# Patient Record
Sex: MALE | Race: WHITE | NOT HISPANIC OR LATINO | Employment: OTHER | URBAN - METROPOLITAN AREA
[De-identification: names, ages, dates, MRNs, and addresses within clinical notes are randomized per-mention and may not be internally consistent; named-entity substitution may affect disease eponyms.]

---

## 2017-03-30 ENCOUNTER — TELEPHONE (OUTPATIENT)
Dept: MEDICAL GROUP | Facility: MEDICAL CENTER | Age: 67
End: 2017-03-30

## 2017-03-30 DIAGNOSIS — R91.1 PULMONARY NODULE: ICD-10-CM

## 2017-03-31 NOTE — TELEPHONE ENCOUNTER
----- Message from Briseyda Sandoval R.N. sent at 3/30/2017 10:38 AM PDT -----  Regarding: Follow-up CT lung due April 2017  Hi Dr. Duncan,    Would you like this patient to have a CT scan repeat of his lungs in April?  I am happy to send him a reminder letter if you place the order.  Just let me know how I can help    Thanks  Briseyda Sandoval  [facility]  [phone]

## 2017-04-06 ENCOUNTER — TELEPHONE (OUTPATIENT)
Dept: MEDICAL GROUP | Facility: MEDICAL CENTER | Age: 67
End: 2017-04-06

## 2017-04-06 ENCOUNTER — HOSPITAL ENCOUNTER (OUTPATIENT)
Dept: RADIOLOGY | Facility: MEDICAL CENTER | Age: 67
End: 2017-04-06
Attending: INTERNAL MEDICINE
Payer: MEDICARE

## 2017-04-06 DIAGNOSIS — R91.1 PULMONARY NODULE: ICD-10-CM

## 2017-04-06 DIAGNOSIS — Z12.11 COLON CANCER SCREENING: ICD-10-CM

## 2017-04-06 PROCEDURE — 71250 CT THORAX DX C-: CPT

## 2017-06-29 ENCOUNTER — PATIENT OUTREACH (OUTPATIENT)
Dept: HEALTH INFORMATION MANAGEMENT | Facility: OTHER | Age: 67
End: 2017-06-29

## 2017-06-29 NOTE — PROGRESS NOTES
Outcome: Left Message    WebIZ Checked & Epic Updated:  yes    [facility]Connect Verified: yes    Attempt # 1

## 2017-07-05 NOTE — PROGRESS NOTES
Attempt #:1    WebIZ Checked & Epic Updated: yes  HealthConnect Verified: no  Verify PCP: yes    Communication Preference Obtained: yes     Review Care Team: yes    Annual Wellness Visit Scheduling  1. Scheduling Status:Scheduled     Care Gap Scheduling (Attempt to Schedule EACH Overdue Care Gap!)     Health Maintenance Due   Topic Date Due   • Annual Wellness Visit  SCHEDULED   • IMM DTaP/Tdap/Td Vaccine (1 - Tdap) SCHEDULED   • IMM ZOSTER VACCINE  SCHEDULED   • PFT SCREENING-FEV1 AND FEV/FVC RATIO / SPIROMETRY SHOULD BE PERFORMED ANNUALLY  NA   • IMM PNEUMOCOCCAL 65+ (ADULT) HIGH/HIGHEST RISK SERIES (2 of 2 - PPSV23) SCHEDULED   • COLONOSCOPY  HAS APPT SCHEDULED         MyChart Activation: declined  MyChart Rozina: no  Virtual Visits: no  Opt In to Text Messages: no

## 2017-08-01 ENCOUNTER — TELEPHONE (OUTPATIENT)
Dept: MEDICAL GROUP | Facility: MEDICAL CENTER | Age: 67
End: 2017-08-01

## 2017-08-07 ENCOUNTER — OFFICE VISIT (OUTPATIENT)
Dept: MEDICAL GROUP | Facility: MEDICAL CENTER | Age: 67
End: 2017-08-07
Payer: MEDICARE

## 2017-08-07 VITALS
BODY MASS INDEX: 22.66 KG/M2 | OXYGEN SATURATION: 95 % | HEART RATE: 77 BPM | DIASTOLIC BLOOD PRESSURE: 60 MMHG | WEIGHT: 141 LBS | TEMPERATURE: 98.8 F | HEIGHT: 66 IN | SYSTOLIC BLOOD PRESSURE: 102 MMHG

## 2017-08-07 DIAGNOSIS — D12.6 ADENOMATOUS POLYP OF COLON, UNSPECIFIED PART OF COLON: Chronic | ICD-10-CM

## 2017-08-07 DIAGNOSIS — Z12.5 PROSTATE CANCER SCREENING: ICD-10-CM

## 2017-08-07 DIAGNOSIS — J44.9 CHRONIC OBSTRUCTIVE PULMONARY DISEASE, UNSPECIFIED COPD TYPE (HCC): Chronic | ICD-10-CM

## 2017-08-07 DIAGNOSIS — Z23 NEED FOR VACCINATION: ICD-10-CM

## 2017-08-07 DIAGNOSIS — R79.89 LOW VITAMIN D LEVEL: ICD-10-CM

## 2017-08-07 DIAGNOSIS — R35.1 NOCTURIA: ICD-10-CM

## 2017-08-07 DIAGNOSIS — Z00.00 MEDICARE ANNUAL WELLNESS VISIT, SUBSEQUENT: ICD-10-CM

## 2017-08-07 DIAGNOSIS — H34.239: ICD-10-CM

## 2017-08-07 DIAGNOSIS — Z72.0 TOBACCO ABUSE: Chronic | ICD-10-CM

## 2017-08-07 DIAGNOSIS — E78.5 DYSLIPIDEMIA: Chronic | ICD-10-CM

## 2017-08-07 DIAGNOSIS — R79.89 LOW VITAMIN B12 LEVEL: Chronic | ICD-10-CM

## 2017-08-07 DIAGNOSIS — I70.0 ATHEROSCLEROSIS OF AORTA (HCC): ICD-10-CM

## 2017-08-07 DIAGNOSIS — D72.829 LEUKOCYTOSIS, UNSPECIFIED TYPE: Chronic | ICD-10-CM

## 2017-08-07 PROCEDURE — G0009 ADMIN PNEUMOCOCCAL VACCINE: HCPCS | Performed by: INTERNAL MEDICINE

## 2017-08-07 PROCEDURE — 90732 PPSV23 VACC 2 YRS+ SUBQ/IM: CPT | Performed by: INTERNAL MEDICINE

## 2017-08-07 PROCEDURE — G0438 PPPS, INITIAL VISIT: HCPCS | Mod: 25 | Performed by: INTERNAL MEDICINE

## 2017-08-07 ASSESSMENT — PATIENT HEALTH QUESTIONNAIRE - PHQ9: CLINICAL INTERPRETATION OF PHQ2 SCORE: 0

## 2017-08-07 NOTE — PROGRESS NOTES
Chief Complaint   Patient presents with   • Annual Wellness Visit         HPI:  Narendra is a 67 y.o. here for Medicare Annual Wellness Visit   medications and allergies reviewed  Cigar 3-4 per day, no cigarettes  Not interested in smoking cessation classes   no cough, no shortness of breath, no chest pain  Using inhalers daily  Taking vitamin D and B-12 multivitamin  Medical history, surgical history, social history, family history reviewed and updated      Patient Active Problem List    Diagnosis Date Noted   • Pulmonary nodule 07/03/2016   • History of hypertension 04/27/2015   • Low vitamin B12 level 02/07/2015   • Leukocytosis 12/31/2013   • Adenomatous colon polyp 07/16/2012   • BPH (benign prostatic hypertrophy) 04/17/2012   • COPD (chronic obstructive pulmonary disease) (HCC) 07/27/2009   • Dyslipidemia 07/27/2009   • History of pneumothorax 07/27/2009   • S/P Splenectomy 07/27/2009   • Preventative health care 07/27/2009   • Tobacco abuse 07/27/2009   • BRAO (branch retinal artery occlusion) 07/27/2009       Current Outpatient Prescriptions   Medication Sig Dispense Refill   • fluticasone-salmeterol (ADVAIR DISKUS) 250-50 MCG/DOSE AEROSOL POWDER, BREATH ACTIVATED Inhale 1 Puff by mouth 2 times a day. 60 Inhaler 4   • tiotropium (SPIRIVA HANDIHALER) 18 MCG Cap Inhale 1 Cap by mouth every day. 30 Cap 11   • tiotropium (SPIRIVA HANDIHALER) 18 MCG Cap Inhale 1 Cap by mouth every day. 30 Cap 11   • zolpidem (AMBIEN) 10 MG Tab Take 1 Tab by mouth at bedtime as needed for Sleep. 15 Each 0   • fluticasone-salmeterol (ADVAIR) 250-50 MCG/DOSE AEROSOL POWDER, BREATH ACTIVATED Inhale 1 Puff by mouth 2 times a day. 3 Inhaler 3   • pravastatin (PRAVACHOL) 20 MG Tab Take 1 Tab by mouth every day. 30 Tab 4   • pneumococcal vaccine (PNEUMOVAX 23) 25 MCG/0.5ML Injection 0.5 mL by Intramuscular route Once PRN for up to 1 dose. 0.5 mL 0   • vitamin D, Ergocalciferol, (DRISDOL) 94547 UNITS Cap capsule Take 1 Cap by mouth every 7  days. 12 Cap 0   • cyanocobalamin (VITAMIN B12) 1000 MCG TABS Take 1 Tab by mouth every day. 90 Tab 3     No current facility-administered medications for this visit.        Patient is taking medications as noted in medication list.  Current supplements as per medication list.     Allergies: Review of patient's allergies indicates no known allergies.    Current social contact/activities: Pt keeps himself busy by working full time.       Is patient current with immunizations? Pneumovax    He  reports that he has been smoking Cigarettes and Cigars.  He has a 70.5 pack-year smoking history. He has never used smokeless tobacco. He reports that he drinks alcohol. He reports that he does not use illicit drugs.  Ready to quit: Not Answered  Counseling given: Not Answered        DPA/Advanced directive: Patient does not have an Advanced Directive.  A packet and workshop information was given on Advanced Directives.    ROS:    Gait: Uses no assistive device   Ostomy: no    Other tubes: no    Amputations: no   Chronic oxygen use no   Last eye exam within the last six months   Wears hearing aids: no   : Denies incontinence.             Depression Screening    Little interest or pleasure in doing things?  0 - not at all  Feeling down, depressed, or hopeless? 0 - not at all  Trouble falling or staying asleep, or sleeping too much?     Feeling tired or having little energy?     Poor appetite or overeating?     Feeling bad about yourself - or that you are a failure or have let yourself or your family down?    Trouble concentrating on things, such as reading the newspaper or watching television?    Moving or speaking so slowly that other people could have noticed.  Or the opposite - being so fidgety or restless that you have been moving around a lot more than usual?     Thoughts that you would be better off dead, or of hurting yourself?     Patient Health Questionnaire Score:        If depressive symptoms identified deferred to  follow up visit unless specifically addressed in assessment and plan.    Interpretation of PHQ-9 Total Score   Score Severity   1-4 No Depression   5-9 Mild Depression   10-14 Moderate Depression   15-19 Moderately Severe Depression   20-27 Severe Depression      Screening for Cognitive Impairment    Three Minute Recall (apple, watch, alcides)  3/3    Draw clock face with all 12 numbers set to the hand to show 10 minutes past 11 o'clock  1 5/5  If cognitive concerns identified, deferred for follow up unless specifically addressed in assessment and plan.    Fall Risk Assessment    Has the patient had two or more falls in the last year or any fall with injury in the last year?  No  If fall risk identified, deferred for follow up unless specifically addressed in assessment and plan.      Safety Assessment    Throw rugs on floor.  No  Handrails on all stairs.  Yes  Good lighting in all hallways.  Yes  Difficulty hearing.  No  Patient counseled about all safety risks that were identified.    Functional Assessment ADLs    Are there any barriers preventing you from cooking for yourself or meeting nutritional needs?  No.    Are there any barriers preventing you from driving safely or obtaining transportation?  No.    Are there any barriers preventing you from using a telephone or calling for help?  No.    Are there any barriers preventing you from shopping?  No.    Are there any barriers preventing you from taking care of your own finances?  No.    Are there any barriers preventing you from managing your medications?  No.    Are you currently engaging any exercise or physical activity?  No.       Health Maintenance Summary                Annual Wellness Visit Overdue 1950     IMM DTaP/Tdap/Td Vaccine Overdue 7/25/1969     IMM ZOSTER VACCINE Overdue 7/25/2010     PFT SCREENING-FEV1 AND FEV/FVC RATIO / SPIROMETRY SHOULD BE PERFORMED ANNUALLY Overdue 2/6/2014      Done 2/6/2013 PFT DICTATED RESULTS     Patient has more  history with this topic...    IMM PNEUMOCOCCAL 65+ (ADULT) HIGH/HIGHEST RISK SERIES Overdue 7/25/2015      Done 1/23/2015 Imm Admin: Pneumococcal Conjugate Vaccine (Prevnar/PCV-13)    IMM INFLUENZA Next Due 9/1/2017     LUNG CANCER SCREENING Next Due 10/6/2017      Done 4/6/2017 CT-CHEST (THORAX) W/O     Patient has more history with this topic...    COLONOSCOPY Next Due 7/28/2020      Done 7/28/2017 REFERRAL TO GI FOR COLONOSCOPY     Patient has more history with this topic...          Adenoma  12/05 colonoscopy [facility] tubular adenoma  6/29/12 colon per [facility], neg, repeat 5 years  7/28/17 colon per [facility] 3 mm polyp descending colon, 10 mm polyp descending colon, 5-8 mm polyp sigmoid colon, 10 mm polyp distal sigmoid colon, 6-8 mm polyp rectum, pathology adenoma and hyperplastic polyps repeat 3 years    BPH  5/11 psa 0.7  4/12 psa 7.5 given course of cipro  5/12/12 psa 2.3 after cipro  12/31/13 psa 1.4  2/7/15 psa 1.1  5/18/16 psa 1.2    COPD  5/08 chest x-ray negative  6/08 PFT FEV1.6 L (54% predicted), FEV/FVC ratio 47%, positive bronchodilator response  6/10 quit smoking tobacco  5/11 still off cigs, smoking 2 cigars per day  5/11 restart spiriva  5/11 cxr negative  12/12 off cigs, still smokes cigars 3-4 per day  12/12 on spiriva, add symbicort 80/4.5  2/7/13 PFT severe stage III COPD, FEV1 1.4 L (46% predicted), FEV/FVC 47% improvement after bronchodilator 13%, normal DLCO; continue spiriva, symbicort 80/1.5, smoking cessation  6/24/16  CT thorax lung cancer screening to spiculated 9 mm nodules RUL underlying emphysematous changes and tiny 3 mm nodule RML, recommend 3 month follow-up CT vs CT/PET  2/7/13 cxr negative  12/26/13 still 3 cigars per day; on spiriva and symbicort  1/23/15 still 3 cigars per day, on spiriva and symbicort  1/26/16 change symbicort to advair 250/50 (insurance) and continue spiriva  6/2/16 CT lung cancer screening visit  7/8/16 C note right upper lobe lung nodules, suspicious in nature,  patient agrees to CT/PET  7/8/16 CT/PET spiculated right upper lobe nodule SUV 1.1, not FDG avid, scarring left upper lobe noted, nonspecific findings, low-grade neoplasm cannot be excluded; per [facility] repeat CT 3 months  10/24/16 CT thorax without; 2 spiculated right upper lobe pulmonary nodules 8 mm and 9 mm in size unchanged, 3 mm right middle lobe unchanged nodule  10/27/16 lung cancer screening program note, recommend referral to pulmonary nodule clinic  4/6/17 CT thorax without; 2 stable 8-9 mm spiculated right upper lobe nodules, stable probably postinflammatory tiny 3 mm RML and RLL nodules, no new suspicious nodules    Dyslipidemia  5/08 chol  239, trig 91,hdl 71,ldl 150  8/09 chol 268,trig 107,hdl 56,ldl 191  8/09 start zocor 20  8/09 stress echo negative  5/11 chol 231,trig 114,hdl 53,ldl 155 off zocor  5/12 chol 215,trig 97,hdl 48,ldl 148 off zocor  12/31/13 chol 215,trig 101,hdl 55,ldl 140,crp 3.0 off statin; 10 year risk 12%, I recommend statin therapy he declines  1/13/14 echo technically difficult study, possible mild LVH  1/13/14 treadmill thallium exercise 6 minutes 30 seconds lashell protocol, limited by fatigue; no ischemia, EF 59%  2/7/15 chol 250,trig 86,hdl 51,ldl 182; urine mac <0.5; 10 year risk calculation 19%, start zocor 20 mg  5/16/16 did not take zocor  5/18/16 chol 239,trig 152,hdl 44,ldl 165    History hypertension  1/13/14 echo technically difficult study, possible mild LVH  1/13/14 treadmill thallium exercise 6 minutes 30 seconds lashell protocol, limited by fatigue; no ischemia, EF 59%  4/27/15 start lisinopril 10 mg   5/16/16 off lisinopril     Leukocytosis  8/09 wbc 8.9  5/11 wbc 11.4  4/12 wbc 12.4 (54%N,34%L)  12/31/13 wbc 12.9 (53%N,22%L),hgb 17,hct 52,mcv 102,platelet 364, CRP 3.0; ? Related to tobacco  2/7/15 wbc 12.4 (49%N,34%L),hgb 17,hct 53,plt [id number]; b12 201,folate 5.3  2/13/15 leukocyte alkaline phosphatase 108 normal  5/18/16 wbc 13.5 (51%N,30%L),hgb 17.8,hct 54,plt 427,b12  249,folate 7  5/18/16 b12 249,folate 7 not on b12    Low B-12  12/7/15 b12 201, folate 5.3, wbc 12.4(49%N,34%L),hgb 17,hct 53,plt [id number]; start b12 1000 mcg daily  5/18/16 b12 249,folate 7,MMA<0.1,Intrinsic factor Ab negative, not on b12    Preventative health  12/26/13 decline flu,pneum,td vac  1/13/14 ultrasound aorta negative  1/23/15 prevnar  1/23/15 menactra  5/16/16 pneumovax written to get [facility]  5/18/16 psa 1.2  5/18/16 vit d 27 on 16320 weekly  7/28/17 colon per [facility] 3 mm polyp descending colon, 10 mm polyp descending colon, 5-8 mm polyp sigmoid colon, 10 mm polyp distal sigmoid colon, 6-8 mm polyp rectum, pathology adenoma and hyperplastic polyps repeat 3 years    Pulmonary nodule  6/24/16  CT thorax lung cancer screening to spiculated 9 mm nodules RUL underlying emphysematous changes and tiny 3 mm nodule RML, recommend 3 month follow-up CT vs CT/PET  7/8/16 IO note right upper lobe lung nodules, suspicious in nature, patient agrees to CT/PET  7/8/16 CT/PET spiculated right upper lobe nodule SUV 1.1, not FDG avid, scarring left upper lobe noted, nonspecific findings, low-grade neoplasm cannot be excluded; per [facility] repeat CT 3 months  10/24/16 CT thorax without; 2 spiculated right upper lobe pulmonary nodules 8 mm and 9 mm in size unchanged, 3 mm right middle lobe unchanged nodule  10/27/16 lung cancer screening program note, recommend referral to pulmonary nodule clinic  11/3/16 lung cancer screening note recent follow-up CT scan unchanged pulmonary nodules, recommend repeat CT scan 6 months with myself  4/6/17 CT thorax without; 2 stable 8-9 mm spiculated right upper lobe nodules, stable probably postinflammatory tiny 3 mm RML and RLL nodules, no new suspicious nodules    Status post splenectomy  During childhood  12/31/13 previously declined vaccines   1/23/15 menactra  1/23/15 prevnar    Tobacco  12/12 off cigs had been smoking 1 to 1.5 packs per day for 40+ years, still smokes cigars 3-4 per  day  2/4/13 cxr negative  4/13 off cigar using electronic cig  12/26/13 still smokes 3 cigar per day  1/13/14 echo technically difficult study, possible mild LVH  1/13/14 treadmill thallium exercise 6 minutes 30 seconds lashell protocol, limited by fatigue; no ischemia, EF 59%  5/16/16 still smoking 3 cigars per day  6/24/16  CT thorax lung cancer screening to spiculated 9 mm nodules RUL underlying emphysematous changes and tiny 3 mm nodule RML, recommend 3 month follow-up CT vs CT/PET  7/8/16 [facility] note right upper lobe lung nodules, suspicious in nature, patient agrees to CT/PET  7/8/16 [facility] note right upper lobe lung nodules, suspicious in nature, patient agrees to CT/PET  7/8/16 CT/PET spiculated right upper lobe nodule SUV 1.1, not FDG avid, scarring left upper lobe noted, nonspecific findings, low-grade neoplasm cannot be excluded; per [facility] repeat CT 3 months  10/24/16 CT thorax without; 2 spiculated right upper lobe pulmonary nodules 8 mm and 9 mm in size unchanged, 3 mm right middle lobe unchanged nodule  10/27/16 lung cancer screening program note, recommend referral to pulmonary nodule clinic  4/6/17 CT thorax without; 2 stable 8-9 mm spiculated right upper lobe nodules, stable probably postinflammatory tiny 3 mm RML and RLL nodules, no new suspicious nodules  8/7/17 no cigarettes, 4 cigars per day        Patient Care Team:  Naveen Duncan M.D. as PCP - General      Social History   Substance Use Topics   • Smoking status: Current Every Day Smoker -- 1.50 packs/day for 47 years     Types: Cigarettes, Cigars     Last Attempt to Quit: 05/17/2012   • Smokeless tobacco: Never Used      Comment: 1.5 ppd since age of 14 yrs.  Quit 4 yrs ago and only smokes cigars   • Alcohol Use: 0.0 - 0.6 oz/week     0-1 Standard drinks or equivalent per week     Family History   Problem Relation Age of Onset   • Cancer Brother      prostate and likely 2 other cancers     He  has a past medical history of Dyslipidemia; Bronchitis  chronic; PNEUMOTHORAX; COPD (chronic obstructive pulmonary disease) (CMS-HCC) (7/27/2009); S/P Splenectomy (7/27/2009); Preventative health care (7/27/2009); Tubular adenoma (7/27/2009); and Tobacco abuse (7/27/2009).   Past Surgical History   Procedure Laterality Date   • Splenectomy     • General lung surgery Left 1980s     Lung biopsy - benign         Exam:      Hearing fair  Dentition fair  Alert, oriented in no acute distress.  Eye contact is good, speech goal directed, affect calm  Lungs clear  Cv s1s2 reg  Rectal exam prostate mild BPH, no nodules, guaiac negative    Assessment and Plan. The following treatment and monitoring plan is recommended:      Assessment  #! Wellness assessment    #2 recent colonoscopy finding polyp pathology adenoma 7/28/17 colon per [facility] 3 mm polyp descending colon, 10 mm polyp descending colon, 5-8 mm polyp sigmoid colon, 10 mm polyp distal sigmoid colon, 6-8 mm polyp rectum, pathology adenoma and hyperplastic polyps repeat 3 years    #3 probable COPD, still smoking 4 cigars per day, has declined primary function tests previously, remains on inhalers, lung cancer screening CT scan thorax done in April 4/6/17 CT thorax without; 2 stable 8-9 mm spiculated right upper lobe nodules, stable probably postinflammatory tiny 3 mm RML and RLL nodules, no new suspicious nodules    #4 Dyslipidemia has declined to take simvastatin 5/18/16 chol 239,trig 152,hdl 44,ldl 165    #5 Leukocytosis most recent labs 5/18/16 wbc 13.5 (51%N,30%L),hgb 17.8,hct 54,plt 427,b12 249,folate 7    #6 Low B-12 taking B-12 vitamin supplementation most recent labs 5/18/16 b12 249,folate 7,MMA<0.1,Intrinsic factor Ab negative, not on b12    #7 Preventative health  12/26/13 decline flu,pneum,td vac  1/13/14 ultrasound aorta negative  1/23/15 prevnar  1/23/15 menactra  5/16/16 pneumovax written to get at pharamacy  5/18/16 psa 1.2  5/18/16 vit d 27 on 789[address] weekly  7/28/17 colon per [facility] 3 mm polyp descending colon, 10 mm  polyp descending colon, 5-8 mm polyp sigmoid colon, 10 mm polyp distal sigmoid colon, 6-8 mm polyp rectum, pathology adenoma and hyperplastic polyps repeat 3 years    #8 Pulmonary nodule most recent CT scan done in April 4/6/17 CT thorax without; 2 stable 8-9 mm spiculated right upper lobe nodules, stable probably postinflammatory tiny 3 mm RML and RLL nodules, no new suspicious nodules    #9 Status post splenectomy previous vaccines administered   1/23/15 menactra  1/23/15 prevnar    #10 Tobacco 8/7/17 no cigarettes, 4 cigars per day    #11 atherosclerosis aorta, no history of aneurysm, risk factors tobacco, dyslipidemia; 10/25/16 atherosclerotic plaque aorta on CT scan thorax    #12 history of low vitamin D         Services suggested:    Health Care Screening recommendations as per orders if indicated.  Referrals offered: PT/OT/Nutrition counseling/Behavioral Health/Smoking cessation as per orders if indicated.    Discussion today about general wellness and lifestyle habits:    · Prevent falls and reduce trip hazards; Cautioned about securing or removing rugs.  · Have a working fire alarm and carbon monoxide detector;   · Engage in regular physical activity and social activities       Follow-up:   Plan  #1 health maintenance is reviewed and updated    #2 colonoscopy in 3 years follow-up adenoma    #3 repeat CT thorax one year follow-up nodules    #4 stop smoking understands long-term risk of cigar smoking lung cancer, worsening lung disease, COPD, head and neck cancer, cardiovascular disease    #5 decline stop smoking classes and medications    #6 labs ordered    #7 pneumococcal 23 vaccine today    #8 repeat Menactra 1/20, has had pneumococcal 13    #9 annual influenza vaccine    #10 labs ordered    #11 no need for hearing test, physical therapy, nutrition consultation    #12 follow-up 6 months    #13 has declined statin therapy understanding may decrease the risk of cardiovascular disease

## 2017-08-07 NOTE — PROGRESS NOTES
Subjective:      Narendra Sun is a 67 y.o. male who presents with Annual Wellness Visit            HPI     Here for wellness         Current Outpatient Prescriptions   Medication Sig Dispense Refill   • fluticasone-salmeterol (ADVAIR DISKUS) 250-50 MCG/DOSE AEROSOL POWDER, BREATH ACTIVATED Inhale 1 Puff by mouth 2 times a day. 60 Inhaler 4   • tiotropium (SPIRIVA HANDIHALER) 18 MCG Cap Inhale 1 Cap by mouth every day. 30 Cap 11   • tiotropium (SPIRIVA HANDIHALER) 18 MCG Cap Inhale 1 Cap by mouth every day. 30 Cap 11   • zolpidem (AMBIEN) 10 MG Tab Take 1 Tab by mouth at bedtime as needed for Sleep. 15 Each 0   • fluticasone-salmeterol (ADVAIR) 250-50 MCG/DOSE AEROSOL POWDER, BREATH ACTIVATED Inhale 1 Puff by mouth 2 times a day. 3 Inhaler 3   • pravastatin (PRAVACHOL) 20 MG Tab Take 1 Tab by mouth every day. 30 Tab 4   • pneumococcal vaccine (PNEUMOVAX 23) 25 MCG/0.5ML Injection 0.5 mL by Intramuscular route Once PRN for up to 1 dose. 0.5 mL 0   • vitamin D, Ergocalciferol, (DRISDOL) 37639 UNITS Cap capsule Take 1 Cap by mouth every 7 days. 12 Cap 0   • cyanocobalamin (VITAMIN B12) 1000 MCG TABS Take 1 Tab by mouth every day. 90 Tab 3     No current facility-administered medications for this visit.     Patient Active Problem List    Diagnosis Date Noted   • Pulmonary nodule 07/03/2016   • History of hypertension 04/27/2015   • Low vitamin B12 level 02/07/2015   • Leukocytosis 12/31/2013   • Adenomatous colon polyp 07/16/2012   • BPH (benign prostatic hypertrophy) 04/17/2012   • COPD (chronic obstructive pulmonary disease) (HCC) 07/27/2009   • Dyslipidemia 07/27/2009   • History of pneumothorax 07/27/2009   • S/P Splenectomy 07/27/2009   • Preventative health care 07/27/2009   • Tobacco abuse 07/27/2009   • BRAO (branch retinal artery occlusion) 07/27/2009       Adenoma  12/05 colonoscopy [facility] tubular adenoma  6/29/12 colon per [facility], neg, repeat 5 years  7/28/17 colon per [facility] 3 mm polyp descending colon, 10 mm  polyp descending colon, 5-8 mm polyp sigmoid colon, 10 mm polyp distal sigmoid colon, 6-8 mm polyp rectum, pathology adenoma and hyperplastic polyps repeat 3 years    BPH  5/11 psa 0.7  4/12 psa 7.5 given course of cipro  5/12/12 psa 2.3 after cipro  12/31/13 psa 1.4  2/7/15 psa 1.1  5/18/16 psa 1.2    brao  Records from ophthalmology july 2009 from [facility] indicate right branch retinal artery occlusion  8/09 MRI/MRA head and neck negative  8/09 protein C, protein S, anticardiolipin antibody, beta 2 glycoprotein antibody, factor II, factor V leyden, homocysteine antithrombin III all negative  8/09 stress echo negative  8/09 holter monitor negative, need to modify risk factors we'll start on statin, he needs to quit smoking    COPD  5/08 chest x-ray negative  6/08 PFT FEV1.6 L (54% predicted), FEV/FVC ratio 47%, positive bronchodilator response  6/10 quit smoking tobacco  5/11 still off cigs, smoking 2 cigars per day  5/11 restart spiriva  5/11 cxr negative  12/12 off cigs, still smokes cigars 3-4 per day  12/12 on spiriva, add symbicort 80/4.5  2/7/13 PFT severe stage III COPD, FEV1 1.4 L (46% predicted), FEV/FVC 47% improvement after bronchodilator 13%, normal DLCO; continue spiriva, symbicort 80/1.5, smoking cessation  6/24/16  CT thorax lung cancer screening to spiculated 9 mm nodules RUL underlying emphysematous changes and tiny 3 mm nodule RML, recommend 3 month follow-up CT vs CT/PET  2/7/13 cxr negative  12/26/13 still 3 cigars per day; on spiriva and symbicort  1/23/15 still 3 cigars per day, on spiriva and symbicort  1/26/16 change symbicort to advair 250/50 (insurance) and continue spiriva  6/2/16 CT lung cancer screening visit  7/8/16 [facility] note right upper lobe lung nodules, suspicious in nature, patient agrees to CT/PET  7/8/16 CT/PET spiculated right upper lobe nodule SUV 1.1, not FDG avid, scarring left upper lobe noted, nonspecific findings, low-grade neoplasm cannot be excluded; per  [facility] repeat CT 3 months  10/24/16 CT thorax without; 2 spiculated right upper lobe pulmonary nodules 8 mm and 9 mm in size unchanged, 3 mm right middle lobe unchanged nodule  10/27/16 lung cancer screening program note, recommend referral to pulmonary nodule clinic  4/6/17 CT thorax without; 2 stable 8-9 mm spiculated right upper lobe nodules, stable probably postinflammatory tiny 3 mm RML and RLL nodules, no new suspicious nodules    Dyslipidemia  5/08 chol  239, trig 91,hdl 71,ldl 150  8/09 chol 268,trig 107,hdl 56,ldl 191  8/09 start zocor 20  8/09 stress echo negative  5/11 chol 231,trig 114,hdl 53,ldl 155 off zocor  5/12 chol 215,trig 97,hdl 48,ldl 148 off zocor  12/31/13 chol 215,trig 101,hdl 55,ldl 140,crp 3.0 off statin; 10 year risk 12%, I recommend statin therapy he declines  1/13/14 echo technically difficult study, possible mild LVH  1/13/14 treadmill thallium exercise 6 minutes 30 seconds lashell protocol, limited by fatigue; no ischemia, EF 59%  2/7/15 chol 250,trig 86,hdl 51,ldl 182; urine mac <0.5; 10 year risk calculation 19%, start zocor 20 mg  5/16/16 did not take zocor  5/18/16 chol 239,trig 152,hdl 44,ldl 165    History hypertension  1/13/14 echo technically difficult study, possible mild LVH  1/13/14 treadmill thallium exercise 6 minutes 30 seconds lashell protocol, limited by fatigue; no ischemia, EF 59%  4/27/15 start lisinopril 10 mg   5/16/16 off lisinopril     History of pneumothorax  1980s status post left upper lobectomy    Leukocytosis  8/09 wbc 8.9  5/11 wbc 11.4  4/12 wbc 12.4 (54%N,34%L)  12/31/13 wbc 12.9 (53%N,22%L),hgb 17,hct 52,mcv 102,platelet 364, CRP 3.0; ? Related to tobacco  2/7/15 wbc 12.4 (49%N,34%L),hgb 17,hct 53,plt [id number]; b12 201,folate 5.3  2/13/15 leukocyte alkaline phosphatase 108 normal  5/18/16 wbc 13.5 (51%N,30%L),hgb 17.8,hct 54,plt 427,b12 249,folate 7  5/18/16 b12 249,folate 7 not on b12    Low B-12  2/7/15 b12 201, folate 5.3, wbc 12.4(49%N,34%L),hgb 17,hct 53,plt  [id number]; start b12 1000 mcg daily  5/18/16 b12 249,folate 7,MMA<0.1,Intrinsic factor Ab negative, not on b12    Preventative health  12/26/13 decline flu,pneum,td vac  1/13/14 ultrasound aorta negative  1/23/15 prevnar  1/23/15 menactra  5/16/16 pneumovax written to get at [facility]  5/18/16 psa 1.2  5/18/16 vit d 27 on [id number] weekly  7/28/17 colon per [facility] 3 mm polyp descending colon, 10 mm polyp descending colon, 5-8 mm polyp sigmoid colon, 10 mm polyp distal sigmoid colon, 6-8 mm polyp rectum, pathology adenoma and hyperplastic polyps repeat 3 years    Pulmonary nodule  6/24/16  CT thorax lung cancer screening to spiculated 9 mm nodules RUL underlying emphysematous changes and tiny 3 mm nodule RML, recommend 3 month follow-up CT vs CT/PET  7/8/16 [facility] note right upper lobe lung nodules, suspicious in nature, patient agrees to CT/PET  7/8/16 CT/PET spiculated right upper lobe nodule SUV 1.1, not FDG avid, scarring left upper lobe noted, nonspecific findings, low-grade neoplasm cannot be excluded; per [facility] repeat CT 3 months  10/24/16 CT thorax without; 2 spiculated right upper lobe pulmonary nodules 8 mm and 9 mm in size unchanged, 3 mm right middle lobe unchanged nodule  10/27/16 lung cancer screening program note, recommend referral to pulmonary nodule clinic  11/3/16 lung cancer screening note recent follow-up CT scan unchanged pulmonary nodules, recommend repeat CT scan 6 months with myself  4/6/17 CT thorax without; 2 stable 8-9 mm spiculated right upper lobe nodules, stable probably postinflammatory tiny 3 mm RML and RLL nodules, no new suspicious nodules    Status post splenectomy  During childhood  12/31/13 previously declined vaccines   1/23/15 menactra  1/23/15 prevnar    Tobacco  12/12 off cigs had been smoking 1 to 1.5 packs per day for 40+ years, still smokes cigars 3-4 per day  2/4/13 cxr negative  4/13 off cigar using electronic cig  12/26/13 still smokes 3 cigar per day  1/13/14 echo technically  difficult study, possible mild LVH  1/13/14 treadmill thallium exercise 6 minutes 30 seconds lashell protocol, limited by fatigue; no ischemia, EF 59%  5/16/16 still smoking 3 cigars per day  6/24/16  CT thorax lung cancer screening to spiculated 9 mm nodules RUL underlying emphysematous changes and tiny 3 mm nodule RML, recommend 3 month follow-up CT vs CT/PET  7/8/16 [facility] note right upper lobe lung nodules, suspicious in nature, patient agrees to CT/PET          Depression Screening    Little interest or pleasure in doing things?  0 - not at all  Feeling down, depressed , or hopeless? 0 - not at all  Trouble falling or staying asleep, or sleeping too much?     Feeling tired or having little energy?     Poor appetite or overeating?     Feeling bad about yourself - or that you are a failure or have let yourself or your family down?    Trouble concentrating on things, such as reading the newspaper or watching television?    Moving or speaking so slowly that other people could have noticed.  Or the opposite - being so fidgety or restless that you have been moving around a lot more than usual?     Thoughts that you would be better off dead, or of hurting yourself?     Patient Health Questionnaire Score:      If depressive symptoms identified deferred to follow up visit unless specifically addressed in assessment and plan.    Interpretation of PHQ-9 Total Score   Score Severity   1-4 No Depression   5-9 Mild Depression   10-14 Moderate Depression   15-19 Moderately Severe Depression   20-27 Severe Depression      Screening for Cognitive Impairment    Three Minute Recall (apple, watch, alcides)  3/3    Draw clock face with all 12 numbers set to the hand to show 10 minutes past 11 o'clock  1 5/5  Cognitive concerns identified deferred for follow up unless specifically addressed in assessment and plan.    Fall Risk Assessment    Has the patient had two or more falls in the last year or any fall with injury in the last year?   "No    Safety Assessment    Throw rugs on floor.  No  Handrails on all stairs.  Yes  Good lighting in all hallways.  Yes  Difficulty hearing.  No  Patient counseled about all safety risks that were identified.    Functional Assessment ADLs    Are there any barriers preventing you from cooking for yourself or meeting nutritional needs?  No.    Are there any barriers preventing you from driving safely or obtaining transportation?  No.    Are there any barriers preventing you from using a telephone or calling for help?  No.    Are there any barriers preventing you from shopping?  No.    Are there any barriers preventing you from taking care of your own finances?  No.    Are there any barriers preventing you from managing your medications?  No.    Are currently engaging any exercise or physical activity?  No.       Health Maintenance Summary                Annual Wellness Visit Overdue 1950     IMM DTaP/Tdap/Td Vaccine Overdue 7/25/1969     IMM ZOSTER VACCINE Overdue 7/25/2010     PFT SCREENING-FEV1 AND FEV/FVC RATIO / SPIROMETRY SHOULD BE PERFORMED ANNUALLY Overdue 2/6/2014      Done 2/6/2013 PFT DICTATED RESULTS     Patient has more history with this topic...    IMM PNEUMOCOCCAL 65+ (ADULT) HIGH/HIGHEST RISK SERIES Overdue 7/25/2015      Done 1/23/2015 Imm Admin: Pneumococcal Conjugate Vaccine (Prevnar/PCV-13)    IMM INFLUENZA Next Due 9/1/2017     LUNG CANCER SCREENING Next Due 10/6/2017      Done 4/6/2017 CT-CHEST (THORAX) W/O     Patient has more history with this topic...    COLONOSCOPY Next Due 7/28/2020      Done 7/28/2017 REFERRAL TO GI FOR COLONOSCOPY     Patient has more history with this topic...          Patient Care Team:  Naveen Duncan M.D. as PCP - CLARISA ColladoPSELENE as Consulting Physician (Oncology)        ROS       Objective:     /60 mmHg  Pulse 77  Temp(Src) 37.1 °C (98.8 °F)  Ht 1.676 m (5' 5.98\")  Wt 63.957 kg (141 lb)  BMI 22.77 kg/m2  SpO2 95%     Physical Exam "   Constitutional: He appears well-developed and well-nourished. No distress.   HENT:   Head: Normocephalic and atraumatic.   Eyes: Conjunctivae are normal. Right eye exhibits no discharge. Left eye exhibits no discharge.   Neck: No JVD present. No thyromegaly present.   Cardiovascular: Normal rate and regular rhythm.    Pulmonary/Chest: Effort normal and breath sounds normal. No respiratory distress. He has no wheezes.   Abdominal: He exhibits no distension.   Neurological: He is alert.   Skin: Skin is warm. He is not diaphoretic.   Psychiatric: He has a normal mood and affect. His behavior is normal.   Nursing note and vitals reviewed.              Assessment/Plan:   Assessment  #! Wellness    #2    Adenoma  7/28/17 colon per [facility] 3 mm polyp descending colon, 10 mm polyp descending colon, 5-8 mm polyp sigmoid colon, 10 mm polyp distal sigmoid colon, 6-8 mm polyp rectum, pathology adenoma and hyperplastic polyps repeat 3 years        COPD  5/08 chest x-ray negative  6/08 PFT FEV1.6 L (54% predicted), FEV/FVC ratio 47%, positive bronchodilator response  6/10 quit smoking tobacco  5/11 still off cigs, smoking 2 cigars per day  5/11 restart spiriva  5/11 cxr negative  12/12 off cigs, still smokes cigars 3-4 per day  12/12 on spiriva, add symbicort 80/4.5  2/7/13 PFT severe stage III COPD, FEV1 1.4 L (46% predicted), FEV/FVC 47% improvement after bronchodilator 13%, normal DLCO; continue spiriva, symbicort 80/1.5, smoking cessation  6/24/16  CT thorax lung cancer screening to spiculated 9 mm nodules RUL underlying emphysematous changes and tiny 3 mm nodule RML, recommend 3 month follow-up CT vs CT/PET  2/7/13 cxr negative  12/26/13 still 3 cigars per day; on spiriva and symbicort  1/23/15 still 3 cigars per day, on spiriva and symbicort  1/26/16 change symbicort to advair 250/50 (insurance) and continue spiriva  6/2/16 CT lung cancer screening visit  7/8/16 [facility] note right upper lobe lung nodules, suspicious in nature,  patient agrees to CT/PET  7/8/16 CT/PET spiculated right upper lobe nodule SUV 1.1, not FDG avid, scarring left upper lobe noted, nonspecific findings, low-grade neoplasm cannot be excluded; per [facility] repeat CT 3 months  10/24/16 CT thorax without; 2 spiculated right upper lobe pulmonary nodules 8 mm and 9 mm in size unchanged, 3 mm right middle lobe unchanged nodule  10/27/16 lung cancer screening program note, recommend referral to pulmonary nodule clinic  4/6/17 CT thorax without; 2 stable 8-9 mm spiculated right upper lobe nodules, stable probably postinflammatory tiny 3 mm RML and RLL nodules, no new suspicious nodules    Dyslipidemia  5/16/16 did not take zocor  5/18/16 chol 239,trig 152,hdl 44,ldl 165      Leukocytosis  5/18/16 b12 249,folate 7 not on b12    Low B-12  5/18/16 b12 249,folate 7,MMA<0.1,Intrinsic factor Ab negative, not on b12    Preventative health  12/26/13 decline flu,pneum,td vac  1/13/14 ultrasound aorta negative  1/23/15 prevnar  1/23/15 menactra  5[facility]16/[facility]  5/18/16 psa 1.2  5/18/16 vit d 27 on [id number] weekly  7/28/17 colon per [facility] 3 mm polyp descending colon, 10 mm polyp descending colon, 5-8 mm polyp sigmoid colon, 10 mm polyp distal sigmoid colon, 6-8 mm polyp rectum, pathology adenoma and hyperplastic polyps repeat 3 years    Pulmonary nodule  6/24/16  CT thorax lung cancer screening to spiculated 9 mm nodules RUL underlying emphysematous changes and tiny 3 mm nodule RML, recommend 3 month follow-up CT vs CT/PET  7/8/16 [facility] note right upper lobe lung nodules, suspicious in nature, patient agrees to CT/PET  7/8/16 CT/PET spiculated right upper lobe nodule SUV 1.1, not FDG avid, scarring left upper lobe noted, nonspecific findings, low-grade neoplasm cannot be excluded; per [facility] repeat CT 3 months  10/24/16 CT thorax without; 2 spiculated right upper lobe pulmonary nodules 8 mm and 9 mm in size unchanged, 3 mm right middle lobe unchanged nodule  10/27/16  lung cancer screening program note, recommend referral to pulmonary nodule clinic  11/3/16 lung cancer screening note recent follow-up CT scan unchanged pulmonary nodules, recommend repeat CT scan 6 months with myself  4/6/17 CT thorax without; 2 stable 8-9 mm spiculated right upper lobe nodules, stable probably postinflammatory tiny 3 mm RML and RLL nodules, no new suspicious nodules    Status post splenectomy  During childhood  12/31/13 previously declined vaccines   1/23/15 menactra  1/23/15 prevnar    Tobacco  12/12 off cigs had been smoking 1 to 1.5 packs per day for 40+ years, still smokes cigars 3-4 per day  2/4/13 cxr negative  4/13 off cigar using electronic cig  12/26/13 still smokes 3 cigar per day  1/13/14 echo technically difficult study, possible mild LVH  1/13/14 treadmill thallium exercise 6 minutes 30 seconds lashell protocol, limited by fatigue; no ischemia, EF 59%  5/16/16 still smoking 3 cigars per day  6/24/16  CT thorax lung cancer screening to spiculated 9 mm nodules RUL underlying emphysematous changes and tiny 3 mm nodule RML, recommend 3 month follow-up CT vs CT/PET  7/8/16 [facility] note right upper lobe lung nodules, suspicious in nature, patient agrees to CT/PET    Aorta calcium           Plan  #1 stop smoking recommended    #2 recommend stop smoking classes    #3 understands long term risk of smoking     #4 repeat labs    #5 pneumovax     #6 PFT

## 2017-08-07 NOTE — MR AVS SNAPSHOT
"        Narendra Sun   2017 10:20 AM   Office Visit   MRN: [id number]    Department:  [facility]   Dept Phone:  [phone]    Description:  Male : 1950   Provider:  Naveen Duncan M.D.; [facility]            Reason for Visit     Annual Wellness Visit           Allergies as of 2017     No Known Allergies      You were diagnosed with     Medicare annual wellness visit, subsequent   [875914]       Need for vaccination   [311869]       Tobacco abuse   [617212]       Dyslipidemia   [237273]       Leukocytosis, unspecified type   [8183093]       Low vitamin B12 level   [176822]       Low vitamin D level   [8146046]       Nocturia   [788.43.ICD-9-CM]       Prostate cancer screening   [553351]       Chronic obstructive pulmonary disease, unspecified COPD type (CMS-HCC)   [4950534]       Adenomatous polyp of colon, unspecified part of colon   [0728351]       BRAO (branch retinal artery occlusion), unspecified laterality   [184708]         Vital Signs     Blood Pressure Pulse Temperature Height Weight Body Mass Index    102/60 mmHg 77 37.1 °C (98.8 °F) 1.676 m (5' 5.98\") 63.957 kg (141 lb) 22.77 kg/m2    Oxygen Saturation Smoking Status                95% Current Every Day Smoker          Basic Information     Date Of Birth Sex Race Ethnicity Preferred Language    1950 Male White Non- English      Problem List              ICD-10-CM Priority Class Noted - Resolved    COPD (chronic obstructive pulmonary disease) (HCC) (Chronic) J44.9   2009 - Present    Dyslipidemia (Chronic) E78.5   2009 - Present    History of pneumothorax Z87.09   2009 - Present    S/P Splenectomy (Chronic)    2009 - Present    Preventative health care (Chronic) Z00.00   2009 - Present    Tobacco abuse (Chronic) Z72.0   2009 - Present    BRAO (branch retinal artery occlusion) H34.239   2009 - Present    BPH (benign prostatic hypertrophy) (Chronic) N40.0   " 4/17/2012 - Present    Adenomatous colon polyp (Chronic) D12.6   7/16/2012 - Present    Leukocytosis (Chronic) D72.829   12/31/2013 - Present    Low vitamin B12 level (Chronic) E53.8   2/7/2015 - Present    History of hypertension Z86.79   4/27/2015 - Present    Pulmonary nodule (Chronic) R91.1   7/3/2016 - Present      Health Maintenance        Date Due Completion Dates    IMM DTaP/Tdap/Td Vaccine (1 - Tdap) 7/25/1969 ---    IMM ZOSTER VACCINE 7/25/2010 ---    IMM PNEUMOCOCCAL 65+ (ADULT) HIGH/HIGHEST RISK SERIES (2 of 2 - PPSV23) 7/25/2015 1/23/2015    IMM INFLUENZA (1) 9/1/2017 ---    COLONOSCOPY 7/28/2020 7/28/2017, 6/29/2012            Current Immunizations     13-VALENT PCV PREVNAR 1/23/2015    Meningococcal Conjugate Vaccine MCV4 (Menactra) 1/23/2015    Pneumococcal polysaccharide vaccine (PPSV-23) 8/7/2017      Below and/or attached are the medications your provider expects you to take. Review all of your home medications and newly ordered medications with your provider and/or pharmacist. Follow medication instructions as directed by your provider and/or pharmacist. Please keep your medication list with you and share with your provider. Update the information when medications are discontinued, doses are changed, or new medications (including over-the-counter products) are added; and carry medication information at all times in the event of emergency situations     Allergies:  No Known Allergies          Medications  Valid as of: August 07, 2017 - 11:34 AM    Generic Name Brand Name Tablet Size Instructions for use    Cyanocobalamin (Tab) VITAMIN B12 1000 MCG Take 1 Tab by mouth every day.        Ergocalciferol (Cap) DRISDOL 05721 UNITS Take 1 Cap by mouth every 7 days.        Fluticasone-Salmeterol (AEROSOL POWDER, BREATH ACTIVATED) ADVAIR 250-50 MCG/DOSE Inhale 1 Puff by mouth 2 times a day.        Tiotropium Bromide Monohydrate (Cap) SPIRIVA 18 MCG Inhale 1 Cap by mouth every day.        Zolpidem Tartrate  (Tab) AMBIEN 10 MG Take 1 Tab by mouth at bedtime as needed for Sleep.        .                 Medicines prescribed today were sent to:     [id number] - [place], [place] - [address]    [address] [facility] [place] [address]    Phone: [phone] Fax: [phone]    Open 24 Hours?: No      Medication refill instructions:       If your prescription bottle indicates you have medication refills left, it is not necessary to call your provider’s office. Please contact your pharmacy and they will refill your medication.    If your prescription bottle indicates you do not have any refills left, you may request refills at any time through one of the following ways: The online [employer] system (except Urgent Care), by calling your provider’s office, or by asking your pharmacy to contact your provider’s office with a refill request. Medication refills are processed only during regular business hours and may not be available until the next business day. Your provider may request additional information or to have a follow-up visit with you prior to refilling your medication.   *Please Note: Medication refills are assigned a new Rx number when refilled electronically. Your pharmacy may indicate that no refills were authorized even though a new prescription for the same medication is available at the pharmacy. Please request the medicine by name with the pharmacy before contacting your provider for a refill.        Your To Do List     Future Labs/Procedures Complete By Expires    CBC WITH DIFFERENTIAL  As directed 8/8/2018    COMP METABOLIC PANEL  As directed 8/8/2018    LIPID PROFILE  As directed 8/8/2018    PROSTATE SPECIFIC AG SCREENING  As directed 8/8/2018    TSH  As directed 8/8/2018    URINALYSIS,CULTURE IF INDICATED  As directed 8/8/2018    VITAMIN B12  As directed 8/8/2018    VITAMIN D,25 HYDROXY  As directed 8/8/2018      Other Notes About Your Plan        5/18/16 wbc 13.5 (51%N,30%L),hgb 17.8,hct 54,plt 427,b12  249,folate 7 ??splenectomy  5/18/16 b12 249,folate 7,homocysteine 12  5/20/16 start pravachol 20 mg repeat labs in 4 weeks, check cmp,cbc,jak2 mutation  4/18 repeat CT lung cancer screening                     [employer] Access Code: [email or web address]  Expires: 9/6/2017 10:28 AM    Faraday Bicycles  A secure, online tool to manage your health information     [employer]’s Faraday Bicycles® is a secure, online tool that connects you to your personalized health information from the privacy of your home -- day or night - making it very easy for you to manage your healthcare. Once the activation process is completed, you can even access your medical information using the Faraday Bicycles rozina, which is available for free in the Apple Rozina store or Google Play store.     [employer] provides the following levels of access (as shown below):   My Chart Features   [facility] Primary Care Doctor [facility]  Specialists [facility]  Urgent  Care Non-[facility]  Primary Care  Doctor   Email your healthcare team securely and privately 24/7 X X X    Manage appointments: schedule your next appointment; view details of past/upcoming appointments X      Request prescription refills. X      View recent personal medical records, including lab and immunizations X X X X   View health record, including health history, allergies, medications X X X X   Read reports about your outpatient visits, procedures, consult and ER notes X X X X   See your discharge summary, which is a recap of your hospital and/or ER visit that includes your diagnosis, lab results, and care plan. X X       How to register for [employer]:  1. Go to  [email or web address].  2. Click on the Sign Up Now box, which takes you to the New Member Sign Up page. You will need to provide the following information:  a. Enter your Far[employer]icy[employer] Access Code exactly as it appears at the top of this page. (You will not need to use this code after you’ve completed the sign-up process. If you do not sign up before the expiration date,  you must request a new code.)   b. Enter your date of birth.   c. Enter your home email address.   d. Click Submit, and follow the next screen’s instructions.  3. Create a BoxFox ID. This will be your BoxFox login ID and cannot be changed, so think of one that is secure and easy to remember.  4. Create a [employer]wot password. You can change your password at any time.  5. Enter your Password Reset Question and Answer. This can be used at a later time if you forget your password.   6. Enter your e-mail address. This allows you to receive e-mail notifications when new information is available in BoxFox.  7. Click Sign Up. You can now view your health information.    For assistance activating your Box[employer] account, call [phone]        Quit Tobacco Information     Do you want to quit using tobacco?    Quitting tobacco decreases risks of cancer, heart and lung disease, increases life expectancy, improves sense of taste and smell, and increases spending money, among other benefits.    If you are thinking about quitting, we can help.  • [facility] Quit Tobacco Program: 327.623.9312  o Program occurs weekly for four weeks and includes pharmacist consultation on products to support quitting smoking or chewing tobacco. A provider referral is needed for pharmacist consultation.  • Tobacco Users Help Hotline: 7-841-QUITNOW (893-3851) or https://[place].quitlogix.org/  o Free, confidential telephone and online coaching for [place] residents. Sessions are designed on a schedule that is convenient for you. Eligible clients receive free nicotine replacement therapy.  • Nationally: www.smokefree.gov  o Information and professional assistance to support both immediate and long-term needs as you become, and remain, a non-smoker. Smokefree.gov allows you to choose the help that best fits your needs.

## 2017-08-14 ENCOUNTER — HOSPITAL ENCOUNTER (OUTPATIENT)
Dept: LAB | Facility: MEDICAL CENTER | Age: 67
End: 2017-08-14
Attending: INTERNAL MEDICINE
Payer: MEDICARE

## 2017-08-14 ENCOUNTER — TELEPHONE (OUTPATIENT)
Dept: MEDICAL GROUP | Facility: MEDICAL CENTER | Age: 67
End: 2017-08-14

## 2017-08-14 DIAGNOSIS — R79.89 LOW VITAMIN D LEVEL: ICD-10-CM

## 2017-08-14 DIAGNOSIS — R35.1 NOCTURIA: ICD-10-CM

## 2017-08-14 DIAGNOSIS — R79.89 LOW VITAMIN B12 LEVEL: Chronic | ICD-10-CM

## 2017-08-14 DIAGNOSIS — E78.5 DYSLIPIDEMIA: Chronic | ICD-10-CM

## 2017-08-14 DIAGNOSIS — Z12.5 PROSTATE CANCER SCREENING: ICD-10-CM

## 2017-08-14 DIAGNOSIS — Z00.00 PREVENTATIVE HEALTH CARE: Chronic | ICD-10-CM

## 2017-08-14 LAB
25(OH)D3 SERPL-MCNC: 23 NG/ML (ref 30–100)
ALBUMIN SERPL BCP-MCNC: 3.7 G/DL (ref 3.2–4.9)
ALBUMIN/GLOB SERPL: 1.4 G/DL
ALP SERPL-CCNC: 68 U/L (ref 30–99)
ALT SERPL-CCNC: 13 U/L (ref 2–50)
ANION GAP SERPL CALC-SCNC: 6 MMOL/L (ref 0–11.9)
APPEARANCE UR: CLEAR
AST SERPL-CCNC: 16 U/L (ref 12–45)
BASOPHILS # BLD AUTO: 0.6 % (ref 0–1.8)
BASOPHILS # BLD: 0.07 K/UL (ref 0–0.12)
BILIRUB SERPL-MCNC: 0.6 MG/DL (ref 0.1–1.5)
BILIRUB UR QL STRIP.AUTO: NEGATIVE
BUN SERPL-MCNC: 19 MG/DL (ref 8–22)
CALCIUM SERPL-MCNC: 8.9 MG/DL (ref 8.5–10.5)
CHLORIDE SERPL-SCNC: 108 MMOL/L (ref 96–112)
CHOLEST SERPL-MCNC: 205 MG/DL (ref 100–199)
CO2 SERPL-SCNC: 24 MMOL/L (ref 20–33)
COLOR UR: YELLOW
CREAT SERPL-MCNC: 0.93 MG/DL (ref 0.5–1.4)
CULTURE IF INDICATED INDCX: NO UA CULTURE
EOSINOPHIL # BLD AUTO: 0.87 K/UL (ref 0–0.51)
EOSINOPHIL NFR BLD: 7.2 % (ref 0–6.9)
ERYTHROCYTE [DISTWIDTH] IN BLOOD BY AUTOMATED COUNT: 51.8 FL (ref 35.9–50)
GFR SERPL CREATININE-BSD FRML MDRD: >60 ML/MIN/1.73 M 2
GLOBULIN SER CALC-MCNC: 2.7 G/DL (ref 1.9–3.5)
GLUCOSE SERPL-MCNC: 76 MG/DL (ref 65–99)
GLUCOSE UR STRIP.AUTO-MCNC: NEGATIVE MG/DL
HCT VFR BLD AUTO: 51.2 % (ref 42–52)
HDLC SERPL-MCNC: 36 MG/DL
HGB BLD-MCNC: 17.2 G/DL (ref 14–18)
IMM GRANULOCYTES # BLD AUTO: 0.05 K/UL (ref 0–0.11)
IMM GRANULOCYTES NFR BLD AUTO: 0.4 % (ref 0–0.9)
KETONES UR STRIP.AUTO-MCNC: NEGATIVE MG/DL
LDLC SERPL CALC-MCNC: 141 MG/DL
LEUKOCYTE ESTERASE UR QL STRIP.AUTO: NEGATIVE
LYMPHOCYTES # BLD AUTO: 3.92 K/UL (ref 1–4.8)
LYMPHOCYTES NFR BLD: 32.4 % (ref 22–41)
MCH RBC QN AUTO: 33.9 PG (ref 27–33)
MCHC RBC AUTO-ENTMCNC: 33.6 G/DL (ref 33.7–35.3)
MCV RBC AUTO: 100.8 FL (ref 81.4–97.8)
MICRO URNS: NORMAL
MONOCYTES # BLD AUTO: 1.12 K/UL (ref 0–0.85)
MONOCYTES NFR BLD AUTO: 9.3 % (ref 0–13.4)
NEUTROPHILS # BLD AUTO: 6.06 K/UL (ref 1.82–7.42)
NEUTROPHILS NFR BLD: 50.1 % (ref 44–72)
NITRITE UR QL STRIP.AUTO: NEGATIVE
NRBC # BLD AUTO: 0 K/UL
NRBC BLD AUTO-RTO: 0 /100 WBC
PH UR STRIP.AUTO: 6 [PH]
PLATELET # BLD AUTO: 401 K/UL (ref 164–446)
PMV BLD AUTO: 9.8 FL (ref 9–12.9)
POTASSIUM SERPL-SCNC: 4.2 MMOL/L (ref 3.6–5.5)
PROT SERPL-MCNC: 6.4 G/DL (ref 6–8.2)
PROT UR QL STRIP: NEGATIVE MG/DL
PSA SERPL-MCNC: 1.29 NG/ML (ref 0–4)
RBC # BLD AUTO: 5.08 M/UL (ref 4.7–6.1)
RBC UR QL AUTO: NEGATIVE
SODIUM SERPL-SCNC: 138 MMOL/L (ref 135–145)
SP GR UR STRIP.AUTO: 1.02
TRIGL SERPL-MCNC: 142 MG/DL (ref 0–149)
TSH SERPL DL<=0.005 MIU/L-ACNC: 1.65 UIU/ML (ref 0.3–3.7)
UROBILINOGEN UR STRIP.AUTO-MCNC: 0.2 MG/DL
VIT B12 SERPL-MCNC: 273 PG/ML (ref 211–911)
WBC # BLD AUTO: 12.1 K/UL (ref 4.8–10.8)

## 2017-08-14 PROCEDURE — 80053 COMPREHEN METABOLIC PANEL: CPT

## 2017-08-14 PROCEDURE — 81270 JAK2 GENE: CPT

## 2017-08-14 PROCEDURE — 84153 ASSAY OF PSA TOTAL: CPT

## 2017-08-14 PROCEDURE — 36415 COLL VENOUS BLD VENIPUNCTURE: CPT

## 2017-08-14 PROCEDURE — 84443 ASSAY THYROID STIM HORMONE: CPT

## 2017-08-14 PROCEDURE — 82306 VITAMIN D 25 HYDROXY: CPT

## 2017-08-14 PROCEDURE — 85025 COMPLETE CBC W/AUTO DIFF WBC: CPT

## 2017-08-14 PROCEDURE — 81003 URINALYSIS AUTO W/O SCOPE: CPT

## 2017-08-14 PROCEDURE — 82607 VITAMIN B-12: CPT

## 2017-08-14 PROCEDURE — 80061 LIPID PANEL: CPT

## 2017-08-15 NOTE — TELEPHONE ENCOUNTER
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

## 2017-08-19 LAB — JAK2 P.V617F MUT/NOR BLD/T: 0 %

## 2017-10-10 DIAGNOSIS — J44.9 CHRONIC OBSTRUCTIVE PULMONARY DISEASE, UNSPECIFIED COPD TYPE (HCC): ICD-10-CM

## 2017-10-10 RX ORDER — TIOTROPIUM BROMIDE 18 UG/1
18 CAPSULE ORAL; RESPIRATORY (INHALATION) DAILY
Qty: 30 CAP | Refills: 11 | Status: SHIPPED | OUTPATIENT
Start: 2017-10-10 | End: 2017-11-09 | Stop reason: SDUPTHER

## 2017-10-10 NOTE — TELEPHONE ENCOUNTER
Was the patient seen in the last year in this department? Yes     Does patient have an active prescription for medications requested? No     Received Request Via: Patient     Pt is calling to request refills on both medications. Would like to pick Rx's up in office tomorrow.

## 2017-11-09 DIAGNOSIS — J44.9 CHRONIC OBSTRUCTIVE PULMONARY DISEASE, UNSPECIFIED COPD TYPE (HCC): ICD-10-CM

## 2017-11-09 RX ORDER — TIOTROPIUM BROMIDE 18 UG/1
18 CAPSULE ORAL; RESPIRATORY (INHALATION) DAILY
Qty: 30 CAP | Refills: 6 | Status: SHIPPED | OUTPATIENT
Start: 2017-11-09 | End: 2017-11-09 | Stop reason: SDUPTHER

## 2017-11-09 RX ORDER — TIOTROPIUM BROMIDE 18 UG/1
18 CAPSULE ORAL; RESPIRATORY (INHALATION) DAILY
Qty: 30 CAP | Refills: 6 | Status: SHIPPED | OUTPATIENT
Start: 2017-11-09 | End: 2018-10-04 | Stop reason: SDUPTHER

## 2017-11-09 NOTE — TELEPHONE ENCOUNTER
Pablo Paz    Pt switching to a new pharmacy, would like hard copies left at  for .    Was the patient seen in the last year in this department? Yes Last 8/7/17    Does patient have an active prescription for medications requested? Yes     Received Request Via: Patient

## 2017-11-29 ENCOUNTER — OFFICE VISIT (OUTPATIENT)
Dept: MEDICAL GROUP | Facility: MEDICAL CENTER | Age: 67
End: 2017-11-29
Payer: MEDICARE

## 2017-11-29 ENCOUNTER — HOSPITAL ENCOUNTER (OUTPATIENT)
Dept: LAB | Facility: MEDICAL CENTER | Age: 67
End: 2017-11-29
Attending: FAMILY MEDICINE
Payer: MEDICARE

## 2017-11-29 VITALS
DIASTOLIC BLOOD PRESSURE: 84 MMHG | BODY MASS INDEX: 22.66 KG/M2 | TEMPERATURE: 98 F | WEIGHT: 141 LBS | HEIGHT: 66 IN | RESPIRATION RATE: 16 BRPM | OXYGEN SATURATION: 94 % | HEART RATE: 88 BPM | SYSTOLIC BLOOD PRESSURE: 140 MMHG

## 2017-11-29 DIAGNOSIS — Z23 NEED FOR VACCINATION: ICD-10-CM

## 2017-11-29 DIAGNOSIS — B35.1 FUNGAL INFECTION OF TOENAIL: ICD-10-CM

## 2017-11-29 DIAGNOSIS — M10.9 ACUTE GOUT INVOLVING TOE OF RIGHT FOOT, UNSPECIFIED CAUSE: ICD-10-CM

## 2017-11-29 DIAGNOSIS — J40 BRONCHITIS: ICD-10-CM

## 2017-11-29 PROBLEM — M79.671 RIGHT FOOT PAIN: Status: ACTIVE | Noted: 2017-11-29

## 2017-11-29 LAB
ANISOCYTOSIS BLD QL SMEAR: ABNORMAL
BASOPHILS # BLD AUTO: 0 % (ref 0–1.8)
BASOPHILS # BLD: 0 K/UL (ref 0–0.12)
EOSINOPHIL # BLD AUTO: 0.8 K/UL (ref 0–0.51)
EOSINOPHIL NFR BLD: 4.1 % (ref 0–6.9)
ERYTHROCYTE [DISTWIDTH] IN BLOOD BY AUTOMATED COUNT: 50.4 FL (ref 35.9–50)
HCT VFR BLD AUTO: 51.7 % (ref 42–52)
HGB BLD-MCNC: 17.5 G/DL (ref 14–18)
LYMPHOCYTES # BLD AUTO: 6.3 K/UL (ref 1–4.8)
LYMPHOCYTES NFR BLD: 32.3 % (ref 22–41)
MACROCYTES BLD QL SMEAR: ABNORMAL
MANUAL DIFF BLD: NORMAL
MCH RBC QN AUTO: 33.1 PG (ref 27–33)
MCHC RBC AUTO-ENTMCNC: 33.8 G/DL (ref 33.7–35.3)
MCV RBC AUTO: 97.9 FL (ref 81.4–97.8)
METAMYELOCYTES NFR BLD MANUAL: 2 %
MONOCYTES # BLD AUTO: 1.97 K/UL (ref 0–0.85)
MONOCYTES NFR BLD AUTO: 10.1 % (ref 0–13.4)
MORPHOLOGY BLD-IMP: NORMAL
NEUTROPHILS # BLD AUTO: 10.04 K/UL (ref 1.82–7.42)
NEUTROPHILS NFR BLD: 51.5 % (ref 44–72)
NRBC # BLD AUTO: 0 K/UL
NRBC BLD AUTO-RTO: 0 /100 WBC
PLATELET # BLD AUTO: 412 K/UL (ref 164–446)
PLATELET BLD QL SMEAR: NORMAL
PMV BLD AUTO: 9.3 FL (ref 9–12.9)
POLYCHROMASIA BLD QL SMEAR: NORMAL
RBC # BLD AUTO: 5.28 M/UL (ref 4.7–6.1)
RBC BLD AUTO: PRESENT
URATE SERPL-MCNC: 5.6 MG/DL (ref 2.5–8.3)
VARIANT LYMPHS BLD QL SMEAR: NORMAL
WBC # BLD AUTO: 19.5 K/UL (ref 4.8–10.8)

## 2017-11-29 PROCEDURE — 84550 ASSAY OF BLOOD/URIC ACID: CPT

## 2017-11-29 PROCEDURE — G0008 ADMIN INFLUENZA VIRUS VAC: HCPCS | Performed by: FAMILY MEDICINE

## 2017-11-29 PROCEDURE — 99214 OFFICE O/P EST MOD 30 MIN: CPT | Mod: 25 | Performed by: FAMILY MEDICINE

## 2017-11-29 PROCEDURE — 85007 BL SMEAR W/DIFF WBC COUNT: CPT

## 2017-11-29 PROCEDURE — 90662 IIV NO PRSV INCREASED AG IM: CPT | Performed by: FAMILY MEDICINE

## 2017-11-29 PROCEDURE — 36415 COLL VENOUS BLD VENIPUNCTURE: CPT

## 2017-11-29 PROCEDURE — 85027 COMPLETE CBC AUTOMATED: CPT

## 2017-11-29 RX ORDER — AZITHROMYCIN 250 MG/1
TABLET, FILM COATED ORAL
Qty: 6 TAB | Refills: 0 | Status: SHIPPED | OUTPATIENT
Start: 2017-11-29 | End: 2017-12-04

## 2017-11-29 RX ORDER — CICLOPIROX 80 MG/ML
SOLUTION TOPICAL
Qty: 6.6 ML | Refills: 3 | Status: SHIPPED | OUTPATIENT
Start: 2017-11-29 | End: 2020-02-13

## 2017-11-29 RX ORDER — INDOMETHACIN 50 MG/1
50 CAPSULE ORAL 3 TIMES DAILY PRN
Qty: 30 CAP | Refills: 0 | Status: SHIPPED | OUTPATIENT
Start: 2017-11-29 | End: 2018-10-04

## 2017-11-29 NOTE — ASSESSMENT & PLAN NOTE
Illness: 8 days of illness including: nasal congestion, cough ,  Symptoms negative for fever, chills,   Treatments tried: none   Since onset, symptoms are worse   Similarly ill exposures: yes  Medical history negative for asthma  He  reports that he has been smoking Cigars.  He has a 70.50 pack-year smoking history. He has never used smokeless tobacco.

## 2017-11-29 NOTE — PATIENT INSTRUCTIONS
Low-Purine Diet  Purines are compounds that affect the level of uric acid in your body. A low-purine diet is a diet that is low in purines. Eating a low-purine diet can prevent the level of uric acid in your body from getting too high and causing gout or kidney stones or both.  WHAT DO I NEED TO KNOW ABOUT THIS DIET?  · Choose low-purine foods. Examples of low-purine foods are listed in the next section.  · Drink plenty of fluids, especially water. Fluids can help remove uric acid from your body. Try to drink 8-16 cups (1.9-3.8 L) a day.  · Limit foods high in fat, especially saturated fat, as fat makes it harder for the body to get rid of uric acid. Foods high in saturated fat include pizza, cheese, ice cream, whole milk, fried foods, and gravies. Choose foods that are lower in fat and lean sources of protein. Use olive oil when cooking as it contains healthy fats that are not high in saturated fat.  · Limit alcohol. Alcohol interferes with the elimination of uric acid from your body. If you are having a gout attack, avoid all alcohol.  · Keep in mind that different people's bodies react differently to different foods. You will probably learn over time which foods do or do not affect you. If you discover that a food tends to cause your gout to flare up, avoid eating that food. You can more freely enjoy foods that do not cause problems. If you have any questions about a food item, talk to your dietitian or health care provider.  WHICH FOODS ARE LOW, MODERATE, AND HIGH IN PURINES?  The following is a list of foods that are low, moderate, and high in purines. You can eat any amount of the foods that are low in purines. You may be able to have small amounts of foods that are moderate in purines. Ask your health care provider how much of a food moderate in purines you can have. Avoid foods high in purines.  Grains  · Foods low in purines: Enriched white bread, pasta, rice, cake, cornbread, popcorn.  · Foods moderate in  purines: Whole-grain breads and cereals, wheat germ, bran, oatmeal. Uncooked oatmeal. Dry wheat bran or wheat germ.  · Foods high in purines: Pancakes, Lao toast, biscuits, muffins.  Vegetables  · Foods low in purines: All vegetables, except those that are moderate in purines.  · Foods moderate in purines: Asparagus, cauliflower, spinach, mushrooms, green peas.  Fruits  · All fruits are low in purines.  Meats and other Protein Foods  · Foods low in purines: Eggs, nuts, peanut butter.  · Foods moderate in purines: 80-90% lean beef, lamb, veal, pork, poultry, fish, eggs, peanut butter, nuts. Crab, lobster, oysters, and shrimp. Cooked dried beans, peas, and lentils.  · Foods high in purines: Anchovies, sardines, herring, mussels, tuna, codfish, scallops, trout, and hien. Cobb. Organ meats (such as liver or kidney). Tripe. Game meat. Goose. Sweetbreads.  Dairy  · All dairy foods are low in purines. Low-fat and fat-free dairy products are best because they are low in saturated fat.  Beverages  · Drinks low in purines: Water, carbonated beverages, tea, coffee, cocoa.  · Drinks moderate in purines: Soft drinks and other drinks sweetened with high-fructose corn syrup. Juices. To find whether a food or drink is sweetened with high-fructose corn syrup, look at the ingredients list.  · Drinks high in purines: Alcoholic beverages (such as beer).  Condiments  · Foods low in purines: Salt, herbs, olives, pickles, relishes, vinegar.  · Foods moderate in purines: Butter, margarine, oils, mayonnaise.  Fats and Oils  · Foods low in purines: All types, except gravies and sauces made with meat.  · Foods high in purines: Gravies and sauces made with meat.  Other Foods  · Foods low in purines: Sugars, sweets, gelatin. Cake. Soups made without meat.  · Foods moderate in purines: Meat-based or fish-based soups, broths, or bouillons. Foods and drinks sweetened with high-fructose corn syrup.  · Foods high in purines: High-fat desserts  (such as ice cream, cookies, cakes, pies, doughnuts, and chocolate).  Contact your dietitian for more information on foods that are not listed here.     This information is not intended to replace advice given to you by your health care provider. Make sure you discuss any questions you have with your health care provider.     Document Released: 04/13/2012 Document Revised: 12/23/2014 Document Reviewed: 11/24/2014  [employer] Interactive Patient Education ©2016 Elsevier Inc.  Gout  Gout is an inflammatory arthritis caused by a buildup of uric acid crystals in the joints. Uric acid is a chemical that is normally present in the blood. When the level of uric acid in the blood is too high it can form crystals that deposit in your joints and tissues. This causes joint redness, soreness, and swelling (inflammation). Repeat attacks are common. Over time, uric acid crystals can form into masses (tophi) near a joint, destroying bone and causing disfigurement. Gout is treatable and often preventable.  CAUSES   The disease begins with elevated levels of uric acid in the blood. Uric acid is produced by your body when it breaks down a naturally found substance called purines. Certain foods you eat, such as meats and fish, contain high amounts of purines. Causes of an elevated uric acid level include:  · Being passed down from parent to child (heredity).  · Diseases that cause increased uric acid production (such as obesity, psoriasis, and certain cancers).  · Excessive alcohol use.  · Diet, especially diets rich in meat and seafood.  · Medicines, including certain cancer-fighting medicines (chemotherapy), water pills (diuretics), and aspirin.  · Chronic kidney disease. The kidneys are no longer able to remove uric acid well.  · Problems with metabolism.  Conditions strongly associated with gout include:  · Obesity.  · High blood pressure.  · High cholesterol.  · Diabetes.  Not everyone with elevated uric acid levels gets gout. It  is not understood why some people get gout and others do not. Surgery, joint injury, and eating too much of certain foods are some of the factors that can lead to gout attacks.  SYMPTOMS   · An attack of gout comes on quickly. It causes intense pain with redness, swelling, and warmth in a joint.  · Fever can occur.  · Often, only one joint is involved. Certain joints are more commonly involved:  ¨ Base of the big toe.  ¨ Knee.  ¨ Ankle.  ¨ Wrist.  ¨ Finger.  Without treatment, an attack usually goes away in a few days to weeks. Between attacks, you usually will not have symptoms, which is different from many other forms of arthritis.  DIAGNOSIS   Your caregiver will suspect gout based on your symptoms and exam. In some cases, tests may be recommended. The tests may include:  · Blood tests.  · Urine tests.  · X-rays.  · Joint fluid exam. This exam requires a needle to remove fluid from the joint (arthrocentesis). Using a microscope, gout is confirmed when uric acid crystals are seen in the joint fluid.  TREATMENT   There are two phases to gout treatment: treating the sudden onset (acute) attack and preventing attacks (prophylaxis).  · Treatment of an Acute Attack.  ¨ Medicines are used. These include anti-inflammatory medicines or steroid medicines.  ¨ An injection of steroid medicine into the affected joint is sometimes necessary.  ¨ The painful joint is rested. Movement can worsen the arthritis.  ¨ You may use warm or cold treatments on painful joints, depending which works best for you.  · Treatment to Prevent Attacks.  ¨ If you suffer from frequent gout attacks, your caregiver may advise preventive medicine. These medicines are started after the acute attack subsides. These medicines either help your kidneys eliminate uric acid from your body or decrease your uric acid production. You may need to stay on these medicines for a very long time.  ¨ The early phase of treatment with preventive medicine can be  associated with an increase in acute gout attacks. For this reason, during the first few months of treatment, your caregiver may also advise you to take medicines usually used for acute gout treatment. Be sure you understand your caregiver's directions. Your caregiver may make several adjustments to your medicine dose before these medicines are effective.  ¨ Discuss dietary treatment with your caregiver or dietitian. Alcohol and drinks high in sugar and fructose and foods such as meat, poultry, and seafood can increase uric acid levels. Your caregiver or dietitian can advise you on drinks and foods that should be limited.  HOME CARE INSTRUCTIONS   · Do not take aspirin to relieve pain. This raises uric acid levels.  · Only take over-the-counter or prescription medicines for pain, discomfort, or fever as directed by your caregiver.  · Rest the joint as much as possible. When in bed, keep sheets and blankets off painful areas.  · Keep the affected joint raised (elevated).  · Apply warm or cold treatments to painful joints. Use of warm or cold treatments depends on which works best for you.  · Use crutches if the painful joint is in your leg.  · Drink enough fluids to keep your urine clear or pale yellow. This helps your body get rid of uric acid. Limit alcohol, sugary drinks, and fructose drinks.  · Follow your dietary instructions. Pay careful attention to the amount of protein you eat. Your daily diet should emphasize fruits, vegetables, whole grains, and fat-free or low-fat milk products. Discuss the use of coffee, vitamin C, and cherries with your caregiver or dietitian. These may be helpful in lowering uric acid levels.  · Maintain a healthy body weight.  SEEK MEDICAL CARE IF:   · You develop diarrhea, vomiting, or any side effects from medicines.  · You do not feel better in 24 hours, or you are getting worse.  SEEK IMMEDIATE MEDICAL CARE IF:   · Your joint becomes suddenly more tender, and you have chills or a  fever.  MAKE SURE YOU:   · Understand these instructions.  · Will watch your condition.  · Will get help right away if you are not doing well or get worse.     This information is not intended to replace advice given to you by your health care provider. Make sure you discuss any questions you have with your health care provider.     Document Released: 12/15/2001 Document Revised: 01/08/2016 Document Reviewed: 07/31/2013  [employer] Interactive Patient Education ©2016 [employer] Inc.

## 2017-11-30 ENCOUNTER — TELEPHONE (OUTPATIENT)
Dept: MEDICAL GROUP | Facility: MEDICAL CENTER | Age: 67
End: 2017-11-30

## 2017-11-30 NOTE — PROGRESS NOTES
Subjective:     Chief Complaint   Patient presents with   • Cough     chest cold   • Pain     right foot pain when walking       Narendra Sun Jr. is a 67 y.o. male here today for evaluation and management of:    Bronchitis  Illness: 8 days of illness including: nasal congestion, cough ,  Symptoms negative for fever, chills,   Treatments tried: none   Since onset, symptoms are worse   Similarly ill exposures: yes  Medical history negative for asthma  He  reports that he has been smoking Cigars.  He has a 70.50 pack-year smoking history. He has never used smokeless tobacco.      Fungal infection of toenail  Patient is requesting something for the fungal infection on his toenails. He reports that his toenails are getting thicker and more yellow.    Right foot pain  Patient complains of right foot pain for the last 3 days. It has been red and swollen. He has not had a history of gout in the past. He denies any injury to the foot. He just came back from a vacation in [place] where he was eating and drinking more than he normally would. He denies any calf pain.       No Known Allergies    Current medicines (including changes today)  Current Outpatient Prescriptions   Medication Sig Dispense Refill   • azithromycin (ZITHROMAX) 250 MG Tab 2 tabs by mouth day 1, 1 tab by mouth days 2-5 6 Tab 0   • ciclopirox (PENLAC) 8 % solution Apply to affected toenails daily at bedtime. Once every 7 days wife off with rubbing alcohol and then reapply 6.6 mL 3   • indomethacin (INDOCIN) 50 MG Cap Take 1 Cap by mouth 3 times a day as needed. 30 Cap 0   • tiotropium (SPIRIVA HANDIHALER) 18 MCG Cap Inhale 1 Cap by mouth every day. 30 Cap 6   • fluticasone-salmeterol (ADVAIR DISKUS) 250-50 MCG/DOSE AEROSOL POWDER, BREATH ACTIVATED Inhale 1 Puff by mouth 2 times a day. 60 Inhaler 6   • zolpidem (AMBIEN) 10 MG Tab Take 1 Tab by mouth at bedtime as needed for Sleep. 15 Each 0   • cyanocobalamin (VITAMIN B12) 1000 MCG TABS Take 1 Tab by  "mouth every day. 90 Tab 3     No current facility-administered medications for this visit.        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    Patient Active Problem List    Diagnosis Date Noted   • Bronchitis 11/29/2017   • Right foot pain 11/29/2017   • Fungal infection of toenail 11/29/2017   • Atherosclerosis of aorta (CMS-HCC) 08/07/2017   • Pulmonary nodule 07/03/2016   • History of hypertension 04/27/2015   • Low vitamin B12 level 02/07/2015   • Leukocytosis 12/31/2013   • Adenomatous colon polyp 07/16/2012   • BPH (benign prostatic hypertrophy) 04/17/2012   • COPD (chronic obstructive pulmonary disease) ([facility]) 07/27/2009   • Dyslipidemia 07/27/2009   • History of pneumothorax 07/27/2009   • S/P Splenectomy 07/27/2009   • Preventative health care 07/27/2009   • Tobacco abuse 07/27/2009   • BRAO (branch retinal artery occlusion) 07/27/2009       ROS   No fever or chills.  No nausea or vomiting.  No chest pain or palpitations.   No pain with urination or hematuria.  No black or bloody stools.       Objective:     Blood pressure 140/84, pulse 88, temperature 36.7 °C (98 °F), resp. rate 16, height 1.676 m (5' 6\"), weight 64 kg (141 lb), SpO2 94 %. Body mass index is 22.76 kg/m².   Physical Exam:  Well developed, well nourished.  Alert, oriented in no acute distress.  Eye contact is good, speech goal directed, affect calm  Eyes: conjunctiva non-injected, sclera non-icteric.  Ears: Pinna normal. TM pearly gray.   Nose: Nares are patent.  Erythematous, swollen mucosa with yellow discharge  Mouth: Oral mucous membranes pink and moist with no lesions. Moderate diffuse erythema of the posterior pharynx with yellow postnasal drainage  Neck Supple.  No adenopathy or masses in the neck or " supraclavicular regions. No thyromegaly  Lungs: clear to auscultation bilaterally with good excursion. No wheezes or rhonchi  CV: regular rate and rhythm. No murmur  Right foot with erythema and induration at the first MP joint with exquisite tenderness to palpation and movement. Toenails are yellow and thickened      Assessment and Plan:   The following treatment plan was discussed    1. Bronchitis  Zithromax as directed. Increase fluids and rest  - azithromycin (ZITHROMAX) 250 MG Tab; 2 tabs by mouth day 1, 1 tab by mouth days 2-5  Dispense: 6 Tab; Refill: 0    2. Acute gout involving toe of right foot, unspecified cause  Check uric acid and CBC. Start Indocin for pain. Follow-up with Dr. Duncan  - URIC ACID; Future  - CBC WITH DIFFERENTIAL; Future  - indomethacin (INDOCIN) 50 MG Cap; Take 1 Cap by mouth 3 times a day as needed.  Dispense: 30 Cap; Refill: 0    3. Fungal infection of toenail  Penlac as directed  - ciclopirox (PENLAC) 8 % solution; Apply to affected toenails daily at bedtime. Once every 7 days wife off with rubbing alcohol and then reapply  Dispense: 6.6 mL; Refill: 3    4. Need for vaccination    - INFLUENZA VACCINE, HIGH DOSE (65+ ONLY)    Any change or worsening of signs or symptoms, patient encouraged to follow-up or report to the emergency room for further evaluation. Patient understands and agrees.    Followup: Return if symptoms worsen or fail to improve.

## 2017-11-30 NOTE — TELEPHONE ENCOUNTER
----- Message from Yana Bowser M.D. sent at 11/30/2017 10:10 AM PST -----  Please have patient make an appointment with Dr. Guerrier to go over these lab results.  Yana Bowser M.D.

## 2017-11-30 NOTE — ASSESSMENT & PLAN NOTE
Patient complains of right foot pain for the last 3 days. It has been red and swollen. He has not had a history of gout in the past. He denies any injury to the foot. He just came back from a vacation in [place] where he was eating and drinking more than he normally would. He denies any calf pain.

## 2017-11-30 NOTE — ASSESSMENT & PLAN NOTE
Patient is requesting something for the fungal infection on his toenails. He reports that his toenails are getting thicker and more yellow.

## 2017-12-01 ENCOUNTER — OFFICE VISIT (OUTPATIENT)
Dept: MEDICAL GROUP | Facility: MEDICAL CENTER | Age: 67
End: 2017-12-01
Payer: MEDICARE

## 2017-12-01 VITALS
BODY MASS INDEX: 22.98 KG/M2 | WEIGHT: 143 LBS | SYSTOLIC BLOOD PRESSURE: 154 MMHG | HEIGHT: 66 IN | OXYGEN SATURATION: 98 % | HEART RATE: 62 BPM | DIASTOLIC BLOOD PRESSURE: 82 MMHG | TEMPERATURE: 97.6 F

## 2017-12-01 DIAGNOSIS — R91.1 PULMONARY NODULE: Chronic | ICD-10-CM

## 2017-12-01 DIAGNOSIS — M10.9 GOUT, UNSPECIFIED CAUSE, UNSPECIFIED CHRONICITY, UNSPECIFIED SITE: ICD-10-CM

## 2017-12-01 DIAGNOSIS — J44.9 CHRONIC OBSTRUCTIVE PULMONARY DISEASE, UNSPECIFIED COPD TYPE (HCC): Chronic | ICD-10-CM

## 2017-12-01 DIAGNOSIS — D72.829 LEUKOCYTOSIS, UNSPECIFIED TYPE: Chronic | ICD-10-CM

## 2017-12-01 DIAGNOSIS — Z72.0 TOBACCO ABUSE: Chronic | ICD-10-CM

## 2017-12-01 PROBLEM — J40 BRONCHITIS: Status: RESOLVED | Noted: 2017-11-29 | Resolved: 2017-12-01

## 2017-12-01 PROBLEM — M79.671 RIGHT FOOT PAIN: Status: RESOLVED | Noted: 2017-11-29 | Resolved: 2017-12-01

## 2017-12-01 PROCEDURE — 99214 OFFICE O/P EST MOD 30 MIN: CPT | Performed by: INTERNAL MEDICINE

## 2017-12-01 NOTE — PROGRESS NOTES
Subjective:      Narendra Sun Jr. is a 67 y.o. male who presents with Follow-Up (labs)            HPI     Seen  11/29/17 for chest and head cold, given zpack, no fever or chills, cough with sputum, no chest pain, no sinus pressure, no new meds. Returned from [place] for a trip, with no fever or chills. No sick exposures. No regular etoh.  Symptoms have improved with antibiotics, no nausea, vomiting, diarrhea, still with some fatigue  No change in appetite  No headache, no neck stiffness  Did also have right first toe pain and given indocin and pain resolved, no etoh at the time, no change in diet, No trauma, no previous history of gout, no family history of gout  Still smoking 4 cigars per day  Copd on advair and spiriva, normally no shortness of breath at rest or with exertion with inhalers  Chronic leukocytosis typically white blood cells 12,000, no recurrent infections, no history of anemia or thrombocytopenia  No bruising or bleeding  No recurrent upper respiratory tract infections        Current Outpatient Prescriptions   Medication Sig Dispense Refill   • azithromycin (ZITHROMAX) 250 MG Tab 2 tabs by mouth day 1, 1 tab by mouth days 2-5 6 Tab 0   • ciclopirox (PENLAC) 8 % solution Apply to affected toenails daily at bedtime. Once every 7 days wife off with rubbing alcohol and then reapply 6.6 mL 3   • indomethacin (INDOCIN) 50 MG Cap Take 1 Cap by mouth 3 times a day as needed. 30 Cap 0   • tiotropium (SPIRIVA HANDIHALER) 18 MCG Cap Inhale 1 Cap by mouth every day. 30 Cap 6   • fluticasone-salmeterol (ADVAIR DISKUS) 250-50 MCG/DOSE AEROSOL POWDER, BREATH ACTIVATED Inhale 1 Puff by mouth 2 times a day. 60 Inhaler 6   • zolpidem (AMBIEN) 10 MG Tab Take 1 Tab by mouth at bedtime as needed for Sleep. 15 Each 0   • cyanocobalamin (VITAMIN B12) 1000 MCG TABS Take 1 Tab by mouth every day. 90 Tab 3     No current facility-administered medications for this visit.      Patient Active Problem List    Diagnosis   • COPD (chronic obstructive pulmonary disease) (HCC)   • Dyslipidemia   • History of pneumothorax   • S/P Splenectomy   • Preventative health care   • Tobacco abuse   • BRAO (branch retinal artery occlusion)   • BPH (benign prostatic hypertrophy)   • Adenomatous colon polyp   • Leukocytosis   • Low vitamin B12 level   • History of hypertension   • Pulmonary nodule   • Atherosclerosis of aorta (CMS-HCC)   • Right foot pain   • Fungal infection of toenail      Adenoma  12/05 colonoscopy [facility] tubular adenoma  6/29/12 colon per [facility], neg, repeat 5 years  7/28/17 colon per [facility] 3 mm polyp descending colon, 10 mm polyp descending colon, 5-8 mm polyp sigmoid colon, 10 mm polyp distal sigmoid colon, 6-8 mm polyp rectum, pathology adenoma and hyperplastic polyps repeat 3 years     BPH  5/11 psa 0.7  4/12 psa 7.5 given course of cipro  5/12/12 psa 2.3 after cipro  12/31/13 psa 1.4  2/7/15 psa 1.1  5/18/16 psa 1.2     COPD  5/08 chest x-ray negative  6/08 PFT FEV1.6 L (54% predicted), FEV/FVC ratio 47%, positive bronchodilator response  6/10 quit smoking tobacco  5/11 still off cigs, smoking 2 cigars per day  5/11 restart spiriva  5/11 cxr negative  12/12 off cigs, still smokes cigars 3-4 per day  12/12 on spiriva, add symbicort 80/4.5  2/7/13 PFT severe stage III COPD, FEV1 1.4 L (46% predicted), FEV/FVC 47% improvement after bronchodilator 13%, normal DLCO; continue spiriva, symbicort 80/1.5, smoking cessation  6/24/16  CT thorax lung cancer screening to spiculated 9 mm nodules RUL underlying emphysematous changes and tiny 3 mm nodule RML, recommend 3 month follow-up CT vs CT/PET  2/7/13 cxr negative  12/26/13 still 3 cigars per day; on spiriva and symbicort  1/23/15 still 3 cigars per day, on spiriva and symbicort  1/26/16 change symbicort to advair 250/50 (insurance) and continue spiriva  6/2/16 CT lung cancer screening visit  7/8/16 [facility] note right upper lobe lung nodules, suspicious in nature, patient agrees to  CT/PET  7/8/16 CT/PET spiculated right upper lobe nodule SUV 1.1, not FDG avid, scarring left upper lobe noted, nonspecific findings, low-grade neoplasm cannot be excluded; per [facility] repeat CT 3 months  10/24/16 CT thorax without; 2 spiculated right upper lobe pulmonary nodules 8 mm and 9 mm in size unchanged, 3 mm right middle lobe unchanged nodule  10/27/16 lung cancer screening program note, recommend referral to pulmonary nodule clinic  4/6/17 CT thorax without; 2 stable 8-9 mm spiculated right upper lobe nodules, stable probably postinflammatory tiny 3 mm RML and RLL nodules, no new suspicious nodules     Dyslipidemia  5/08 chol  239, trig 91,hdl 71,ldl 150  8/09 chol 268,trig 107,hdl 56,ldl 191  8/09 start zocor 20  8/09 stress echo negative  5/11 chol 231,trig 114,hdl 53,ldl 155 off zocor  5/12 chol 215,trig 97,hdl 48,ldl 148 off zocor  12/31/13 chol 215,trig 101,hdl 55,ldl 140,crp 3.0 off statin; 10 year risk 12%, I recommend statin therapy he declines  1/13/14 echo technically difficult study, possible mild LVH  1/13/14 treadmill thallium exercise 6 minutes 30 seconds lashell protocol, limited by fatigue; no ischemia, EF 59%  2/7/15 chol 250,trig 86,hdl 51,ldl 182; urine mac <0.5; 10 year risk calculation 19%, start zocor 20 mg  5/16/16 did not take zocor  5/18/16 chol 239,trig 152,hdl 44,ldl 165  8/14/17 chol 205,trig 142,hdl 36,ldl 141 off zocor, 10 year cardiac risk, declines medication     History hypertension  1/13/14 echo technically difficult study, possible mild LVH  1/13/14 treadmill thallium exercise 6 minutes 30 seconds lashell protocol, limited by fatigue; no ischemia, EF 59%  4/27/15 start lisinopril 10 mg   5/16/16 off lisinopril      Leukocytosis  8/09 wbc 8.9  5/11 wbc 11.4  4/12 wbc 12.4 (54%N,34%L)  12/31/13 wbc 12.9 (53%N,22%L),hgb 17,hct 52,mcv 102,platelet 364, CRP 3.0; ? Related to tobacco  2/7/15 wbc 12.4 (49%N,34%L),hgb 17,hct 53,plt [id number]; b12 201,folate 5.3  2/13/15 leukocyte alkaline  phosphatase 108 normal  5/18/16 wbc 13.5 (51%N,30%L),hgb 17.8,hct 54,plt 427,b12 249,folate 7  5/18/16 b12 249,folate 7 not on b12  8/14/17 wbc 12.1 (50%N,32%L),hgb 17,hct 51,mcv 100.8,b12 273,jak2 negative      Low B-12  12/7/15 b12 201, folate 5.3, wbc 12.4(49%N,34%L),hgb 17,hct 53,plt [id number]; start b12 1000 mcg daily  5/18/16 b12 249,folate 7,MMA<0.1,Intrinsic factor Ab negative, not on b12  8/14/17 b12 273     Preventative health  12/26/13 decline flu,pneum,td vac  1/13/14 ultrasound aorta negative  1/23/15 prevnar  1/23/15 menactra  [facility]  5/18/16 psa 1.2  5/18/16 vit d 27 on [address] weekly  7/28/17 colon per [facility] 3 mm polyp descending colon, 10 mm polyp descending colon, 5-8 mm polyp sigmoid colon, 10 mm polyp distal sigmoid colon, 6-8 mm polyp rectum, pathology adenoma and hyperplastic polyps repeat 3 years     Pulmonary nodule  6/24/16  CT thorax lung cancer screening to spiculated 9 mm nodules RUL underlying emphysematous changes and tiny 3 mm nodule RML, recommend 3 month follow-up CT vs CT/PET  7/8/16 [facility] note right upper lobe lung nodules, suspicious in nature, patient agrees to CT/PET  7/8/16 CT/PET spiculated right upper lobe nodule SUV 1.1, not FDG avid, scarring left upper lobe noted, nonspecific findings, low-grade neoplasm cannot be excluded; per [facility] repeat CT 3 months  10/24/16 CT thorax without; 2 spiculated right upper lobe pulmonary nodules 8 mm and 9 mm in size unchanged, 3 mm right middle lobe unchanged nodule  10/27/16 lung cancer screening program note, recommend referral to pulmonary nodule clinic  11/3/16 lung cancer screening note recent follow-up CT scan unchanged pulmonary nodules, recommend repeat CT scan 6 months with myself  4/6/17 CT thorax without; 2 stable 8-9 mm spiculated right upper lobe nodules, stable probably postinflammatory tiny 3 mm RML and RLL nodules, no new suspicious nodules     Status post splenectomy  During childhood  12/31/13  previously declined vaccines   1/23/15 menactra  1/23/15 prevnar     Tobacco  12/12 off cigs had been smoking 1 to 1.5 packs per day for 40+ years, still smokes cigars 3-4 per day  2/4/13 cxr negative  4/13 off cigar using electronic cig  12/26/13 still smokes 3 cigar per day  1/13/14 echo technically difficult study, possible mild LVH  1/13/14 treadmill thallium exercise 6 minutes 30 seconds lashell protocol, limited by fatigue; no ischemia, EF 59%  5/16/16 still smoking 3 cigars per day  6/24/16  CT thorax lung cancer screening to spiculated 9 mm nodules RUL underlying emphysematous changes and tiny 3 mm nodule RML, recommend 3 month follow-up CT vs CT/PET  7/8/16 [facility] note right upper lobe lung nodules, suspicious in nature, patient agrees to CT/PET  7/8/16 [facility] note right upper lobe lung nodules, suspicious in nature, patient agrees to CT/PET  7/8/16 CT/PET spiculated right upper lobe nodule SUV 1.1, not FDG avid, scarring left upper lobe noted, nonspecific findings, low-grade neoplasm cannot be excluded; per [facility] repeat CT 3 months  10/24/16 CT thorax without; 2 spiculated right upper lobe pulmonary nodules 8 mm and 9 mm in size unchanged, 3 mm right middle lobe unchanged nodule  10/27/16 lung cancer screening program note, recommend referral to pulmonary nodule clinic  4/6/17 CT thorax without; 2 stable 8-9 mm spiculated right upper lobe nodules, stable probably postinflammatory tiny 3 mm RML and RLL nodules, no new suspicious nodules  8/7/17 no cigarettes, 4 cigars per day    Health Maintenance Summary                IMM DTaP/Tdap/Td Vaccine Overdue 7/25/1969     IMM ZOSTER VACCINE Overdue 7/25/2010     PFT SCREENING-FEV1 AND FEV/FVC RATIO / SPIROMETRY SHOULD BE PERFORMED ANNUALLY Overdue 2/6/2014      Done 2/6/2013 PFT DICTATED RESULTS     Patient has more history with this topic...    LUNG CANCER SCREENING Overdue 10/6/2017      Done 4/6/2017 CT-CHEST (THORAX) W/O     Patient has more history with this topic...  "   Annual Wellness Visit Next Due 8/8/2018      Done 8/7/2017 Visit Dx: Medicare annual wellness visit, subsequent    COLONOSCOPY Next Due 7/28/2020      Done 7/28/2017 REFERRAL TO GI FOR COLONOSCOPY     Patient has more history with this topic...          Patient Care Team:  Naveen Duncan M.D. as PCP - General  CAROL Rodríguez as Consulting Physician (Oncology)      ROS       Objective:     /82   Pulse 62   Temp 36.4 °C (97.6 °F)   Ht 1.676 m (5' 6\")   Wt 64.9 kg (143 lb)   SpO2 98%   BMI 23.08 kg/m²      Physical Exam   Constitutional: He appears well-developed and well-nourished. No distress.   HENT:   Head: Normocephalic and atraumatic.   Eyes: Conjunctivae are normal. Right eye exhibits no discharge. Left eye exhibits no discharge.   Neck: Neck supple. No JVD present.   Cardiovascular: Normal rate and regular rhythm.    No murmur heard.  Pulmonary/Chest: Effort normal. No respiratory distress. He has no wheezes.   Abdominal: He exhibits no distension.   Musculoskeletal: He exhibits no edema.   Neurological: He is alert.   Skin: Skin is warm. He is not diaphoretic.   Psychiatric: He has a normal mood and affect. His behavior is normal.   Nursing note and vitals reviewed.              Assessment/Plan:     Assessment  #! Bronchitis and COPD exacerbation started on Zithromax Wednesday, symptoms slowly improving    #2 COPD, smoking 4 cigars per day, not interested in smoking cessation, on Advair and Spiriva    #3 leukocytosis 11/29/17 wbc 19.5 (51%N, 32%L), hemoglobin 17, hematocrit 52, platelet 412,000, has had chronic leukocytosis as baseline WBC 12,000,likely increased white blood cell count secondary to bronchitis, COPD exacerbation    #4 right first toe pain resolved question gout flareup because of alcohol and routine intake for Thanksgiving    #5 pulmonary nodule 4/6/17 CT thorax without; 2 stable 8-9 mm spiculated right upper lobe nodules, stable probably postinflammatory tiny 3 mm RML " and RLL nodules, no new suspicious nodules    #7 elevated blood pressure today question related to bronchitis    Plan  #1 complete Zithromax, monitor for nausea, vomiting, diarrhea    #2 repeat CBC 2 weeks    #3 smoking cessation, stop cigars, long-term risk of worsening COPD, lung cancer discussed, declined stop smoking intervention    #4 repeat CT thorax in April    #5 declines pulmonary function testing    #6 continue Advair and Spiriva    #7 no alcohol    #8 no need for imaging at this time    #9 monitor blood pressure    #10 follow-up 3 months

## 2018-04-04 ENCOUNTER — TELEPHONE (OUTPATIENT)
Dept: MEDICAL GROUP | Facility: MEDICAL CENTER | Age: 68
End: 2018-04-04

## 2018-04-04 DIAGNOSIS — Z72.0 NICOTINE ABUSE: ICD-10-CM

## 2018-04-04 DIAGNOSIS — Z12.2 ENCOUNTER FOR SCREENING FOR LUNG CANCER: ICD-10-CM

## 2018-04-04 NOTE — LETTER
April 5, 2018        Narendra Sun Jr.  [address] [place] [address]        Dear Narendra:    Please be advised that our office has tried to reach you several times by phone without success.    Please note that Dr. Duncan had this message for you:    Please notify the patient that he is due for his annual CT scan of the lungs.  I have placed the order in the computer system.    If you have any questions or concerns, please don't hesitate to call.        Sincerely,        Naveen Duncan M.D.    Electronically Signed

## 2018-04-04 NOTE — TELEPHONE ENCOUNTER
Phone Number Called: [phone] (home)     Message: Pt informed and given number to schedule.     Left Message for patient to call back: no

## 2018-04-04 NOTE — TELEPHONE ENCOUNTER
Please notify the patient that he is due for his annual CT scan of the lungs.  I have placed the order in the computer system.

## 2018-04-04 NOTE — TELEPHONE ENCOUNTER
----- Message from Briseyda Sandoval R.N. sent at 4/4/2018 12:18 PM PDT -----  Regarding: Patient due for annual Chest CT follow-up after 4/6/18  Hi Dr. Duncan,    Our records indicate that your patient is due for his annual Chest CT follow-upafter 4/6/18.   Once ordered I can send a letter out to encourage the patient to schedule.    Let me know if you have any questions or how I might help.    Thanks!   Briseyda Sandoval, BSN, RN, [facility]   Lung Cancer Screening Program   [phone]

## 2018-04-13 ENCOUNTER — HOSPITAL ENCOUNTER (OUTPATIENT)
Dept: RADIOLOGY | Facility: MEDICAL CENTER | Age: 68
End: 2018-04-13
Attending: INTERNAL MEDICINE
Payer: MEDICARE

## 2018-04-13 DIAGNOSIS — Z72.0 NICOTINE ABUSE: ICD-10-CM

## 2018-04-13 DIAGNOSIS — Z12.2 ENCOUNTER FOR SCREENING FOR LUNG CANCER: ICD-10-CM

## 2018-04-13 PROCEDURE — G0297 LDCT FOR LUNG CA SCREEN: HCPCS

## 2018-04-16 ENCOUNTER — TELEPHONE (OUTPATIENT)
Dept: MEDICAL GROUP | Facility: MEDICAL CENTER | Age: 68
End: 2018-04-16

## 2018-04-17 NOTE — TELEPHONE ENCOUNTER
Notified with results  Repeat CT/PET 3 months  Discussed CT calcium score, he would like to consider given atherosclerotic plaque on CT  Recommend smoking cessation understanding smoking increases risk for lung cancer and heart attack  Schedule follow-up appointment in 3 months to discuss, he understands and agrees

## 2018-06-07 ENCOUNTER — PATIENT OUTREACH (OUTPATIENT)
Dept: HEALTH INFORMATION MANAGEMENT | Facility: OTHER | Age: 68
End: 2018-06-07

## 2018-06-07 NOTE — PROGRESS NOTES
1. Attempt #: Final    2. [facility] Verified: yes    3. Verify PCP: yes    4. Care Team Updated:       •   DME Company (gait device, O2, CPAP, etc.): NO       •   Other Specialists (eye doctor, derm, GYN, cardiology, endo, etc): NO    5.  Reviewed/Updated the following with patient:       •   Communication Preference Obtained? YES       •   Preferred Pharmacy? YES       •   Preferred Lab? YES       •   Family History (document living status of immediate family members and if + hx of cancer, diabetes, hypertension, hyperlipidemia, heart attack, stroke) YES. Was Abstract Encounter opened and chart updated? NO    6. [employer] Activation: sent activation code    7. [employer]: yes    8. Annual Wellness Visit Scheduling  Scheduling Status:Scheduled      9. Care Gap Scheduling (Attempt to Schedule EACH Overdue Care Gap!)     Health Maintenance Due   Topic Date Due   • IMM MENINGOCOCCAL B VACCINE (1 of 3 - Trumenba 3-Dose Series ) 07/25/1960   • IMM DTaP/Tdap/Td Vaccine (1 - Tdap) 07/25/1969   • PFT SCREENING-FEV1 AND FEV/FVC RATIO / SPIROMETRY SHOULD BE PERFORMED ANNUALLY  02/06/2014        Scheduled patient for Annual Wellness Visit    10. Patient was advised: “This is a free wellness visit. The provider will screen for medical conditions to help you stay healthy. If you have other concerns to address you may be asked to discuss these at a separate visit or there may be an additional fee.”     11. Patient was informed to arrive 15 min prior to their scheduled appointment and bring in their medication bottles.

## 2018-07-21 ENCOUNTER — TELEPHONE (OUTPATIENT)
Dept: MEDICAL GROUP | Facility: MEDICAL CENTER | Age: 68
End: 2018-07-21

## 2018-07-21 DIAGNOSIS — D72.829 LEUKOCYTOSIS, UNSPECIFIED TYPE: Chronic | ICD-10-CM

## 2018-07-21 DIAGNOSIS — I25.10 CORONARY ARTERY DISEASE WITHOUT ANGINA PECTORIS, UNSPECIFIED VESSEL OR LESION TYPE, UNSPECIFIED WHETHER NATIVE OR TRANSPLANTED HEART: ICD-10-CM

## 2018-07-21 DIAGNOSIS — R35.0 URINARY FREQUENCY: ICD-10-CM

## 2018-07-21 DIAGNOSIS — Z12.5 PROSTATE CANCER SCREENING: ICD-10-CM

## 2018-07-21 DIAGNOSIS — M10.9 GOUT, UNSPECIFIED CAUSE, UNSPECIFIED CHRONICITY, UNSPECIFIED SITE: ICD-10-CM

## 2018-07-21 DIAGNOSIS — R91.1 PULMONARY NODULE: ICD-10-CM

## 2018-07-21 DIAGNOSIS — E78.5 DYSLIPIDEMIA: ICD-10-CM

## 2018-07-21 DIAGNOSIS — R79.89 LOW VITAMIN B12 LEVEL: Chronic | ICD-10-CM

## 2018-07-21 NOTE — TELEPHONE ENCOUNTER
Please notify patient that he is due for:  (1) CT/pet scan of his lungs to follow-up on the lung nodule, that order has been placed, this is a special CT scan with an additional PET test to look for lung inflammation or cancer  (2) fasting blood and urine tests, that order has been placed  (3) we had previously discussed getting a separate CT scan to evaluate calcium deposits in the blood vessels of his heart, I have ordered that test, that may be an out-of-pocket expense however, the imaging department should inform him when that test is scheduled, hopefully he can have that CT scan done at the same time the CT scan of his lungs is performed

## 2018-07-25 ENCOUNTER — TELEPHONE (OUTPATIENT)
Dept: MEDICAL GROUP | Facility: MEDICAL CENTER | Age: 68
End: 2018-07-25

## 2018-07-25 NOTE — TELEPHONE ENCOUNTER
ANNUAL WELLNESS VISIT PRE-VISIT PLANNING WITH OUTREACH    1.  If any orders were placed at last visit, do we have Results/Consult Notes?        •  Labs - Labs ordered, NOT completed  Note: If patient appointment is for lab review and patient did not complete labs, check with provider if OK to reschedule patient until labs completed.       •  Imaging - Imaging was not ordered at last office visit.       •  Referrals - No referrals were ordered at last office visit.    2.  Immunizations were updated in Epic using WebIZ?:Epic matches WebIZ       •  WebIZ Recommendations: FLU, TDAP and SHINGRIX (Shingles)       •  Is patient due for Tdap? YES. Patient was not notified of copay/out of pocket cost.       •  Is patient due for Shingles?YES. Patient was not notified of copay/out of pocket cost.    3.  Patient has the following Care Path diagnoses on Problem List: COPD (chronic obstructive pulmonary disease) (HCC)      4.  MDX printed for Provider? YES

## 2018-08-01 ENCOUNTER — TELEPHONE (OUTPATIENT)
Dept: MEDICAL GROUP | Facility: MEDICAL CENTER | Age: 68
End: 2018-08-01

## 2018-08-01 ENCOUNTER — HOSPITAL ENCOUNTER (OUTPATIENT)
Dept: RADIOLOGY | Facility: MEDICAL CENTER | Age: 68
End: 2018-08-01
Attending: INTERNAL MEDICINE
Payer: COMMERCIAL

## 2018-08-01 ENCOUNTER — HOSPITAL ENCOUNTER (OUTPATIENT)
Dept: RADIOLOGY | Facility: MEDICAL CENTER | Age: 68
End: 2018-08-01
Attending: INTERNAL MEDICINE
Payer: MEDICARE

## 2018-08-01 DIAGNOSIS — I25.10 CORONARY ARTERY DISEASE WITHOUT ANGINA PECTORIS, UNSPECIFIED VESSEL OR LESION TYPE, UNSPECIFIED WHETHER NATIVE OR TRANSPLANTED HEART: ICD-10-CM

## 2018-08-01 DIAGNOSIS — R91.1 PULMONARY NODULE: ICD-10-CM

## 2018-08-01 PROBLEM — B35.1 FUNGAL INFECTION OF TOENAIL: Status: RESOLVED | Noted: 2017-11-29 | Resolved: 2018-08-01

## 2018-08-01 PROCEDURE — A9552 F18 FDG: HCPCS

## 2018-08-01 PROCEDURE — 4410556 CT-CARDIAC SCORING

## 2018-08-02 NOTE — TELEPHONE ENCOUNTER
Notified with coronary calcium score, recommend statin therapy decrease risk of event, he declines for now understands that and may decrease risk of heart disease, heart attack, stroke.  He will continue no tobacco, continue to work on diet and exercise but declines cholesterol medication.

## 2018-08-21 ENCOUNTER — TELEPHONE (OUTPATIENT)
Dept: MEDICAL GROUP | Facility: MEDICAL CENTER | Age: 68
End: 2018-08-21

## 2018-08-21 NOTE — TELEPHONE ENCOUNTER
ANNUAL WELLNESS VISIT PRE-VISIT PLANNING WITH OUTREACH    1.  If any orders were placed at last visit, do we have Results/Consult Notes?        •  Labs - Labs ordered 12/01/17, NOT completed  Note: If patient appointment is for lab review and patient did not complete labs, check with provider if OK to reschedule patient until labs completed.       •  Imaging - Imaging was not ordered at last office visit.       •  Referrals - No referrals were ordered at last office visit.    2.  Immunizations were updated in Epic using WebIZ?:Epic matches WebIZ       •  WebIZ Recommendations: FLU, TDAP and SHINGRIX (Shingles)       •  Is patient due for Tdap? YES. Patient was not notified of copay/out of pocket cost.       •  Is patient due for Shingles?YES. Patient was not notified of copay/out of pocket cost.    3.  Patient has the following Care Path diagnoses on Problem List:  COPD (chronic obstructive pulmonary disease) (HCC)    4.  MDX printed for Provider? YES

## 2018-09-27 ENCOUNTER — TELEPHONE (OUTPATIENT)
Dept: MEDICAL GROUP | Facility: MEDICAL CENTER | Age: 68
End: 2018-09-27

## 2018-09-27 NOTE — TELEPHONE ENCOUNTER
ANNUAL WELLNESS VISIT PRE-VISIT PLANNING WITH OUTREACH    1.  If any orders were placed at last visit, do we have Results/Consult Notes?        •  Labs - Labs ordered 12/1/17, NOT completed  Note: If patient appointment is for lab review and patient did not complete labs, check with provider if OK to reschedule patient until labs completed.       •  Imaging - Imaging was not ordered at last office visit.       •  Referrals - No referrals were ordered at last office visit.    2.  Immunizations were updated in Epic using WebIZ?:Epic matches WebIZ       •  WebIZ Recommendations: FLU, TDAP and SHINGRIX (Shingles)       •  Is patient due for Tdap? YES. Patient was not notified of copay/out of pocket cost.       •  Is patient due for Shingles?YES. Patient was not notified of copay/out of pocket cost.    3.  Patient has the following Care Path diagnoses on Problem List: COPD (chronic obstructive pulmonary disease) (HCC)      4.  MDX printed for Provider? YES

## 2018-10-04 ENCOUNTER — OFFICE VISIT (OUTPATIENT)
Dept: MEDICAL GROUP | Facility: MEDICAL CENTER | Age: 68
End: 2018-10-04
Payer: MEDICARE

## 2018-10-04 VITALS
TEMPERATURE: 98.9 F | HEART RATE: 98 BPM | DIASTOLIC BLOOD PRESSURE: 80 MMHG | SYSTOLIC BLOOD PRESSURE: 122 MMHG | OXYGEN SATURATION: 95 % | BODY MASS INDEX: 24.27 KG/M2 | HEIGHT: 66 IN | WEIGHT: 151 LBS

## 2018-10-04 DIAGNOSIS — Z23 NEED FOR VACCINATION: ICD-10-CM

## 2018-10-04 DIAGNOSIS — J44.9 CHRONIC OBSTRUCTIVE PULMONARY DISEASE, UNSPECIFIED COPD TYPE (HCC): ICD-10-CM

## 2018-10-04 DIAGNOSIS — G47.00 INSOMNIA, UNSPECIFIED TYPE: ICD-10-CM

## 2018-10-04 DIAGNOSIS — Z23 NEEDS FLU SHOT: ICD-10-CM

## 2018-10-04 DIAGNOSIS — I70.0 ATHEROSCLEROSIS OF AORTA (HCC): ICD-10-CM

## 2018-10-04 DIAGNOSIS — Z23 NEED FOR ZOSTER VACCINE: ICD-10-CM

## 2018-10-04 DIAGNOSIS — Z00.00 MEDICARE ANNUAL WELLNESS VISIT, SUBSEQUENT: ICD-10-CM

## 2018-10-04 DIAGNOSIS — Z00.00 PREVENTATIVE HEALTH CARE: Chronic | ICD-10-CM

## 2018-10-04 DIAGNOSIS — Z72.0 TOBACCO ABUSE: Chronic | ICD-10-CM

## 2018-10-04 PROCEDURE — 90662 IIV NO PRSV INCREASED AG IM: CPT | Performed by: INTERNAL MEDICINE

## 2018-10-04 PROCEDURE — G0008 ADMIN INFLUENZA VIRUS VAC: HCPCS | Performed by: INTERNAL MEDICINE

## 2018-10-04 PROCEDURE — G0439 PPPS, SUBSEQ VISIT: HCPCS | Mod: 25 | Performed by: INTERNAL MEDICINE

## 2018-10-04 RX ORDER — TIOTROPIUM BROMIDE 18 UG/1
18 CAPSULE ORAL; RESPIRATORY (INHALATION) DAILY
Qty: 60 CAP | Refills: 6 | Status: SHIPPED | OUTPATIENT
Start: 2018-10-04 | End: 2020-02-13 | Stop reason: SDUPTHER

## 2018-10-04 RX ORDER — ZOLPIDEM TARTRATE 10 MG/1
10 TABLET ORAL NIGHTLY PRN
Qty: 15 EACH | Refills: 0 | Status: SHIPPED | OUTPATIENT
Start: 2018-10-04 | End: 2023-03-02 | Stop reason: SDUPTHER

## 2018-10-04 ASSESSMENT — ACTIVITIES OF DAILY LIVING (ADL): BATHING_REQUIRES_ASSISTANCE: 0

## 2018-10-04 ASSESSMENT — PAIN SCALES - GENERAL: PAINLEVEL: NO PAIN

## 2018-10-04 ASSESSMENT — ENCOUNTER SYMPTOMS: GENERAL WELL-BEING: GOOD

## 2018-10-04 ASSESSMENT — PATIENT HEALTH QUESTIONNAIRE - PHQ9: CLINICAL INTERPRETATION OF PHQ2 SCORE: 0

## 2018-10-04 NOTE — PROGRESS NOTES
Chief Complaint   Patient presents with   • Annual Wellness Visit         HPI:  Narendra is a 68 y.o. here for Medicare Annual Wellness Visit  Meds, allergies, med and surgical history reviewed and updated  Retired in july   Three cigars per day no cigarettes  SH , retired, drinks tea sweetened   No chest pain, shortness of breath, cough, lightheadedness, syncope, still using Advair and Spiriva  Upcoming travel to [facility], needs Ambien for insomnia travel, does not use nightly, no drowsiness, sedation, memory loss, confusion, falls with medication      Patient Active Problem List    Diagnosis Date Noted   • CAD (coronary artery disease) 08/01/2018   • Gout 12/01/2017   • Atherosclerosis of aorta (HCC) 08/07/2017   • Pulmonary nodule 07/03/2016   • History of hypertension 04/27/2015   • Low vitamin B12 level 02/07/2015   • Leukocytosis 12/31/2013   • Adenomatous colon polyp 07/16/2012   • BPH (benign prostatic hypertrophy) 04/17/2012   • COPD (chronic obstructive pulmonary disease) ([facility]) 07/27/2009   • Dyslipidemia 07/27/2009   • History of pneumothorax 07/27/2009   • S/P splenectomy 07/27/2009   • Preventative health care 07/27/2009   • Tobacco abuse 07/27/2009   • BRAO (branch retinal artery occlusion) 07/27/2009       Current Outpatient Prescriptions   Medication Sig Dispense Refill   • tiotropium (SPIRIVA HANDIHALER) 18 MCG Cap Inhale 1 Cap by mouth every day. 30 Cap 6   • fluticasone-salmeterol (ADVAIR DISKUS) 250-50 MCG/DOSE AEROSOL POWDER, BREATH ACTIVATED Inhale 1 Puff by mouth 2 times a day. 60 Inhaler 6   • ciclopirox (PENLAC) 8 % solution Apply to affected toenails daily at bedtime. Once every 7 days wife off with rubbing alcohol and then reapply 6.6 mL 3   • indomethacin (INDOCIN) 50 MG Cap Take 1 Cap by mouth 3 times a day as needed. 30 Cap 0   • zolpidem (AMBIEN) 10 MG Tab Take 1 Tab by mouth at bedtime as needed for Sleep. 15 Each 0   • cyanocobalamin (VITAMIN B12) 1000 MCG TABS Take 1 Tab by  mouth every day. 90 Tab 3     No current facility-administered medications for this visit.         Patient is taking medications as noted in medication list.  Current supplements as per medication list.     Allergies: Patient has no known allergies.    Current social contact/activities: Pt likes to golf, camp, fish, travel, watching football     Is patient current with immunizations? No, due for FLU, TDAP and SHINGRIX (Shingles). Patient is interested in receiving FLU today.    He  reports that he has been smoking Cigars.  He has a 70.50 pack-year smoking history. He has never used smokeless tobacco. He reports that he drinks alcohol. He reports that he does not use drugs.  Ready to quit: Not Answered  Counseling given: Not Answered        DPA/Advanced directive: Patient does not have an Advanced Directive.  A packet and workshop information was given on Advanced Directives.    ROS:    Gait: Uses no assistive device    Ostomy: No   Other tubes: No   Amputations: No   Chronic oxygen use No    Last eye exam Summer 2018    Wears hearing aids: No    : Denies any urinary leakage during the last 6 months       Screening:    COPD    1.  Is patient under the care of a pulmonologist? No.  2.  Has patient ever completed a PFT or spirometry? Yes, on 4-5 years ago.  3.  Is patient on oxygen or CPAP? No.  4.  Has patient ever had instruction on inhaler technique by health care professional? Yes  5.  Is patient interested in a referral to respiratory therapy for more information on COPD, inhaler technique, and/or information on establishing an action plan?  No, patient is NOT interested.      Depression Screening    Little interest or pleasure in doing things?  0 - not at all  Feeling down, depressed, or hopeless? 0 - not at all  Patient Health Questionnaire Score: 0    If depressive symptoms identified deferred to follow up visit unless specifically addressed in assessment and plan.    Interpretation of PHQ-9 Total Score    Score Severity   1-4 No Depression   5-9 Mild Depression   10-14 Moderate Depression   15-19 Moderately Severe Depression   20-27 Severe Depression    Screening for Cognitive Impairment    Three Minute Recall (leader, season, table)  3/3    Jude clock face with all 12 numbers and set the hands to show 10 past 11.  Yes 5/5  If cognitive concerns identified, deferred for follow up unless specifically addressed in assessment and plan.    Fall Risk Assessment    Has the patient had two or more falls in the last year or any fall with injury in the last year?  No  If fall risk identified, deferred for follow up unless specifically addressed in assessment and plan.    Safety Assessment    Throw rugs on floor.  Yes  Handrails on all stairs.  No  Good lighting in all hallways.  Yes  Difficulty hearing.  No  Patient counseled about all safety risks that were identified.    Functional Assessment ADLs    Are there any barriers preventing you from cooking for yourself or meeting nutritional needs?  No.    Are there any barriers preventing you from driving safely or obtaining transportation?  No.    Are there any barriers preventing you from using a telephone or calling for help?  No.    Are there any barriers preventing you from shopping?  No.    Are there any barriers preventing you from taking care of your own finances?  No.    Are there any barriers preventing you from managing your medications?  No.    Are there any barriers preventing you from showering, bathing or dressing yourself?  No.    Are you currently engaging in any exercise or physical activity?  No.     What is your perception of your health?  Good.    Health Maintenance Summary                IMM MENINGOCOCCAL B VACCINE Overdue 7/25/1960     IMM DTaP/Tdap/Td Vaccine Overdue 7/25/1969     IMM ZOSTER VACCINES Overdue 7/25/2000     PFT SCREENING-FEV1 AND FEV/FVC RATIO / SPIROMETRY SHOULD BE PERFORMED ANNUALLY Overdue 2/6/2014      Done 2/6/2013 PFT DICTATED  RESULTS     Patient has more history with this topic...    IMM INFLUENZA Overdue 9/1/2018      Done 11/29/2017 Imm Admin: Influenza Vaccine Adult HD    LUNG CANCER SCREENING Next Due 11/1/2018      Done 8/1/2018 [id number] BASE TO MID-THIGH     Patient has more history with this topic...    COLONOSCOPY Next Due 7/28/2020      Done 7/28/2017 REFERRAL TO GI FOR COLONOSCOPY     Patient has more history with this topic...          Patient Care Team:  Naveen Duncan M.D. as PCP - General  CAROL Rodríguez as Consulting Physician (Oncology)         Adenoma  12/05 colonoscopy [facility] tubular adenoma  6/29/12 colon per [facility], neg, repeat 5 years  7/28/17 colon per [facility] 3 mm polyp descending colon, 10 mm polyp descending colon, 5-8 mm polyp sigmoid colon, 10 mm polyp distal sigmoid colon, 6-8 mm polyp rectum, pathology adenoma and hyperplastic polyps repeat 3 years     BPH  5/11 psa 0.7  4/12 psa 7.5 given course of cipro  5/12/12 psa 2.3 after cipro  12/31/13 psa 1.4  2/7/15 psa 1.1  5/18/16 psa 1.2    cad  8/1/18 CT cardiac scoring LMA 0.0, LCX 1.5, .5, RCA 0.0, PDA 0.0     COPD  5/08 chest x-ray negative  6/08 PFT FEV1.6 L (54% predicted), FEV/FVC ratio 47%, positive bronchodilator response  6/10 quit smoking tobacco  5/11 still off cigs, smoking 2 cigars per day  5/11 restart spiriva  5/11 cxr negative  12/12 off cigs, still smokes cigars 3-4 per day  12/12 on spiriva, add symbicort 80/4.5  2/7/13 PFT severe stage III COPD, FEV1 1.4 L (46% predicted), FEV/FVC 47% improvement after bronchodilator 13%, normal DLCO; continue spiriva, symbicort 80/1.5, smoking cessation  6/24/16  CT thorax lung cancer screening to spiculated 9 mm nodules RUL underlying emphysematous changes and tiny 3 mm nodule RML, recommend 3 month follow-up CT vs CT/PET  2/7/13 cxr negative  12/26/13 still 3 cigars per day; on spiriva and symbicort  1/23/15 still 3 cigars per day, on spiriva and symbicort  1/26/16 change symbicort to advair  250/50 (insurance) and continue spiriva  6/2/16 CT lung cancer screening visit  7/8/16 [facility] note right upper lobe lung nodules, suspicious in nature, patient agrees to CT/PET  7/8/16 CT/PET spiculated right upper lobe nodule SUV 1.1, not FDG avid, scarring left upper lobe noted, nonspecific findings, low-grade neoplasm cannot be excluded; per IO repeat CT 3 months  10/24/16 CT thorax without; 2 spiculated right upper lobe pulmonary nodules 8 mm and 9 mm in size unchanged, 3 mm right middle lobe unchanged nodule  10/27/16 lung cancer screening program note, recommend referral to pulmonary nodule clinic  4/6/17 CT thorax without; 2 stable 8-9 mm spiculated right upper lobe nodules, stable probably postinflammatory tiny 3 mm RML and RLL nodules, no new suspicious nodules  12/1/17 declines PFT   4/15/18 CT lung lung cancer screening stable 9 mm right posterior/apical ill-defined nodule, stable right upper/lateral 9 mm solid pulmonary nodule, postoperative changes left upper lobe, emphysema, aortic and coronary atherosclerotic plaque, previous splenectomy   8/1/18 CT/PET no abnormal uptake within either right upper lobe or right apical nodule, nodules unchanged dating back to previous lung cancer screening CT 6/24/16, downgraded to category 3 RADS, no change 3 mm right middle lobe nodule, no increased uptake neck, abdomen, pelvis     Dyslipidemia  5/08 chol  239, trig 91,hdl 71,ldl 150  8/09 chol 268,trig 107,hdl 56,ldl 191  8/09 start zocor 20  8/09 stress echo negative  5/11 chol 231,trig 114,hdl 53,ldl 155 off zocor  5/12 chol 215,trig 97,hdl 48,ldl 148 off zocor  12/31/13 chol 215,trig 101,hdl 55,ldl 140,crp 3.0 off statin; 10 year risk 12%, I recommend statin therapy he declines  1/13/14 echo technically difficult study, possible mild LVH  1/13/14 treadmill thallium exercise 6 minutes 30 seconds lashell protocol, limited by fatigue; no ischemia, EF 59%  2/7/15 chol 250,trig 86,hdl 51,ldl 182; urine mac <0.5; 10 year  risk calculation 19%, start zocor 20 mg  5/16/16 did not take zocor  5/18/16 chol 239,trig 152,hdl 44,ldl 165  8/14/17 chol 205,trig 142,hdl 36,ldl 141 off zocor, 10 year cardiac risk, declines medication  8/1/18 CT cardiac scoring LMA 0.0, LCX 1.5, .5, RCA 0.0, PDA 0.0     History hypertension  1/13/14 echo technically difficult study, possible mild LVH  1/13/14 treadmill thallium exercise 6 minutes 30 seconds lashell protocol, limited by fatigue; no ischemia, EF 59%  4/27/15 start lisinopril 10 mg   5/16/16 off lisinopril      Leukocytosis  8/09 wbc 8.9  5/11 wbc 11.4  4/12 wbc 12.4 (54%N,34%L)  12/31/13 wbc 12.9 (53%N,22%L),hgb 17,hct 52,mcv 102,platelet 364, CRP 3.0; ? Related to tobacco  2/7/15 wbc 12.4 (49%N,34%L),hgb 17,hct 53,plt [id number]; b12 201,folate 5.3  2/13/15 leukocyte alkaline phosphatase 108 normal  5/18/16 wbc 13.5 (51%N,30%L),hgb 17.8,hct 54,plt 427,b12 249,folate 7  5/18/16 b12 249,folate 7 not on b12  8/14/17 wbc 12.1 (50%N,32%L),hgb 17,hct 51,mcv 100.8,b12 273,jak2 negative   11/29/17 wbc 19.5 (51%N, 32%L)     Low B-12  12/7/15 b12 201, folate 5.3, wbc 12.4(49%N,34%L),hgb 17,hct 53,plt [id number]; start b12 1000 mcg daily  5/18/16 b12 249,folate 7,MMA<0.1,Intrinsic factor Ab negative, not on b12  8/14/17 b12 273     Preventative health  12/26/13 decline flu,pneum,td vac  1/13/14 ultrasound aorta negative  1/23/15 prevnar  1/23/15 menactra  7/28/17 colon per [facility] 3 mm polyp descending colon, 10 mm polyp descending colon, 5-8 mm polyp sigmoid colon, 10 mm polyp distal sigmoid colon, 6-8 mm polyp rectum, pathology adenoma and hyperplastic polyps repeat 3 years  8/7/17 pneumovax  8/14/17 psa 1.2  8/14/17 vit d 23 start 2000 units     Pulmonary nodule  6/24/16  CT thorax lung cancer screening to spiculated 9 mm nodules RUL underlying emphysematous changes and tiny 3 mm nodule RML, recommend 3 month follow-up CT vs CT/PET  7/8/16 [facility] note right upper lobe lung nodules, suspicious in nature, patient  agrees to CT/PET  7/8/16 CT/PET spiculated right upper lobe nodule SUV 1.1, not FDG avid, scarring left upper lobe noted, nonspecific findings, low-grade neoplasm cannot be excluded; per [facility] repeat CT 3 months  10/24/16 CT thorax without; 2 spiculated right upper lobe pulmonary nodules 8 mm and 9 mm in size unchanged, 3 mm right middle lobe unchanged nodule  10/27/16 lung cancer screening program note, recommend referral to pulmonary nodule clinic  11/3/16 lung cancer screening note recent follow-up CT scan unchanged pulmonary nodules, recommend repeat CT scan 6 months with myself  4/6/17 CT thorax without; 2 stable 8-9 mm spiculated right upper lobe nodules, stable probably postinflammatory tiny 3 mm RML and RLL nodules, no new suspicious nodules  12/1/17 4 cigars per day  4/15/18 CT lung lung cancer screening stable 9 mm right posterior/apical ill-defined nodule, stable right upper/lateral 9 mm solid pulmonary nodule, postoperative changes left upper lobe, emphysema, aortic and coronary atherosclerotic plaque, previous splenectomy   8/1/18 CT/PET no abnormal uptake within either right upper lobe or right apical nodule, nodules unchanged dating back to previous lung cancer screening CT 6/24/16, downgraded to category 3 RADS, no change 3 mm right middle lobe nodule, no increased uptake neck, abdomen, pelvis     Status post splenectomy  During childhood  12/31/13 previously declined vaccines   1/23/15 menactra  1/23/15 prevnar  8/7/17 pneumovax     Tobacco  12/12 off cigs had been smoking 1 to 1.5 packs per day for 40+ years, still smokes cigars 3-4 per day  2/4/13 cxr negative  4/13 off cigar using electronic cig  12/26/13 still smokes 3 cigar per day  1/13/14 echo technically difficult study, possible mild LVH  1/13/14 treadmill thallium exercise 6 minutes 30 seconds lashell protocol, limited by fatigue; no ischemia, EF 59%  5/16/16 still smoking 3 cigars per day  6/24/16  CT thorax lung cancer screening to  spiculated 9 mm nodules RUL underlying emphysematous changes and tiny 3 mm nodule RML, recommend 3 month follow-up CT vs CT/PET  7/8/16 [facility] note right upper lobe lung nodules, suspicious in nature, patient agrees to CT/PET  7/8/16 [facility] note right upper lobe lung nodules, suspicious in nature, patient agrees to CT/PET  7/8/16 CT/PET spiculated right upper lobe nodule SUV 1.1, not FDG avid, scarring left upper lobe noted, nonspecific findings, low-grade neoplasm cannot be excluded; per [facility] repeat CT 3 months  10/24/16 CT thorax without; 2 spiculated right upper lobe pulmonary nodules 8 mm and 9 mm in size unchanged, 3 mm right middle lobe unchanged nodule  10/27/16 lung cancer screening program note, recommend referral to pulmonary nodule clinic  4/6/17 CT thorax without; 2 stable 8-9 mm spiculated right upper lobe nodules, stable probably postinflammatory tiny 3 mm RML and RLL nodules, no new suspicious nodules  8/7/17 no cigarettes, 4 cigars per day  12/1/17 4 cigars per day  4/15/18 CT lung lung cancer screening stable 9 mm right posterior/apical ill-defined nodule, stable right upper/lateral 9 mm solid pulmonary nodule, postoperative changes left upper lobe, emphysema, aortic and coronary atherosclerotic plaque, previous splenectomy   8/1/18 CT/PET no abnormal uptake within either right upper lobe or right apical nodule, nodules unchanged dating back to previous lung cancer screening CT 6/24/16, downgraded to category 3 RADS, no change 3 mm right middle lobe nodule, no increased uptake neck, abdomen, pelvis        Social History   Substance Use Topics   • Smoking status: Current Every Day Smoker     Packs/day: 1.50     Years: 47.00     Types: Cigars     Last attempt to quit: 5/17/2012   • Smokeless tobacco: Never Used      Comment: 1.5 ppd since age of 14 yrs.  Quit 4 yrs ago and only smokes cigars   • Alcohol use 0.0 - 0.6 oz/week     Family History   Problem Relation Age of Onset   • Cancer Brother          "prostate and likely 2 other cancers     He  has a past medical history of Bronchitis chronic; COPD (chronic obstructive pulmonary disease) (HCC) (7/27/2009); Dyslipidemia; PNEUMOTHORAX; Preventative health care (7/27/2009); S/P Splenectomy (7/27/2009); Tobacco abuse (7/27/2009); and Tubular adenoma (7/27/2009).   Past Surgical History:   Procedure Laterality Date   • GENERAL LUNG SURGERY Left 1980s    Lung biopsy - benign   • SPLENECTOMY             Exam:     Blood pressure 122/80, pulse 98, temperature 37.2 °C (98.9 °F), temperature source Temporal, height 1.676 m (5' 6\"), weight 68.5 kg (151 lb), SpO2 95 %. Body mass index is 24.37 kg/m².    Hearing and dentition fair  Alert, oriented in no acute distress.  Eye contact is good, speech goal directed, affect calm  Lungs clear  Cv s1s2 reg    Assessment and Plan. The following treatment and monitoring plan is recommended:     1. Need for vaccination  INFLUENZA VACCINE, HIGH DOSE (65+ ONLY)       Assessment  #! Wellness    #2 cad asymptomatic declines statin therapy 8/1/18 CT cardiac scoring LMA 0.0, LCX 1.5, .5, RCA 0.0, PDA 0.0     #3 COPD stable on inhalers 4/15/18 CT lung lung cancer screening stable 9 mm right posterior/apical ill-defined nodule, stable right upper/lateral 9 mm solid pulmonary nodule, postoperative changes left upper lobe, emphysema, aortic and coronary atherosclerotic plaque, previous splenectomy  8/1/18 CT/PET no abnormal uptake within either right upper lobe or right apical nodule, nodules unchanged dating back to previous lung cancer screening CT 6/24/16, downgraded to category 3 RADS, no change 3 mm right middle lobe nodule, no increased uptake neck, abdomen, pelvis     #4 dyslipidemia 8/14/17 chol 205,trig 142,hdl 36,ldl 141 off zocor, 10 year cardiac risk, declines medication  8/1/18 CT cardiac scoring LMA 0.0, LCX 1.5, .5, RCA 0.0, PDA 0.0     #5 leukocytosis no current fevers, chills, night sweats 8/14/17 wbc 12.1 " (50%N,32%L),hgb 17,hct 51,mcv 100.8,b12 273,jak2 negative 11/29/17 wbc 19.5 (51%N, 32%L)     #6 low B-12 8/14/17 b12 273     #7 preventative health  12/26/13 decline flu,pneum,td vac  1/13/14 ultrasound aorta negative  1/23/15 prevnar  1/23/15 menactra  7/28/17 colon per [facility] 3 mm polyp descending colon, 10 mm polyp descending colon, 5-8 mm polyp sigmoid colon, 10 mm polyp distal sigmoid colon, 6-8 mm polyp rectum, pathology adenoma and hyperplastic polyps repeat 3 years  8/7/17 pneumovax  8/14/17 psa 1.2  8/14/17 vit d 23 start 2000 units     #8 pulmonary nodule most recent imaging study 8/1/18 CT/PET no abnormal uptake within either right upper lobe or right apical nodule, nodules unchanged dating back to previous lung cancer screening CT 6/24/16, downgraded to category 3 RADS, no change 3 mm right middle lobe nodule, no increased uptake neck, abdomen, pelvis     #9 status post splenectomy  During childhood  12/31/13 previously declined vaccines   1/23/15 menactra  1/23/15 prevnar  8/7/17 pneumovax     #10 tobacco 3 cigars/day has quit cigarettes  8/1/18 CT/PET no abnormal uptake within either right upper lobe or right apical nodule, nodules unchanged dating back to previous lung cancer screening CT 6/24/16, downgraded to category 3 RADS, no change 3 mm right middle lobe nodule, no increased uptake neck, abdomen, pelvis  10/4/18 3 cigars per day    #11 insomnia with travel    #12 atherosclerosis aorta no aneurysm stable, has cut back on cigarettes, only 3 cigars              Services suggested:    Health Care Screening recommendations as per orders if indicated.  Referrals offered: PT/OT/Nutrition counseling/Behavioral Health/Smoking cessation as per orders if indicated.    Discussion today about general wellness and lifestyle habits:    · Prevent falls and reduce trip hazards; Cautioned about securing or removing rugs.  · Have a working fire alarm and carbon monoxide detector;   · Engage in regular physical  activity and social activities       Follow-up  Plan  #! Flu shot javon dose    #2 shignrix to get at pharmacy    #3 tdap declines    #4 declines PT    #5 declines hearing test    #6 declines pft    #7 refill ambien precautions sleep hygiene discussed, can cause drowsiness, sedation, memory loss, confusion, falls    #8 had labs done this summer at [facility] we did not get the results labs reordered again    #9 smoking cessation emphasized, tobacco increases long-term risk for COPD, lung cancer, cardiovascular disease    #10 declines statin therapy    #11 Repeat vaccines 2020 splenectomy    #12 repeat CT thorax August 19    #13 declined statin therapy    #14 follow-up 6 months

## 2018-10-16 ENCOUNTER — PATIENT OUTREACH (OUTPATIENT)
Dept: HEALTH INFORMATION MANAGEMENT | Facility: OTHER | Age: 68
End: 2018-10-16

## 2018-10-16 ENCOUNTER — HOSPITAL ENCOUNTER (OUTPATIENT)
Dept: LAB | Facility: MEDICAL CENTER | Age: 68
End: 2018-10-16
Attending: INTERNAL MEDICINE
Payer: MEDICARE

## 2018-10-16 DIAGNOSIS — M10.9 GOUT, UNSPECIFIED CAUSE, UNSPECIFIED CHRONICITY, UNSPECIFIED SITE: ICD-10-CM

## 2018-10-16 DIAGNOSIS — R79.89 LOW VITAMIN B12 LEVEL: Chronic | ICD-10-CM

## 2018-10-16 DIAGNOSIS — D72.829 LEUKOCYTOSIS, UNSPECIFIED TYPE: Chronic | ICD-10-CM

## 2018-10-16 DIAGNOSIS — R35.0 URINARY FREQUENCY: ICD-10-CM

## 2018-10-16 DIAGNOSIS — Z12.5 PROSTATE CANCER SCREENING: ICD-10-CM

## 2018-10-16 DIAGNOSIS — E78.5 DYSLIPIDEMIA: ICD-10-CM

## 2018-10-16 LAB
ALBUMIN SERPL BCP-MCNC: 4.2 G/DL (ref 3.2–4.9)
ALBUMIN/GLOB SERPL: 1.2 G/DL
ALP SERPL-CCNC: 69 U/L (ref 30–99)
ALT SERPL-CCNC: 15 U/L (ref 2–50)
ANION GAP SERPL CALC-SCNC: 7 MMOL/L (ref 0–11.9)
APPEARANCE UR: CLEAR
AST SERPL-CCNC: 17 U/L (ref 12–45)
BASOPHILS # BLD AUTO: 0.6 % (ref 0–1.8)
BASOPHILS # BLD: 0.09 K/UL (ref 0–0.12)
BILIRUB SERPL-MCNC: 0.7 MG/DL (ref 0.1–1.5)
BILIRUB UR QL STRIP.AUTO: NEGATIVE
BUN SERPL-MCNC: 19 MG/DL (ref 8–22)
CALCIUM SERPL-MCNC: 9.8 MG/DL (ref 8.5–10.5)
CHLORIDE SERPL-SCNC: 107 MMOL/L (ref 96–112)
CHOLEST SERPL-MCNC: 261 MG/DL (ref 100–199)
CO2 SERPL-SCNC: 25 MMOL/L (ref 20–33)
COLOR UR: YELLOW
CREAT SERPL-MCNC: 1.08 MG/DL (ref 0.5–1.4)
EOSINOPHIL # BLD AUTO: 0.8 K/UL (ref 0–0.51)
EOSINOPHIL NFR BLD: 5.7 % (ref 0–6.9)
ERYTHROCYTE [DISTWIDTH] IN BLOOD BY AUTOMATED COUNT: 54.1 FL (ref 35.9–50)
FASTING STATUS PATIENT QL REPORTED: NORMAL
GLOBULIN SER CALC-MCNC: 3.4 G/DL (ref 1.9–3.5)
GLUCOSE SERPL-MCNC: 82 MG/DL (ref 65–99)
GLUCOSE UR STRIP.AUTO-MCNC: NEGATIVE MG/DL
HCT VFR BLD AUTO: 55.3 % (ref 42–52)
HDLC SERPL-MCNC: 42 MG/DL
HGB BLD-MCNC: 18.3 G/DL (ref 14–18)
IMM GRANULOCYTES # BLD AUTO: 0.11 K/UL (ref 0–0.11)
IMM GRANULOCYTES NFR BLD AUTO: 0.8 % (ref 0–0.9)
KETONES UR STRIP.AUTO-MCNC: NEGATIVE MG/DL
LDLC SERPL CALC-MCNC: 188 MG/DL
LEUKOCYTE ESTERASE UR QL STRIP.AUTO: NEGATIVE
LYMPHOCYTES # BLD AUTO: 5.11 K/UL (ref 1–4.8)
LYMPHOCYTES NFR BLD: 36.5 % (ref 22–41)
MCH RBC QN AUTO: 33.6 PG (ref 27–33)
MCHC RBC AUTO-ENTMCNC: 33.1 G/DL (ref 33.7–35.3)
MCV RBC AUTO: 101.5 FL (ref 81.4–97.8)
MICRO URNS: NORMAL
MONOCYTES # BLD AUTO: 1.21 K/UL (ref 0–0.85)
MONOCYTES NFR BLD AUTO: 8.6 % (ref 0–13.4)
NEUTROPHILS # BLD AUTO: 6.69 K/UL (ref 1.82–7.42)
NEUTROPHILS NFR BLD: 47.8 % (ref 44–72)
NITRITE UR QL STRIP.AUTO: NEGATIVE
NRBC # BLD AUTO: 0 K/UL
NRBC BLD-RTO: 0 /100 WBC
PH UR STRIP.AUTO: 5.5 [PH]
PLATELET # BLD AUTO: 436 K/UL (ref 164–446)
PMV BLD AUTO: 9.8 FL (ref 9–12.9)
POTASSIUM SERPL-SCNC: 4.5 MMOL/L (ref 3.6–5.5)
PROT SERPL-MCNC: 7.6 G/DL (ref 6–8.2)
PROT UR QL STRIP: NEGATIVE MG/DL
RBC # BLD AUTO: 5.45 M/UL (ref 4.7–6.1)
RBC UR QL AUTO: NEGATIVE
SODIUM SERPL-SCNC: 139 MMOL/L (ref 135–145)
SP GR UR STRIP.AUTO: 1.02
TRIGL SERPL-MCNC: 155 MG/DL (ref 0–149)
URATE SERPL-MCNC: 6.1 MG/DL (ref 2.5–8.3)
UROBILINOGEN UR STRIP.AUTO-MCNC: 0.2 MG/DL
WBC # BLD AUTO: 14 K/UL (ref 4.8–10.8)

## 2018-10-16 PROCEDURE — 88184 FLOWCYTOMETRY/ TC 1 MARKER: CPT

## 2018-10-16 PROCEDURE — 81003 URINALYSIS AUTO W/O SCOPE: CPT

## 2018-10-16 PROCEDURE — 84443 ASSAY THYROID STIM HORMONE: CPT

## 2018-10-16 PROCEDURE — 36415 COLL VENOUS BLD VENIPUNCTURE: CPT

## 2018-10-16 PROCEDURE — 82607 VITAMIN B-12: CPT

## 2018-10-16 PROCEDURE — 80053 COMPREHEN METABOLIC PANEL: CPT

## 2018-10-16 PROCEDURE — 80061 LIPID PANEL: CPT

## 2018-10-16 PROCEDURE — 84550 ASSAY OF BLOOD/URIC ACID: CPT

## 2018-10-16 PROCEDURE — 88185 FLOWCYTOMETRY/TC ADD-ON: CPT | Mod: 91

## 2018-10-16 PROCEDURE — 84153 ASSAY OF PSA TOTAL: CPT

## 2018-10-16 PROCEDURE — 85025 COMPLETE CBC W/AUTO DIFF WBC: CPT

## 2018-10-16 NOTE — PROGRESS NOTES
Outcome: Patient completed AWV and received a RX to complete zoster vaccine. Pt declined to Tdap vaccine, stated was not interested.     Please transfer to Patient Outreach Team at [phone] when patient returns call.    WebIZ Checked & Epic Updated:  yes  Tdap   Zoster Papito (Shingrix)     [facility] Verified: yes  Effective Date: 1/1/2018     Attempt # 1

## 2018-10-17 LAB
PSA SERPL-MCNC: 1.05 NG/ML (ref 0–4)
TSH SERPL DL<=0.005 MIU/L-ACNC: 2.15 UIU/ML (ref 0.38–5.33)
VIT B12 SERPL-MCNC: 324 PG/ML (ref 211–911)

## 2018-10-18 ENCOUNTER — TELEPHONE (OUTPATIENT)
Dept: MEDICAL GROUP | Facility: MEDICAL CENTER | Age: 68
End: 2018-10-18

## 2018-10-18 DIAGNOSIS — E78.5 DYSLIPIDEMIA: Chronic | ICD-10-CM

## 2018-10-18 LAB
ACUTE LEUKEMIA MARKERS SPEC-IMP: NORMAL
CD1 CELLS NFR SPEC: <1 %
CD10 CELLS NFR SPEC: 1 %
CD103 CELLS NFR SPEC: <1 %
CD11B CELLS NFR SPEC: 2 %
CD13 CELLS NFR SPEC: 4 %
CD16 CELLS NFR SPEC: 19 %
CD19 CELLS NFR SPEC: 8 %
CD2 CELLS NFR SPEC: 87 %
CD20 CELLS NFR SPEC: 9 %
CD22 CELLS NFR SPEC: 7 %
CD3 CELLS NFR SPEC: 76 %
CD33 CELLS NFR SPEC: 3 %
CD34 CELLS NFR SPEC: <1 %
CD4 LEUKLYMP PHENO Q5783: 41 %
CD45 CELLS NFR SPEC: NORMAL %
CD5 CELLS NFR SPEC: 77 %
CD56 CELLS NFR SPEC: 17 %
CD57 CELLS NFR SPEC: 22 %
CD64 CELLS NFR SPEC: 1 %
CD7 CELLS NFR SPEC: 79 %
CD8 LEUKLYMP PHENO Q5786: 42 %
EVENTS COUNTED SPEC: 24 MARKERS
LYMPHS KAPPA/LYMPH NFR SPEC: 5 %
LYMPHS LAMBDA/LYMPH NFR SPEC: 3 %
SOURCE 9121: NORMAL
TCR G-D CELLS NFR SPEC: 1 %

## 2018-10-18 RX ORDER — ATORVASTATIN CALCIUM 20 MG/1
20 TABLET, FILM COATED ORAL DAILY
Qty: 90 TAB | Refills: 1 | Status: SHIPPED | OUTPATIENT
Start: 2018-10-18 | End: 2019-08-01 | Stop reason: SDUPTHER

## 2018-10-19 NOTE — TELEPHONE ENCOUNTER
Notified with labs, WBC decreased, flow cytometry negative, cholesterol elevated off statin, recommend start statin again, previous CT calcium score elevated, has had statin before, can cause muscle pain, muscle aches  Need to recheck labs in 8 weeks, prescription to pharmacy notify us if muscle pain, muscle aches otherwise recheck labs 8 weeks orders placed in computer

## 2019-03-13 ENCOUNTER — TELEPHONE (OUTPATIENT)
Dept: MEDICAL GROUP | Facility: MEDICAL CENTER | Age: 69
End: 2019-03-13

## 2019-03-13 DIAGNOSIS — M25.522 LEFT ELBOW PAIN: ICD-10-CM

## 2019-03-13 DIAGNOSIS — M79.642 HAND PAIN, LEFT: ICD-10-CM

## 2019-03-13 DIAGNOSIS — M79.645 THUMB PAIN, LEFT: ICD-10-CM

## 2019-03-13 NOTE — TELEPHONE ENCOUNTER
Please notify patient I will place the x-ray orders in the computer system but he needs to be seen if he has persistent pain or symptoms.

## 2019-03-13 NOTE — TELEPHONE ENCOUNTER
1. Caller Name: Pt                      Call Back Number: [phone] (home)       2. Message: Pt left message requesting a x-ray order for his Left elbow pain and Left Thumb pain[facility] and has been experiencing  pain ever since.     3. Patient approves office to leave a detailed voicemail/MyChart message: yes

## 2019-03-14 ENCOUNTER — TELEPHONE (OUTPATIENT)
Dept: MEDICAL GROUP | Facility: MEDICAL CENTER | Age: 69
End: 2019-03-14

## 2019-03-14 ENCOUNTER — HOSPITAL ENCOUNTER (OUTPATIENT)
Dept: RADIOLOGY | Facility: MEDICAL CENTER | Age: 69
End: 2019-03-14
Attending: INTERNAL MEDICINE
Payer: MEDICARE

## 2019-03-14 DIAGNOSIS — M25.522 LEFT ELBOW PAIN: ICD-10-CM

## 2019-03-14 DIAGNOSIS — M79.645 THUMB PAIN, LEFT: ICD-10-CM

## 2019-03-14 PROCEDURE — 73080 X-RAY EXAM OF ELBOW: CPT | Mod: LT

## 2019-03-14 PROCEDURE — 73140 X-RAY EXAM OF FINGER(S): CPT | Mod: LT

## 2019-03-14 NOTE — TELEPHONE ENCOUNTER
----- Message from Naveen Duncan M.D. sent at 3/14/2019 12:36 PM PDT -----  Please notify patient x-ray of the elbow was negative

## 2019-03-14 NOTE — TELEPHONE ENCOUNTER
Phone Number Called: [phone] (home)       Message: Patient notified of results.   Pt asking if there is any explanation for his pain and if there is anything he can do or take that might relieve his pain    Left Message for patient to call back: N\A

## 2019-03-14 NOTE — TELEPHONE ENCOUNTER
Phone Number Called: [phone] (home)       Message: Patient notified of results.       Left Message for patient to call back: N\A

## 2019-03-14 NOTE — TELEPHONE ENCOUNTER
----- Message from Naveen Duncan M.D. sent at 3/14/2019 12:36 PM PDT -----  Please notify patient the x-ray shows no acute fracture, there is some evidence of old injury to the area of the thumb but nothing acute.

## 2019-03-14 NOTE — TELEPHONE ENCOUNTER
He really would need to be seen and evaluated for this.  He can try Tylenol 3 times a day in the meantime.

## 2019-03-21 ENCOUNTER — PATIENT OUTREACH (OUTPATIENT)
Dept: OTHER | Facility: MEDICAL CENTER | Age: 69
End: 2019-03-21

## 2019-04-13 ENCOUNTER — TELEPHONE (OUTPATIENT)
Dept: MEDICAL GROUP | Facility: MEDICAL CENTER | Age: 69
End: 2019-04-13

## 2019-04-13 DIAGNOSIS — Z12.2 ENCOUNTER FOR SCREENING FOR LUNG CANCER: ICD-10-CM

## 2019-04-13 DIAGNOSIS — Z12.2 ENCOUNTER FOR SCREENING FOR MALIGNANT NEOPLASM OF RESPIRATORY ORGANS: ICD-10-CM

## 2019-04-13 DIAGNOSIS — Z87.891 PERSONAL HISTORY OF NICOTINE DEPENDENCE: ICD-10-CM

## 2019-04-13 DIAGNOSIS — F17.200 TOBACCO DEPENDENCE: ICD-10-CM

## 2019-04-13 NOTE — TELEPHONE ENCOUNTER
Please notify patient that he is due for his CT lung cancer screening test.  The CT lung cancer screening test has been ordered.

## 2019-04-18 ENCOUNTER — HOSPITAL ENCOUNTER (OUTPATIENT)
Dept: RADIOLOGY | Facility: MEDICAL CENTER | Age: 69
End: 2019-04-18
Attending: INTERNAL MEDICINE
Payer: MEDICARE

## 2019-04-18 DIAGNOSIS — Z87.891 PERSONAL HISTORY OF NICOTINE DEPENDENCE: ICD-10-CM

## 2019-04-18 DIAGNOSIS — Z12.2 ENCOUNTER FOR SCREENING FOR MALIGNANT NEOPLASM OF RESPIRATORY ORGANS: ICD-10-CM

## 2019-04-18 PROCEDURE — G0297 LDCT FOR LUNG CA SCREEN: HCPCS

## 2019-04-19 ENCOUNTER — TELEPHONE (OUTPATIENT)
Dept: MEDICAL GROUP | Facility: MEDICAL CENTER | Age: 69
End: 2019-04-19

## 2019-04-19 NOTE — TELEPHONE ENCOUNTER
----- Message from Naveen Duncan M.D. sent at 4/19/2019  4:18 PM PDT -----  Please notify patient that the CT shows no overall change, he still has the right lung nodules, the radiologist recommends a repeat CT scan in 6 months

## 2019-04-19 NOTE — TELEPHONE ENCOUNTER
Please notify patient that the CT shows no overall change, he still has the right lung nodules, the radiologist recommends a repeat CT scan in 6 months

## 2019-07-29 ENCOUNTER — TELEPHONE (OUTPATIENT)
Dept: MEDICAL GROUP | Facility: MEDICAL CENTER | Age: 69
End: 2019-07-29

## 2019-07-30 ENCOUNTER — TELEPHONE (OUTPATIENT)
Dept: MEDICAL GROUP | Facility: MEDICAL CENTER | Age: 69
End: 2019-07-30

## 2019-07-30 NOTE — TELEPHONE ENCOUNTER
ESTABLISHED PATIENT PRE-VISIT PLANNING     Patient was NOT contacted to complete PVP.     Note: Patient will not be contacted if there is no indication to call.     1.  Reviewed notes from the last few office visits within the medical group: Yes    2.  If any orders were placed at last visit or intended to be done for this visit (i.e. 6 mos follow-up), do we have Results/Consult Notes?        •  Labs - Labs not completed   Note: If patient appointment is for lab review and patient did not complete labs, check with provider if OK to reschedule patient until labs completed.       •  Imaging - Imaging ordered, completed and results are in chart.       •  Referrals - No referrals were ordered at last office visit.    3. Is this appointment scheduled as a Hospital Follow-Up? No    4.  Immunizations were updated in [employer] using WebIZ?: Yes       •  Web Iz Recommendations: MENACTRA (MCV4), PREVNAR (PCV13) , TDAP, TRUMENBA (Men B) and SHINGRIX (Shingles)    5.  Patient is due for the following Health Maintenance Topics:   Health Maintenance Due   Topic Date Due   • HEPATITIS C SCREENING  1950   • IMM MENINGOCOCCAL B VACCINE AT RISK PATIENTS AGED 10+ (1 of 3 - Risk Trumenba 3-dose series) 07/25/1960   • IMM DTaP/Tdap/Td Vaccine (1 - Tdap) 07/25/1969   • IMM ZOSTER VACCINES (1 of 2) 07/25/2000   • PFT SCREENING-FEV1 AND FEV/FVC RATIO / SPIROMETRY SHOULD BE PERFORMED ANNUALLY  02/06/2014   • IMM MENINGOCOCCAL VACCINE (MCV4) (2 - Risk 2-dose series) 03/20/2015       6. Orders for overdue Health Maintenance topics pended in Pre-Charting? NO    7.  AHA (MDX) form printed for Provider? YES    8.  Patient was NOT informed to arrive 15 min prior to their scheduled appointment and bring in their medication bottles.

## 2019-07-30 NOTE — TELEPHONE ENCOUNTER
ESTABLISHED PATIENT PRE-VISIT PLANNING     Patient was NOT contacted to complete PVP.     Note: Patient will not be contacted if there is no indication to call.     1.  Reviewed notes from the last few office visits within the medical group: Yes    2.  If any orders were placed at last visit or intended to be done for this visit (i.e. 6 mos follow-up), do we have Results/Consult Notes?        •  Labs - Labs ordered but not completed   Note: If patient appointment is for lab review and patient did not complete labs, check with provider if OK to reschedule patient until labs completed.       •  Imaging - Imaging ordered, completed and results are in chart.       •  Referrals - No referrals were ordered at last office visit.    3. Is this appointment scheduled as a Hospital Follow-Up? No    4.  Immunizations were updated in [employer] using WebIZ?: Yes       •  Web Iz Recommendations: PREVNAR (PCV13) , TDAP, TRUMENBA (Men B) and SHINGRIX (Shingles)    5.  Patient is due for the following Health Maintenance Topics:   Health Maintenance Due   Topic Date Due   • HEPATITIS C SCREENING  1950   • IMM MENINGOCOCCAL B VACCINE AT RISK PATIENTS AGED 10+ (1 of 3 - Risk Trumenba 3-dose series) 07/25/1960   • IMM DTaP/Tdap/Td Vaccine (1 - Tdap) 07/25/1969   • IMM ZOSTER VACCINES (1 of 2) 07/25/2000   • PFT SCREENING-FEV1 AND FEV/FVC RATIO / SPIROMETRY SHOULD BE PERFORMED ANNUALLY  02/06/2014   • IMM MENINGOCOCCAL VACCINE (MCV4) (2 - Risk 2-dose series) 03/20/2015       6. Orders for overdue Health Maintenance topics pended in Pre-Charting? NO    7.  AHA (MDX) form printed for Provider? YES    8.  Patient was NOT informed to arrive 15 min prior to their scheduled appointment and bring in their medication bottles.

## 2019-08-01 ENCOUNTER — OFFICE VISIT (OUTPATIENT)
Dept: MEDICAL GROUP | Facility: MEDICAL CENTER | Age: 69
End: 2019-08-01
Payer: MEDICARE

## 2019-08-01 VITALS
BODY MASS INDEX: 24.43 KG/M2 | HEIGHT: 66 IN | DIASTOLIC BLOOD PRESSURE: 80 MMHG | WEIGHT: 152 LBS | SYSTOLIC BLOOD PRESSURE: 134 MMHG | TEMPERATURE: 98.5 F | OXYGEN SATURATION: 94 % | HEART RATE: 73 BPM

## 2019-08-01 DIAGNOSIS — D72.829 LEUKOCYTOSIS, UNSPECIFIED TYPE: ICD-10-CM

## 2019-08-01 DIAGNOSIS — J44.9 CHRONIC OBSTRUCTIVE PULMONARY DISEASE, UNSPECIFIED COPD TYPE (HCC): ICD-10-CM

## 2019-08-01 DIAGNOSIS — I70.0 ATHEROSCLEROSIS OF AORTA (HCC): ICD-10-CM

## 2019-08-01 DIAGNOSIS — D64.9 ANEMIA, UNSPECIFIED TYPE: ICD-10-CM

## 2019-08-01 DIAGNOSIS — R35.1 NOCTURIA: ICD-10-CM

## 2019-08-01 DIAGNOSIS — Z12.5 PROSTATE CANCER SCREENING: ICD-10-CM

## 2019-08-01 DIAGNOSIS — E78.5 DYSLIPIDEMIA: Chronic | ICD-10-CM

## 2019-08-01 DIAGNOSIS — E55.9 HYPOVITAMINOSIS D: ICD-10-CM

## 2019-08-01 DIAGNOSIS — Z72.0 TOBACCO ABUSE: Chronic | ICD-10-CM

## 2019-08-01 DIAGNOSIS — Z00.00 MEDICARE ANNUAL WELLNESS VISIT, SUBSEQUENT: ICD-10-CM

## 2019-08-01 DIAGNOSIS — Z11.59 NEED FOR HEPATITIS C SCREENING TEST: ICD-10-CM

## 2019-08-01 DIAGNOSIS — R91.1 PULMONARY NODULE: ICD-10-CM

## 2019-08-01 DIAGNOSIS — I10 ESSENTIAL HYPERTENSION: ICD-10-CM

## 2019-08-01 PROCEDURE — G0439 PPPS, SUBSEQ VISIT: HCPCS | Performed by: INTERNAL MEDICINE

## 2019-08-01 RX ORDER — ATORVASTATIN CALCIUM 20 MG/1
20 TABLET, FILM COATED ORAL DAILY
Qty: 30 TAB | Refills: 5 | Status: SHIPPED | OUTPATIENT
Start: 2019-08-01 | End: 2019-10-14 | Stop reason: SDUPTHER

## 2019-08-01 ASSESSMENT — ENCOUNTER SYMPTOMS: GENERAL WELL-BEING: GOOD

## 2019-08-01 ASSESSMENT — PATIENT HEALTH QUESTIONNAIRE - PHQ9: CLINICAL INTERPRETATION OF PHQ2 SCORE: 0

## 2019-08-01 ASSESSMENT — ACTIVITIES OF DAILY LIVING (ADL): BATHING_REQUIRES_ASSISTANCE: 0

## 2019-08-01 NOTE — PROGRESS NOTES
Subjective:      Narendra Sun Jr. is a 69 y.o. male who presents with wellness        HPI     CC:  Health Risk Assessment Exam.    HPI: Annual wellness visit  Narendra is a 69 y.o. here for Health Risk Assessment.     PCP: Naveen Duncan M.D.  Ophthal: eye exam once a year at [facility] glasses distance and reading, no issues night driving   Teeth cleaning once per year  meds and allergies reviewed and updated, medical and surgical history reviewed and updated, social and family history and updated  Did not start lipitor last year  Still smoking 3 cigars per day no cough, shortness of breath at rest, no recurrent upper respiratory tract infections, COPD maintains on Advair and Spiriva  Some hip pain with ambulation worse with ambulation 1/4 mile   Does not use sunscreen         Patient Active Problem List    Diagnosis Date Noted   • CAD (coronary artery disease) 08/01/2018   • Gout 12/01/2017   • Atherosclerosis of aorta (HCC) 08/07/2017   • Pulmonary nodule 07/03/2016   • History of hypertension 04/27/2015   • Low vitamin B12 level 02/07/2015   • Leukocytosis 12/31/2013   • Adenomatous colon polyp 07/16/2012   • BPH (benign prostatic hypertrophy) 04/17/2012   • COPD (chronic obstructive pulmonary disease) (HCC) 07/27/2009   • Dyslipidemia 07/27/2009   • History of pneumothorax 07/27/2009   • S/P splenectomy 07/27/2009   • Preventative health care 07/27/2009   • Tobacco abuse 07/27/2009   • BRAO (branch retinal artery occlusion) 07/27/2009     Current Outpatient Medications   Medication Sig Dispense Refill   • tiotropium (SPIRIVA HANDIHALER) 18 MCG Cap Inhale 1 Cap by mouth every day. 60 Cap 6   • fluticasone-salmeterol (ADVAIR DISKUS) 250-50 MCG/DOSE AEROSOL POWDER, BREATH ACTIVATED Inhale 1 Puff by mouth 2 times a day. 120 Inhaler 6   • cyanocobalamin (VITAMIN B12) 1000 MCG TABS Take 1 Tab by mouth every day. 90 Tab 3   • atorvastatin (LIPITOR) 20 MG Tab Take 1 Tab by mouth every day. (Patient not taking:  "Reported on 2019) 90 Tab 1   • ciclopirox (PENLAC) 8 % solution Apply to affected toenails daily at bedtime. Once every 7 days wife off with rubbing alcohol and then reapply (Patient not taking: Reported on 2019) 6.6 mL 3     No current facility-administered medications for this visit.       Current supplements: Reviewed  Chronic narcotic pain medicines: no  Allergies: Patient has no known allergies.    Screening: Reviewed  Depressive Symptoms: Denies feeling down, depressed or hopeless. Denies loss of interest or pleasure in doing things   ADLs: Denies needing help with using telephone, transportation, shopping, preparing meals, housework, laundry, or managing medication or money.    Independent with bathing, hygiene, feeding, toileting, dressing    Memory concerns: Denies difficulty remembering details of conversations, events and upcoming appointments.  Hearing problems: Denies.   Recent falls no    Current social contact/activities: Reviewed  Social History     Tobacco Use   • Smoking status: Current Every Day Smoker     Packs/day: 1.50     Years: 47.00     Pack years: 70.50     Types: Cigars     Last attempt to quit: 2012     Years since quittin.2   • Smokeless tobacco: Never Used   • Tobacco comment: 1.5 ppd since age of 14 yrs.  Quit 4 yrs ago and only smokes cigars   Substance Use Topics   • Alcohol use: Yes     Alcohol/week: 0.0 - 0.6 oz   • Drug use: No       Family History   Problem Relation Age of Onset   • Cancer Brother         prostate and likely 2 other cancers     Past Surgical History:   Procedure Laterality Date   • GENERAL LUNG SURGERY Left 1980s    Lung biopsy - benign   • SPLENECTOMY         ROS:    Ostomy or other tubes or amputations: No  Chronic oxygen use no  Uses inhalers daily       /80 (BP Location: Right arm, Patient Position: Sitting, BP Cuff Size: Adult)   Pulse 73   Temp 36.9 °C (98.5 °F)   Ht 1.676 m (5' 6\")   Wt 68.9 kg (152 lb)   SpO2 94%   BMI 24.53 " kg/m²  Body mass index is 24.53 kg/m².    Physical Exam:  Gait: normal. Uses assistive device :no  Lungs clear  Cardiovascular S1-S2 regular  Extremities no edema  Skin no petechia  Normal affect, insight, judgment      Health Care Screening recommendations reviewed with patient today and updated or ordered.  DPA/Advanced directive: Completed/Information provided.    Referrals for PT/OT/Nutrition counseling/Behavioral Health/Smoking cessation as above if indicated  Discussion today about general wellness and lifestyle habits:    · Prevent falls and reduce trip hazards;   · Have a working fire alarm and carbon monoxide detector;   · Engage in regular physical activity and social activities;   Use sun protection when outdoors.           Current Outpatient Medications   Medication Sig Dispense Refill   • atorvastatin (LIPITOR) 20 MG Tab Take 1 Tab by mouth every day. 90 Tab 1   • tiotropium (SPIRIVA HANDIHALER) 18 MCG Cap Inhale 1 Cap by mouth every day. 60 Cap 6   • fluticasone-salmeterol (ADVAIR DISKUS) 250-50 MCG/DOSE AEROSOL POWDER, BREATH ACTIVATED Inhale 1 Puff by mouth 2 times a day. 120 Inhaler 6   • ciclopirox (PENLAC) 8 % solution Apply to affected toenails daily at bedtime. Once every 7 days wife off with rubbing alcohol and then reapply 6.6 mL 3   • cyanocobalamin (VITAMIN B12) 1000 MCG TABS Take 1 Tab by mouth every day. 90 Tab 3     No current facility-administered medications for this visit.       Adenoma  12/05 colonoscopy [facility] tubular adenoma  6/29/12 colon per [facility], neg, repeat 5 years  7/28/17 colon per [facility] 3 mm polyp descending colon, 10 mm polyp descending colon, 5-8 mm polyp sigmoid colon, 10 mm polyp distal sigmoid colon, 6-8 mm polyp rectum, pathology adenoma and hyperplastic polyps repeat 3 years     BPH  5/11 psa 0.7  4/12 psa 7.5 given course of cipro  5/12/12 psa 2.3 after cipro  12/31/13 psa 1.4  2/7/15 psa 1.1  5/18/16 psa 1.2  10/16/18 psa 1.0      cad  8/1/18 CT cardiac scoring LMA 0.0,  LCX 1.5, .5, RCA 0.0, PDA 0.0     COPD  5/08 chest x-ray negative  6/08 PFT FEV1.6 L (54% predicted), FEV/FVC ratio 47%, positive bronchodilator response  6/10 quit smoking tobacco  5/11 still off cigs, smoking 2 cigars per day  5/11 restart spiriva  5/11 cxr negative  12/12 off cigs, still smokes cigars 3-4 per day  12/12 on spiriva, add symbicort 80/4.5  2/7/13 PFT severe stage III COPD, FEV1 1.4 L (46% predicted), FEV/FVC 47% improvement after bronchodilator 13%, normal DLCO; continue spiriva, symbicort 80/1.5, smoking cessation  6/24/16  CT thorax lung cancer screening to spiculated 9 mm nodules RUL underlying emphysematous changes and tiny 3 mm nodule RML, recommend 3 month follow-up CT vs CT/PET  2/7/13 cxr negative  12/26/13 still 3 cigars per day; on spiriva and symbicort  1/23/15 still 3 cigars per day, on spiriva and symbicort  1/26/16 change symbicort to advair 250/50 (insurance) and continue spiriva  6/2/16 CT lung cancer screening visit  7/8/16 [facility] note right upper lobe lung nodules, suspicious in nature, patient agrees to CT/PET  7/8/16 CT/PET spiculated right upper lobe nodule SUV 1.1, not FDG avid, scarring left upper lobe noted, nonspecific findings, low-grade neoplasm cannot be excluded; per [facility] repeat CT 3 months  10/24/16 CT thorax without; 2 spiculated right upper lobe pulmonary nodules 8 mm and 9 mm in size unchanged, 3 mm right middle lobe unchanged nodule  10/27/16 lung cancer screening program note, recommend referral to pulmonary nodule clinic  4/6/17 CT thorax without; 2 stable 8-9 mm spiculated right upper lobe nodules, stable probably postinflammatory tiny 3 mm RML and RLL nodules, no new suspicious nodules  12/1/17 declines PFT   4/15/18 CT lung lung cancer screening stable 9 mm right posterior/apical ill-defined nodule, stable right upper/lateral 9 mm solid pulmonary nodule, postoperative changes left upper lobe, emphysema, aortic and coronary atherosclerotic plaque, previous  splenectomy   8/1/18 CT/PET no abnormal uptake within either right upper lobe or right apical nodule, nodules unchanged dating back to previous lung cancer screening CT 6/24/16, downgraded to category 3 RADS, no change 3 mm right middle lobe nodule, no increased uptake neck, abdomen, pelvis  4/19/19 CT lung cancer screening spiculated nodular density right lung apex 9 x 9 mm with cavitary change and spiculated nodule right lung apex posteriorly 7 x 8 mm overall unchanged from previous exam, postoperative change left upper lobe, recommend repeat 6 months     Dyslipidemia  5/08 chol  239, trig 91,hdl 71,ldl 150  8/09 chol 268,trig 107,hdl 56,ldl 191  8/09 start zocor 20  8/09 stress echo negative  5/11 chol 231,trig 114,hdl 53,ldl 155 off zocor  5/12 chol 215,trig 97,hdl 48,ldl 148 off zocor  12/31/13 chol 215,trig 101,hdl 55,ldl 140,crp 3.0 off statin; 10 year risk 12%, I recommend statin therapy he declines  1/13/14 echo technically difficult study, possible mild LVH  1/13/14 treadmill thallium exercise 6 minutes 30 seconds lashell protocol, limited by fatigue; no ischemia, EF 59%  2/7/15 chol 250,trig 86,hdl 51,ldl 182; urine mac <0.5; 10 year risk calculation 19%, start zocor 20 mg  5/16/16 did not take zocor  5/18/16 chol 239,trig 152,hdl 44,ldl 165  8/14/17 chol 205,trig 142,hdl 36,ldl 141 off zocor, 10 year cardiac risk, declines medication  8/1/18 CT cardiac scoring LMA 0.0, LCX 1.5, .5, RCA 0.0, PDA 0.0  10/16/8 chol 261,trig 155,hdl 42,ldl 188 declines statin therapy  10/18/18 start lipitor 20 mg     History hypertension  1/13/14 echo technically difficult study, possible mild LVH  1/13/14 treadmill thallium exercise 6 minutes 30 seconds lashell protocol, limited by fatigue; no ischemia, EF 59%  4/27/15 start lisinopril 10 mg   5/16/16 off lisinopril     gout  11/29/17 right first toe pain given indocin uric 5.6  10/16/18 uric 6.1     Leukocytosis  8/09 wbc 8.9  5/11 wbc 11.4  4/12 wbc 12.4  (54%N,34%L)  12/31/13 wbc 12.9 (53%N,22%L),hgb 17,hct 52,mcv 102,platelet 364, CRP 3.0; ? Related to tobacco  2/7/15 wbc 12.4 (49%N,34%L),hgb 17,hct 53,plt [id number]; b12 201,folate 5.3  2/13/15 leukocyte alkaline phosphatase 108 normal  5/18/16 wbc 13.5 (51%N,30%L),hgb 17.8,hct 54,plt 427,b12 249,folate 7  5/18/16 b12 249,folate 7 not on b12  8/14/17 wbc 12.1 (50%N,32%L),hgb 17,hct 51,mcv 100.8,b12 273,jak2 negative   11/29/17 wbc 19.5 (51%N, 32%L)  10/16/18 wbc 14 (48%N,36%L), hgb 18,hct 55, plt 436, b12 324     Low B-12  12/7/15 b12 201, folate 5.3, wbc 12.4(49%N,34%L),hgb 17,hct 53,plt [id number]; start b12 1000 mcg daily  5/18/16 b12 249,folate 7,MMA<0.1,Intrinsic factor Ab negative, not on b12  8/14/17 b12 273  10/16/18 b12 324, hgb 18,hct 55,mcv 101     Preventative health  12/26/13 decline flu,pneum,td vac  1/13/14 ultrasound aorta negative  1/23/15 prevnar  1/23/15 menactra  7/28/17 colon per [facility] 3 mm polyp descending colon, 10 mm polyp descending colon, 5-8 mm polyp sigmoid colon, 10 mm polyp distal sigmoid colon, 6-8 mm polyp rectum, pathology adenoma and hyperplastic polyps repeat 3 years  8/7/17 pneumovax  8/14/17 vit d 23 start 2000 units  10/4/18 shingrix provided to get at pharmacy   10/4/18 tdap declined  10/16/18 psa 1.0    Pulmonary nodule  6/24/16  CT thorax lung cancer screening to spiculated 9 mm nodules RUL underlying emphysematous changes and tiny 3 mm nodule RML, recommend 3 month follow-up CT vs CT/PET  7/8/16 [facility] note right upper lobe lung nodules, suspicious in nature, patient agrees to CT/PET  7/8/16 CT/PET spiculated right upper lobe nodule SUV 1.1, not FDG avid, scarring left upper lobe noted, nonspecific findings, low-grade neoplasm cannot be excluded; per [facility] repeat CT 3 months  10/24/16 CT thorax without; 2 spiculated right upper lobe pulmonary nodules 8 mm and 9 mm in size unchanged, 3 mm right middle lobe unchanged nodule  10/27/16 lung cancer screening program note, recommend referral  to pulmonary nodule clinic  11/3/16 lung cancer screening note recent follow-up CT scan unchanged pulmonary nodules, recommend repeat CT scan 6 months with myself  4/6/17 CT thorax without; 2 stable 8-9 mm spiculated right upper lobe nodules, stable probably postinflammatory tiny 3 mm RML and RLL nodules, no new suspicious nodules  4/15/18 CT lung lung cancer screening stable 9 mm right posterior/apical ill-defined nodule, stable right upper/lateral 9 mm solid pulmonary nodule, postoperative changes left upper lobe, emphysema, aortic and coronary atherosclerotic plaque, previous splenectomy   8/1/18 CT/PET no abnormal uptake within either right upper lobe or right apical nodule, nodules unchanged dating back to previous lung cancer screening CT 6/24/16, downgraded to category 3 RADS, no change 3 mm right middle lobe nodule, no increased uptake neck, abdomen, pelvis  4/19/19 CT lung cancer screening spiculated nodular density right lung apex 9 x 9 mm with cavitary change and spiculated nodule right lung apex posteriorly 7 x 8 mm overall unchanged from previous exam, postoperative change left upper lobe, recommend repeat 6 months     Status post splenectomy  During childhood  12/31/13 previously declined vaccines   1/23/15 menactra  1/23/15 prevnar  8/7/17 pneumovax     Tobacco  12/12 off cigs had been smoking 1 to 1.5 packs per day for 40+ years, still smokes cigars 3-4 per day  2/4/13 cxr negative  4/13 off cigar using electronic cig  12/26/13 still smokes 3 cigar per day  1/13/14 echo technically difficult study, possible mild LVH  1/13/14 treadmill thallium exercise 6 minutes 30 seconds lashell protocol, limited by fatigue; no ischemia, EF 59%  5/16/16 still smoking 3 cigars per day  6/24/16  CT thorax lung cancer screening to spiculated 9 mm nodules RUL underlying emphysematous changes and tiny 3 mm nodule RML, recommend 3 month follow-up CT vs CT/PET  7/8/16 [facility] note right upper lobe lung nodules, suspicious in  nature, patient agrees to CT/PET  7/8/16 [facility] note right upper lobe lung nodules, suspicious in nature, patient agrees to CT/PET  7/8/16 CT/PET spiculated right upper lobe nodule SUV 1.1, not FDG avid, scarring left upper lobe noted, nonspecific findings, low-grade neoplasm cannot be excluded; per [facility] repeat CT 3 months  10/24/16 CT thorax without; 2 spiculated right upper lobe pulmonary nodules 8 mm and 9 mm in size unchanged, 3 mm right middle lobe unchanged nodule  10/27/16 lung cancer screening program note, recommend referral to pulmonary nodule clinic  4/6/17 CT thorax without; 2 stable 8-9 mm spiculated right upper lobe nodules, stable probably postinflammatory tiny 3 mm RML and RLL nodules, no new suspicious nodules  8/7/17 no cigarettes, 4 cigars per day  12/1/17 4 cigars per day  4/15/18 CT lung lung cancer screening stable 9 mm right posterior/apical ill-defined nodule, stable right upper/lateral 9 mm solid pulmonary nodule, postoperative changes left upper lobe, emphysema, aortic and coronary atherosclerotic plaque, previous splenectomy   8/1/18 CT/PET no abnormal uptake within either right upper lobe or right apical nodule, nodules unchanged dating back to previous lung cancer screening CT 6/24/16, downgraded to category 3 RADS, no change 3 mm right middle lobe nodule, no increased uptake neck, abdomen, pelvis  10/4/18 declines PFT, smoking 3 cigars per day  4/19/19 CT lung cancer screening spiculated nodular density right lung apex 9 x 9 mm with cavitary change and spiculated nodule right lung apex posteriorly 7 x 8 mm overall unchanged from previous exam, postoperative change left upper lobe, recommend repeat 6 months            Patient Active Problem List   Diagnosis   • COPD (chronic obstructive pulmonary disease) (HCC)   • Dyslipidemia   • History of pneumothorax   • S/P splenectomy   • Preventative health care   • Tobacco abuse   • BRAO (branch retinal artery occlusion)   • BPH (benign prostatic  hypertrophy)   • Adenomatous colon polyp   • Leukocytosis   • Low vitamin B12 level   • History of hypertension   • Pulmonary nodule   • Atherosclerosis of aorta (HCC)   • Gout   • CAD (coronary artery disease)     Depression Screening    Little interest or pleasure in doing things?  0 - not at all  Feeling down, depressed , or hopeless? 0 - not at all  Patient Health Questionnaire Score: 0     If depressive symptoms identified deferred to follow up visit unless specifically addressed in assessment and plan.    Interpretation of PHQ-9 Total Score   Score Severity   1-4 No Depression   5-9 Mild Depression   10-14 Moderate Depression   15-19 Moderately Severe Depression   20-27 Severe Depression    Screening for Cognitive Impairment    Three Minute Recall (village, kitchen, baby) 3/3    Jude clock face with all 12 numbers and set the hands to show 10 past 10.  Yes    Cognitive concerns identified deferred for follow up unless specifically addressed in assessment and plan.    Fall Risk Assessment    Has the patient had two or more falls in the last year or any fall with injury in the last year?  No    Safety Assessment    Throw rugs on floor.  No  Handrails on all stairs.  No  Good lighting in all hallways.  Yes  Difficulty hearing.  No  Patient counseled about all safety risks that were identified.    Functional Assessment ADLs    Are there any barriers preventing you from cooking for yourself or meeting nutritional needs?  No.    Are there any barriers preventing you from driving safely or obtaining transportation?  No.    Are there any barriers preventing you from using a telephone or calling for help?  No.    Are there any barriers preventing you from shopping?  No.    Are there any barriers preventing you from taking care of your own finances?  No.    Are there any barriers preventing you from managing your medications?  No.    Are there any barriers preventing you from showering, bathing or dressing  yourself?  No.    Are you currently engaging in any exercise or physical activity?  No.     What is your perception of your health?  Good.      Health Maintenance Summary                Annual Wellness Visit Overdue 1950     HEPATITIS C SCREENING Overdue 1950     IMM MENINGOCOCCAL B VACCINE AT RISK PATIENTS AGED 10+ Overdue 7/25/1960     PFT SCREENING-FEV1 AND FEV/FVC RATIO / SPIROMETRY SHOULD BE PERFORMED ANNUALLY Overdue 7/25/1968     IMM DTaP/Tdap/Td Vaccine Overdue 7/25/1969     IMM ZOSTER VACCINES Overdue 7/25/2000     IMM MENINGOCOCCAL VACCINE (MCV4) Overdue 3/20/2015      Done 1/23/2015 Imm Admin: Meningococcal Conjugate Vaccine MCV4 (Menactra)    IMM INFLUENZA Next Due 9/1/2019      Done 10/4/2018 Imm Admin: Influenza Vaccine Adult HD     Patient has more history with this topic...    LUNG CANCER SCREENING Next Due 10/18/2019      Done 4/18/2019 CT-LUNG CANCER-SCREENING     Patient has more history with this topic...    COLONOSCOPY Next Due 7/28/2020      Done 7/28/2017 REFERRAL TO GI FOR COLONOSCOPY     Patient has more history with this topic...          Patient Care Team:  Naveen Duncan M.D. as PCP - General  CAROL Rodríguez as Consulting Physician (Oncology)      ROS       Objective:          Physical Exam   Constitutional: He appears well-developed and well-nourished. No distress.   HENT:   Head: Normocephalic and atraumatic.   Eyes: Conjunctivae are normal. Right eye exhibits no discharge. Left eye exhibits no discharge.   Neck: Neck supple. No JVD present. No thyromegaly present.   Cardiovascular: Normal rate and regular rhythm.   Pulmonary/Chest: Effort normal and breath sounds normal. No stridor. No respiratory distress.   Abdominal: He exhibits no distension.   Musculoskeletal: He exhibits no edema.   Neurological: He is alert.   Skin: Skin is warm. He is not diaphoretic.   Nursing note and vitals reviewed.              Assessment/Plan:     Assessment and Plan. The following  treatment and monitoring plan is recommended:   #! Wellness visit       #2 cad coronary calcium score asymptomatic 8/1/18 CT cardiac scoring LMA 0.0, LCX 1.5, .5, RCA 0.0, PDA 0.0     #3 COPD on advair and spiriva no recent exacerbation 6/08 PFT FEV1.6 L (54% predicted), FEV/FVC ratio 47%, positive bronchodilator response/19/19 CT lung cancer screening spiculated nodular density right lung apex 9 x 9 mm with cavitary change and spiculated nodule right lung apex posteriorly 7 x 8 mm overall unchanged from previous exam, postoperative change left upper lobe, recommend repeat 6 months  Has declined follow up PFT     #4 dyslipidemia 10/16/8 chol 261,trig 155,hdl 42,ldl 188 declines statin therapy recommended to start Lipitor last year did not do so      #5 History hypertension 5/16/16 off lisinopril, not checking blood pressures regularly     #6 leukocytosis 10/16/18 wbc 14 (48%N,36%L), hgb 18,hct 55, plt 436, b12 324 question related to splenectomy     #7 low B-12 10/16/18 b12 324, hgb 18,hct 55,mcv 101 on supplementation     #8 pulmonary nodule continues to smoke 3 cigars/day 4/19/19 CT lung cancer screening spiculated nodular density right lung apex 9 x 9 mm with cavitary change and spiculated nodule right lung apex posteriorly 7 x 8 mm overall unchanged from previous exam, postoperative change left upper lobe, recommend repeat 6 months     #9 status post splenectomy  During childhood  12/31/13 previously declined vaccines   1/23/15 menactra  1/23/15 prevnar  8/7/17 pneumovax     #10 Tobacco 3 cigars per day declines stop smoking classes    #11 Adenoma 7/28/17 colon per [facility] 3 mm polyp descending colon, 10 mm polyp descending colon, 5-8 mm polyp sigmoid colon, 10 mm polyp distal sigmoid colon, 6-8 mm polyp rectum, pathology adenoma and hyperplastic polyps repeat 3 years     #12 BPH 10/16/18 psa 1.0    #13 Preventative health  12/26/13 decline flu,pneum,td vac  1/13/14 ultrasound aorta negative  1/23/15  prevnar  1/23/15 menactra  7/28/17 colon per [facility] 3 mm polyp descending colon, 10 mm polyp descending colon, 5-8 mm polyp sigmoid colon, 10 mm polyp distal sigmoid colon, 6-8 mm polyp rectum, pathology adenoma and hyperplastic polyps repeat 3 years  8/7/17 pneumovax  8/14/17 vit d 23 start 2000 units  10/4/18 shingrix provided to get at pharmacy   10/4/18 tdap declined  10/16/18 psa 1.0    #14 hip arthritis    #15  Aortic atherosclerosis no aneurysm       Plan  #! Start lipitor as ordered previously can cause muscle pain or aches check labs 4 weeks    #2 stop smoking cigar offered stop smoking classes, declines understanding tobacco increases risk for worsening COPD, coronary disease    #3 recommend PFT patient declines    #4 [facility] parking placard application for hip pain     #5 declines hip xray and PT notify me if symptoms worsen    #6 declines hearing test    #7 declines tdap    #8 still waiting on shingrix check with pharmacy    #11 due CT thorax in November    #12 check blood pressure periodically record    #13 labs ordered repeat 4 weeks after on Lipitor    #14 follow up one year

## 2019-10-08 ENCOUNTER — HOSPITAL ENCOUNTER (OUTPATIENT)
Dept: RADIOLOGY | Facility: MEDICAL CENTER | Age: 69
End: 2019-10-08
Attending: INTERNAL MEDICINE
Payer: MEDICARE

## 2019-10-08 ENCOUNTER — TELEPHONE (OUTPATIENT)
Dept: MEDICAL GROUP | Facility: MEDICAL CENTER | Age: 69
End: 2019-10-08

## 2019-10-08 DIAGNOSIS — R91.1 PULMONARY NODULE: ICD-10-CM

## 2019-10-08 PROCEDURE — 71250 CT THORAX DX C-: CPT

## 2019-10-09 ENCOUNTER — HOSPITAL ENCOUNTER (OUTPATIENT)
Dept: LAB | Facility: MEDICAL CENTER | Age: 69
End: 2019-10-09
Attending: INTERNAL MEDICINE
Payer: MEDICARE

## 2019-10-09 DIAGNOSIS — E55.9 HYPOVITAMINOSIS D: ICD-10-CM

## 2019-10-09 DIAGNOSIS — I10 ESSENTIAL HYPERTENSION: ICD-10-CM

## 2019-10-09 DIAGNOSIS — Z11.59 NEED FOR HEPATITIS C SCREENING TEST: ICD-10-CM

## 2019-10-09 DIAGNOSIS — E78.5 DYSLIPIDEMIA: Chronic | ICD-10-CM

## 2019-10-09 DIAGNOSIS — Z12.5 PROSTATE CANCER SCREENING: ICD-10-CM

## 2019-10-09 DIAGNOSIS — D64.9 ANEMIA, UNSPECIFIED TYPE: ICD-10-CM

## 2019-10-09 DIAGNOSIS — R35.1 NOCTURIA: ICD-10-CM

## 2019-10-09 LAB
ALBUMIN SERPL BCP-MCNC: 4 G/DL (ref 3.2–4.9)
ALBUMIN/GLOB SERPL: 1.4 G/DL
ALP SERPL-CCNC: 73 U/L (ref 30–99)
ALT SERPL-CCNC: 19 U/L (ref 2–50)
ANION GAP SERPL CALC-SCNC: 7 MMOL/L (ref 0–11.9)
APPEARANCE UR: CLEAR
AST SERPL-CCNC: 18 U/L (ref 12–45)
BASOPHILS # BLD AUTO: 0.6 % (ref 0–1.8)
BASOPHILS # BLD: 0.08 K/UL (ref 0–0.12)
BILIRUB SERPL-MCNC: 0.6 MG/DL (ref 0.1–1.5)
BILIRUB UR QL STRIP.AUTO: NEGATIVE
BUN SERPL-MCNC: 11 MG/DL (ref 8–22)
CALCIUM SERPL-MCNC: 8.8 MG/DL (ref 8.5–10.5)
CHLORIDE SERPL-SCNC: 106 MMOL/L (ref 96–112)
CHOLEST SERPL-MCNC: 159 MG/DL (ref 100–199)
CO2 SERPL-SCNC: 26 MMOL/L (ref 20–33)
COLOR UR: YELLOW
CREAT SERPL-MCNC: 0.92 MG/DL (ref 0.5–1.4)
CREAT UR-MCNC: 108.5 MG/DL
EOSINOPHIL # BLD AUTO: 0.67 K/UL (ref 0–0.51)
EOSINOPHIL NFR BLD: 4.8 % (ref 0–6.9)
ERYTHROCYTE [DISTWIDTH] IN BLOOD BY AUTOMATED COUNT: 54.8 FL (ref 35.9–50)
GLOBULIN SER CALC-MCNC: 2.9 G/DL (ref 1.9–3.5)
GLUCOSE SERPL-MCNC: 73 MG/DL (ref 65–99)
GLUCOSE UR STRIP.AUTO-MCNC: NEGATIVE MG/DL
HCT VFR BLD AUTO: 55.4 % (ref 42–52)
HDLC SERPL-MCNC: 44 MG/DL
HGB BLD-MCNC: 18.3 G/DL (ref 14–18)
IMM GRANULOCYTES # BLD AUTO: 0.08 K/UL (ref 0–0.11)
IMM GRANULOCYTES NFR BLD AUTO: 0.6 % (ref 0–0.9)
IRON SATN MFR SERPL: 39 % (ref 15–55)
IRON SERPL-MCNC: 120 UG/DL (ref 50–180)
KETONES UR STRIP.AUTO-MCNC: NEGATIVE MG/DL
LDLC SERPL CALC-MCNC: 96 MG/DL
LEUKOCYTE ESTERASE UR QL STRIP.AUTO: NEGATIVE
LYMPHOCYTES # BLD AUTO: 4.34 K/UL (ref 1–4.8)
LYMPHOCYTES NFR BLD: 31 % (ref 22–41)
MCH RBC QN AUTO: 34.1 PG (ref 27–33)
MCHC RBC AUTO-ENTMCNC: 33 G/DL (ref 33.7–35.3)
MCV RBC AUTO: 103.4 FL (ref 81.4–97.8)
MICRO URNS: NORMAL
MICROALBUMIN UR-MCNC: 0.7 MG/DL
MICROALBUMIN/CREAT UR: 6 MG/G (ref 0–30)
MONOCYTES # BLD AUTO: 1.28 K/UL (ref 0–0.85)
MONOCYTES NFR BLD AUTO: 9.1 % (ref 0–13.4)
NEUTROPHILS # BLD AUTO: 7.56 K/UL (ref 1.82–7.42)
NEUTROPHILS NFR BLD: 53.9 % (ref 44–72)
NITRITE UR QL STRIP.AUTO: NEGATIVE
NRBC # BLD AUTO: 0 K/UL
NRBC BLD-RTO: 0 /100 WBC
PH UR STRIP.AUTO: 6 [PH] (ref 5–8)
PLATELET # BLD AUTO: 377 K/UL (ref 164–446)
PMV BLD AUTO: 9.7 FL (ref 9–12.9)
POTASSIUM SERPL-SCNC: 4.7 MMOL/L (ref 3.6–5.5)
PROT SERPL-MCNC: 6.9 G/DL (ref 6–8.2)
PROT UR QL STRIP: NEGATIVE MG/DL
RBC # BLD AUTO: 5.36 M/UL (ref 4.7–6.1)
RBC UR QL AUTO: NEGATIVE
SODIUM SERPL-SCNC: 139 MMOL/L (ref 135–145)
SP GR UR STRIP.AUTO: 1.02
TIBC SERPL-MCNC: 307 UG/DL (ref 250–450)
TRIGL SERPL-MCNC: 94 MG/DL (ref 0–149)
UROBILINOGEN UR STRIP.AUTO-MCNC: 0.2 MG/DL
WBC # BLD AUTO: 14 K/UL (ref 4.8–10.8)

## 2019-10-09 PROCEDURE — 80061 LIPID PANEL: CPT

## 2019-10-09 PROCEDURE — 81003 URINALYSIS AUTO W/O SCOPE: CPT

## 2019-10-09 PROCEDURE — 82607 VITAMIN B-12: CPT

## 2019-10-09 PROCEDURE — 80053 COMPREHEN METABOLIC PANEL: CPT

## 2019-10-09 PROCEDURE — 82306 VITAMIN D 25 HYDROXY: CPT

## 2019-10-09 PROCEDURE — 84165 PROTEIN E-PHORESIS SERUM: CPT

## 2019-10-09 PROCEDURE — 83540 ASSAY OF IRON: CPT

## 2019-10-09 PROCEDURE — 83550 IRON BINDING TEST: CPT

## 2019-10-09 PROCEDURE — 84153 ASSAY OF PSA TOTAL: CPT

## 2019-10-09 PROCEDURE — 82043 UR ALBUMIN QUANTITATIVE: CPT

## 2019-10-09 PROCEDURE — 85025 COMPLETE CBC W/AUTO DIFF WBC: CPT

## 2019-10-09 PROCEDURE — 86803 HEPATITIS C AB TEST: CPT

## 2019-10-09 PROCEDURE — 87522 HEPATITIS C REVRS TRNSCRPJ: CPT

## 2019-10-09 PROCEDURE — 82570 ASSAY OF URINE CREATININE: CPT

## 2019-10-09 PROCEDURE — 82728 ASSAY OF FERRITIN: CPT

## 2019-10-09 PROCEDURE — 82746 ASSAY OF FOLIC ACID SERUM: CPT

## 2019-10-09 PROCEDURE — 84155 ASSAY OF PROTEIN SERUM: CPT

## 2019-10-09 PROCEDURE — 84443 ASSAY THYROID STIM HORMONE: CPT

## 2019-10-09 PROCEDURE — 36415 COLL VENOUS BLD VENIPUNCTURE: CPT

## 2019-10-10 LAB
25(OH)D3 SERPL-MCNC: 22 NG/ML (ref 30–100)
FERRITIN SERPL-MCNC: 116.4 NG/ML (ref 22–322)
FOLATE SERPL-MCNC: 20.2 NG/ML
HCV AB SER QL: REACTIVE
PSA SERPL-MCNC: 2.16 NG/ML (ref 0–4)
TSH SERPL DL<=0.005 MIU/L-ACNC: 1.87 UIU/ML (ref 0.38–5.33)
VIT B12 SERPL-MCNC: 300 PG/ML (ref 211–911)

## 2019-10-12 LAB
ALBUMIN SERPL ELPH-MCNC: 3.75 G/DL (ref 3.75–5.01)
ALPHA1 GLOB SERPL ELPH-MCNC: 0.3 G/DL (ref 0.19–0.46)
ALPHA2 GLOB SERPL ELPH-MCNC: 0.74 G/DL (ref 0.48–1.05)
B-GLOBULIN SERPL ELPH-MCNC: 0.75 G/DL (ref 0.48–1.1)
GAMMA GLOB SERPL ELPH-MCNC: 0.86 G/DL (ref 0.62–1.51)
HCV RNA SERPL NAA+PROBE-ACNC: NOT DETECTED IU/ML
HCV RNA SERPL NAA+PROBE-LOG IU: NOT DETECTED LOG IU/ML
HCV RNA SERPL QL NAA+PROBE: NOT DETECTED
INTERPRETATION SERPL IFE-IMP: NORMAL
MONOCLON BAND OBS SERPL: NORMAL
PATHOLOGY STUDY: NORMAL
PROT SERPL-MCNC: 6.4 G/DL (ref 6.3–8.2)

## 2019-10-14 DIAGNOSIS — Z00.00 PREVENTATIVE HEALTH CARE: Chronic | ICD-10-CM

## 2019-10-14 DIAGNOSIS — E78.5 DYSLIPIDEMIA: Chronic | ICD-10-CM

## 2019-10-14 RX ORDER — ATORVASTATIN CALCIUM 20 MG/1
20 TABLET, FILM COATED ORAL DAILY
Qty: 90 TAB | Refills: 3 | Status: SHIPPED | OUTPATIENT
Start: 2019-10-14 | End: 2020-12-13

## 2019-11-26 ENCOUNTER — PATIENT OUTREACH (OUTPATIENT)
Dept: HEALTH INFORMATION MANAGEMENT | Facility: OTHER | Age: 69
End: 2019-11-26

## 2019-11-26 SDOH — ECONOMIC STABILITY: FOOD INSECURITY: WITHIN THE PAST 12 MONTHS, THE FOOD YOU BOUGHT JUST DIDN'T LAST AND YOU DIDN'T HAVE MONEY TO GET MORE.: NEVER TRUE

## 2019-11-26 SDOH — ECONOMIC STABILITY: TRANSPORTATION INSECURITY
IN THE PAST 12 MONTHS, HAS THE LACK OF TRANSPORTATION KEPT YOU FROM MEDICAL APPOINTMENTS OR FROM GETTING MEDICATIONS?: NO

## 2019-11-26 SDOH — ECONOMIC STABILITY: FOOD INSECURITY: WITHIN THE PAST 12 MONTHS, YOU WORRIED THAT YOUR FOOD WOULD RUN OUT BEFORE YOU GOT MONEY TO BUY MORE.: NEVER TRUE

## 2019-11-26 SDOH — ECONOMIC STABILITY: TRANSPORTATION INSECURITY
IN THE PAST 12 MONTHS, HAS LACK OF TRANSPORTATION KEPT YOU FROM MEETINGS, WORK, OR FROM GETTING THINGS NEEDED FOR DAILY LIVING?: NO

## 2019-11-26 NOTE — PROGRESS NOTES
1. [facility] Verified: yes    2. Verify PCP: yes    3. Review and add  to Care Team: yes    5. Reviewed/Updated the following with patient:       •   Communication Preference Obtained? YES  • MyChart Activation: already active       •   E-Mail Address Obtained? YES       •   Appointment Day and Time Preferences? YES       •   Preferred Pharmacy? YES       •   Preferred Lab? YES    6. Care Gap Scheduling (Attempt to Schedule EACH Overdue Care Gap!)    Patient declined Immunizations: FLU.

## 2020-01-17 ENCOUNTER — OFFICE VISIT (OUTPATIENT)
Dept: URGENT CARE | Facility: PHYSICIAN GROUP | Age: 70
End: 2020-01-17
Payer: MEDICARE

## 2020-01-17 ENCOUNTER — HOSPITAL ENCOUNTER (OUTPATIENT)
Dept: RADIOLOGY | Facility: MEDICAL CENTER | Age: 70
End: 2020-01-17
Attending: NURSE PRACTITIONER
Payer: MEDICARE

## 2020-01-17 VITALS
SYSTOLIC BLOOD PRESSURE: 118 MMHG | HEIGHT: 66 IN | RESPIRATION RATE: 24 BRPM | WEIGHT: 150 LBS | BODY MASS INDEX: 24.11 KG/M2 | HEART RATE: 102 BPM | TEMPERATURE: 97.5 F | OXYGEN SATURATION: 91 % | DIASTOLIC BLOOD PRESSURE: 70 MMHG

## 2020-01-17 DIAGNOSIS — R05.9 COUGH: ICD-10-CM

## 2020-01-17 DIAGNOSIS — J44.0 COPD WITH ACUTE LOWER RESPIRATORY INFECTION (HCC): ICD-10-CM

## 2020-01-17 PROCEDURE — 94760 N-INVAS EAR/PLS OXIMETRY 1: CPT | Performed by: NURSE PRACTITIONER

## 2020-01-17 PROCEDURE — 94640 AIRWAY INHALATION TREATMENT: CPT | Performed by: NURSE PRACTITIONER

## 2020-01-17 PROCEDURE — 99214 OFFICE O/P EST MOD 30 MIN: CPT | Mod: 25 | Performed by: NURSE PRACTITIONER

## 2020-01-17 PROCEDURE — 71046 X-RAY EXAM CHEST 2 VIEWS: CPT

## 2020-01-17 RX ORDER — IPRATROPIUM BROMIDE AND ALBUTEROL SULFATE 2.5; .5 MG/3ML; MG/3ML
3 SOLUTION RESPIRATORY (INHALATION) ONCE
Status: COMPLETED | OUTPATIENT
Start: 2020-01-17 | End: 2020-01-17

## 2020-01-17 RX ORDER — BENZONATATE 100 MG/1
100 CAPSULE ORAL 3 TIMES DAILY PRN
Qty: 60 CAP | Refills: 0 | Status: SHIPPED | OUTPATIENT
Start: 2020-01-17 | End: 2020-02-13

## 2020-01-17 RX ORDER — DOXYCYCLINE HYCLATE 100 MG
100 TABLET ORAL 2 TIMES DAILY
Qty: 14 TAB | Refills: 0 | Status: SHIPPED | OUTPATIENT
Start: 2020-01-17 | End: 2020-01-24

## 2020-01-17 RX ORDER — ALBUTEROL SULFATE 90 UG/1
2 AEROSOL, METERED RESPIRATORY (INHALATION) EVERY 6 HOURS PRN
Qty: 8.5 G | Refills: 0 | Status: SHIPPED | OUTPATIENT
Start: 2020-01-17 | End: 2020-02-13 | Stop reason: SDUPTHER

## 2020-01-17 RX ORDER — PREDNISONE 10 MG/1
20 TABLET ORAL 2 TIMES DAILY
Qty: 20 TAB | Refills: 0 | Status: SHIPPED | OUTPATIENT
Start: 2020-01-17 | End: 2020-01-22

## 2020-01-17 RX ADMIN — IPRATROPIUM BROMIDE AND ALBUTEROL SULFATE 3 ML: 2.5; .5 SOLUTION RESPIRATORY (INHALATION) at 11:28

## 2020-01-17 NOTE — PROGRESS NOTES
Chief Complaint   Patient presents with   • Cough     x 4 days, dry, body aches        HISTORY OF PRESENT ILLNESS: Patient is a 69 y.o. male who presents today due to symptoms that started four days ago. Pt reports a productive cough. Reports associated nasal congestion, sinus pressure, mild sore throat, bilateral ear pressure, fever, chills, fatigue, malaise, wheezing, and body aches. Denies chest pain, shortness of breath, or wheezing. Admits to h/o COPD.  He takes his COPD maintenance medications as directed, he does not have a rescue inhaler.  He is a daily smoker.  Has tried OTC cold medications without significant relief of symptoms. No recent ABX use. No other aggravating or alleviating factors.     Patient Active Problem List    Diagnosis Date Noted   • CAD (coronary artery disease) 08/01/2018   • Gout 12/01/2017   • Atherosclerosis of aorta (HCC) 08/07/2017   • Pulmonary nodule 07/03/2016   • History of hypertension 04/27/2015   • Low vitamin B12 level 02/07/2015   • Leukocytosis 12/31/2013   • Adenomatous colon polyp 07/16/2012   • BPH (benign prostatic hypertrophy) 04/17/2012   • COPD (chronic obstructive pulmonary disease) ([facility]) 07/27/2009   • Dyslipidemia 07/27/2009   • History of pneumothorax 07/27/2009   • S/P splenectomy 07/27/2009   • Preventative health care 07/27/2009   • Tobacco abuse 07/27/2009   • BRAO (branch retinal artery occlusion) 07/27/2009       Allergies:Patient has no known allergies.    Current Outpatient Medications Ordered in Epic   Medication Sig Dispense Refill   • atorvastatin (LIPITOR) 20 MG Tab Take 1 Tab by mouth every day. 90 Tab 3   • tiotropium (SPIRIVA HANDIHALER) 18 MCG Cap Inhale 1 Cap by mouth every day. 60 Cap 6   • fluticasone-salmeterol (ADVAIR DISKUS) 250-50 MCG/DOSE AEROSOL POWDER, BREATH ACTIVATED Inhale 1 Puff by mouth 2 times a day. 120 Inhaler 6   • cyanocobalamin (VITAMIN B12) 1000 MCG TABS Take 1 Tab by mouth every day. 90 Tab 3   • ciclopirox (PENLAC) 8 %  solution Apply to affected toenails daily at bedtime. Once every 7 days wife off with rubbing alcohol and then reapply (Patient not taking: Reported on 2019) 6.6 mL 3     Current Facility-Administered Medications Ordered in Epic   Medication Dose Route Frequency Provider Last Rate Last Dose   • ipratropium-albuterol (DUONEB) nebulizer solution  3 mL Nebulization Once HOMERO Odell           Past Medical History:   Diagnosis Date   • Bronchitis chronic    • COPD (chronic obstructive pulmonary disease) ([facility]) 2009   • Dyslipidemia    • PNEUMOTHORAX    • Preventative health care 2009   • S/P Splenectomy 2009   • Tobacco abuse 2009   • Tubular adenoma 2009       Social History     Tobacco Use   • Smoking status: Current Every Day Smoker     Packs/day: 1.50     Years: 47.00     Pack years: 70.50     Types: Cigars     Last attempt to quit: 2012     Years since quittin.6   • Smokeless tobacco: Never Used   • Tobacco comment: 1.5 ppd since age of 14 yrs.  Quit 4 yrs ago and only smokes cigars   Substance Use Topics   • Alcohol use: Yes     Alcohol/week: 0.0 - 0.6 oz   • Drug use: No       Family Status   Relation Name Status   • Mo     • Fa     • Bro  Alive   • Bro  Alive     Family History   Problem Relation Age of Onset   • Cancer Brother         prostate and likely 2 other cancers       ROS:  Review of Systems   Constitutional: Positive for subjective fever, chills, fatigue, malaise. Negative for weight loss.  HENT: Positive for congestion, ear pressure, and sore throat. Negative for ear pain, nosebleeds, and neck pain.    Eyes: Negative for vision changes.   Cardiovascular: Negative for chest pain, palpitations, orthopnea and leg swelling.   Respiratory: Positive for cough and sputum production, wheezing. Negative for shortness of breath.  Gastrointestinal: Negative for abdominal pain, nausea, vomiting or diarrhea.   Skin: Negative for rash, diaphoresis.  "    Exam:  /70   Pulse (!) 102   Temp 36.4 °C (97.5 °F) (Temporal)   Resp (!) 24   Ht 1.676 m (5' 6\")   Wt 68 kg (150 lb)   SpO2 91%   General: well-nourished, well-developed male in NAD  Head: normocephalic, atraumatic  Eyes: PERRLA, EOM within normal limits, no conjunctival injection, no scleral icterus, visual fields and acuity grossly intact.  Ears: normal shape and symmetry, no tenderness, no discharge. External canals are without any significant edema or erythema. Tympanic membranes are without any inflammation, no effusion. Gross auditory acuity is intact.  Nose: symmetrical without tenderness, mild discharge, erythema present bilateral nares.  Mouth/Throat: reasonable hygiene, no exudates or tonsillar enlargement. Mild erythema present.   Neck: no masses, range of motion within normal limits, no tracheal deviation.  Lymph: mild cervical adenopathy. No supraclavicular adenopathy.   Neuro: alert and oriented. Cranial nerves 1-12 grossly intact.   Cardiovascular: regular rate (auscultated at 95), regular rhythm without murmurs, rubs, or gallops. No edema.   Pulmonary: no distress. Chest is symmetrical with respiration.  Scattered wheezes and rhonchi throughout.  Musculoskeletal: appropriate muscle tone, gait is stable.  Skin: warm, dry, intact, no clubbing, no cyanosis.   Psych: appropriate mood, affect, judgement.         DX chest radiology reading \"No evidence of acute cardiopulmonary disease.\"        Assessment/Plan:  1. COPD with acute lower respiratory infection (HCC)  ipratropium-albuterol (DUONEB) nebulizer solution    predniSONE (DELTASONE) 10 MG Tab    albuterol 108 (90 Base) MCG/ACT Aero Soln inhalation aerosol    doxycycline (VIBRAMYCIN) 100 MG Tab   2. Cough  DX-CHEST-2 VIEWS    benzonatate (TESSALON) 100 MG Cap             Patient is given a DuoNeb in clinic, less wheezes auscultated posttreatment, oxygen improved to 95%.  Doxycycline and prednisone as directed.  Albuterol and Tessalon " as needed.  Smoking cessation advised.  Rest, increase fluids, hand and respiratory hygiene.   Supportive care, differential diagnoses, and indications for immediate follow-up discussed with patient.   Pathogenesis of diagnosis discussed including typical length and natural progression.  Instructed to return to clinic or nearest emergency department for any change in condition, further concerns, or worsening of symptoms.  Patient states understanding of the plan of care and discharge instructions.  Instructed to make an appointment with his primary care provider in the next 3-5 days if not significantly improving and for further care.         Please note that this dictation was created using voice recognition software. I have made every reasonable attempt to correct obvious errors, but I expect that there are errors of grammar and possibly content that I did not discover before finalizing the note.  A mask was worn for the entire visit with the patient.     MIGUEL Odell.

## 2020-02-07 ENCOUNTER — TELEPHONE (OUTPATIENT)
Dept: MEDICAL GROUP | Facility: MEDICAL CENTER | Age: 70
End: 2020-02-07

## 2020-02-07 NOTE — TELEPHONE ENCOUNTER
Narendra Dumont called and stated that you better call pt today. I called and it went to voice mail. LM for pt to call office.

## 2020-02-10 ENCOUNTER — TELEPHONE (OUTPATIENT)
Dept: MEDICAL GROUP | Facility: MEDICAL CENTER | Age: 70
End: 2020-02-10

## 2020-02-10 DIAGNOSIS — R05.9 COUGH: ICD-10-CM

## 2020-02-10 NOTE — TELEPHONE ENCOUNTER
Narendra Sun Jr.  [phone]      I was directed to sched pt for his cough. ESCALATION visit scheduled for sarika. Pt states his cough started to get better after UC visit but now is worse that ever. Pt is wondering if maybe a CXR is in order before his appt. Please advise.

## 2020-02-12 ENCOUNTER — HOSPITAL ENCOUNTER (OUTPATIENT)
Dept: RADIOLOGY | Facility: MEDICAL CENTER | Age: 70
End: 2020-02-12
Attending: INTERNAL MEDICINE
Payer: MEDICARE

## 2020-02-12 ENCOUNTER — TELEPHONE (OUTPATIENT)
Dept: MEDICAL GROUP | Facility: MEDICAL CENTER | Age: 70
End: 2020-02-12

## 2020-02-12 DIAGNOSIS — R05.9 COUGH: ICD-10-CM

## 2020-02-12 PROCEDURE — 71046 X-RAY EXAM CHEST 2 VIEWS: CPT

## 2020-02-13 ENCOUNTER — TELEPHONE (OUTPATIENT)
Dept: MEDICAL GROUP | Facility: MEDICAL CENTER | Age: 70
End: 2020-02-13

## 2020-02-13 ENCOUNTER — OFFICE VISIT (OUTPATIENT)
Dept: MEDICAL GROUP | Facility: MEDICAL CENTER | Age: 70
End: 2020-02-13
Payer: MEDICARE

## 2020-02-13 VITALS
SYSTOLIC BLOOD PRESSURE: 128 MMHG | DIASTOLIC BLOOD PRESSURE: 78 MMHG | OXYGEN SATURATION: 95 % | HEART RATE: 97 BPM | TEMPERATURE: 99 F | BODY MASS INDEX: 24.91 KG/M2 | HEIGHT: 66 IN | WEIGHT: 155 LBS

## 2020-02-13 DIAGNOSIS — I70.0 ATHEROSCLEROSIS OF AORTA (HCC): ICD-10-CM

## 2020-02-13 DIAGNOSIS — J40 BRONCHITIS: ICD-10-CM

## 2020-02-13 DIAGNOSIS — J44.9 CHRONIC OBSTRUCTIVE PULMONARY DISEASE, UNSPECIFIED COPD TYPE (HCC): ICD-10-CM

## 2020-02-13 DIAGNOSIS — J44.0 COPD WITH ACUTE LOWER RESPIRATORY INFECTION (HCC): ICD-10-CM

## 2020-02-13 DIAGNOSIS — D72.829 LEUKOCYTOSIS, UNSPECIFIED TYPE: ICD-10-CM

## 2020-02-13 DIAGNOSIS — Z72.0 TOBACCO ABUSE: Chronic | ICD-10-CM

## 2020-02-13 PROCEDURE — 99213 OFFICE O/P EST LOW 20 MIN: CPT | Performed by: INTERNAL MEDICINE

## 2020-02-13 RX ORDER — AZITHROMYCIN 250 MG/1
TABLET, FILM COATED ORAL
Qty: 6 TAB | Refills: 0 | Status: SHIPPED | OUTPATIENT
Start: 2020-02-13 | End: 2020-08-11

## 2020-02-13 RX ORDER — METHYLPREDNISOLONE 4 MG/1
TABLET ORAL
Qty: 21 TAB | Refills: 0 | Status: SHIPPED | OUTPATIENT
Start: 2020-02-13 | End: 2020-08-11

## 2020-02-13 RX ORDER — ALBUTEROL SULFATE 90 UG/1
2 AEROSOL, METERED RESPIRATORY (INHALATION) EVERY 6 HOURS PRN
Qty: 8.5 G | Refills: 6 | Status: SHIPPED | OUTPATIENT
Start: 2020-02-13 | End: 2021-02-25

## 2020-02-13 RX ORDER — TIOTROPIUM BROMIDE 18 UG/1
18 CAPSULE ORAL; RESPIRATORY (INHALATION) DAILY
Qty: 60 CAP | Refills: 6 | Status: SHIPPED
Start: 2020-02-13 | End: 2022-05-16

## 2020-02-13 ASSESSMENT — PATIENT HEALTH QUESTIONNAIRE - PHQ9: CLINICAL INTERPRETATION OF PHQ2 SCORE: 0

## 2020-02-13 NOTE — TELEPHONE ENCOUNTER
----- Message from Naveen Duncan M.D. sent at 2/12/2020  3:19 PM PST -----  Please notify the patient that his chest x-ray is negative

## 2020-02-14 NOTE — PROGRESS NOTES
Subjective:      Narendra Sun Jr. is a 69 y.o. male who presents with Cough            HPI     Seen by urgent care 1/17/20 and given doxycycline 100 mg bid and prednisone taper, completed course of the antibiotics, still with dry cough, no sputum, no blood in sputum, no fever but some chills, no body aches, no nausea or emesis, no sob with activity, no chest pain, still using spiriva and advair, does have post nasal drip and cough worse at night, no sinus pressure, no mouth or tooth pain.  COPD declines PFT, on Advair and Spiriva although only taking Advair once a day has difficulty remembering to take second dose.  Last CT thorax pulmonary nodules unchanged 9 mm and 8 mm right apex in October  2 cigars per day  Dyslipidemia remains on Lipitor          Current Outpatient Medications   Medication Sig Dispense Refill   • albuterol 108 (90 Base) MCG/ACT Aero Soln inhalation aerosol Inhale 2 Puffs by mouth every 6 hours as needed for Shortness of Breath. 8.5 g 0   • benzonatate (TESSALON) 100 MG Cap Take 1 Cap by mouth 3 times a day as needed for Cough. 60 Cap 0   • atorvastatin (LIPITOR) 20 MG Tab Take 1 Tab by mouth every day. 90 Tab 3   • tiotropium (SPIRIVA HANDIHALER) 18 MCG Cap Inhale 1 Cap by mouth every day. 60 Cap 6   • fluticasone-salmeterol (ADVAIR DISKUS) 250-50 MCG/DOSE AEROSOL POWDER, BREATH ACTIVATED Inhale 1 Puff by mouth 2 times a day. 120 Inhaler 6   • cyanocobalamin (VITAMIN B12) 1000 MCG TABS Take 1 Tab by mouth every day. 90 Tab 3   • ciclopirox (PENLAC) 8 % solution Apply to affected toenails daily at bedtime. Once every 7 days wife off with rubbing alcohol and then reapply (Patient not taking: Reported on 8/1/2019) 6.6 mL 3     No current facility-administered medications for this visit.      Patient Active Problem List   Diagnosis   • COPD (chronic obstructive pulmonary disease) (HCC)   • Dyslipidemia   • History of pneumothorax   • S/P splenectomy   • Preventative health care   • Tobacco  "abuse   • BRAO (branch retinal artery occlusion)   • BPH (benign prostatic hypertrophy)   • Adenomatous colon polyp   • Leukocytosis   • Low vitamin B12 level   • History of hypertension   • Pulmonary nodule   • Atherosclerosis of aorta (HCC)   • Gout   • CAD (coronary artery disease)       Depression Screening    Little interest or pleasure in doing things?  0 - not at all  Feeling down, depressed , or hopeless? 0 - not at all  Patient Health Questionnaire Score: 0          Health Maintenance Summary                IMM INFLUENZA Overdue 9/1/2019      Done 10/4/2018 Imm Admin: Influenza Vaccine Adult HD     Patient has more history with this topic...    IMM ZOSTER VACCINES Postponed 3/11/2020 Originally 7/25/2000. System: vaccine not available, other system reasons    LUNG CANCER SCREENING Next Due 4/8/2020      Done 10/8/2019 CT-CHEST (THORAX) W/O     Patient has more history with this topic...    COLONOSCOPY Next Due 7/28/2020      Done 7/28/2017 REFERRAL TO GI FOR COLONOSCOPY     Patient has more history with this topic...    Annual Wellness Visit Next Due 8/1/2020      Done 8/1/2019 Visit Dx: Medicare annual wellness visit, subsequent     Patient has more history with this topic...          Patient Care Team:  Naveen Duncan M.D. as PCP - General  CAROL Rodríguez as Consulting Physician (Oncology)  Valencia Lima as      ROS       Objective:     /78 (BP Location: Right arm, Patient Position: Sitting, BP Cuff Size: Adult)   Pulse 97   Temp 37.2 °C (99 °F)   Ht 1.676 m (5' 6\")   Wt 70.3 kg (155 lb)   SpO2 95%   BMI 25.02 kg/m²      Physical Exam  Vitals signs and nursing note reviewed.   Constitutional:       General: He is not in acute distress.     Appearance: Normal appearance.   HENT:      Head: Normocephalic and atraumatic.      Right Ear: Tympanic membrane normal.      Left Ear: Tympanic membrane normal.      Mouth/Throat:      Pharynx: No oropharyngeal " exudate.   Neck:      Musculoskeletal: Neck supple.   Cardiovascular:      Rate and Rhythm: Normal rate and regular rhythm.      Heart sounds: Normal heart sounds. No murmur.   Pulmonary:      Effort: Pulmonary effort is normal. No respiratory distress.      Breath sounds: Normal breath sounds.   Abdominal:      General: There is no distension.   Skin:     General: Skin is warm.   Neurological:      General: No focal deficit present.      Mental Status: He is alert.   Psychiatric:         Mood and Affect: Mood normal.                 Assessment/Plan:     Assessment  #1 persistent cough completion of doxycycline last month with persistent cough, some improvement after initial course and prednisone therapy but symptoms recurred, chest x-ray yesterday negative, still smokes cigars, has been stable on Advair taking only once a day and Spiriva for COPD    #2 COPD has declined follow-up pulmonary function testing, last PFT 2013 stage III on Advair once a day and Spiriva, has difficulty remembering to take second Advair dose    #3 pulmonary nodules stable by CT thorax last in October 8 mm and 9 mm noncalcified right apex nodules, stable from 2016 when had negative CT/PET    #4 tobacco abuse cigars 3/day    #5 aortic atherosclerosis no aneurysm    #6 leukocytosis chronic, last WBC 14,000, iron studies, SPEP negative at that time      Plan  #1 Zithromax x5 days can cause nausea, vomiting, diarrhea, rash has had previously 2017 without side effects    #2 Medrol dose pack has had previously without side effects    #3 refill albuterol as needed    #4 complete Advair change to Breo once a day since he has difficulty remembering to take second inhalation dose, continue Spiriva    #5 work on cigar smoking cessation since that increases long-term risk of worsening COPD    #6 repeat CBC, check flow cytometry in 6 weeks after completion of antibiotics and steroids    #7 repeat CT calcium score in October

## 2020-05-06 ENCOUNTER — PATIENT OUTREACH (OUTPATIENT)
Dept: HEALTH INFORMATION MANAGEMENT | Facility: OTHER | Age: 70
End: 2020-05-06

## 2020-05-06 NOTE — PROGRESS NOTES
Called member to reschedule cancelled visit in March. I was able to reschedule the member for an in person visit with his PCP. The member had no questions or concerns at this time. If the member calls back, please transfer to [phone].    Attempt # 1

## 2020-05-07 ENCOUNTER — OFFICE VISIT (OUTPATIENT)
Dept: MEDICAL GROUP | Facility: MEDICAL CENTER | Age: 70
End: 2020-05-07
Payer: MEDICARE

## 2020-05-07 VITALS
DIASTOLIC BLOOD PRESSURE: 76 MMHG | OXYGEN SATURATION: 92 % | TEMPERATURE: 98.7 F | SYSTOLIC BLOOD PRESSURE: 130 MMHG | WEIGHT: 158 LBS | HEIGHT: 66 IN | HEART RATE: 98 BPM | BODY MASS INDEX: 25.39 KG/M2

## 2020-05-07 DIAGNOSIS — Z00.00 PREVENTATIVE HEALTH CARE: Chronic | ICD-10-CM

## 2020-05-07 DIAGNOSIS — Z72.0 TOBACCO ABUSE: Chronic | ICD-10-CM

## 2020-05-07 DIAGNOSIS — Z90.81 S/P SPLENECTOMY: Chronic | ICD-10-CM

## 2020-05-07 DIAGNOSIS — J44.9 CHRONIC OBSTRUCTIVE PULMONARY DISEASE, UNSPECIFIED COPD TYPE (HCC): Chronic | ICD-10-CM

## 2020-05-07 DIAGNOSIS — Z23 NEED FOR MENINGITIS VACCINATION: ICD-10-CM

## 2020-05-07 DIAGNOSIS — R91.1 PULMONARY NODULE: ICD-10-CM

## 2020-05-07 DIAGNOSIS — Z23 NEED FOR PNEUMOCOCCAL VACCINATION: ICD-10-CM

## 2020-05-07 PROCEDURE — 90472 IMMUNIZATION ADMIN EACH ADD: CPT | Performed by: INTERNAL MEDICINE

## 2020-05-07 PROCEDURE — 99214 OFFICE O/P EST MOD 30 MIN: CPT | Mod: 25 | Performed by: INTERNAL MEDICINE

## 2020-05-07 PROCEDURE — 90734 MENACWYD/MENACWYCRM VACC IM: CPT | Performed by: INTERNAL MEDICINE

## 2020-05-07 PROCEDURE — 90670 PCV13 VACCINE IM: CPT | Performed by: INTERNAL MEDICINE

## 2020-05-07 PROCEDURE — G0009 ADMIN PNEUMOCOCCAL VACCINE: HCPCS | Performed by: INTERNAL MEDICINE

## 2020-05-07 ASSESSMENT — FIBROSIS 4 INDEX: FIB4 SCORE: 0.76

## 2020-05-07 NOTE — PROGRESS NOTES
Subjective:      Narendra Sun Jr. is a 69 y.o. male who presents           HPI     Here for follow up completed zithromax in feb, has copd on breo once daily and spiriva, no cough or sob, no fever,, no hemoptysis or chest pain, practicing social distancing measures with COVID in our community.  Self existing measures, using mask when outdoors in crowds.  Still trying to keep active although he has gained some weight since last summer.  Tries to work in the yard, occasional go for walks.  Due for blood test for follow-up leukocytosis  Dyslipidemia on Lipitor  2 cigars per day no cigarettes  S/p splenectomy         Current Outpatient Medications   Medication Sig Dispense Refill   • tiotropium (SPIRIVA HANDIHALER) 18 MCG Cap Inhale 1 Cap by mouth every day. 60 Cap 6   • Fluticasone Furoate-Vilanterol (BREO ELLIPTA) 200-25 MCG/INH AEROSOL POWDER, BREATH ACTIVATED Inhale 1 Puff by mouth every day. 2 Each 6   • albuterol 108 (90 Base) MCG/ACT Aero Soln inhalation aerosol Inhale 2 Puffs by mouth every 6 hours as needed for Shortness of Breath. 8.5 g 6   • atorvastatin (LIPITOR) 20 MG Tab Take 1 Tab by mouth every day. 90 Tab 3   • cyanocobalamin (VITAMIN B12) 1000 MCG TABS Take 1 Tab by mouth every day. 90 Tab 3   • azithromycin (ZITHROMAX Z-LULU) 250 MG Tab 2 tabs by mouth day 1, 1 tab by mouth days 2-5 (Patient not taking: Reported on 5/7/2020) 6 Tab 0   • methylPREDNISolone (MEDROL DOSEPAK) 4 MG Tablet Therapy Pack As directed on the packaging label. (Patient not taking: Reported on 5/7/2020) 21 Tab 0   • fluticasone-salmeterol (ADVAIR DISKUS) 250-50 MCG/DOSE AEROSOL POWDER, BREATH ACTIVATED Inhale 1 Puff by mouth 2 times a day. (Patient not taking: Reported on 5/7/2020) 120 Inhaler 6     No current facility-administered medications for this visit.      Patient Active Problem List   Diagnosis   • COPD (chronic obstructive pulmonary disease) (HCC)   • Dyslipidemia   • History of pneumothorax   • S/P splenectomy   •  "Preventative health care   • Tobacco abuse   • BRAO (branch retinal artery occlusion)   • BPH (benign prostatic hypertrophy)   • Adenomatous colon polyp   • Leukocytosis   • Low vitamin B12 level   • History of hypertension   • Pulmonary nodule   • Atherosclerosis of aorta (HCC)   • Gout   • CAD (coronary artery disease)            Health Maintenance Summary                IMM ZOSTER VACCINES Overdue 7/25/2000     LUNG CANCER SCREENING Overdue 4/8/2020      Done 10/8/2019 CT-CHEST (THORAX) W/O     Patient has more history with this topic...    COLONOSCOPY Next Due 7/28/2020      Done 7/28/2017 REFERRAL TO GI FOR COLONOSCOPY     Patient has more history with this topic...    Annual Wellness Visit Next Due 8/1/2020      Done 8/1/2019 Visit Dx: Medicare annual wellness visit, subsequent     Patient has more history with this topic...    IMM INFLUENZA Next Due 9/1/2020      Done 10/4/2018 Imm Admin: Influenza Vaccine Adult HD     Patient has more history with this topic...          Patient Care Team:  Naveen Duncan M.D. as PCP - General  CAROL Rodríguez as Consulting Physician (Oncology)  Valencia Lima as [facility]       ROS       Objective:     /76 (BP Location: Right arm, Patient Position: Sitting, BP Cuff Size: Adult)   Pulse 98   Temp 37.1 °C (98.7 °F)   Ht 1.676 m (5' 6\")   Wt 71.7 kg (158 lb)   SpO2 92%   BMI 25.50 kg/m²      Physical Exam  Vitals signs and nursing note reviewed.   Constitutional:       General: He is not in acute distress.     Appearance: Normal appearance.   HENT:      Head: Normocephalic and atraumatic.      Right Ear: External ear normal.      Left Ear: External ear normal.      Nose: Nose normal.   Eyes:      Conjunctiva/sclera: Conjunctivae normal.   Neck:      Musculoskeletal: Neck supple.   Cardiovascular:      Rate and Rhythm: Normal rate and regular rhythm.      Heart sounds: Normal heart sounds. No murmur.   Pulmonary:      Effort: " Pulmonary effort is normal. No respiratory distress.      Breath sounds: Normal breath sounds.   Abdominal:      General: There is no distension.   Skin:     General: Skin is warm.   Neurological:      General: No focal deficit present.      Mental Status: He is alert.   Psychiatric:         Mood and Affect: Mood normal.         Behavior: Behavior normal.                 Assessment/Plan:       Assessment  #! Copd stable on breo and spirviva    #2 leukocytosis 10/10/19 wbc 14 (53%N,31%L),hgb 18,hct 55,mcv 104,iron 120,ferritin 116,%sat 39,b12 300,folate 20,SPEP negative     #3 s/p splenectomy  1/23/15 menactra  1/23/15 prevnar  8/7/17 pneumovax  5/7/20 prevnar   5/7/20 menactra    #4 tobacco use 2 cigars/day    #5 pulmonary nodule 10/8/19 CT thorax without lung; unchanged 19 mm and 8 mm noncalcified pulmonary nodules right lung apex, postoperative scarring left upper lobe again noted    Plan  #! Declines PFT     #2 prevnar    #3 menactra     #4 has labs due already in [facility] he will get those done    #5 CT lung follow-up pulmonary nodule this summer order placed in computer system    #6 colon cancer screening due this summer     #7 continue Breo and Spiriva    #8 recommend cigar smoking cessation as tobacco long-term increases risk of worsening COPD and lung cancer    #9 follow-up 6 months    #10 continue practicing social distancing measures    #11 nutrition, diet, exercise discussed

## 2020-07-16 ENCOUNTER — HOSPITAL ENCOUNTER (OUTPATIENT)
Dept: LAB | Facility: MEDICAL CENTER | Age: 70
End: 2020-07-16
Attending: INTERNAL MEDICINE
Payer: MEDICARE

## 2020-07-16 DIAGNOSIS — D72.829 LEUKOCYTOSIS, UNSPECIFIED TYPE: ICD-10-CM

## 2020-07-16 LAB
BASOPHILS # BLD AUTO: 0.6 % (ref 0–1.8)
BASOPHILS # BLD: 0.09 K/UL (ref 0–0.12)
EOSINOPHIL # BLD AUTO: 0.62 K/UL (ref 0–0.51)
EOSINOPHIL NFR BLD: 4.3 % (ref 0–6.9)
ERYTHROCYTE [DISTWIDTH] IN BLOOD BY AUTOMATED COUNT: 57.8 FL (ref 35.9–50)
HCT VFR BLD AUTO: 56.8 % (ref 42–52)
HGB BLD-MCNC: 18.6 G/DL (ref 14–18)
IMM GRANULOCYTES # BLD AUTO: 0.13 K/UL (ref 0–0.11)
IMM GRANULOCYTES NFR BLD AUTO: 0.9 % (ref 0–0.9)
LYMPHOCYTES # BLD AUTO: 4.05 K/UL (ref 1–4.8)
LYMPHOCYTES NFR BLD: 28.1 % (ref 22–41)
MCH RBC QN AUTO: 33.1 PG (ref 27–33)
MCHC RBC AUTO-ENTMCNC: 32.7 G/DL (ref 33.7–35.3)
MCV RBC AUTO: 101.1 FL (ref 81.4–97.8)
MONOCYTES # BLD AUTO: 1.29 K/UL (ref 0–0.85)
MONOCYTES NFR BLD AUTO: 8.9 % (ref 0–13.4)
NEUTROPHILS # BLD AUTO: 8.25 K/UL (ref 1.82–7.42)
NEUTROPHILS NFR BLD: 57.2 % (ref 44–72)
NRBC # BLD AUTO: 0 K/UL
NRBC BLD-RTO: 0 /100 WBC
PLATELET # BLD AUTO: 368 K/UL (ref 164–446)
PMV BLD AUTO: 9.9 FL (ref 9–12.9)
RBC # BLD AUTO: 5.62 M/UL (ref 4.7–6.1)
WBC # BLD AUTO: 14.4 K/UL (ref 4.8–10.8)

## 2020-07-16 PROCEDURE — 88184 FLOWCYTOMETRY/ TC 1 MARKER: CPT

## 2020-07-16 PROCEDURE — 88185 FLOWCYTOMETRY/TC ADD-ON: CPT | Mod: 91

## 2020-07-16 PROCEDURE — 85025 COMPLETE CBC W/AUTO DIFF WBC: CPT

## 2020-07-16 PROCEDURE — 36415 COLL VENOUS BLD VENIPUNCTURE: CPT

## 2020-07-17 LAB
ACUTE LEUKEMIA MARKERS SPEC-IMP: NORMAL
EVENTS COUNTED SPEC: 21 MARKERS
SOURCE 9121: NORMAL

## 2020-07-20 ENCOUNTER — PATIENT OUTREACH (OUTPATIENT)
Dept: HEALTH INFORMATION MANAGEMENT | Facility: OTHER | Age: 70
End: 2020-07-20

## 2020-07-20 NOTE — PROGRESS NOTES
Outcome: Left Message to call back so I can schedule upcoming AWV due 8/1/2020    Please transfer to Patient Outreach Team at [phone] when patient returns call.    [facility] Verified: yes    Attempt # 1

## 2020-07-24 ENCOUNTER — PATIENT OUTREACH (OUTPATIENT)
Dept: HEALTH INFORMATION MANAGEMENT | Facility: OTHER | Age: 70
End: 2020-07-24

## 2020-07-24 NOTE — PROGRESS NOTES
Called and wish the member a happy birthday. There was nothing the member needed at this time. If the member calls back, please transfer to [phone].

## 2020-07-28 NOTE — PROGRESS NOTES
Called member to schedule his overdue AWV with his PCP. I was able to schedule the appointment and provide all needed information. If the member calls in, please transfer to [phone]    Attempt # 1

## 2020-08-05 ENCOUNTER — TELEPHONE (OUTPATIENT)
Dept: MEDICAL GROUP | Facility: MEDICAL CENTER | Age: 70
End: 2020-08-05

## 2020-08-05 NOTE — TELEPHONE ENCOUNTER
Future Appointments       Provider Department Center    8/11/2020 10:20 AM Naveen MIGUEL Duncan M.D.; [facility]          ANNUAL WELLNESS VISIT PRE-VISIT PLANNING WITHOUT OUTREACH    1.  Reviewed note from last office visit with PCP: YES    2.  If any orders were placed at last visit, do we have Results/Consult Notes?        •  Labs - Labs ordered, completed on 7/17/2020 and results are in chart.  Note: If patient appointment is for lab review and patient did not complete labs, check with provider if OK to reschedule patient until labs completed.       •  Imaging - Imaging was not ordered at last office visit.       •  Referrals - No referrals were ordered at last office visit.    3.  Immunizations were updated in [facility] using [employer]?: Yes       •  [employer] Recommendations: SHINGRIX (Shingles)        •  Is patient due for Tdap? NO       •  Is patient due for Shingrix? YES. Patient was not notified of copay/out of pocket cost.     4.  Patient is due for the following Health Maintenance Topics:   Health Maintenance Due   Topic Date Due   • IMM ZOSTER VACCINES (1 of 2) 07/25/2000   • LUNG CANCER SCREENING  04/08/2020   • COLONOSCOPY  07/28/2020   • Annual Wellness Visit  08/01/2020       - Patient already has appointment scheduled for Annual Wellness Visit (AWV).    5.  Reviewed/Updated the following with patient:       •   Preferred Pharmacy? NO       •   Preferred Lab? NO       •   Preferred Communication? NO       •   Allergies? NO       •   Medications? NO       •   Social History? NO       •   Family History (document living status of immediate family members and if + hx of  cancer, diabetes, hypertension, hyperlipidemia, heart attack, stroke) NO    6.  Care Team Updated:       •   DME Company (gait device, O2, CPAP, etc.): NO       •   Other Specialists (eye doctor, derm, GYN, cardiology, endo, etc): NO    7. Orders for overdue Health Maintenance topics pended  in Pre-Charting? NO    8.  Patient has the following Care Path diagnoses on Problem List:      9.  Patient was not advised: “This is a free wellness visit. The provider will screen for medical conditions to help you stay healthy. If you have other concerns to address you may be asked to discuss these at a separate visit or there may be an additional fee.”     10.  Patient was NOT informed to arrive 15 min prior to their scheduled appointment and bring in their medication bottles.

## 2020-08-11 ENCOUNTER — OFFICE VISIT (OUTPATIENT)
Dept: MEDICAL GROUP | Facility: MEDICAL CENTER | Age: 70
End: 2020-08-11
Payer: MEDICARE

## 2020-08-11 ENCOUNTER — TELEPHONE (OUTPATIENT)
Dept: MEDICAL GROUP | Facility: MEDICAL CENTER | Age: 70
End: 2020-08-11

## 2020-08-11 VITALS
DIASTOLIC BLOOD PRESSURE: 84 MMHG | OXYGEN SATURATION: 95 % | SYSTOLIC BLOOD PRESSURE: 128 MMHG | HEART RATE: 94 BPM | BODY MASS INDEX: 24.43 KG/M2 | WEIGHT: 152 LBS | TEMPERATURE: 98.1 F | HEIGHT: 66 IN

## 2020-08-11 DIAGNOSIS — R91.1 SOLITARY PULMONARY NODULE: ICD-10-CM

## 2020-08-11 DIAGNOSIS — D12.6 COLON ADENOMA: ICD-10-CM

## 2020-08-11 DIAGNOSIS — I25.10 CORONARY ARTERY DISEASE INVOLVING NATIVE CORONARY ARTERY OF NATIVE HEART WITHOUT ANGINA PECTORIS: ICD-10-CM

## 2020-08-11 DIAGNOSIS — D72.829 LEUKOCYTOSIS, UNSPECIFIED TYPE: ICD-10-CM

## 2020-08-11 DIAGNOSIS — Z23 NEED FOR MENINGITIS VACCINATION: ICD-10-CM

## 2020-08-11 DIAGNOSIS — J44.9 CHRONIC OBSTRUCTIVE PULMONARY DISEASE, UNSPECIFIED COPD TYPE (HCC): ICD-10-CM

## 2020-08-11 DIAGNOSIS — I70.0 ATHEROSCLEROSIS OF AORTA (HCC): ICD-10-CM

## 2020-08-11 DIAGNOSIS — Z00.00 MEDICARE ANNUAL WELLNESS VISIT, SUBSEQUENT: ICD-10-CM

## 2020-08-11 DIAGNOSIS — Z12.11 COLON CANCER SCREENING: ICD-10-CM

## 2020-08-11 DIAGNOSIS — E78.5 DYSLIPIDEMIA: Chronic | ICD-10-CM

## 2020-08-11 PROCEDURE — 90746 HEPB VACCINE 3 DOSE ADULT IM: CPT | Performed by: INTERNAL MEDICINE

## 2020-08-11 PROCEDURE — G0439 PPPS, SUBSEQ VISIT: HCPCS | Performed by: INTERNAL MEDICINE

## 2020-08-11 PROCEDURE — G0010 ADMIN HEPATITIS B VACCINE: HCPCS | Performed by: INTERNAL MEDICINE

## 2020-08-11 ASSESSMENT — FIBROSIS 4 INDEX: FIB4 SCORE: 0.79

## 2020-08-11 ASSESSMENT — ENCOUNTER SYMPTOMS: GENERAL WELL-BEING: GOOD

## 2020-08-11 ASSESSMENT — PATIENT HEALTH QUESTIONNAIRE - PHQ9: CLINICAL INTERPRETATION OF PHQ2 SCORE: 0

## 2020-08-11 ASSESSMENT — ACTIVITIES OF DAILY LIVING (ADL): BATHING_REQUIRES_ASSISTANCE: 0

## 2020-08-11 NOTE — TELEPHONE ENCOUNTER
Narendra Sun Jr.  [phone] (home)     LM for pt to call office.     Pt was given Hep B Vaccine.  Should have been given Men. AUSTIN (MANOJ).     Dottie CARRILLO report.

## 2020-08-11 NOTE — PROGRESS NOTES
Chief Complaint   Patient presents with   • Annual Wellness Visit         HPI:  Narendra is a 70 y.o. here for Medicare Annual Wellness Visit  Medication, allergies, medical history, surgical history, social history, family history reviewed and updated  SH , retired, no etoh, cigars 2 per day  Patient continues to smoke 2 cigarettes/day working on cessation interested in quitting but does not want to try prescription medications for cessation, still keeps active, does notice occasional shortness of breath with activity, COPD diagnosis previously, currently on Spiriva and Breo however he does find that the Breo is costly  Practicing social distancing measures    Patient Active Problem List    Diagnosis Date Noted   • CAD (coronary artery disease) 08/01/2018   • Gout 12/01/2017   • Atherosclerosis of aorta (HCC) 08/07/2017   • Pulmonary nodule 07/03/2016   • History of hypertension 04/27/2015   • Low vitamin B12 level 02/07/2015   • Leukocytosis 12/31/2013   • Adenomatous colon polyp 07/16/2012   • BPH (benign prostatic hypertrophy) 04/17/2012   • COPD (chronic obstructive pulmonary disease) (HCC) 07/27/2009   • Dyslipidemia 07/27/2009   • History of pneumothorax 07/27/2009   • S/P splenectomy 07/27/2009   • Preventative health care 07/27/2009   • Tobacco abuse 07/27/2009   • History BRAO (branch retinal artery occlusion) 07/27/2009              Adenoma  12/05 colonoscopy [facility] tubular adenoma  6/29/12 colon per [facility], neg, repeat 5 years  7/28/17 colon per [facility] 3 mm polyp descending colon, 10 mm polyp descending colon, 5-8 mm polyp sigmoid colon, 10 mm polyp distal sigmoid colon, 6-8 mm polyp rectum, pathology adenoma and hyperplastic polyps repeat 3 years    atherosclerosis aorta  10/25/16 atherosclerotic plaque aorta on CT scan thorax     BPH  5/11 psa 0.7  4/12 psa 7.5 given course of cipro  5/12/12 psa 2.3 after cipro  12/31/13 psa 1.4  2/7/15 psa 1.1  5/18/16 psa 1.2  10/16/18 psa 1.0  10/10/19 psa 2.1       cad  8/1/18 CT cardiac scoring LMA 0.0, LCX 1.5, .5, RCA 0.0, PDA 0.0     COPD  5/08 chest x-ray negative  6/08 PFT FEV1.6 L (54% predicted), FEV/FVC ratio 47%, positive bronchodilator response  6/10 quit smoking tobacco  5/11 still off cigs, smoking 2 cigars per day  5/11 restart spiriva  5/11 cxr negative  12/12 off cigs, still smokes cigars 3-4 per day  12/12 on spiriva, add symbicort 80/4.5  2/7/13 PFT severe stage III COPD, FEV1 1.4 L (46% predicted), FEV/FVC 47% improvement after bronchodilator 13%, normal DLCO; continue spiriva, symbicort 80/1.5, smoking cessation  6/24/16  CT thorax lung cancer screening to spiculated 9 mm nodules RUL underlying emphysematous changes and tiny 3 mm nodule RML, recommend 3 month follow-up CT vs CT/PET  2/7/13 cxr negative  12/26/13 still 3 cigars per day; on spiriva and symbicort  1/23/15 still 3 cigars per day, on spiriva and symbicort  1/26/16 change symbicort to advair 250/50 (insurance) and continue spiriva  6/2/16 CT lung cancer screening visit  7/8/16 [facility] note right upper lobe lung nodules, suspicious in nature, patient agrees to CT/PET  7/8/16 CT/PET spiculated right upper lobe nodule SUV 1.1, not FDG avid, scarring left upper lobe noted, nonspecific findings, low-grade neoplasm cannot be excluded; per [facility] repeat CT 3 months  10/24/16 CT thorax without; 2 spiculated right upper lobe pulmonary nodules 8 mm and 9 mm in size unchanged, 3 mm right middle lobe unchanged nodule  10/27/16 lung cancer screening program note, recommend referral to pulmonary nodule clinic  4/6/17 CT thorax without; 2 stable 8-9 mm spiculated right upper lobe nodules, stable probably postinflammatory tiny 3 mm RML and RLL nodules, no new suspicious nodules  12/1/17 declines PFT   4/15/18 CT lung lung cancer screening stable 9 mm right posterior/apical ill-defined nodule, stable right upper/lateral 9 mm solid pulmonary nodule, postoperative changes left upper lobe, emphysema, aortic and  coronary atherosclerotic plaque, previous splenectomy   8/1/18 CT/PET no abnormal uptake within either right upper lobe or right apical nodule, nodules unchanged dating back to previous lung cancer screening CT 6/24/16, downgraded to category 3 RADS, no change 3 mm right middle lobe nodule, no increased uptake neck, abdomen, pelvis  4/19/19 CT lung cancer screening spiculated nodular density right lung apex 9 x 9 mm with cavitary change and spiculated nodule right lung apex posteriorly 7 x 8 mm overall unchanged from previous exam, postoperative change left upper lobe, recommend repeat 6 months  8/1/19 on advair and spiriva declines PFT  10/8/19 CT thorax without; unchanged 9 mm and 8 mm noncalcified pulmonary nodules right lung apex, postoperative scarring left upper lobe again noted  2/13/20 change advair to breo once daily (had difficulty remembering to take Advair twice a day) continue spiriva  5/7/20 declines PFT      Dyslipidemia  5/08 chol  239, trig 91,hdl 71,ldl 150  8/09 chol 268,trig 107,hdl 56,ldl 191  8/09 start zocor 20  8/09 stress echo negative  5/11 chol 231,trig 114,hdl 53,ldl 155 off zocor  5/12 chol 215,trig 97,hdl 48,ldl 148 off zocor  12/31/13 chol 215,trig 101,hdl 55,ldl 140,crp 3.0 off statin; 10 year risk 12%, I recommend statin therapy he declines  1/13/14 echo technically difficult study, possible mild LVH  1/13/14 treadmill thallium exercise 6 minutes 30 seconds lashell protocol, limited by fatigue; no ischemia, EF 59%  2/7/15 chol 250,trig 86,hdl 51,ldl 182; urine mac <0.5; 10 year risk calculation 19%, start zocor 20 mg  5/16/16 did not take zocor  5/18/16 chol 239,trig 152,hdl 44,ldl 165  8/14/17 chol 205,trig 142,hdl 36,ldl 141 off zocor, 10 year cardiac risk, declines medication  8/1/18 CT cardiac scoring LMA 0.0, LCX 1.5, .5, RCA 0.0, PDA 0.0  10/16/8 chol 261,trig 155,hdl 42,ldl 188 declines statin therapy  10/18/18 start lipitor 20 mg  10/10/19 chol 159,trig 94,hdl 44,ldl 96  on lipitor 20 mg     History hypertension  1/13/14 echo technically difficult study, possible mild LVH  1/13/14 treadmill thallium exercise 6 minutes 30 seconds lashell protocol, limited by fatigue; no ischemia, EF 59%  4/27/15 start lisinopril 10 mg   5/16/16 off lisinopril   10/10/19 urine mac 6     gout  11/29/17 right first toe pain given indocin uric 5.6  10/16/18 uric 6.1     Leukocytosis  8/09 wbc 8.9  5/11 wbc 11.4  4/12 wbc 12.4 (54%N,34%L)  12/31/13 wbc 12.9 (53%N,22%L),hgb 17,hct 52,mcv 102,platelet 364, CRP 3.0; ? Related to tobacco  2/7/15 wbc 12.4 (49%N,34%L),hgb 17,hct 53,plt [id number]; b12 201,folate 5.3  2/13/15 leukocyte alkaline phosphatase 108 normal  5/18/16 wbc 13.5 (51%N,30%L),hgb 17.8,hct 54,plt 427,b12 249,folate 7  5/18/16 b12 249,folate 7 not on b12  8/14/17 wbc 12.1 (50%N,32%L),hgb 17,hct 51,mcv 100.8,b12 273,jak2 negative   11/29/17 wbc 19.5 (51%N, 32%L)  10/16/18 wbc 14 (48%N,36%L), hgb 18,hct 55, plt 436, b12 324  10/8/19 CT thorax without lung; unchanged 19 mm and 8 mm noncalcified pulmonary nodules right lung apex, postoperative scarring left upper lobe again noted  10/10/19 wbc 14 (53%N,31%L),hgb 18,hct 55,mcv 103,iron 120,ferritin 116,%sat 39,b12 300,folate 20  7/16/20 wbc 14.4 (57%N,28%L),hgb 18.6,hct 56.8,plt 368, flow cytometry normal myeloid cells     Low B-12  12/7/15 b12 201, folate 5.3, wbc 12.4(49%N,34%L),hgb 17,hct 53,plt [id number]; start b12 1000 mcg daily  5/18/16 b12 249,folate 7,MMA<0.1,Intrinsic factor Ab negative, not on b12  8/14/17 b12 273  10/16/18 b12 324, hgb 18,hct 55,mcv 101  10/10/19 b2 300, hgb 18,hct 55,mcv 103     Preventative health  1/13/14 ultrasound aorta negative  7/28/17 colon per [facility] 3 mm polyp descending colon, 10 mm polyp descending colon, 5-8 mm polyp sigmoid colon, 10 mm polyp distal sigmoid colon, 6-8 mm polyp rectum, pathology adenoma and hyperplastic polyps repeat 3 years  8/7/17 pneumovax  10/4/18 tdap declined  10/10/19 psa 2.1  10/10/19 vit d 22  on 2000 units increase 4000 units  10/12/19 hep c Ab reactive, follow up HCV RNA negative  5/7/20 prevnar   5/7/20 menactra     Pulmonary nodule  6/24/16  CT thorax lung cancer screening to spiculated 9 mm nodules RUL underlying emphysematous changes and tiny 3 mm nodule RML, recommend 3 month follow-up CT vs CT/PET  7/8/16 [facility] note right upper lobe lung nodules, suspicious in nature, patient agrees to CT/PET  7/8/16 CT/PET spiculated right upper lobe nodule SUV 1.1, not FDG avid, scarring left upper lobe noted, nonspecific findings, low-grade neoplasm cannot be excluded; per [facility] repeat CT 3 months  10/24/16 CT thorax without; 2 spiculated right upper lobe pulmonary nodules 8 mm and 9 mm in size unchanged, 3 mm right middle lobe unchanged nodule  10/27/16 lung cancer screening program note, recommend referral to pulmonary nodule clinic  11/3/16 lung cancer screening note recent follow-up CT scan unchanged pulmonary nodules, recommend repeat CT scan 6 months with myself  4/6/17 CT thorax without; 2 stable 8-9 mm spiculated right upper lobe nodules, stable probably postinflammatory tiny 3 mm RML and RLL nodules, no new suspicious nodules  4/15/18 CT lung lung cancer screening stable 9 mm right posterior/apical ill-defined nodule, stable right upper/lateral 9 mm solid pulmonary nodule, postoperative changes left upper lobe, emphysema, aortic and coronary atherosclerotic plaque, previous splenectomy   8/1/18 CT/PET no abnormal uptake within either right upper lobe or right apical nodule, nodules unchanged dating back to previous lung cancer screening CT 6/24/16, downgraded to category 3 RADS, no change 3 mm right middle lobe nodule, no increased uptake neck, abdomen, pelvis  4/19/19 CT lung cancer screening spiculated nodular density right lung apex 9 x 9 mm with cavitary change and spiculated nodule right lung apex posteriorly 7 x 8 mm overall unchanged from previous exam, postoperative change left upper lobe,  recommend repeat 6 months  10/8/19 CT thorax without lung; unchanged 19 mm and 8 mm noncalcified pulmonary nodules right lung apex, postoperative scarring left upper lobe again noted  2/12/20 cxr negative      Status post splenectomy  During childhood  1/23/15 menactra  1/23/15 prevnar  8/7/17 pneumovax  5/7/20 prevnar   5/7/20 menactra     Tobacco  12/12 off cigs had been smoking 1 to 1.5 packs per day for 40+ years, still smokes cigars 3-4 per day  2/4/13 cxr negative  4/13 off cigar using electronic cig  12/26/13 still smokes 3 cigar per day  1/13/14 echo technically difficult study, possible mild LVH  1/13/14 treadmill thallium exercise 6 minutes 30 seconds lashell protocol, limited by fatigue; no ischemia, EF 59%  5/16/16 still smoking 3 cigars per day  6/24/16  CT thorax lung cancer screening to spiculated 9 mm nodules RUL underlying emphysematous changes and tiny 3 mm nodule RML, recommend 3 month follow-up CT vs CT/PET  7/8/16 [facility] note right upper lobe lung nodules, suspicious in nature, patient agrees to CT/PET  7/8/16 [facility] note right upper lobe lung nodules, suspicious in nature, patient agrees to CT/PET  7/8/16 CT/PET spiculated right upper lobe nodule SUV 1.1, not FDG avid, scarring left upper lobe noted, nonspecific findings, low-grade neoplasm cannot be excluded; per [facility] repeat CT 3 months  10/24/16 CT thorax without; 2 spiculated right upper lobe pulmonary nodules 8 mm and 9 mm in size unchanged, 3 mm right middle lobe unchanged nodule  10/27/16 lung cancer screening program note, recommend referral to pulmonary nodule clinic  4/6/17 CT thorax without; 2 stable 8-9 mm spiculated right upper lobe nodules, stable probably postinflammatory tiny 3 mm RML and RLL nodules, no new suspicious nodules  8/7/17 no cigarettes, 4 cigars per day  12/1/17 4 cigars per day  4/15/18 CT lung lung cancer screening stable 9 mm right posterior/apical ill-defined nodule, stable right upper/lateral 9 mm solid pulmonary nodule,  postoperative changes left upper lobe, emphysema, aortic and coronary atherosclerotic plaque, previous splenectomy   8/1/18 CT/PET no abnormal uptake within either right upper lobe or right apical nodule, nodules unchanged dating back to previous lung cancer screening CT 6/24/16, downgraded to category 3 RADS, no change 3 mm right middle lobe nodule, no increased uptake neck, abdomen, pelvis  10/4/18 declines PFT, smoking 3 cigars per day  4/19/19 CT lung cancer screening spiculated nodular density right lung apex 9 x 9 mm with cavitary change and spiculated nodule right lung apex posteriorly 7 x 8 mm overall unchanged from previous exam, postoperative change left upper lobe, recommend repeat 6 months  8/1/19 3 cigars/day declines stop smoking classes  10/8/19 CT thorax without lung; unchanged 19 mm and 8 mm noncalcified pulmonary nodules right lung apex, postoperative scarring left upper lobe again noted  2/12/20 cxr negative   2/13/20 2 cigars per day  5/7/20 2 cigars per day       Current Outpatient Medications   Medication Sig Dispense Refill   • tiotropium (SPIRIVA HANDIHALER) 18 MCG Cap Inhale 1 Cap by mouth every day. 60 Cap 6   • Fluticasone Furoate-Vilanterol (BREO ELLIPTA) 200-25 MCG/INH AEROSOL POWDER, BREATH ACTIVATED Inhale 1 Puff by mouth every day. 2 Each 6   • albuterol 108 (90 Base) MCG/ACT Aero Soln inhalation aerosol Inhale 2 Puffs by mouth every 6 hours as needed for Shortness of Breath. 8.5 g 6   • atorvastatin (LIPITOR) 20 MG Tab Take 1 Tab by mouth every day. 90 Tab 3   • cyanocobalamin (VITAMIN B12) 1000 MCG TABS Take 1 Tab by mouth every day. 90 Tab 3   • azithromycin (ZITHROMAX Z-LULU) 250 MG Tab 2 tabs by mouth day 1, 1 tab by mouth days 2-5 (Patient not taking: Reported on 5/7/2020) 6 Tab 0   • methylPREDNISolone (MEDROL DOSEPAK) 4 MG Tablet Therapy Pack As directed on the packaging label. (Patient not taking: Reported on 5/7/2020) 21 Tab 0   • fluticasone-salmeterol (ADVAIR DISKUS) 250-50  MCG/DOSE AEROSOL POWDER, BREATH ACTIVATED Inhale 1 Puff by mouth 2 times a day. (Patient not taking: Reported on 5/7/2020) 120 Inhaler 6     No current facility-administered medications for this visit.         Patient is taking medications as noted in medication list.  Current supplements as per medication list.     Allergies: Patient has no known allergies.    Current social contact/activities: Patient reports camping, fishing, golfing      Is patient current with immunizations? No, due for SHINGRIX (Shingles). Patient is interested in receiving NONE today.    He  reports that he has been smoking cigars. He has a 70.50 pack-year smoking history. He has never used smokeless tobacco. He reports current alcohol use. He reports that he does not use drugs.  Ready to quit: Not Answered  Counseling given: Not Answered  Comment: 1.5 ppd since age of 14 yrs.  Quit 4 yrs ago and only smokes cigars        DPA/Advanced directive: Patient does not have an Advanced Directive.  A packet and workshop information was given on Advanced Directives.    ROS:    Gait: Uses no assistive device   Ostomy: No   Other tubes: No    Amputations: No   Chronic oxygen use No   Last eye exam Patient reports 1 year ago and he has one scheduled for next week    Wears hearing aids: No    : Denies any urinary leakage during the last 6 months       Screening:    COPD    1.  Is patient under the care of a pulmonologist? No.  2.  Has patient ever completed a PFT or spirometry? Yes, on 1/20/2014.  3.  Is patient on oxygen or CPAP? No.  4.  Has patient ever had instruction on inhaler technique by health care professional? Yes  5.  Is patient interested in a referral to respiratory therapy for more information on COPD, inhaler technique, and/or information on establishing an action plan?  No, patient is NOT interested.      Depression Screening    Little interest or pleasure in doing things?  0 - not at all  Feeling down, depressed, or hopeless? 0 - not  at all  Patient Health Questionnaire Score: 0    If depressive symptoms identified deferred to follow up visit unless specifically addressed in assessment and plan.    Interpretation of PHQ-9 Total Score   Score Severity   1-4 No Depression   5-9 Mild Depression   10-14 Moderate Depression   15-19 Moderately Severe Depression   20-27 Severe Depression    Screening for Cognitive Impairment    Three Minute Recall (river, nation, finger)  2/3    Jude clock face with all 12 numbers and set the hands to show 10 past 11.  Yes 5/5  If cognitive concerns identified, deferred for follow up unless specifically addressed in assessment and plan.    Fall Risk Assessment    Has the patient had two or more falls in the last year or any fall with injury in the last year?  No  If fall risk identified, deferred for follow up unless specifically addressed in assessment and plan.    Safety Assessment    Throw rugs on floor.  No  Handrails on all stairs.  Yes  Good lighting in all hallways.  Yes  Difficulty hearing.  No  Patient counseled about all safety risks that were identified.    Functional Assessment ADLs    Are there any barriers preventing you from cooking for yourself or meeting nutritional needs?  No.    Are there any barriers preventing you from driving safely or obtaining transportation?  No.    Are there any barriers preventing you from using a telephone or calling for help?  No.    Are there any barriers preventing you from shopping?  No.    Are there any barriers preventing you from taking care of your own finances?  No.    Are there any barriers preventing you from managing your medications?  No.    Are there any barriers preventing you from showering, bathing or dressing yourself?  No.    Are you currently engaging in any exercise or physical activity?  No.     What is your perception of your health?  Good.    Health Maintenance Summary                IMM ZOSTER VACCINES Overdue 7/25/2000     LUNG CANCER SCREENING  "Overdue 2020      Done 10/8/2019 CT-CHEST (THORAX) W/O     Patient has more history with this topic...    COLONOSCOPY Overdue 2020      Done 2017 REFERRAL TO GI FOR COLONOSCOPY     Patient has more history with this topic...    Annual Wellness Visit Overdue 2020      Done 2019 Visit Dx: Medicare annual wellness visit, subsequent     Patient has more history with this topic...    IMM INFLUENZA Next Due 2020      Done 10/4/2018 Imm Admin: Influenza Vaccine Adult HD     Patient has more history with this topic...          Patient Care Team:  Naveen Duncan M.D. as PCP - General  CAROL Rodríguez as Consulting Physician (Oncology)  Valencia Lima as [facility]     Social History     Tobacco Use   • Smoking status: Current Every Day Smoker     Packs/day: 1.50     Years: 47.00     Pack years: 70.50     Types: Cigars     Last attempt to quit: 2012     Years since quittin.2   • Smokeless tobacco: Never Used   • Tobacco comment: 1.5 ppd since age of 14 yrs.  Quit 4 yrs ago and only smokes cigars   Substance Use Topics   • Alcohol use: Yes     Alcohol/week: 0.0 - 0.6 oz   • Drug use: No     Family History   Problem Relation Age of Onset   • Cancer Brother         prostate and likely 2 other cancers     He  has a past medical history of Bronchitis chronic, COPD (chronic obstructive pulmonary disease) (HCC) (2009), Dyslipidemia, PNEUMOTHORAX, Preventative health care (2009), S/P Splenectomy (2009), Tobacco abuse (2009), and Tubular adenoma (2009).   Past Surgical History:   Procedure Laterality Date   • GENERAL LUNG SURGERY Left 1980s    Lung biopsy - benign   • SPLENECTOMY             Exam:     /84   Pulse 94   Temp 36.7 °C (98.1 °F) (Temporal)   Ht 1.676 m (5' 6\")   Wt 68.9 kg (152 lb)   SpO2 95%  Body mass index is 24.53 kg/m².    Hearing and dentition fair  Alert, oriented in no acute distress.  Eye contact is good, " speech goal directed, affect calm  Lungs clear   Cv s1s2 reg       Assessment and Plan. The following treatment and monitoring plan is recommended:    Assessment  #! Medicare wellness assessment    #2 COPD last pulmonary function testing 2013 has declined to follow-up PFTs, continues to smoke cigars, remains on Spiriva and Breo    #3 leukocytosis status post splenectomy no history of recurrent infections 7/16/20 wbc 14.4 (57%N,28%L),hgb 18.6,hct 56.8,plt 368, flow cytometry normal myeloid cells    #4 dyslipidemia 10/10/19 chol 159,trig 94,hdl 44,ldl 96 on lipitor 20 mg no side effects of medication    #5 pulmonary nodule pulmonary nodule 10/8/19 CT thorax without lung; unchanged 19 mm and 8 mm noncalcified pulmonary nodules right lung apex, postoperative scarring left upper lobe again noted    #6 tobacco abuse 2 cigars/day continues to work on smoking cessation on his own    #7 cad asymptomatic 8/1/18 CT cardiac scoring LMA 0.0, LCX 1.5, .5, RCA 0.0, PDA 0.0  = 282.0     #8 colon polyp adenoma 7/28/17 colon per [facility] 3 mm polyp descending colon, 10 mm polyp descending colon, 5-8 mm polyp sigmoid colon, 10 mm polyp distal sigmoid colon, 6-8 mm polyp rectum, pathology adenoma and hyperplastic polyps repeat 3 years    #9 aortic atherosclerosis 10/25/16 atherosclerotic plaque aorta on CT scan thorax, no aneurysm    #10 preventative health  1/13/14 ultrasound aorta negative  7/28/17 colon per [facility] 3 mm polyp descending colon, 10 mm polyp descending colon, 5-8 mm polyp sigmoid colon, 10 mm polyp distal sigmoid colon, 6-8 mm polyp rectum, pathology adenoma and hyperplastic polyps repeat 3 years  8/7/17 pneumovax  10/4/18 tdap declined  10/10/19 psa 2.1  10/10/19 vit d 22 on 2000 units increase 4000 units  10/12/19 hep c Ab reactive, follow up HCV RNA negative  5/7/20 prevnar   5/7/20 menactra    #11 splenectomy  During childhood  1/23/15 menactra  1/23/15 prevnar  8/7/17 pneumovax  5/7/20 prevnar   5/7/20  menactra    Services suggested:   Health Care Screening recommendations as per orders if indicated.  Referrals offered: PT/OT/Nutrition counseling/Behavioral Health/Smoking cessation as per orders if indicated.    Discussion today about general wellness and lifestyle habits:    · Prevent falls and reduce trip hazards; Cautioned about securing or removing rugs.  · Have a working fire alarm and carbon monoxide detector;   · Engage in regular physical activity and social activities       Plan  #! Health maintenance reviewed and updated    #2 referral [facility] follow up colon cancer screening    #3 shingrix vaccine to check at pharmacy    #4 check availability for flu at pharmacy     #5 CT thorax ordered follow up lung nodule    #6 offered stop smoking clinic and potential pharmaceutical intervention for smoking cessation assistance he declines he would like to work on this on his own, he can try the over-the-counter nicotine patch, work on smoking cessation as tobacco increases risk for lung disease, heart disease, malignancy    #7 PFT     #8 pulmonary rehab diagnosis COPD    #9 repeat cbc follow-up leukocytosis likely due to splenectomy    #!0 has had Menactra, recommend Trumenba vaccination    #11 check on alternatives for Breo, potentially since he is on Breo and Spiriva, he may benefit from Trelegy Ellipta to defer cost    #12 follow-up 6 months    #13 continue social distancing, mask wearing in public

## 2020-08-12 NOTE — TELEPHONE ENCOUNTER
Patient notified. Patient will be coming in tomorrow to get the vaccines. On the phone it seemed as if patient did not grasp that he was given a vaccine he did not need. Tomorrow when he comes in for the Men B vaccine, we will explain better that he was given a vaccine he did not need, and that the Hep B vaccine will be refunded and not charged for.

## 2020-08-13 ENCOUNTER — NON-PROVIDER VISIT (OUTPATIENT)
Dept: MEDICAL GROUP | Facility: MEDICAL CENTER | Age: 70
End: 2020-08-13
Payer: MEDICARE

## 2020-08-13 DIAGNOSIS — Z23 NEED FOR HEPATITIS B VACCINATION: ICD-10-CM

## 2020-08-13 PROCEDURE — 99999 PR NO CHARGE: CPT | Performed by: INTERNAL MEDICINE

## 2020-08-13 PROCEDURE — 90621 MENB-FHBP VACC 2/3 DOSE IM: CPT | Performed by: INTERNAL MEDICINE

## 2020-08-13 PROCEDURE — 90471 IMMUNIZATION ADMIN: CPT | Performed by: INTERNAL MEDICINE

## 2020-08-13 NOTE — NON-PROVIDER
"Narendra Sun Jr. is a 70 y.o. male here for a non-provider visit for:   TRUMENBA (Men B) 1 of 1    Reason for immunization: Overdue/Provider Recommended  Immunization records indicate need for vaccine: Yes, confirmed with Epic  Minimum interval has been met for this vaccine: Yes  ABN completed: No    Order and dose verified by: [facility]  VIS Dated  8/9/16 was given to patient: Yes  All IAC Questionnaire questions were answered \"No.\"    Patient tolerated injection and no adverse effects were observed or reported: Yes    Pt scheduled for next dose in series: Not Indicated    "

## 2020-08-31 ENCOUNTER — PATIENT OUTREACH (OUTPATIENT)
Dept: HEALTH INFORMATION MANAGEMENT | Facility: OTHER | Age: 70
End: 2020-08-31

## 2020-10-01 ENCOUNTER — PATIENT OUTREACH (OUTPATIENT)
Dept: HEALTH INFORMATION MANAGEMENT | Facility: OTHER | Age: 70
End: 2020-10-01

## 2020-10-16 ENCOUNTER — HOSPITAL ENCOUNTER (OUTPATIENT)
Dept: RADIOLOGY | Facility: MEDICAL CENTER | Age: 70
End: 2020-10-16
Attending: INTERNAL MEDICINE
Payer: MEDICARE

## 2020-10-16 DIAGNOSIS — R91.1 SOLITARY PULMONARY NODULE: ICD-10-CM

## 2020-10-16 PROCEDURE — 71250 CT THORAX DX C-: CPT

## 2020-10-17 ENCOUNTER — OFFICE VISIT (OUTPATIENT)
Dept: URGENT CARE | Facility: PHYSICIAN GROUP | Age: 70
End: 2020-10-17
Payer: MEDICARE

## 2020-10-17 ENCOUNTER — TELEPHONE (OUTPATIENT)
Dept: MEDICAL GROUP | Facility: MEDICAL CENTER | Age: 70
End: 2020-10-17

## 2020-10-17 VITALS
OXYGEN SATURATION: 99 % | WEIGHT: 150 LBS | HEIGHT: 66 IN | SYSTOLIC BLOOD PRESSURE: 132 MMHG | TEMPERATURE: 97.7 F | DIASTOLIC BLOOD PRESSURE: 70 MMHG | BODY MASS INDEX: 24.11 KG/M2 | HEART RATE: 96 BPM | RESPIRATION RATE: 20 BRPM

## 2020-10-17 DIAGNOSIS — M65.4 DE QUERVAIN'S TENOSYNOVITIS, RIGHT: ICD-10-CM

## 2020-10-17 DIAGNOSIS — J44.9 CHRONIC OBSTRUCTIVE PULMONARY DISEASE, UNSPECIFIED COPD TYPE (HCC): Chronic | ICD-10-CM

## 2020-10-17 DIAGNOSIS — R91.8 PULMONARY NODULES: ICD-10-CM

## 2020-10-17 PROCEDURE — 99214 OFFICE O/P EST MOD 30 MIN: CPT | Performed by: PHYSICIAN ASSISTANT

## 2020-10-17 RX ORDER — SODIUM FLUORIDE 1.1 G/100G
CREAM ORAL
COMMUNITY
Start: 2020-09-12 | End: 2023-03-08

## 2020-10-17 ASSESSMENT — PAIN SCALES - GENERAL: PAINLEVEL: 4=SLIGHT-MODERATE PAIN

## 2020-10-17 ASSESSMENT — ENCOUNTER SYMPTOMS
SENSORY CHANGE: 1
TINGLING: 1
FALLS: 0

## 2020-10-17 ASSESSMENT — FIBROSIS 4 INDEX: FIB4 SCORE: 0.79

## 2020-10-17 NOTE — TELEPHONE ENCOUNTER
Notified with results, stable pulmonary nodules, one new nodule, recommend follow-up either with smoking history, repeat CT thorax ordered February, patient understands and agrees.  Also spiriva and breo cost prohibitive, we will try to see if Trelegy inhaler would be more cost effective for him, this would be in place of the breo and spiriva, prescription to pharmacy, patient to call his pharmacy and check on the cost of the trelegy

## 2020-10-17 NOTE — PATIENT INSTRUCTIONS
De Quervain Tenosynovitis  Introduction  Tendons attach muscles to bones. They also help with joint movements. When tendons become irritated or swollen, it is called tendinitis.  The extensor pollicis brevis (EPB) tendon connects the EPB muscle to a bone that is near the base of the thumb. The EPB muscle helps to straighten and extend the thumb. De Quervain tenosynovitis is a condition in which the EPB tendon lining (sheath) becomes irritated, thickened, and swollen. This condition is sometimes called stenosing tenosynovitis. This condition causes pain on the thumb side of the back of the wrist.  What are the causes?  Causes of this condition include:  · Activities that repeatedly cause your thumb and wrist to extend.  · A sudden increase in activity or change in activity that affects your wrist.  What increases the risk?  This condition is more likely to develop in:  · Females.  · People who have diabetes.  · Women who have recently given birth.  · People who are over 40 years of age.  · People who do activities that involve repeated hand and wrist motions, such as tennis, racquetball, volleyball, gardening, and taking care of children.  · People who do heavy labor.  · People who have poor wrist strength and flexibility.  · People who do not warm up properly before activities.  What are the signs or symptoms?  Symptoms of this condition include:  · Pain or tenderness over the thumb side of the back of the wrist when your thumb and wrist are not moving.  · Pain that gets worse when you straighten your thumb or extend your thumb or wrist.  · Pain when the injured area is touched.  · Locking or catching of the thumb joint while you bend and straighten your thumb.  · Decreased thumb motion due to pain.  · Swelling over the affected area.  How is this diagnosed?  This condition is diagnosed with a medical history and physical exam. Your health care provider will ask for details about your injury and ask about your  symptoms.  How is this treated?  Treatment may include the use of icing and medicines to reduce pain and swelling. You may also be advised to wear a splint or brace to limit your thumb and wrist motion. In less severe cases, treatment may also include working with a physical therapist to strengthen your wrist and calm the irritation around your EPB tendon sheath. In severe cases, surgery may be needed.  Follow these instructions at home:  If you have a splint or brace:  · Wear it as told by your health care provider. Remove it only as told by your health care provider.  · Loosen the splint or brace if your fingers become numb and tingle, or if they turn cold and blue.  · Keep the splint or brace clean and dry.  Managing pain, stiffness, and swelling  · If directed, apply ice to the injured area.  ¨ Put ice in a plastic bag.  ¨ Place a towel between your skin and the bag.  ¨ Leave the ice on for 20 minutes, 2-3 times per day.  · Move your fingers often to avoid stiffness and to lessen swelling.  · Raise (elevate) the injured area above the level of your heart while you are sitting or lying down.  General instructions  · Return to your normal activities as told by your health care provider. Ask your health care provider what activities are safe for you.  · Take over-the-counter and prescription medicines only as told by your health care provider.  · Keep all follow-up visits as told by your health care provider. This is important.  · Do not drive or operate heavy machinery while taking prescription pain medicine.  Contact a health care provider if:  · Your pain, tenderness, or swelling gets worse, even if you have had treatment.  · You have numbness or tingling in your wrist, hand, or fingers on the injured side.  This information is not intended to replace advice given to you by your health care provider. Make sure you discuss any questions you have with your health care provider.  Document Released: 12/18/2006  Document Revised: 05/25/2017 Document Reviewed: 02/23/2016  © 2017 [employer]

## 2020-10-17 NOTE — PROGRESS NOTES
"Subjective:   Narendra Sun Jr.  is a 70 y.o. male who presents for Wrist Pain (R wrist pain, x2 months)    This is a new problem.  Mr. Sun is a right-hand-dominant male who presents to urgent care with 2-month history of pain in the right wrist.  Patient reports that he was camping and doing a lot of repetitive motion with his wrist when he began to experience pain.  This has persisted over the course the past 2 months.  Pain is worse with certain movements in particular movements of the thumb.  Patient denies any specific falls or injury.  He reports occasionally the hand will feel as if it is numb and tingling.      Patient denies fever, chills, shaking chills cough, shortness of breath, runny nose, congestion, sore throat, headache, loss of taste or smell, myalgias, nausea, vomiting, diarrhea.  Patient denies any known exposure to patient positive for COVID-19 or suspected positive for COVID-19.  Patient has not traveled outside the area in the past 14 days.          Wrist Injury   Associated symptoms include tingling.     Review of Systems   Musculoskeletal: Positive for joint pain. Negative for falls.   Neurological: Positive for tingling and sensory change.   All other systems reviewed and are negative.    No Known Allergies  Reviewed past medical, surgical , social and family history.  Reviewed prescription and over-the-counter medications with patient and electronic health record today.     Objective:   /70   Pulse 96   Temp 36.5 °C (97.7 °F) (Temporal)   Resp 20   Ht 1.676 m (5' 6\")   Wt 68 kg (150 lb)   SpO2 99%   BMI 24.21 kg/m²   Physical Exam  Vitals signs reviewed.   Constitutional:       General: He is not in acute distress.     Appearance: He is well-developed. He is not ill-appearing or toxic-appearing.   HENT:      Head: Normocephalic and atraumatic.      Jaw: There is normal jaw occlusion.      Right Ear: External ear normal.      Left Ear: External ear normal.      Nose: Nose " normal.      Mouth/Throat:      Mouth: Mucous membranes are moist.   Eyes:      General: Lids are normal.      Extraocular Movements: Extraocular movements intact.      Conjunctiva/sclera: Conjunctivae normal.      Pupils: Pupils are equal, round, and reactive to light.   Neck:      Musculoskeletal: Normal range of motion and neck supple.   Cardiovascular:      Rate and Rhythm: Normal rate and regular rhythm.      Pulses:           Radial pulses are 2+ on the right side.      Heart sounds: Normal heart sounds.   Pulmonary:      Effort: Pulmonary effort is normal. No respiratory distress.      Breath sounds: Normal breath sounds.   Musculoskeletal:      Right wrist: He exhibits decreased range of motion and tenderness. He exhibits no bony tenderness, no swelling, no effusion and no deformity.        Arms:       Comments: Right wrist with tenderness along the extensor tendon of the thumb with + Finkelstein.  Distal neurovascular intact.  No bony tenderness.   Skin:     General: Skin is warm and dry.      Findings: No rash.   Neurological:      Mental Status: He is alert and oriented to person, place, and time.      Cranial Nerves: No cranial nerve deficit.      Sensory: No sensory deficit.      Motor: Motor function is intact.      Coordination: Coordination is intact.   Psychiatric:         Attention and Perception: Attention normal.         Mood and Affect: Mood and affect normal.         Speech: Speech normal.         Behavior: Behavior normal.         Thought Content: Thought content normal.         Cognition and Memory: Cognition normal.         Judgment: Judgment normal.           Assessment/Plan:   1. De Quervain's tenosynovitis, right  - REFERRAL TO SPORTS MEDICINE      Patient is placed in a thumb spica splint.  Referral placed to sports medicine for further evaluation and management.    Printed education provided to patient regarding de Quervain's tenosynovitis.  Differential diagnosis, natural history,  supportive care, and indications for immediate follow-up discussed.     Upon entering exam room I ensured patient was wearing a mask.  This provider wore appropriate PPE throughout entire visit.  Patient wore mask entire visit except for a brief period while examining oropharynx.    Red flag warning symptoms and strict ER/follow-up precautions given.  The patient demonstrated a good understanding and agreed with the treatment plan.  Please note that this note was created using voice recognition speech to text software. Every effort has been made to correct obvious errors.  However, I expect there are errors of grammar and possibly context that were not discovered prior to finalizing the note  PATRICIA Esteves PA-C

## 2020-12-08 ENCOUNTER — TELEPHONE (OUTPATIENT)
Dept: MEDICAL GROUP | Facility: MEDICAL CENTER | Age: 70
End: 2020-12-08

## 2020-12-08 NOTE — TELEPHONE ENCOUNTER
Narendra Sun Jr.  [phone]    Pt called and LM, he would like to discuss his colonoscopy results with you.

## 2020-12-09 NOTE — TELEPHONE ENCOUNTER
Called patient, answered his question, recommend continue follow-up 3 years repeat colonoscopy as recommended by GI.

## 2020-12-15 DIAGNOSIS — J44.9 CHRONIC OBSTRUCTIVE PULMONARY DISEASE, UNSPECIFIED COPD TYPE (HCC): Chronic | ICD-10-CM

## 2021-01-15 DIAGNOSIS — Z23 NEED FOR VACCINATION: ICD-10-CM

## 2021-02-06 PROCEDURE — 91301 MODERNA SARS-COV-2 VACCINE: CPT

## 2021-02-06 PROCEDURE — 0011A MODERNA SARS-COV-2 VACCINE: CPT

## 2021-02-08 ENCOUNTER — IMMUNIZATION (OUTPATIENT)
Dept: FAMILY PLANNING/WOMEN'S HEALTH CLINIC | Facility: IMMUNIZATION CENTER | Age: 71
End: 2021-02-08
Payer: MEDICARE

## 2021-02-08 DIAGNOSIS — Z23 ENCOUNTER FOR VACCINATION: Primary | ICD-10-CM

## 2021-03-05 ENCOUNTER — IMMUNIZATION (OUTPATIENT)
Dept: FAMILY PLANNING/WOMEN'S HEALTH CLINIC | Facility: IMMUNIZATION CENTER | Age: 71
End: 2021-03-05
Attending: INTERNAL MEDICINE
Payer: MEDICARE

## 2021-03-05 DIAGNOSIS — Z23 ENCOUNTER FOR VACCINATION: Primary | ICD-10-CM

## 2021-03-05 PROCEDURE — 0012A MODERNA SARS-COV-2 VACCINE: CPT

## 2021-03-05 PROCEDURE — 91301 MODERNA SARS-COV-2 VACCINE: CPT

## 2021-03-09 ENCOUNTER — PATIENT OUTREACH (OUTPATIENT)
Dept: HEALTH INFORMATION MANAGEMENT | Facility: OTHER | Age: 71
End: 2021-03-09

## 2021-03-18 ENCOUNTER — TELEPHONE (OUTPATIENT)
Dept: MEDICAL GROUP | Facility: MEDICAL CENTER | Age: 71
End: 2021-03-18

## 2021-03-18 ENCOUNTER — APPOINTMENT (OUTPATIENT)
Dept: RADIOLOGY | Facility: MEDICAL CENTER | Age: 71
End: 2021-03-18
Attending: INTERNAL MEDICINE
Payer: MEDICARE

## 2021-03-18 DIAGNOSIS — E53.8 B12 DEFICIENCY: ICD-10-CM

## 2021-03-18 DIAGNOSIS — Z12.5 PROSTATE CANCER SCREENING: ICD-10-CM

## 2021-03-18 DIAGNOSIS — R35.1 NOCTURIA: ICD-10-CM

## 2021-03-18 DIAGNOSIS — E78.5 DYSLIPIDEMIA: Chronic | ICD-10-CM

## 2021-03-18 DIAGNOSIS — E55.9 VITAMIN D DEFICIENCY: ICD-10-CM

## 2021-03-18 RX ORDER — ATORVASTATIN CALCIUM 20 MG/1
TABLET, FILM COATED ORAL
Qty: 100 TABLET | Refills: 0 | Status: SHIPPED | OUTPATIENT
Start: 2021-03-18 | End: 2021-07-07

## 2021-03-18 NOTE — TELEPHONE ENCOUNTER
Please notify the patient that he is due for his repeat blood test including his cholesterol.  I will place the order for the fasting blood test in the computer system.

## 2021-03-29 ENCOUNTER — TELEPHONE (OUTPATIENT)
Dept: MEDICAL GROUP | Facility: MEDICAL CENTER | Age: 71
End: 2021-03-29

## 2021-03-29 ENCOUNTER — HOSPITAL ENCOUNTER (OUTPATIENT)
Dept: RADIOLOGY | Facility: MEDICAL CENTER | Age: 71
End: 2021-03-29
Attending: INTERNAL MEDICINE
Payer: MEDICARE

## 2021-03-29 ENCOUNTER — HOSPITAL ENCOUNTER (OUTPATIENT)
Dept: LAB | Facility: MEDICAL CENTER | Age: 71
End: 2021-03-29
Attending: INTERNAL MEDICINE
Payer: MEDICARE

## 2021-03-29 DIAGNOSIS — E53.8 B12 DEFICIENCY: ICD-10-CM

## 2021-03-29 DIAGNOSIS — E78.5 DYSLIPIDEMIA: Chronic | ICD-10-CM

## 2021-03-29 DIAGNOSIS — J44.9 CHRONIC OBSTRUCTIVE PULMONARY DISEASE, UNSPECIFIED COPD TYPE (HCC): ICD-10-CM

## 2021-03-29 DIAGNOSIS — E55.9 VITAMIN D DEFICIENCY: ICD-10-CM

## 2021-03-29 DIAGNOSIS — R91.8 PULMONARY NODULES: ICD-10-CM

## 2021-03-29 DIAGNOSIS — Z12.5 PROSTATE CANCER SCREENING: ICD-10-CM

## 2021-03-29 DIAGNOSIS — R91.1 LUNG NODULE: ICD-10-CM

## 2021-03-29 DIAGNOSIS — R35.1 NOCTURIA: ICD-10-CM

## 2021-03-29 LAB
BASOPHILS # BLD AUTO: 0.5 % (ref 0–1.8)
BASOPHILS # BLD: 0.06 K/UL (ref 0–0.12)
EOSINOPHIL # BLD AUTO: 0.58 K/UL (ref 0–0.51)
EOSINOPHIL NFR BLD: 4.5 % (ref 0–6.9)
ERYTHROCYTE [DISTWIDTH] IN BLOOD BY AUTOMATED COUNT: 56.1 FL (ref 35.9–50)
HCT VFR BLD AUTO: 60.1 % (ref 42–52)
HGB BLD-MCNC: 19.2 G/DL (ref 14–18)
IMM GRANULOCYTES # BLD AUTO: 0.09 K/UL (ref 0–0.11)
IMM GRANULOCYTES NFR BLD AUTO: 0.7 % (ref 0–0.9)
LYMPHOCYTES # BLD AUTO: 4.11 K/UL (ref 1–4.8)
LYMPHOCYTES NFR BLD: 31.9 % (ref 22–41)
MCH RBC QN AUTO: 32.9 PG (ref 27–33)
MCHC RBC AUTO-ENTMCNC: 31.9 G/DL (ref 33.7–35.3)
MCV RBC AUTO: 103.1 FL (ref 81.4–97.8)
MONOCYTES # BLD AUTO: 1.21 K/UL (ref 0–0.85)
MONOCYTES NFR BLD AUTO: 9.4 % (ref 0–13.4)
NEUTROPHILS # BLD AUTO: 6.85 K/UL (ref 1.82–7.42)
NEUTROPHILS NFR BLD: 53 % (ref 44–72)
NRBC # BLD AUTO: 0 K/UL
NRBC BLD-RTO: 0 /100 WBC
PLATELET # BLD AUTO: 397 K/UL (ref 164–446)
PMV BLD AUTO: 10.5 FL (ref 9–12.9)
RBC # BLD AUTO: 5.83 M/UL (ref 4.7–6.1)
WBC # BLD AUTO: 12.9 K/UL (ref 4.8–10.8)

## 2021-03-29 PROCEDURE — 82306 VITAMIN D 25 HYDROXY: CPT

## 2021-03-29 PROCEDURE — 82607 VITAMIN B-12: CPT

## 2021-03-29 PROCEDURE — 36415 COLL VENOUS BLD VENIPUNCTURE: CPT

## 2021-03-29 PROCEDURE — 84153 ASSAY OF PSA TOTAL: CPT

## 2021-03-29 PROCEDURE — 85025 COMPLETE CBC W/AUTO DIFF WBC: CPT

## 2021-03-29 PROCEDURE — 80053 COMPREHEN METABOLIC PANEL: CPT

## 2021-03-29 PROCEDURE — 71250 CT THORAX DX C-: CPT | Mod: ME

## 2021-03-29 PROCEDURE — 80061 LIPID PANEL: CPT

## 2021-03-29 PROCEDURE — 84443 ASSAY THYROID STIM HORMONE: CPT

## 2021-03-30 LAB
25(OH)D3 SERPL-MCNC: 41 NG/ML (ref 30–100)
ALBUMIN SERPL BCP-MCNC: 4.4 G/DL (ref 3.2–4.9)
ALBUMIN/GLOB SERPL: 1.4 G/DL
ALP SERPL-CCNC: 87 U/L (ref 30–99)
ALT SERPL-CCNC: 22 U/L (ref 2–50)
ANION GAP SERPL CALC-SCNC: 10 MMOL/L (ref 7–16)
AST SERPL-CCNC: 20 U/L (ref 12–45)
BILIRUB SERPL-MCNC: 0.7 MG/DL (ref 0.1–1.5)
BUN SERPL-MCNC: 15 MG/DL (ref 8–22)
CALCIUM SERPL-MCNC: 9.7 MG/DL (ref 8.5–10.5)
CHLORIDE SERPL-SCNC: 105 MMOL/L (ref 96–112)
CHOLEST SERPL-MCNC: 186 MG/DL (ref 100–199)
CO2 SERPL-SCNC: 23 MMOL/L (ref 20–33)
CREAT SERPL-MCNC: 1 MG/DL (ref 0.5–1.4)
FASTING STATUS PATIENT QL REPORTED: NORMAL
GLOBULIN SER CALC-MCNC: 3.1 G/DL (ref 1.9–3.5)
GLUCOSE SERPL-MCNC: 75 MG/DL (ref 65–99)
HDLC SERPL-MCNC: 42 MG/DL
LDLC SERPL CALC-MCNC: 116 MG/DL
POTASSIUM SERPL-SCNC: 4.4 MMOL/L (ref 3.6–5.5)
PROT SERPL-MCNC: 7.5 G/DL (ref 6–8.2)
PSA SERPL-MCNC: 1.34 NG/ML (ref 0–4)
SODIUM SERPL-SCNC: 138 MMOL/L (ref 135–145)
TRIGL SERPL-MCNC: 138 MG/DL (ref 0–149)
TSH SERPL DL<=0.005 MIU/L-ACNC: 1.59 UIU/ML (ref 0.38–5.33)
VIT B12 SERPL-MCNC: 398 PG/ML (ref 211–911)

## 2021-03-30 NOTE — TELEPHONE ENCOUNTER
Notified with results, will repeat CT scan in 3 to 4 months, order placed in computer, patient understands and agrees

## 2021-04-01 PROBLEM — D75.1 POLYCYTHEMIA: Status: ACTIVE | Noted: 2021-04-01

## 2021-04-02 ENCOUNTER — TELEPHONE (OUTPATIENT)
Dept: MEDICAL GROUP | Facility: MEDICAL CENTER | Age: 71
End: 2021-04-02

## 2021-04-02 DIAGNOSIS — D75.1 POLYCYTHEMIA: ICD-10-CM

## 2021-04-03 NOTE — TELEPHONE ENCOUNTER
Notified with results, cholesterol stable, WBC decreased, still with polycythemia. Previous studies including flow cytometry negative, patient has had prior splenectomy.  Will order secondary work-up for polycythemia, patient can have that done in a month.  He understands and agrees.  Orders are placed in the computer system.

## 2021-05-17 ENCOUNTER — PATIENT MESSAGE (OUTPATIENT)
Dept: HEALTH INFORMATION MANAGEMENT | Facility: OTHER | Age: 71
End: 2021-05-17

## 2021-05-27 ENCOUNTER — PATIENT MESSAGE (OUTPATIENT)
Dept: HEALTH INFORMATION MANAGEMENT | Facility: OTHER | Age: 71
End: 2021-05-27

## 2021-05-27 ENCOUNTER — PATIENT OUTREACH (OUTPATIENT)
Dept: HEALTH INFORMATION MANAGEMENT | Facility: OTHER | Age: 71
End: 2021-05-27

## 2021-05-27 NOTE — PROGRESS NOTES
Called pt to schedule RACHELE. Verified HIPAA. I was able to schedule the appointment for the pt and provided all needed information.     06/22/2021  1:00 PM  BLU Boyle   [address]    Attempt # 2

## 2021-06-22 PROBLEM — M10.9 GOUT: Status: RESOLVED | Noted: 2017-12-01 | Resolved: 2021-06-22

## 2021-07-06 ENCOUNTER — TELEPHONE (OUTPATIENT)
Dept: MEDICAL GROUP | Facility: MEDICAL CENTER | Age: 71
End: 2021-07-06

## 2021-07-06 NOTE — TELEPHONE ENCOUNTER
ANNUAL WELLNESS VISIT PRE-VISIT PLANNING    Patient was NOT contacted to complete the Annual Wellness Visit Pre-Visit Planning. Information was reviewed in the chart.     1.  Reviewed notes from the last office visit: Yes    2.  If any orders were ordered or intended to be done prior to visit (i.e. 6 mos follow-up), do we have results/consult notes or has patient scheduled?   · Labs - Labs ordered, NOT completed. Patient advised to complete prior to next appointment. A [employer] message was sent to patient reminding of upcoming visit and outstanding lab orders.   Note: If patient appointment is for lab review and patient did not complete labs, check with provider if OK to reschedule patient until labs completed.  · Imaging - Imaging ordered, NOT completed. Patient advised to complete prior to next appointment. Patient has an appointment scheduled with [facility] to complete the imaging order.   · Referrals - No referrals were ordered at last office visit.    3.  Immunizations were updated in Epic using Reconcile Outside Information activity? Yes. Immunizations queried through [employer] and reconciled from outside sources. Immunization records are up to date.       •  Is patient due for Tdap? NO       •  Is patient due for Shingrix? NO    4.  Patient is due for the following Health Maintenance Topics:   There are no preventive care reminders to display for this patient.    5.  Reviewed/Updated the following:  · Preferred Pharmacy? Yes  · Preferred Lab? Yes  · Preferred Communication? Yes  · Allergies? Yes. No outside allergies to reconcile  · Medications? YES. Was Abstract Encounter opened and chart updated? NO. No new medications to reconcile from outside sources  · Social History? Yes  · Family History (document living status of immediate family members and if + hx of cancer, diabetes, hypertension, hyperlipidemia, heart attack, stroke) No    6.  Care Team Updated:  · DME Company (gait device, O2, CPAP, etc.): N\A  · Other  Specialists (eye doctor, derm, GYN, cardiology, endo, etc): YES    7.  Patient was not advised: “This is a free wellness visit. The provider will screen for medical conditions to help you stay healthy. If you have other concerns to address you may be asked to discuss these at a separate visit or there may be an additional fee.”         ---------------------------------------------------------------------------------------------  This Pre-Visit Planning note has been created for the Provider and Medical Assistant to review prior to the patient's office appointment.   Patient is NOT REQUIRED to follow-up on this note.

## 2021-07-19 ENCOUNTER — OFFICE VISIT (OUTPATIENT)
Dept: MEDICAL GROUP | Facility: MEDICAL CENTER | Age: 71
End: 2021-07-19
Payer: MEDICARE

## 2021-07-19 VITALS
TEMPERATURE: 99.2 F | SYSTOLIC BLOOD PRESSURE: 104 MMHG | OXYGEN SATURATION: 95 % | WEIGHT: 149 LBS | HEIGHT: 66 IN | DIASTOLIC BLOOD PRESSURE: 74 MMHG | HEART RATE: 98 BPM | BODY MASS INDEX: 23.95 KG/M2

## 2021-07-19 DIAGNOSIS — Z00.00 PREVENTATIVE HEALTH CARE: Chronic | ICD-10-CM

## 2021-07-19 DIAGNOSIS — Z23 NEED FOR MENINGOCOCCAL VACCINATION: ICD-10-CM

## 2021-07-19 DIAGNOSIS — L98.9 SKIN LESION: ICD-10-CM

## 2021-07-19 DIAGNOSIS — R91.1 PULMONARY NODULE: ICD-10-CM

## 2021-07-19 DIAGNOSIS — Z23 NEED FOR HEPATITIS B VACCINATION: ICD-10-CM

## 2021-07-19 PROCEDURE — G0010 ADMIN HEPATITIS B VACCINE: HCPCS | Performed by: INTERNAL MEDICINE

## 2021-07-19 PROCEDURE — 99213 OFFICE O/P EST LOW 20 MIN: CPT | Mod: 25 | Performed by: INTERNAL MEDICINE

## 2021-07-19 PROCEDURE — 17000 DESTRUCT PREMALG LESION: CPT | Performed by: INTERNAL MEDICINE

## 2021-07-19 PROCEDURE — 90746 HEPB VACCINE 3 DOSE ADULT IM: CPT | Performed by: INTERNAL MEDICINE

## 2021-07-19 PROCEDURE — 90621 MENB-FHBP VACC 2/3 DOSE IM: CPT | Performed by: INTERNAL MEDICINE

## 2021-07-19 PROCEDURE — 90472 IMMUNIZATION ADMIN EACH ADD: CPT | Performed by: INTERNAL MEDICINE

## 2021-07-19 ASSESSMENT — FIBROSIS 4 INDEX: FIB4 SCORE: 0.75
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
## 2021-08-02 ENCOUNTER — TELEPHONE (OUTPATIENT)
Dept: MEDICAL GROUP | Facility: MEDICAL CENTER | Age: 71
End: 2021-08-02

## 2021-08-02 ENCOUNTER — HOSPITAL ENCOUNTER (OUTPATIENT)
Dept: RADIOLOGY | Facility: MEDICAL CENTER | Age: 71
End: 2021-08-02
Attending: INTERNAL MEDICINE
Payer: MEDICARE

## 2021-08-02 DIAGNOSIS — R91.1 PULMONARY NODULE: ICD-10-CM

## 2021-08-02 DIAGNOSIS — R91.1 LUNG NODULE: ICD-10-CM

## 2021-08-02 PROCEDURE — 71250 CT THORAX DX C-: CPT | Mod: MH

## 2021-08-03 ENCOUNTER — TELEPHONE (OUTPATIENT)
Dept: MEDICAL GROUP | Facility: MEDICAL CENTER | Age: 71
End: 2021-08-03

## 2021-08-03 NOTE — TELEPHONE ENCOUNTER
----- Message from Naveen Duncan M.D. sent at 8/2/2021  7:15 PM PDT -----  Called the patient and left a message.  Please notify him that the CT of his lung shows no change in the size of his lung nodules, there is no evidence of pneumonia or inflammation.    The radiologist recommends repeating the CT scan again in 6 months, I will place that order in the computer system.

## 2021-08-03 NOTE — TELEPHONE ENCOUNTER
Called the patient and left a message.  Please notify him that the CT of his lung shows no change in the size of his lung nodules, there is no evidence of pneumonia or inflammation.    The radiologist recommends repeating the CT scan again in 6 months, I will place that order in the computer system.

## 2022-02-04 ENCOUNTER — PATIENT OUTREACH (OUTPATIENT)
Dept: HEALTH INFORMATION MANAGEMENT | Facility: OTHER | Age: 72
End: 2022-02-04

## 2022-03-16 ENCOUNTER — PATIENT OUTREACH (OUTPATIENT)
Dept: HEALTH INFORMATION MANAGEMENT | Facility: OTHER | Age: 72
End: 2022-03-16
Payer: MEDICARE

## 2022-05-16 ENCOUNTER — OFFICE VISIT (OUTPATIENT)
Dept: MEDICAL GROUP | Facility: MEDICAL CENTER | Age: 72
End: 2022-05-16
Payer: MEDICARE

## 2022-05-16 ENCOUNTER — HOSPITAL ENCOUNTER (OUTPATIENT)
Dept: RADIOLOGY | Facility: MEDICAL CENTER | Age: 72
End: 2022-05-16
Attending: INTERNAL MEDICINE
Payer: MEDICARE

## 2022-05-16 VITALS
BODY MASS INDEX: 25.07 KG/M2 | DIASTOLIC BLOOD PRESSURE: 84 MMHG | HEART RATE: 102 BPM | HEIGHT: 66 IN | OXYGEN SATURATION: 93 % | WEIGHT: 156 LBS | SYSTOLIC BLOOD PRESSURE: 142 MMHG | TEMPERATURE: 98.1 F

## 2022-05-16 DIAGNOSIS — Z23 NEED FOR MENINGITIS VACCINATION: ICD-10-CM

## 2022-05-16 DIAGNOSIS — R91.1 PULMONARY NODULE: ICD-10-CM

## 2022-05-16 DIAGNOSIS — D72.829 LEUKOCYTOSIS, UNSPECIFIED TYPE: ICD-10-CM

## 2022-05-16 DIAGNOSIS — E78.5 DYSLIPIDEMIA: ICD-10-CM

## 2022-05-16 DIAGNOSIS — Z00.00 PREVENTATIVE HEALTH CARE: Chronic | ICD-10-CM

## 2022-05-16 DIAGNOSIS — D75.1 POLYCYTHEMIA: ICD-10-CM

## 2022-05-16 DIAGNOSIS — E53.8 B12 DEFICIENCY: ICD-10-CM

## 2022-05-16 DIAGNOSIS — Z23 NEED FOR TDAP VACCINATION: ICD-10-CM

## 2022-05-16 DIAGNOSIS — Z90.81 S/P SPLENECTOMY: Chronic | ICD-10-CM

## 2022-05-16 DIAGNOSIS — E55.9 VITAMIN D DEFICIENCY: ICD-10-CM

## 2022-05-16 DIAGNOSIS — Z12.5 PROSTATE CANCER SCREENING: ICD-10-CM

## 2022-05-16 PROCEDURE — 90471 IMMUNIZATION ADMIN: CPT | Performed by: INTERNAL MEDICINE

## 2022-05-16 PROCEDURE — 71250 CT THORAX DX C-: CPT | Mod: ME

## 2022-05-16 PROCEDURE — 99214 OFFICE O/P EST MOD 30 MIN: CPT | Mod: 25 | Performed by: INTERNAL MEDICINE

## 2022-05-16 PROCEDURE — 90715 TDAP VACCINE 7 YRS/> IM: CPT | Performed by: INTERNAL MEDICINE

## 2022-05-16 PROCEDURE — 90621 MENB-FHBP VACC 2/3 DOSE IM: CPT | Performed by: INTERNAL MEDICINE

## 2022-05-16 PROCEDURE — 90472 IMMUNIZATION ADMIN EACH ADD: CPT | Performed by: INTERNAL MEDICINE

## 2022-05-16 ASSESSMENT — FIBROSIS 4 INDEX: FIB4 SCORE: 0.76

## 2022-05-16 NOTE — PROGRESS NOTES
Subjective     tricia Sun Jr is a 71 y.o. male who presents follow up copd               HPI      Patient here follow-up COPD still smoking 2 cigars/day, has declined PFTs, last CT scan showed nodules stable in August.  Still uses Trelegy Ellipta daily, albuterol inhaler as needed.  Dyslipidemia remains on Lipitor 20 mg.  No regular exercise, tries to eat a healthy diet, although he does eat some sweets.  Breathing has been stable, maintains on Trelegy, no recurrent upper respiratory tract infections.  Up-to-date on his COVID vaccines.  He has had 1 shingles vaccine, he is due for the second shingles vaccine.  Status post colectomy, due for Trumenba third in series.  Also has not had a recent tetanus vaccine.  Polycythemia and leukocytosis chronic has been stable previously.  Due for follow-up labs.  No recurrent infections.  Medications, allergies, medical history, surgical history, social history, family history  reviewed and updated      Current Outpatient Medications   Medication Sig Dispense Refill   • albuterol 108 (90 Base) MCG/ACT Aero Soln inhalation aerosol INHALE 2 PUFFS BY MOUTH EVERY 6 HOURS AS NEEDED FOR SHORTNESS OF BREATH 17 Each 3   • atorvastatin (LIPITOR) 20 MG Tab TAKE 1 TABLET BY MOUTH EVERY  Tablet 0   • TRELEGY ELLIPTA 100-62.5-25 MCG/INH AEROSOL POWDER, BREATH ACTIVATED inhalation INHALE 1 INHALATION EVERY DAY BY MOUTH 180 Each 1   • vitamin D (CHOLECALCIFEROL) 1000 Unit (25 mcg) Tab Take 3,000 Units by mouth every day.     • BREO ELLIPTA 200-25 MCG/INH AEROSOL POWDER, BREATH ACTIVATED INHALE 1 PUFF BY MOUTH EVERY  Each 6   • DENTA 5000 PLUS 1.1 % Cream BRUSH ONCE AT BEDTIME DAILY     • tiotropium (SPIRIVA HANDIHALER) 18 MCG Cap Inhale 1 Cap by mouth every day. 60 Cap 6   • cyanocobalamin (VITAMIN B12) 1000 MCG TABS Take 1 Tab by mouth every day. 90 Tab 3     No current facility-administered medications for this visit.     Adenoma  12/05 colonoscopy [facility] tubular  adenoma  6/29/12 colon per [facility], neg, repeat 5 years  7/28/17 colon per [facility] 3 mm polyp descending colon, 10 mm polyp descending colon, 5-8 mm polyp sigmoid colon, 10 mm polyp distal sigmoid colon, 6-8 mm polyp rectum, pathology adenoma and hyperplastic polyps repeat 3 years  11/16/20 colon per [facility] 4 polyps hyperplastic and benign polypoid, repeat in 3 years     atherosclerosis aorta  10/25/16 atherosclerotic plaque aorta on CT scan thorax     BPH  5/11 psa 0.7  4/12 psa 7.5 given course of cipro  5/12/12 psa 2.3 after cipro  12/31/13 psa 1.4  2/7/15 psa 1.1  5/18/16 psa 1.2  10/16/18 psa 1.0  10/10/19 psa 2.1  3/30/21 psa 1.34      cad  8/1/18 CT cardiac scoring LMA 0.0, LCX 1.5, .5, RCA 0.0, PDA 0.0     COPD  5/08 chest x-ray negative  6/08 PFT FEV1.6 L (54% predicted), FEV/FVC ratio 47%, positive bronchodilator response  6/10 quit smoking tobacco  5/11 still off cigs, smoking 2 cigars per day  5/11 restart spiriva  5/11 cxr negative  12/12 off cigs, still smokes cigars 3-4 per day  12/12 on spiriva, add symbicort 80/4.5  2/7/13 PFT severe stage III COPD, FEV1 1.4 L (46% predicted), FEV/FVC 47% improvement after bronchodilator 13%, normal DLCO; continue spiriva, symbicort 80/1.5, smoking cessation  6/24/16  CT thorax lung cancer screening to spiculated 9 mm nodules RUL underlying emphysematous changes and tiny 3 mm nodule RML, recommend 3 month follow-up CT vs CT/PET  2/7/13 cxr negative  12/26/13 still 3 cigars per day; on spiriva and symbicort  1/23/15 still 3 cigars per day, on spiriva and symbicort  1/26/16 change symbicort to advair 250/50 (insurance) and continue spiriva  6/2/16 CT lung cancer screening visit  7/8/16 [facility] note right upper lobe lung nodules, suspicious in nature, patient agrees to CT/PET  7/8/16 CT/PET spiculated right upper lobe nodule SUV 1.1, not FDG avid, scarring left upper lobe noted, nonspecific findings, low-grade neoplasm cannot be excluded; per [facility] repeat CT 3 months  10/24/16  CT thorax without; 2 spiculated right upper lobe pulmonary nodules 8 mm and 9 mm in size unchanged, 3 mm right middle lobe unchanged nodule  10/27/16 lung cancer screening program note, recommend referral to pulmonary nodule clinic  4/6/17 CT thorax without; 2 stable 8-9 mm spiculated right upper lobe nodules, stable probably postinflammatory tiny 3 mm RML and RLL nodules, no new suspicious nodules  12/1/17 declines PFT   4/15/18 CT lung lung cancer screening stable 9 mm right posterior/apical ill-defined nodule, stable right upper/lateral 9 mm solid pulmonary nodule, postoperative changes left upper lobe, emphysema, aortic and coronary atherosclerotic plaque, previous splenectomy   8/1/18 CT/PET no abnormal uptake within either right upper lobe or right apical nodule, nodules unchanged dating back to previous lung cancer screening CT 6/24/16, downgraded to category 3 RADS, no change 3 mm right middle lobe nodule, no increased uptake neck, abdomen, pelvis  4/19/19 CT lung cancer screening spiculated nodular density right lung apex 9 x 9 mm with cavitary change and spiculated nodule right lung apex posteriorly 7 x 8 mm overall unchanged from previous exam, postoperative change left upper lobe, recommend repeat 6 months  8/1/19 on advair and spiriva declines PFT  10/8/19 CT thorax without; unchanged 9 mm and 8 mm noncalcified pulmonary nodules right lung apex, postoperative scarring left upper lobe again noted  2/13/20 change advair to breo once daily (had difficulty remembering to take Advair twice a day) continue spiriva  5/7/20 declines PFT   10/16/20 CT thorax without, pulmonary nodules 7.9 mm right apex, 9.1 mm RUL, 2.8 mm RUL unchanged compared to previous, new 5.0 mm nodule RUL, ill-defined opacities lingula unchanged consistent with atelectasis/fibrosis, no effusions, hyperexpanded and emphysematous lungs borderline enlarged right hilar lymph node unchanged 9.1 mm, atherosclerotic changes  10/17/20 change breo  plus spiriva to trelegy inhaler due to cost  3/29/21 CT thorax; new left lower lobe 4 mm noncalcified nodule, otherwise stable 10 mm and 8 mm spiculated right upper lobe and small nodules, emphysematous changes, left upper lobe resection, recommend repeat CT 3 to 6 months  8/2/21 CT thorax without, spiculated nodule right upper lobe apex 8 mm unchanged, nodule right upper lobe central cavitation 10 mm in size unchanged, 4 mm nodule right lower lobe not significantly changed, stable 4 mm left upper lobe nodule, emphysematous and bullous change multiple areas of pulmonary scarring, surgical changes left upper lobe, repeat CT in 6 months     Dyslipidemia  5/08 chol  239, trig 91,hdl 71,ldl 150  8/09 chol 268,trig 107,hdl 56,ldl 191  8/09 start zocor 20  8/09 stress echo negative  5/11 chol 231,trig 114,hdl 53,ldl 155 off zocor  5/12 chol 215,trig 97,hdl 48,ldl 148 off zocor  12/31/13 chol 215,trig 101,hdl 55,ldl 140,crp 3.0 off statin; 10 year risk 12%, I recommend statin therapy he declines  1/13/14 echo technically difficult study, possible mild LVH  1/13/14 treadmill thallium exercise 6 minutes 30 seconds lashell protocol, limited by fatigue; no ischemia, EF 59%  2/7/15 chol 250,trig 86,hdl 51,ldl 182; urine mac <0.5; 10 year risk calculation 19%, start zocor 20 mg  5/16/16 did not take zocor  5/18/16 chol 239,trig 152,hdl 44,ldl 165  8/14/17 chol 205,trig 142,hdl 36,ldl 141 off zocor, 10 year cardiac risk, declines medication  8/1/18 CT cardiac scoring LMA 0.0, LCX 1.5, .5, RCA 0.0, PDA 0.0  10/16/8 chol 261,trig 155,hdl 42,ldl 188 declines statin therapy  10/18/18 start lipitor 20 mg  10/10/19 chol 159,trig 94,hdl 44,ldl 96 on lipitor 20 mg  3/30/21 chol 186,trig 138,hdl 42,ldl 116 on lipitor 20 mg     History hypertension  1/13/14 echo technically difficult study, possible mild LVH  1/13/14 treadmill thallium exercise 6 minutes 30 seconds lashell protocol, limited by fatigue; no ischemia, EF 59%  4/27/15 start  lisinopril 10 mg   5/16/16 off lisinopril   10/10/19 urine mac 6     history brao  Records from ophthalmology july 2009 from [facility] indicate right branch retinal artery occlusion  8/09 MRI/MRA head and neck negative  8/09 protein C, protein S, anticardiolipin antibody, beta 2 glycoprotein antibody, factor II, factor V leyden, homocysteine antithrombin III all negative  8/09 stress echo negative  8/09 holter monitor negative, need to modify risk factors we'll start on statin, he needs to quit smoking     gout  11/29/17 right first toe pain given indocin uric 5.6  10/16/18 uric 6.1     Leukocytosis  8/09 wbc 8.9  5/11 wbc 11.4  4/12 wbc 12.4 (54%N,34%L)  12/31/13 wbc 12.9 (53%N,22%L),hgb 17,hct 52,mcv 102,platelet 364, CRP 3.0; ? Related to tobacco  2/7/15 wbc 12.4 (49%N,34%L),hgb 17,hct 53,plt [id number]; b12 201,folate 5.3  2/13/15 leukocyte alkaline phosphatase 108 normal  5/18/16 wbc 13.5 (51%N,30%L),hgb 17.8,hct 54,plt 427,b12 249,folate 7  5/18/16 b12 249,folate 7 not on b12  8/14/17 wbc 12.1 (50%N,32%L),hgb 17,hct 51,mcv 100.8,b12 273,jak2 negative   11/29/17 wbc 19.5 (51%N, 32%L)  10/16/18 wbc 14 (48%N,36%L), hgb 18,hct 55, plt 436, b12 324  10/8/19 CT thorax without lung; unchanged 19 mm and 8 mm noncalcified pulmonary nodules right lung apex, postoperative scarring left upper lobe again noted  10/10/19 wbc 14 (53%N,31%L),hgb 18,hct 55,mcv 103,iron 120,ferritin 116,%sat 39,b12 300,folate 20  7/16/20 wbc 14.4 (57%N,28%L),hgb 18.6,hct 56.8,plt 368, flow cytometry normal myeloid cells  3/30/21 wbc 12.9,hgb 19,hct 60,mcv 103   ??related to splenectomy     Low B-12  12/7/15 b12 201, folate 5.3, wbc 12.4(49%N,34%L),hgb 17,hct 53,plt [id number]; start b12 1000 mcg daily  5/18/16 b12 249,folate 7,MMA<0.1,Intrinsic factor Ab negative, not on b12  8/14/17 b12 273  10/16/18 b12 324, hgb 18,hct 55,mcv 101  10/10/19 b2 300, hgb 18,hct 55,mcv 103  3/30/21 b12 398, hgb 19,hct 60,mcv 103     polycythemia  12/31/13  wbc 12.9 (53%N,22%L),hgb 17,hct 52,mcv 102,platelet 364, CRP 3.0; ? Related to tobacco  2/7/15 wbc 12.4 (49%N,34%L),hgb 17,hct 53,plt [id number]; b12 201,folate 5.3  2/13/15 leukocyte alkaline phosphatase 108 normal  5/18/16 wbc 13.5 (51%N,30%L),hgb 17.8,hct 54,plt 427,b12 249,folate 7  5/18/16 b12 249,folate 7 not on b12  8/14/17 wbc 12.1 (50%N,32%L),hgb 17,hct 51,mcv 100.8,b12 273,jak2 negative   11/29/17 wbc 19.5 (51%N, 32%L)  8/1/18 CT/PET stable pulmonary nodules, no uptake in the right upper lobe or right apical nodule, no abnormal FDG uptake neck, abdomen, pelvis  10/16/18 wbc 14 (48%N,36%L), hgb 18,hct 55, plt 436, b12 324, flow cytometry phenotypically normal slightly left shifted myeloid cells without evidence of monoclonality, leukemia, or lymphoproliferative disorder  10/8/19 CT thorax without lung; unchanged 19 mm and 8 mm noncalcified pulmonary nodules right lung apex, postoperative scarring left upper lobe again noted  10/10/19 wbc 14 (53%N,31%L),hgb 18,hct 55,mcv 103,iron 120,ferritin 116,%sat 39,b12 300,folate 20  7/16/20 wbc 14.4 (57%N,28%L),hgb 18.6,hct 56.8,plt 368, flow cytometry normal myeloid cells  3/30/21 wbc 12.9,hgb 19,hct 60,mcv 103      Preventative health  8/7/17 pneumovax  10/4/18 tdap declined  10/12/19 hep c Ab reactive, follow up HCV RNA negative  5/7/20 prevnar   11/16/20 colon per [facility] 4 polyps hyperplastic and benign polypoid, repeat in 3 years  3/5/21 menactra second  3/29/21 psa 1.3  3/29/21 vit d 22  6/27/21 shingrix first   7/19/21 hep b second  7/19/21 trumenba second  5/13/22 covid moderna 4th     Pulmonary nodule  6/24/16  CT thorax lung cancer screening to spiculated 9 mm nodules RUL underlying emphysematous changes and tiny 3 mm nodule RML, recommend 3 month follow-up CT vs CT/PET  7/8/16 [facility] note right upper lobe lung nodules, suspicious in nature, patient agrees to CT/PET  7/8/16 CT/PET spiculated right upper lobe nodule SUV 1.1, not FDG avid, scarring left upper lobe  noted, nonspecific findings, low-grade neoplasm cannot be excluded; per [facility] repeat CT 3 months  10/24/16 CT thorax without; 2 spiculated right upper lobe pulmonary nodules 8 mm and 9 mm in size unchanged, 3 mm right middle lobe unchanged nodule  10/27/16 lung cancer screening program note, recommend referral to pulmonary nodule clinic  11/3/16 lung cancer screening note recent follow-up CT scan unchanged pulmonary nodules, recommend repeat CT scan 6 months with myself  4/6/17 CT thorax without; 2 stable 8-9 mm spiculated right upper lobe nodules, stable probably postinflammatory tiny 3 mm RML and RLL nodules, no new suspicious nodules  4/15/18 CT lung lung cancer screening stable 9 mm right posterior/apical ill-defined nodule, stable right upper/lateral 9 mm solid pulmonary nodule, postoperative changes left upper lobe, emphysema, aortic and coronary atherosclerotic plaque, previous splenectomy   8/1/18 CT/PET no abnormal uptake within either right upper lobe or right apical nodule, nodules unchanged dating back to previous lung cancer screening CT 6/24/16, downgraded to category 3 RADS, no change 3 mm right middle lobe nodule, no increased uptake neck, abdomen, pelvis  4/19/19 CT lung cancer screening spiculated nodular density right lung apex 9 x 9 mm with cavitary change and spiculated nodule right lung apex posteriorly 7 x 8 mm overall unchanged from previous exam, postoperative change left upper lobe, recommend repeat 6 months  10/8/19 CT thorax without lung; unchanged 19 mm and 8 mm noncalcified pulmonary nodules right lung apex, postoperative scarring left upper lobe again noted  2/12/20 cxr negative   10/16/20 CT thorax without, pulmonary nodules 7.9 mm right apex, 9.1 mm RUL, 2.8 mm RUL unchanged compared to previous, new 5.0 mm nodule RUL, ill-defined opacities lingula unchanged consistent with atelectasis/fibrosis, no effusions, hyperexpanded and emphysematous lungs borderline enlarged right hilar lymph  node unchanged 9.1 mm, atherosclerotic changes  3/29/21 CT thorax; new left lower lobe 4 mm noncalcified nodule, otherwise stable 10 mm and 8 mm spiculated right upper lobe and small nodules, emphysematous changes, left upper lobe resection, recommend repeat CT 3 to 6 months     Status post splenectomy  During childhood  8/7/17 pneumovax  5/7/20 prevnar   3/5/21 menactra second  7/19/21 trumenba second     Tobacco  12/12 off cigs had been smoking 1 to 1.5 packs per day for 40+ years, still smokes cigars 3-4 per day  2/4/13 cxr negative  4/13 off cigar using electronic cig  12/26/13 still smokes 3 cigar per day  1/13/14 echo technically difficult study, possible mild LVH  1/13/14 treadmill thallium exercise 6 minutes 30 seconds lashell protocol, limited by fatigue; no ischemia, EF 59%  5/16/16 still smoking 3 cigars per day  6/24/16  CT thorax lung cancer screening to spiculated 9 mm nodules RUL underlying emphysematous changes and tiny 3 mm nodule RML, recommend 3 month follow-up CT vs CT/PET  7/8/16 [facility] note right upper lobe lung nodules, suspicious in nature, patient agrees to CT/PET  7/8/16 [facility] note right upper lobe lung nodules, suspicious in nature, patient agrees to CT/PET  7/8/16 CT/PET spiculated right upper lobe nodule SUV 1.1, not FDG avid, scarring left upper lobe noted, nonspecific findings, low-grade neoplasm cannot be excluded; per [facility] repeat CT 3 months  10/24/16 CT thorax without; 2 spiculated right upper lobe pulmonary nodules 8 mm and 9 mm in size unchanged, 3 mm right middle lobe unchanged nodule  10/27/16 lung cancer screening program note, recommend referral to pulmonary nodule clinic  4/6/17 CT thorax without; 2 stable 8-9 mm spiculated right upper lobe nodules, stable probably postinflammatory tiny 3 mm RML and RLL nodules, no new suspicious nodules  8/7/17 no cigarettes, 4 cigars per day  12/1/17 4 cigars per day  4/15/18 CT lung lung cancer screening stable 9 mm right posterior/apical  ill-defined nodule, stable right upper/lateral 9 mm solid pulmonary nodule, postoperative changes left upper lobe, emphysema, aortic and coronary atherosclerotic plaque, previous splenectomy   8/1/18 CT/PET no abnormal uptake within either right upper lobe or right apical nodule, nodules unchanged dating back to previous lung cancer screening CT 6/24/16, downgraded to category 3 RADS, no change 3 mm right middle lobe nodule, no increased uptake neck, abdomen, pelvis  10/4/18 declines PFT, smoking 3 cigars per day  4/19/19 CT lung cancer screening spiculated nodular density right lung apex 9 x 9 mm with cavitary change and spiculated nodule right lung apex posteriorly 7 x 8 mm overall unchanged from previous exam, postoperative change left upper lobe, recommend repeat 6 months  8/1/19 3 cigars/day declines stop smoking classes  10/8/19 CT thorax without lung; unchanged 19 mm and 8 mm noncalcified pulmonary nodules right lung apex, postoperative scarring left upper lobe again noted  2/12/20 cxr negative   2/13/20 2 cigars per day  5/7/20 2 cigars per day  10/16/20 CT thorax without, pulmonary nodules 7.9 mm right apex, 9.1 mm RUL, 2.8 mm RUL unchanged compared to previous, new 5.0 mm nodule RUL, ill-defined opacities lingula unchanged consistent with atelectasis/fibrosis, no effusions, hyperexpanded and emphysematous lungs borderline enlarged right hilar lymph node unchanged 9.1 mm, atherosclerotic changes  3/29/21 CT thorax; new left lower lobe 4 mm noncalcified nodule, otherwise stable 10 mm and 8 mm spiculated right upper lobe and small nodules, emphysematous changes, left upper lobe resection, recommend repeat CT 3 to 6 months                      Patient Active Problem List   Diagnosis   • COPD (chronic obstructive pulmonary disease) (HCC)   • Dyslipidemia   • History of pneumothorax   • S/P splenectomy   • Preventative health care   • Tobacco abuse   • History of BRAO (branch retinal artery occlusion)   • BPH  (benign prostatic hypertrophy)   • Adenomatous colon polyp   • Leukocytosis   • Low vitamin B12 level   • History of hypertension   • Pulmonary nodule   • Atherosclerosis of aorta (HCC)   • CAD (coronary artery disease)   • Polycythemia        Patient Care Team:  Naveen Duncan M.D. as PCP - General  Naveen Duncan M.D. as PCP - [facility] Paneled  CAROL Rodríguez as Consulting Physician (Medical Oncology)  Valencia Lima as [facility]     ROS           Objective          Physical Exam  Vitals and nursing note reviewed.   Constitutional:       Appearance: Normal appearance.   HENT:      Head: Normocephalic and atraumatic.      Right Ear: External ear normal.      Left Ear: External ear normal.   Eyes:      Conjunctiva/sclera: Conjunctivae normal.   Cardiovascular:      Rate and Rhythm: Normal rate and regular rhythm.   Pulmonary:      Effort: Pulmonary effort is normal.      Breath sounds: Normal breath sounds.   Abdominal:      General: There is no distension.   Skin:     General: Skin is warm.   Neurological:      Mental Status: He is alert.   Psychiatric:         Mood and Affect: Mood normal.         Behavior: Behavior normal.                             Assessment & Plan        Assessment  #!  COPD with emphysematous changes on CT scan, has declined pulmonary function test recently, last PFTs 2013 showed FEV1 1.4 L, 46% predicted, smokes 2 cigars a day    #2  Cigar use 2/day    #3 dyslipidemia 3/30/21 chol 186,trig 138,hdl 42,ldl 116 on lipitor 20 mg    #4 leukocytosis, WBC 12.9, chronic elevation going back to 2011    #5 pulmonary nodule 3/29/21 CT thorax; new left lower lobe 4 mm noncalcified nodule, otherwise stable 10 mm and 8 mm spiculated right upper lobe and small nodules, emphysematous changes, left upper lobe resection, recommend repeat CT 3 to 6 months    #6  Polycythemia 3/30/21 wbc 12.9,hgb 19,hct 60,mcv 103      #7  B12 deficiency, vitamin D deficiency    #8 status post  splenectomy        Plan  #1 labs including work-up for leukocytosis, polycythemia    #2 due trumenba third     #3 tdap     #4 need pneumovax later this fall     #5 CT lung cancer screening     #6  Declines pulmonary function testing    #7 continue inhalers    #8 continue Lipitor    #9 due second sihingrix vaccine at the pharmacy    #10 work on tobacco cessation as that increases risk for COPD worsening and lung cancer    #11 work on regular exercise and activity    #12 try to limit sweets and processed foods in diet    #13 up-to-date on COVID fourth vaccine in series    #14 follow-up 6 months

## 2022-05-17 ENCOUNTER — TELEPHONE (OUTPATIENT)
Dept: MEDICAL GROUP | Facility: MEDICAL CENTER | Age: 72
End: 2022-05-17
Payer: MEDICARE

## 2022-05-17 DIAGNOSIS — R91.1 PULMONARY NODULE: ICD-10-CM

## 2022-05-17 DIAGNOSIS — Z72.0 TOBACCO ABUSE: Chronic | ICD-10-CM

## 2022-05-18 NOTE — TELEPHONE ENCOUNTER
Notified with CT result, recommend repeat 6 months.  Order placed in the computer system.  Patient understands and agrees.

## 2022-06-06 ENCOUNTER — HOSPITAL ENCOUNTER (OUTPATIENT)
Dept: LAB | Facility: MEDICAL CENTER | Age: 72
End: 2022-06-06
Attending: INTERNAL MEDICINE
Payer: MEDICARE

## 2022-06-06 DIAGNOSIS — E53.8 B12 DEFICIENCY: ICD-10-CM

## 2022-06-06 DIAGNOSIS — E55.9 VITAMIN D DEFICIENCY: ICD-10-CM

## 2022-06-06 DIAGNOSIS — E78.5 DYSLIPIDEMIA: ICD-10-CM

## 2022-06-06 DIAGNOSIS — D75.1 POLYCYTHEMIA: ICD-10-CM

## 2022-06-06 DIAGNOSIS — D72.829 LEUKOCYTOSIS, UNSPECIFIED TYPE: ICD-10-CM

## 2022-06-06 DIAGNOSIS — Z12.5 PROSTATE CANCER SCREENING: ICD-10-CM

## 2022-06-06 LAB
25(OH)D3 SERPL-MCNC: 36 NG/ML (ref 30–100)
ALBUMIN SERPL BCP-MCNC: 4.1 G/DL (ref 3.2–4.9)
ALBUMIN/GLOB SERPL: 1.4 G/DL
ALP SERPL-CCNC: 95 U/L (ref 30–99)
ALT SERPL-CCNC: 17 U/L (ref 2–50)
ANION GAP SERPL CALC-SCNC: 12 MMOL/L (ref 7–16)
AST SERPL-CCNC: 20 U/L (ref 12–45)
BASOPHILS # BLD AUTO: 0.5 % (ref 0–1.8)
BASOPHILS # BLD: 0.06 K/UL (ref 0–0.12)
BILIRUB SERPL-MCNC: 0.6 MG/DL (ref 0.1–1.5)
BUN SERPL-MCNC: 14 MG/DL (ref 8–22)
CALCIUM SERPL-MCNC: 8.9 MG/DL (ref 8.5–10.5)
CHLORIDE SERPL-SCNC: 105 MMOL/L (ref 96–112)
CHOLEST SERPL-MCNC: 165 MG/DL (ref 100–199)
CO2 SERPL-SCNC: 22 MMOL/L (ref 20–33)
CREAT SERPL-MCNC: 0.98 MG/DL (ref 0.5–1.4)
EOSINOPHIL # BLD AUTO: 0.59 K/UL (ref 0–0.51)
EOSINOPHIL NFR BLD: 4.8 % (ref 0–6.9)
ERYTHROCYTE [DISTWIDTH] IN BLOOD BY AUTOMATED COUNT: 55.1 FL (ref 35.9–50)
FASTING STATUS PATIENT QL REPORTED: NORMAL
GFR SERPLBLD CREATININE-BSD FMLA CKD-EPI: 82 ML/MIN/1.73 M 2
GLOBULIN SER CALC-MCNC: 2.9 G/DL (ref 1.9–3.5)
GLUCOSE SERPL-MCNC: 80 MG/DL (ref 65–99)
HCT VFR BLD AUTO: 54.8 % (ref 42–52)
HDLC SERPL-MCNC: 45 MG/DL
HGB BLD-MCNC: 18.3 G/DL (ref 14–18)
IMM GRANULOCYTES # BLD AUTO: 0.08 K/UL (ref 0–0.11)
IMM GRANULOCYTES NFR BLD AUTO: 0.6 % (ref 0–0.9)
LDLC SERPL CALC-MCNC: 91 MG/DL
LYMPHOCYTES # BLD AUTO: 3.4 K/UL (ref 1–4.8)
LYMPHOCYTES NFR BLD: 27.4 % (ref 22–41)
MCH RBC QN AUTO: 34 PG (ref 27–33)
MCHC RBC AUTO-ENTMCNC: 33.4 G/DL (ref 33.7–35.3)
MCV RBC AUTO: 101.7 FL (ref 81.4–97.8)
MONOCYTES # BLD AUTO: 1.13 K/UL (ref 0–0.85)
MONOCYTES NFR BLD AUTO: 9.1 % (ref 0–13.4)
NEUTROPHILS # BLD AUTO: 7.13 K/UL (ref 1.82–7.42)
NEUTROPHILS NFR BLD: 57.6 % (ref 44–72)
NRBC # BLD AUTO: 0 K/UL
NRBC BLD-RTO: 0 /100 WBC
PLATELET # BLD AUTO: 419 K/UL (ref 164–446)
PMV BLD AUTO: 9.8 FL (ref 9–12.9)
POTASSIUM SERPL-SCNC: 4.6 MMOL/L (ref 3.6–5.5)
PROT SERPL-MCNC: 7 G/DL (ref 6–8.2)
PSA SERPL-MCNC: 1.49 NG/ML (ref 0–4)
RBC # BLD AUTO: 5.39 M/UL (ref 4.7–6.1)
SODIUM SERPL-SCNC: 139 MMOL/L (ref 135–145)
TRIGL SERPL-MCNC: 143 MG/DL (ref 0–149)
TSH SERPL DL<=0.005 MIU/L-ACNC: 2.99 UIU/ML (ref 0.38–5.33)
VIT B12 SERPL-MCNC: 468 PG/ML (ref 211–911)
WBC # BLD AUTO: 12.4 K/UL (ref 4.8–10.8)

## 2022-06-06 PROCEDURE — 84443 ASSAY THYROID STIM HORMONE: CPT

## 2022-06-06 PROCEDURE — 36415 COLL VENOUS BLD VENIPUNCTURE: CPT

## 2022-06-06 PROCEDURE — 80061 LIPID PANEL: CPT

## 2022-06-06 PROCEDURE — 81219 CALR GENE COM VARIANTS: CPT

## 2022-06-06 PROCEDURE — 84153 ASSAY OF PSA TOTAL: CPT

## 2022-06-06 PROCEDURE — 81270 JAK2 GENE: CPT

## 2022-06-06 PROCEDURE — 88185 FLOWCYTOMETRY/TC ADD-ON: CPT | Mod: 91

## 2022-06-06 PROCEDURE — 82607 VITAMIN B-12: CPT

## 2022-06-06 PROCEDURE — 85025 COMPLETE CBC W/AUTO DIFF WBC: CPT

## 2022-06-06 PROCEDURE — 82306 VITAMIN D 25 HYDROXY: CPT

## 2022-06-06 PROCEDURE — 80053 COMPREHEN METABOLIC PANEL: CPT

## 2022-06-06 PROCEDURE — 81338 MPL GENE COMMON VARIANTS: CPT

## 2022-06-06 PROCEDURE — 82668 ASSAY OF ERYTHROPOIETIN: CPT

## 2022-06-06 PROCEDURE — 88184 FLOWCYTOMETRY/ TC 1 MARKER: CPT

## 2022-06-08 LAB
ACUTE LEUKEMIA MARKERS SPEC-IMP: NORMAL
EPO SERPL-ACNC: 7 MU/ML (ref 4–27)
EVENTS COUNTED SPEC: 26 MARKERS
SOURCE 9121: NORMAL

## 2022-06-09 ENCOUNTER — TELEPHONE (OUTPATIENT)
Dept: MEDICAL GROUP | Facility: MEDICAL CENTER | Age: 72
End: 2022-06-09
Payer: MEDICARE

## 2022-06-10 NOTE — TELEPHONE ENCOUNTER
Notified with labs, chronic leukocytosis, flow cytometry negative, chronic polycythemia improved, April negative, LIUDMILA mutation pending and will notify if abnormal, vitamin D, B12 normal, cholesterol excellent on statin, PSA normal.  Continue medications, continue work on good nutrition and exercise program.

## 2022-06-15 LAB
JAK2 P.V617F BLD/T QL: NOT DETECTED
SPECIMEN SOURCE: NORMAL

## 2022-06-16 LAB — GENE XXX MUT ANL BLD/T: NOT DETECTED

## 2022-06-20 LAB — MPL P.W515 BLD/T QL: NOT DETECTED

## 2022-08-15 ENCOUNTER — APPOINTMENT (OUTPATIENT)
Dept: MEDICAL GROUP | Facility: MEDICAL CENTER | Age: 72
End: 2022-08-15
Payer: MEDICARE

## 2022-11-21 ENCOUNTER — OFFICE VISIT (OUTPATIENT)
Dept: MEDICAL GROUP | Facility: MEDICAL CENTER | Age: 72
End: 2022-11-21
Payer: MEDICARE

## 2022-11-21 VITALS
BODY MASS INDEX: 24.27 KG/M2 | HEIGHT: 66 IN | SYSTOLIC BLOOD PRESSURE: 152 MMHG | WEIGHT: 151 LBS | HEART RATE: 78 BPM | TEMPERATURE: 98.5 F | OXYGEN SATURATION: 99 % | DIASTOLIC BLOOD PRESSURE: 84 MMHG

## 2022-11-21 DIAGNOSIS — I70.0 ATHEROSCLEROSIS OF AORTA (HCC): ICD-10-CM

## 2022-11-21 DIAGNOSIS — Z23 NEEDS FLU SHOT: ICD-10-CM

## 2022-11-21 DIAGNOSIS — Z72.0 TOBACCO ABUSE: Chronic | ICD-10-CM

## 2022-11-21 DIAGNOSIS — R91.1 PULMONARY NODULE: ICD-10-CM

## 2022-11-21 DIAGNOSIS — Z11.59 NEED FOR HEPATITIS B SCREENING TEST: ICD-10-CM

## 2022-11-21 DIAGNOSIS — E78.5 DYSLIPIDEMIA: Chronic | ICD-10-CM

## 2022-11-21 DIAGNOSIS — J44.9 CHRONIC OBSTRUCTIVE PULMONARY DISEASE, UNSPECIFIED COPD TYPE (HCC): Chronic | ICD-10-CM

## 2022-11-21 DIAGNOSIS — Z00.00 PREVENTATIVE HEALTH CARE: Chronic | ICD-10-CM

## 2022-11-21 DIAGNOSIS — Z23 NEED FOR HEPATITIS B VACCINATION: ICD-10-CM

## 2022-11-21 PROCEDURE — 99214 OFFICE O/P EST MOD 30 MIN: CPT | Mod: 25 | Performed by: INTERNAL MEDICINE

## 2022-11-21 ASSESSMENT — FIBROSIS 4 INDEX: FIB4 SCORE: 0.83

## 2022-11-21 ASSESSMENT — PATIENT HEALTH QUESTIONNAIRE - PHQ9: CLINICAL INTERPRETATION OF PHQ2 SCORE: 0

## 2022-11-21 NOTE — PROGRESS NOTES
Subjective     tricia Sun Jr is a 72 y.o. male who presents with blood pressure Follow-Up            HPI    Patient here for follow-up appointment, COPD on Trelegy inhaler taking daily, still smoking 3 cigars/day with no worsening shortness of breath with activity, keeps active during the spring summer and fall with golfing but does not cough as much with the cold weather.  Dyslipidemia on Lipitor 20 mg daily no previous muscle aches or pains with statin therapy.  Trying to eat a healthy diet.  Has not been checking blood pressure regularly, history of hypertension.  Does use salt in his diet.  Pulmonary nodules due for follow-up CT scan.  Medications, allergies, medical history, surgical history, social history, family history  reviewed and updated      Current Outpatient Medications   Medication Sig Dispense Refill    atorvastatin (LIPITOR) 20 MG Tab TAKE 1 TABLET BY MOUTH EVERY  Tablet 2    TRELEGY ELLIPTA 100-62.5-25 MCG/INH AEROSOL POWDER, BREATH ACTIVATED inhalation INHALE 1 INHALATION EVERY DAY BY MOUTH 180 Each 3    albuterol 108 (90 Base) MCG/ACT Aero Soln inhalation aerosol INHALE 2 PUFFS BY MOUTH EVERY 6 HOURS AS NEEDED FOR SHORTNESS OF BREATH 17 Each 3    vitamin D (CHOLECALCIFEROL) 1000 Unit (25 mcg) Tab Take 3,000 Units by mouth every day.      DENTA 5000 PLUS 1.1 % Cream BRUSH ONCE AT BEDTIME DAILY      cyanocobalamin (VITAMIN B12) 1000 MCG TABS Take 1 Tab by mouth every day. 90 Tab 3     No current facility-administered medications for this visit.       Adenoma  12/05 colonoscopy [facility] tubular adenoma  6/29/12 colon per [facility], neg, repeat 5 years  7/28/17 colon per [facility] 3 mm polyp descending colon, 10 mm polyp descending colon, 5-8 mm polyp sigmoid colon, 10 mm polyp distal sigmoid colon, 6-8 mm polyp rectum, pathology adenoma and hyperplastic polyps repeat 3 years  11/16/20 colon per [facility] 4 polyps hyperplastic and benign polypoid, repeat in 3 years     atherosclerosis aorta  10/25/16  atherosclerotic plaque aorta on CT scan thorax     BPH  5/11 psa 0.7  4/12 psa 7.5 given course of cipro  5/12/12 psa 2.3 after cipro  12/31/13 psa 1.4  2/7/15 psa 1.1  5/18/16 psa 1.2  10/16/18 psa 1.0  10/10/19 psa 2.1  3/30/21 psa 1.34  6/8/22 psa 1.5      cad  8/1/18 CT cardiac scoring LMA 0.0, LCX 1.5, .5, RCA 0.0, PDA 0.0     COPD  5/08 chest x-ray negative  6/08 PFT FEV1.6 L (54% predicted), FEV/FVC ratio 47%, positive bronchodilator response  6/10 quit smoking tobacco  5/11 still off cigs, smoking 2 cigars per day  5/11 restart spiriva  5/11 cxr negative  12/12 off cigs, still smokes cigars 3-4 per day  12/12 on spiriva, add symbicort 80/4.5  2/7/13 PFT severe stage III COPD, FEV1 1.4 L (46% predicted), FEV/FVC 47% improvement after bronchodilator 13%, normal DLCO; continue spiriva, symbicort 80/1.5, smoking cessation  6/24/16  CT thorax lung cancer screening to spiculated 9 mm nodules RUL underlying emphysematous changes and tiny 3 mm nodule RML, recommend 3 month follow-up CT vs CT/PET  2/7/13 cxr negative  12/26/13 still 3 cigars per day; on spiriva and symbicort  1/23/15 still 3 cigars per day, on spiriva and symbicort  1/26/16 change symbicort to advair 250/50 (insurance) and continue spiriva  6/2/16 CT lung cancer screening visit  7/8/16 [facility] note right upper lobe lung nodules, suspicious in nature, patient agrees to CT/PET  7/8/16 CT/PET spiculated right upper lobe nodule SUV 1.1, not FDG avid, scarring left upper lobe noted, nonspecific findings, low-grade neoplasm cannot be excluded; per [facility] repeat CT 3 months  10/24/16 CT thorax without; 2 spiculated right upper lobe pulmonary nodules 8 mm and 9 mm in size unchanged, 3 mm right middle lobe unchanged nodule  10/27/16 lung cancer screening program note, recommend referral to pulmonary nodule clinic  4/6/17 CT thorax without; 2 stable 8-9 mm spiculated right upper lobe nodules, stable probably postinflammatory tiny 3 mm RML and RLL nodules, no  new suspicious nodules  12/1/17 declines PFT   4/15/18 CT lung lung cancer screening stable 9 mm right posterior/apical ill-defined nodule, stable right upper/lateral 9 mm solid pulmonary nodule, postoperative changes left upper lobe, emphysema, aortic and coronary atherosclerotic plaque, previous splenectomy   8/1/18 CT/PET no abnormal uptake within either right upper lobe or right apical nodule, nodules unchanged dating back to previous lung cancer screening CT 6/24/16, downgraded to category 3 RADS, no change 3 mm right middle lobe nodule, no increased uptake neck, abdomen, pelvis  4/19/19 CT lung cancer screening spiculated nodular density right lung apex 9 x 9 mm with cavitary change and spiculated nodule right lung apex posteriorly 7 x 8 mm overall unchanged from previous exam, postoperative change left upper lobe, recommend repeat 6 months  8/1/19 on advair and spiriva declines PFT  10/8/19 CT thorax without; unchanged 9 mm and 8 mm noncalcified pulmonary nodules right lung apex, postoperative scarring left upper lobe again noted  2/13/20 change advair to breo once daily (had difficulty remembering to take Advair twice a day) continue spiriva  5/7/20 declines PFT   10/16/20 CT thorax without, pulmonary nodules 7.9 mm right apex, 9.1 mm RUL, 2.8 mm RUL unchanged compared to previous, new 5.0 mm nodule RUL, ill-defined opacities lingula unchanged consistent with atelectasis/fibrosis, no effusions, hyperexpanded and emphysematous lungs borderline enlarged right hilar lymph node unchanged 9.1 mm, atherosclerotic changes  10/17/20 change breo plus spiriva to trelegy inhaler due to cost  3/29/21 CT thorax; new left lower lobe 4 mm noncalcified nodule, otherwise stable 10 mm and 8 mm spiculated right upper lobe and small nodules, emphysematous changes, left upper lobe resection, recommend repeat CT 3 to 6 months  8/2/21 CT thorax without, spiculated nodule right upper lobe apex 8 mm unchanged, nodule right upper  lobe central cavitation 10 mm in size unchanged, 4 mm nodule right lower lobe not significantly changed, stable 4 mm left upper lobe nodule, emphysematous and bullous change multiple areas of pulmonary scarring, surgical changes left upper lobe, repeat CT in 6 months  5/16/22 declines PFT  5/15/22 CT thorax without, stable 9 mm nodule right apex, stable 9 mm nodule RUL, 5 mm nodule RLL increased from 3 mm compared to 2018, 3 mm nodule LLL, no significant adenopathy, coronary calcifications, repeat 6 months     Dyslipidemia  5/08 chol  239, trig 91,hdl 71,ldl 150  8/09 chol 268,trig 107,hdl 56,ldl 191  8/09 start zocor 20  8/09 stress echo negative  5/11 chol 231,trig 114,hdl 53,ldl 155 off zocor  5/12 chol 215,trig 97,hdl 48,ldl 148 off zocor  12/31/13 chol 215,trig 101,hdl 55,ldl 140,crp 3.0 off statin; 10 year risk 12%, I recommend statin therapy he declines  1/13/14 echo technically difficult study, possible mild LVH  1/13/14 treadmill thallium exercise 6 minutes 30 seconds lashell protocol, limited by fatigue; no ischemia, EF 59%  2/7/15 chol 250,trig 86,hdl 51,ldl 182; urine mac <0.5; 10 year risk calculation 19%, start zocor 20 mg  5/16/16 did not take zocor  5/18/16 chol 239,trig 152,hdl 44,ldl 165  8/14/17 chol 205,trig 142,hdl 36,ldl 141 off zocor, 10 year cardiac risk, declines medication  8/1/18 CT cardiac scoring LMA 0.0, LCX 1.5, .5, RCA 0.0, PDA 0.0  10/16/8 chol 261,trig 155,hdl 42,ldl 188 declines statin therapy  10/18/18 start lipitor 20 mg  10/10/19 chol 159,trig 94,hdl 44,ldl 96 on lipitor 20 mg  3/30/21 chol 186,trig 138,hdl 42,ldl 116 on lipitor 20 mg  6/8/22 chol 165,trig 143,hdl 45,ldl 91 on lipitor 20 mg     History hypertension  1/13/14 echo technically difficult study, possible mild LVH  1/13/14 treadmill thallium exercise 6 minutes 30 seconds lashell protocol, limited by fatigue; no ischemia, EF 59%  4/27/15 start lisinopril 10 mg   5/16/16 off lisinopril   10/10/19 urine mac 6      history brao  Records from ophthalmology july 2009 from [facility] indicate right branch retinal artery occlusion  8/09 MRI/MRA head and neck negative  8/09 protein C, protein S, anticardiolipin antibody, beta 2 glycoprotein antibody, factor II, factor V leyden, homocysteine antithrombin III all negative  8/09 stress echo negative  8/09 holter monitor negative, need to modify risk factors we'll start on statin, he needs to quit smoking     gout  11/29/17 right first toe pain given indocin uric 5.6  10/16/18 uric 6.1     Leukocytosis  8/09 wbc 8.9  5/11 wbc 11.4  4/12 wbc 12.4 (54%N,34%L)  12/31/13 wbc 12.9 (53%N,22%L),hgb 17,hct 52,mcv 102,platelet 364, CRP 3.0; ? Related to tobacco  2/7/15 wbc 12.4 (49%N,34%L),hgb 17,hct 53,plt [id number]; b12 201,folate 5.3  2/13/15 leukocyte alkaline phosphatase 108 normal  5/18/16 wbc 13.5 (51%N,30%L),hgb 17.8,hct 54,plt 427,b12 249,folate 7  5/18/16 b12 249,folate 7 not on b12  8/14/17 wbc 12.1 (50%N,32%L),hgb 17,hct 51,mcv 100.8,b12 273,jak2 negative   11/29/17 wbc 19.5 (51%N, 32%L)  10/16/18 wbc 14 (48%N,36%L), hgb 18,hct 55, plt 436, b12 324  10/8/19 CT thorax without lung; unchanged 19 mm and 8 mm noncalcified pulmonary nodules right lung apex, postoperative scarring left upper lobe again noted  10/10/19 wbc 14 (53%N,31%L),hgb 18,hct 55,mcv 103,iron 120,ferritin 116,%sat 39,b12 300,folate 20  7/16/20 wbc 14.4 (57%N,28%L),hgb 18.6,hct 56.8,plt 368, flow cytometry normal myeloid cells  3/30/21 wbc 12.9,hgb 19,hct 60,mcv 103   6/8/22 wbc 12.4,b12 468,flow cytometry negative  ??related to splenectomy     polycythemia  12/31/13 wbc 12.9 (53%N,22%L),hgb 17,hct 52,mcv 102,platelet 364, CRP 3.0; ? Related to tobacco  2/7/15 wbc 12.4 (49%N,34%L),hgb 17,hct 53,plt [id number]; b12 201,folate 5.3  2/13/15 leukocyte alkaline phosphatase 108 normal  5/18/16 wbc 13.5 (51%N,30%L),hgb 17.8,hct 54,plt 427,b12 249,folate 7  5/18/16 b12 249,folate 7 not on b12  8/14/17 wbc 12.1  (50%N,32%L),hgb 17,hct 51,mcv 100.8,b12 273,jak2 negative   11/29/17 wbc 19.5 (51%N, 32%L)  8/1/18 CT/PET stable pulmonary nodules, no uptake in the right upper lobe or right apical nodule, no abnormal FDG uptake neck, abdomen, pelvis  10/16/18 wbc 14 (48%N,36%L), hgb 18,hct 55, plt 436, b12 324, flow cytometry phenotypically normal slightly left shifted myeloid cells without evidence of monoclonality, leukemia, or lymphoproliferative disorder  10/8/19 CT thorax without lung; unchanged 19 mm and 8 mm noncalcified pulmonary nodules right lung apex, postoperative scarring left upper lobe again noted  10/10/19 wbc 14 (53%N,31%L),hgb 18,hct 55,mcv 103,iron 120,ferritin 116,%sat 39,b12 300,folate 20  7/16/20 wbc 14.4 (57%N,28%L),hgb 18.6,hct 56.8,plt 368, flow cytometry normal myeloid cells  3/30/21 wbc 12.9,hgb 19,hct 60,mcv 103   6/8/22 hgb 18,hct 54.8,EPO 7,LIUDMILA,MPL,CALR mutation negative,flow cytometry negative     Preventative health  8/7/17 pneumovax  10/12/19 hep c Ab reactive, follow up HCV RNA negative  5/7/20 prevnar   11/16/20 colon per [facility] 4 polyps hyperplastic and benign polypoid, repeat in 3 years  3/5/21 menactra second  6/27/21 shingrix first   7/19/21 hep b second  5/13/22 covid moderna 4th  5/16/22 trumenba third   5/16/22 tdap  6/8/22 psa 1.5  6/8/22 vit d 36     Pulmonary nodule  6/24/16  CT thorax lung cancer screening to spiculated 9 mm nodules RUL underlying emphysematous changes and tiny 3 mm nodule RML, recommend 3 month follow-up CT vs CT/PET  7/8/16 [facility] note right upper lobe lung nodules, suspicious in nature, patient agrees to CT/PET  7/8/16 CT/PET spiculated right upper lobe nodule SUV 1.1, not FDG avid, scarring left upper lobe noted, nonspecific findings, low-grade neoplasm cannot be excluded; per [facility] repeat CT 3 months  10/24/16 CT thorax without; 2 spiculated right upper lobe pulmonary nodules 8 mm and 9 mm in size unchanged, 3 mm right middle lobe unchanged nodule  10/27/16 lung cancer  screening program note, recommend referral to pulmonary nodule clinic  11/3/16 lung cancer screening note recent follow-up CT scan unchanged pulmonary nodules, recommend repeat CT scan 6 months with myself  4/6/17 CT thorax without; 2 stable 8-9 mm spiculated right upper lobe nodules, stable probably postinflammatory tiny 3 mm RML and RLL nodules, no new suspicious nodules  4/15/18 CT lung lung cancer screening stable 9 mm right posterior/apical ill-defined nodule, stable right upper/lateral 9 mm solid pulmonary nodule, postoperative changes left upper lobe, emphysema, aortic and coronary atherosclerotic plaque, previous splenectomy   8/1/18 CT/PET no abnormal uptake within either right upper lobe or right apical nodule, nodules unchanged dating back to previous lung cancer screening CT 6/24/16, downgraded to category 3 RADS, no change 3 mm right middle lobe nodule, no increased uptake neck, abdomen, pelvis  4/19/19 CT lung cancer screening spiculated nodular density right lung apex 9 x 9 mm with cavitary change and spiculated nodule right lung apex posteriorly 7 x 8 mm overall unchanged from previous exam, postoperative change left upper lobe, recommend repeat 6 months  10/8/19 CT thorax without lung; unchanged 19 mm and 8 mm noncalcified pulmonary nodules right lung apex, postoperative scarring left upper lobe again noted  2/12/20 cxr negative   10/16/20 CT thorax without, pulmonary nodules 7.9 mm right apex, 9.1 mm RUL, 2.8 mm RUL unchanged compared to previous, new 5.0 mm nodule RUL, ill-defined opacities lingula unchanged consistent with atelectasis/fibrosis, no effusions, hyperexpanded and emphysematous lungs borderline enlarged right hilar lymph node unchanged 9.1 mm, atherosclerotic changes  3/29/21 CT thorax; new left lower lobe 4 mm noncalcified nodule, otherwise stable 10 mm and 8 mm spiculated right upper lobe and small nodules, emphysematous changes, left upper lobe resection, recommend repeat CT 3 to 6  months  5/15/22 CT thorax without, stable 9 mm nodule right apex, stable 9 mm nodule RUL, 5 mm nodule RLL increased from 3 mm compared to 2018, 3 mm nodule LLL, no significant adenopathy, coronary calcifications, repeat 6 months     Status post splenectomy  During childhood  8/7/17 pneumovax  5/7/20 prevnar   3/5/21 menactra second  5/16/22 trumenba third      Tobacco  12/12 off cigs had been smoking 1 to 1.5 packs per day for 40+ years, still smokes cigars 3-4 per day  2/4/13 cxr negative  4/13 off cigar using electronic cig  12/26/13 still smokes 3 cigar per day  1/13/14 echo technically difficult study, possible mild LVH  1/13/14 treadmill thallium exercise 6 minutes 30 seconds lashell protocol, limited by fatigue; no ischemia, EF 59%  5/16/16 still smoking 3 cigars per day  6/24/16  CT thorax lung cancer screening to spiculated 9 mm nodules RUL underlying emphysematous changes and tiny 3 mm nodule RML, recommend 3 month follow-up CT vs CT/PET  7/8/16 [facility] note right upper lobe lung nodules, suspicious in nature, patient agrees to CT/PET  7/8/16 [facility] note right upper lobe lung nodules, suspicious in nature, patient agrees to CT/PET  7/8/16 CT/PET spiculated right upper lobe nodule SUV 1.1, not FDG avid, scarring left upper lobe noted, nonspecific findings, low-grade neoplasm cannot be excluded; per [facility] repeat CT 3 months  10/24/16 CT thorax without; 2 spiculated right upper lobe pulmonary nodules 8 mm and 9 mm in size unchanged, 3 mm right middle lobe unchanged nodule  10/27/16 lung cancer screening program note, recommend referral to pulmonary nodule clinic  4/6/17 CT thorax without; 2 stable 8-9 mm spiculated right upper lobe nodules, stable probably postinflammatory tiny 3 mm RML and RLL nodules, no new suspicious nodules  8/7/17 no cigarettes, 4 cigars per day  12/1/17 4 cigars per day  4/15/18 CT lung lung cancer screening stable 9 mm right posterior/apical ill-defined nodule, stable right upper/lateral 9 mm  solid pulmonary nodule, postoperative changes left upper lobe, emphysema, aortic and coronary atherosclerotic plaque, previous splenectomy   8/1/18 CT/PET no abnormal uptake within either right upper lobe or right apical nodule, nodules unchanged dating back to previous lung cancer screening CT 6/24/16, downgraded to category 3 RADS, no change 3 mm right middle lobe nodule, no increased uptake neck, abdomen, pelvis  10/4/18 declines PFT, smoking 3 cigars per day  4/19/19 CT lung cancer screening spiculated nodular density right lung apex 9 x 9 mm with cavitary change and spiculated nodule right lung apex posteriorly 7 x 8 mm overall unchanged from previous exam, postoperative change left upper lobe, recommend repeat 6 months  8/1/19 3 cigars/day declines stop smoking classes  10/8/19 CT thorax without lung; unchanged 19 mm and 8 mm noncalcified pulmonary nodules right lung apex, postoperative scarring left upper lobe again noted  2/12/20 cxr negative   2/13/20 2 cigars per day  5/7/20 2 cigars per day  10/16/20 CT thorax without, pulmonary nodules 7.9 mm right apex, 9.1 mm RUL, 2.8 mm RUL unchanged compared to previous, new 5.0 mm nodule RUL, ill-defined opacities lingula unchanged consistent with atelectasis/fibrosis, no effusions, hyperexpanded and emphysematous lungs borderline enlarged right hilar lymph node unchanged 9.1 mm, atherosclerotic changes  3/29/21 CT thorax; new left lower lobe 4 mm noncalcified nodule, otherwise stable 10 mm and 8 mm spiculated right upper lobe and small nodules, emphysematous changes, left upper lobe resection, recommend repeat CT 3 to 6 months  5/16/22 declines PFT smoking 2 cigars per day  5/15/22 CT thorax without, stable 9 mm nodule right apex, stable 9 mm nodule RUL, 5 mm nodule RLL increased from 3 mm compared to 2018, 3 mm nodule LLL, no significant adenopathy, coronary calcifications, repeat 6 months       Patient Active Problem List   Diagnosis    COPD (chronic obstructive  pulmonary disease) (HCC)    Dyslipidemia    History of pneumothorax    S/P splenectomy    Preventative health care    Tobacco abuse    History of BRAO (branch retinal artery occlusion)    BPH (benign prostatic hypertrophy)    Adenomatous colon polyp    Leukocytosis    Low vitamin B12 level    History of hypertension    Pulmonary nodule    Atherosclerosis of aorta (HCC)    CAD (coronary artery disease)    Polycythemia     Depression Screening  Little interest or pleasure in doing things?  0 - not at all  Feeling down, depressed , or hopeless? 0 - not at all  Patient Health Questionnaire Score: 0                             Patient Care Team:  Naveen Duncan M.D. as PCP - General  Naveen Duncan M.D. as PCP - [facility] Paneled  CAROL Rodríguez as Consulting Physician (Medical Oncology)  Valencia Lima as [facility]       ROS           Objective          Physical Exam  Vitals and nursing note reviewed.   Constitutional:       Appearance: Normal appearance.   HENT:      Head: Normocephalic and atraumatic.      Right Ear: External ear normal.      Left Ear: External ear normal.   Eyes:      Conjunctiva/sclera: Conjunctivae normal.   Cardiovascular:      Rate and Rhythm: Normal rate and regular rhythm.      Heart sounds: Normal heart sounds. No murmur heard.  Pulmonary:      Effort: No respiratory distress.   Abdominal:      General: There is no distension.   Musculoskeletal:         General: No swelling.   Skin:     Coloration: Skin is not jaundiced.   Neurological:      General: No focal deficit present.      Mental Status: He is alert.   Psychiatric:         Mood and Affect: Mood normal.         Behavior: Behavior normal.         Assessment & Plan        Assessment   #1 COPD, on Trelegy inhaler, breathing stable has declined PFTs.  Continues to smoke 3 cigars/day    #2 elevated blood pressure today with history of hypertension off lisinopril, has not been checking his blood pressures at  home    #3 dyslipidemia on Lipitor 6/8/22 chol 165,trig 143,hdl 45,ldl 91 on lipitor 20 mg    #4 leukocytosis 6/8/22 wbc 12.4,b12 468,flow cytometry negative, stable going back to 2011 question related to splenectomy, previous work-up positive for cytometry, B12, iron studies, folate negative    #5 polycythemia 6/8/22 hgb 18,hct 54.8,EPO 7,LIUDMILA,MPL,CALR mutation negative,flow cytometry negative question related to smoking    #6 pulmonary nodule 5/15/22 CT thorax without, stable 9 mm nodule right apex, stable 9 mm nodule RUL, 5 mm nodule RLL increased from 3 mm compared to 2018, 3 mm nodule LLL, no significant adenopathy, coronary calcifications, repeat 6 months    #7 aortic atherosclerosis no aneurysm    Plan   #! Check blood pressure regular and record, if remains elevated consider resuming lisinopril    #2 try to get more exercise with walking since it is the winter months as he does not golf during the winter     #3 decrease sodium intake     #4 CT follow-up pulmonary nodules    #5 continue work on smoking cessation    #6 declines PFT    #7 continue Trelegy inhaler    #8 continue atorvastatin    #9 repeat labs ordered for February    #10 influenza vaccine high-dose and hepatitis B third in series today, he declines the Prevnar 20 today we can have him do that next visit, he can get the updated COVID-vaccine at the pharmacy, he can get the second shingles vaccine at the pharmacy    #11 follow-up 6 months

## 2022-11-22 PROCEDURE — G0010 ADMIN HEPATITIS B VACCINE: HCPCS | Performed by: INTERNAL MEDICINE

## 2022-11-22 PROCEDURE — 90662 IIV NO PRSV INCREASED AG IM: CPT | Performed by: INTERNAL MEDICINE

## 2022-11-22 PROCEDURE — 90746 HEPB VACCINE 3 DOSE ADULT IM: CPT | Performed by: INTERNAL MEDICINE

## 2022-11-22 PROCEDURE — G0008 ADMIN INFLUENZA VIRUS VAC: HCPCS | Performed by: INTERNAL MEDICINE

## 2023-01-16 ENCOUNTER — HOSPITAL ENCOUNTER (OUTPATIENT)
Dept: LAB | Facility: MEDICAL CENTER | Age: 73
End: 2023-01-16
Attending: INTERNAL MEDICINE
Payer: MEDICARE

## 2023-01-16 DIAGNOSIS — E78.5 DYSLIPIDEMIA: Chronic | ICD-10-CM

## 2023-01-16 LAB
ALBUMIN SERPL BCP-MCNC: 4 G/DL (ref 3.2–4.9)
ALBUMIN/GLOB SERPL: 1.5 G/DL
ALP SERPL-CCNC: 89 U/L (ref 30–99)
ALT SERPL-CCNC: 15 U/L (ref 2–50)
ANION GAP SERPL CALC-SCNC: 12 MMOL/L (ref 7–16)
AST SERPL-CCNC: 22 U/L (ref 12–45)
BASOPHILS # BLD AUTO: 0.7 % (ref 0–1.8)
BASOPHILS # BLD: 0.09 K/UL (ref 0–0.12)
BILIRUB SERPL-MCNC: 0.8 MG/DL (ref 0.1–1.5)
BUN SERPL-MCNC: 14 MG/DL (ref 8–22)
CALCIUM ALBUM COR SERPL-MCNC: 9.3 MG/DL (ref 8.5–10.5)
CALCIUM SERPL-MCNC: 9.3 MG/DL (ref 8.5–10.5)
CHLORIDE SERPL-SCNC: 111 MMOL/L (ref 96–112)
CHOLEST SERPL-MCNC: 169 MG/DL (ref 100–199)
CO2 SERPL-SCNC: 23 MMOL/L (ref 20–33)
CREAT SERPL-MCNC: 1.08 MG/DL (ref 0.5–1.4)
EOSINOPHIL # BLD AUTO: 0.6 K/UL (ref 0–0.51)
EOSINOPHIL NFR BLD: 4.8 % (ref 0–6.9)
ERYTHROCYTE [DISTWIDTH] IN BLOOD BY AUTOMATED COUNT: 56.4 FL (ref 35.9–50)
FASTING STATUS PATIENT QL REPORTED: NORMAL
GFR SERPLBLD CREATININE-BSD FMLA CKD-EPI: 73 ML/MIN/1.73 M 2
GLOBULIN SER CALC-MCNC: 2.7 G/DL (ref 1.9–3.5)
GLUCOSE SERPL-MCNC: 88 MG/DL (ref 65–99)
HCT VFR BLD AUTO: 55.5 % (ref 42–52)
HDLC SERPL-MCNC: 48 MG/DL
HGB BLD-MCNC: 18.6 G/DL (ref 14–18)
IMM GRANULOCYTES # BLD AUTO: 0.1 K/UL (ref 0–0.11)
IMM GRANULOCYTES NFR BLD AUTO: 0.8 % (ref 0–0.9)
LDLC SERPL CALC-MCNC: 97 MG/DL
LYMPHOCYTES # BLD AUTO: 4.2 K/UL (ref 1–4.8)
LYMPHOCYTES NFR BLD: 33.4 % (ref 22–41)
MCH RBC QN AUTO: 34.4 PG (ref 27–33)
MCHC RBC AUTO-ENTMCNC: 33.5 G/DL (ref 33.7–35.3)
MCV RBC AUTO: 102.6 FL (ref 81.4–97.8)
MONOCYTES # BLD AUTO: 1.16 K/UL (ref 0–0.85)
MONOCYTES NFR BLD AUTO: 9.2 % (ref 0–13.4)
NEUTROPHILS # BLD AUTO: 6.44 K/UL (ref 1.82–7.42)
NEUTROPHILS NFR BLD: 51.1 % (ref 44–72)
NRBC # BLD AUTO: 0 K/UL
NRBC BLD-RTO: 0 /100 WBC
PLATELET # BLD AUTO: 387 K/UL (ref 164–446)
PMV BLD AUTO: 9.9 FL (ref 9–12.9)
POTASSIUM SERPL-SCNC: 4.9 MMOL/L (ref 3.6–5.5)
PROT SERPL-MCNC: 6.7 G/DL (ref 6–8.2)
RBC # BLD AUTO: 5.41 M/UL (ref 4.7–6.1)
SODIUM SERPL-SCNC: 146 MMOL/L (ref 135–145)
TRIGL SERPL-MCNC: 122 MG/DL (ref 0–149)
WBC # BLD AUTO: 12.6 K/UL (ref 4.8–10.8)

## 2023-01-16 PROCEDURE — 36415 COLL VENOUS BLD VENIPUNCTURE: CPT

## 2023-01-16 PROCEDURE — 80053 COMPREHEN METABOLIC PANEL: CPT

## 2023-01-16 PROCEDURE — 80061 LIPID PANEL: CPT

## 2023-01-16 PROCEDURE — 85025 COMPLETE CBC W/AUTO DIFF WBC: CPT

## 2023-01-17 ENCOUNTER — TELEPHONE (OUTPATIENT)
Dept: MEDICAL GROUP | Facility: MEDICAL CENTER | Age: 73
End: 2023-01-17
Payer: MEDICARE

## 2023-01-17 NOTE — TELEPHONE ENCOUNTER
Called with labs, stable lipid on lipitor, chronic stable leukocytosis no significant change, chronic mild polycythemia stable, previous work-up negative working on smoking cessation.

## 2023-03-02 ENCOUNTER — TELEPHONE (OUTPATIENT)
Dept: MEDICAL GROUP | Facility: MEDICAL CENTER | Age: 73
End: 2023-03-02
Payer: MEDICARE

## 2023-03-02 DIAGNOSIS — G47.00 INSOMNIA, UNSPECIFIED TYPE: ICD-10-CM

## 2023-03-02 RX ORDER — ZOLPIDEM TARTRATE 10 MG/1
10 TABLET ORAL NIGHTLY PRN
Qty: 15 EACH | Refills: 0 | Status: SHIPPED | OUTPATIENT
Start: 2023-03-02 | End: 2023-04-01

## 2023-03-02 NOTE — TELEPHONE ENCOUNTER
Please notify the patient his last Ambien refill was 2018, I will send 1 refill to his pharmacy for the quantity 15 tablets.

## 2023-03-02 NOTE — TELEPHONE ENCOUNTER
Narendra Sun   [phone] (home)     [facility] needs sleeping pills for Narendra when he travels, going to [facility].

## 2023-03-08 ENCOUNTER — APPOINTMENT (OUTPATIENT)
Dept: RADIOLOGY | Facility: MEDICAL CENTER | Age: 73
End: 2023-03-08
Attending: EMERGENCY MEDICINE
Payer: MEDICARE

## 2023-03-08 ENCOUNTER — APPOINTMENT (OUTPATIENT)
Dept: RADIOLOGY | Facility: MEDICAL CENTER | Age: 73
End: 2023-03-08
Attending: INTERNAL MEDICINE
Payer: MEDICARE

## 2023-03-08 ENCOUNTER — HOSPITAL ENCOUNTER (OUTPATIENT)
Facility: MEDICAL CENTER | Age: 73
End: 2023-03-08
Attending: EMERGENCY MEDICINE | Admitting: INTERNAL MEDICINE
Payer: MEDICARE

## 2023-03-08 VITALS
DIASTOLIC BLOOD PRESSURE: 91 MMHG | SYSTOLIC BLOOD PRESSURE: 144 MMHG | RESPIRATION RATE: 19 BRPM | OXYGEN SATURATION: 94 % | WEIGHT: 153.44 LBS | HEART RATE: 79 BPM | BODY MASS INDEX: 24.66 KG/M2 | TEMPERATURE: 98.2 F | HEIGHT: 66 IN

## 2023-03-08 DIAGNOSIS — R42 VERTIGO: ICD-10-CM

## 2023-03-08 DIAGNOSIS — R53.1 WEAKNESS: ICD-10-CM

## 2023-03-08 PROBLEM — M25.512 LEFT SHOULDER PAIN: Status: ACTIVE | Noted: 2023-03-08

## 2023-03-08 LAB
ABO + RH BLD: NORMAL
ABO GROUP BLD: NORMAL
ALBUMIN SERPL BCP-MCNC: 4.2 G/DL (ref 3.2–4.9)
ALBUMIN/GLOB SERPL: 1.3 G/DL
ALP SERPL-CCNC: 101 U/L (ref 30–99)
ALT SERPL-CCNC: 20 U/L (ref 2–50)
ANION GAP SERPL CALC-SCNC: 12 MMOL/L (ref 7–16)
APTT PPP: 32.7 SEC (ref 24.7–36)
AST SERPL-CCNC: 26 U/L (ref 12–45)
BASOPHILS # BLD AUTO: 0.6 % (ref 0–1.8)
BASOPHILS # BLD: 0.07 K/UL (ref 0–0.12)
BILIRUB SERPL-MCNC: 0.5 MG/DL (ref 0.1–1.5)
BLD GP AB SCN SERPL QL: NORMAL
BUN SERPL-MCNC: 11 MG/DL (ref 8–22)
CALCIUM ALBUM COR SERPL-MCNC: 9.4 MG/DL (ref 8.5–10.5)
CALCIUM SERPL-MCNC: 9.6 MG/DL (ref 8.4–10.2)
CHLORIDE SERPL-SCNC: 103 MMOL/L (ref 96–112)
CO2 SERPL-SCNC: 24 MMOL/L (ref 20–33)
CREAT SERPL-MCNC: 1.01 MG/DL (ref 0.5–1.4)
EKG IMPRESSION: NORMAL
EOSINOPHIL # BLD AUTO: 0.47 K/UL (ref 0–0.51)
EOSINOPHIL NFR BLD: 3.9 % (ref 0–6.9)
ERYTHROCYTE [DISTWIDTH] IN BLOOD BY AUTOMATED COUNT: 50.4 FL (ref 35.9–50)
GFR SERPLBLD CREATININE-BSD FMLA CKD-EPI: 79 ML/MIN/1.73 M 2
GLOBULIN SER CALC-MCNC: 3.3 G/DL (ref 1.9–3.5)
GLUCOSE BLD STRIP.AUTO-MCNC: 88 MG/DL (ref 65–99)
GLUCOSE SERPL-MCNC: 104 MG/DL (ref 65–99)
HCT VFR BLD AUTO: 57.3 % (ref 42–52)
HGB BLD-MCNC: 19.5 G/DL (ref 14–18)
IMM GRANULOCYTES # BLD AUTO: 0.09 K/UL (ref 0–0.11)
IMM GRANULOCYTES NFR BLD AUTO: 0.8 % (ref 0–0.9)
INR PPP: 0.97 (ref 0.87–1.13)
LYMPHOCYTES # BLD AUTO: 3.15 K/UL (ref 1–4.8)
LYMPHOCYTES NFR BLD: 26.3 % (ref 22–41)
MCH RBC QN AUTO: 34.2 PG (ref 27–33)
MCHC RBC AUTO-ENTMCNC: 34 G/DL (ref 33.7–35.3)
MCV RBC AUTO: 100.5 FL (ref 81.4–97.8)
MONOCYTES # BLD AUTO: 1.09 K/UL (ref 0–0.85)
MONOCYTES NFR BLD AUTO: 9.1 % (ref 0–13.4)
NEUTROPHILS # BLD AUTO: 7.1 K/UL (ref 1.82–7.42)
NEUTROPHILS NFR BLD: 59.3 % (ref 44–72)
NRBC # BLD AUTO: 0 K/UL
NRBC BLD-RTO: 0 /100 WBC
PLATELET # BLD AUTO: 394 K/UL (ref 164–446)
PMV BLD AUTO: 9.4 FL (ref 9–12.9)
POTASSIUM SERPL-SCNC: 4.3 MMOL/L (ref 3.6–5.5)
PROT SERPL-MCNC: 7.5 G/DL (ref 6–8.2)
PROTHROMBIN TIME: 12.8 SEC (ref 12–14.6)
RBC # BLD AUTO: 5.7 M/UL (ref 4.7–6.1)
RH BLD: NORMAL
SODIUM SERPL-SCNC: 139 MMOL/L (ref 135–145)
TROPONIN T SERPL-MCNC: 9 NG/L (ref 6–19)
WBC # BLD AUTO: 12 K/UL (ref 4.8–10.8)

## 2023-03-08 PROCEDURE — A9270 NON-COVERED ITEM OR SERVICE: HCPCS | Performed by: INTERNAL MEDICINE

## 2023-03-08 PROCEDURE — 700117 HCHG RX CONTRAST REV CODE 255: Performed by: EMERGENCY MEDICINE

## 2023-03-08 PROCEDURE — 70498 CT ANGIOGRAPHY NECK: CPT

## 2023-03-08 PROCEDURE — 85025 COMPLETE CBC W/AUTO DIFF WBC: CPT

## 2023-03-08 PROCEDURE — 86850 RBC ANTIBODY SCREEN: CPT

## 2023-03-08 PROCEDURE — 36415 COLL VENOUS BLD VENIPUNCTURE: CPT

## 2023-03-08 PROCEDURE — 80053 COMPREHEN METABOLIC PANEL: CPT

## 2023-03-08 PROCEDURE — 700102 HCHG RX REV CODE 250 W/ 637 OVERRIDE(OP): Performed by: INTERNAL MEDICINE

## 2023-03-08 PROCEDURE — 93005 ELECTROCARDIOGRAM TRACING: CPT | Performed by: EMERGENCY MEDICINE

## 2023-03-08 PROCEDURE — 70551 MRI BRAIN STEM W/O DYE: CPT

## 2023-03-08 PROCEDURE — 70450 CT HEAD/BRAIN W/O DYE: CPT

## 2023-03-08 PROCEDURE — 84484 ASSAY OF TROPONIN QUANT: CPT

## 2023-03-08 PROCEDURE — 93005 ELECTROCARDIOGRAM TRACING: CPT

## 2023-03-08 PROCEDURE — 85610 PROTHROMBIN TIME: CPT

## 2023-03-08 PROCEDURE — 700111 HCHG RX REV CODE 636 W/ 250 OVERRIDE (IP): Performed by: EMERGENCY MEDICINE

## 2023-03-08 PROCEDURE — 99285 EMERGENCY DEPT VISIT HI MDM: CPT

## 2023-03-08 PROCEDURE — 0042T CT-CEREBRAL PERFUSION ANALYSIS: CPT

## 2023-03-08 PROCEDURE — 94760 N-INVAS EAR/PLS OXIMETRY 1: CPT

## 2023-03-08 PROCEDURE — 86901 BLOOD TYPING SEROLOGIC RH(D): CPT

## 2023-03-08 PROCEDURE — 96374 THER/PROPH/DIAG INJ IV PUSH: CPT

## 2023-03-08 PROCEDURE — 99235 HOSP IP/OBS SAME DATE MOD 70: CPT | Mod: GC | Performed by: INTERNAL MEDICINE

## 2023-03-08 PROCEDURE — 82962 GLUCOSE BLOOD TEST: CPT

## 2023-03-08 PROCEDURE — G0378 HOSPITAL OBSERVATION PER HR: HCPCS

## 2023-03-08 PROCEDURE — 70496 CT ANGIOGRAPHY HEAD: CPT

## 2023-03-08 PROCEDURE — 71045 X-RAY EXAM CHEST 1 VIEW: CPT

## 2023-03-08 PROCEDURE — 85730 THROMBOPLASTIN TIME PARTIAL: CPT

## 2023-03-08 PROCEDURE — 86900 BLOOD TYPING SEROLOGIC ABO: CPT

## 2023-03-08 RX ORDER — ONDANSETRON 4 MG/1
4 TABLET, ORALLY DISINTEGRATING ORAL EVERY 4 HOURS PRN
Status: DISCONTINUED | OUTPATIENT
Start: 2023-03-08 | End: 2023-03-08 | Stop reason: HOSPADM

## 2023-03-08 RX ORDER — AMOXICILLIN 250 MG
2 CAPSULE ORAL 2 TIMES DAILY
Status: DISCONTINUED | OUTPATIENT
Start: 2023-03-08 | End: 2023-03-08 | Stop reason: HOSPADM

## 2023-03-08 RX ORDER — ALBUTEROL SULFATE 90 UG/1
2 AEROSOL, METERED RESPIRATORY (INHALATION) DAILY
Status: SHIPPED | COMMUNITY
End: 2023-07-23

## 2023-03-08 RX ORDER — ONDANSETRON 2 MG/ML
4 INJECTION INTRAMUSCULAR; INTRAVENOUS EVERY 4 HOURS PRN
Status: DISCONTINUED | OUTPATIENT
Start: 2023-03-08 | End: 2023-03-08 | Stop reason: HOSPADM

## 2023-03-08 RX ORDER — MECLIZINE HYDROCHLORIDE 25 MG/1
12.5 TABLET ORAL ONCE
Status: COMPLETED | OUTPATIENT
Start: 2023-03-08 | End: 2023-03-08

## 2023-03-08 RX ORDER — ENOXAPARIN SODIUM 100 MG/ML
40 INJECTION SUBCUTANEOUS DAILY
Status: DISCONTINUED | OUTPATIENT
Start: 2023-03-08 | End: 2023-03-08 | Stop reason: HOSPADM

## 2023-03-08 RX ORDER — LORAZEPAM 2 MG/ML
0.5 INJECTION INTRAMUSCULAR ONCE
Status: COMPLETED | OUTPATIENT
Start: 2023-03-08 | End: 2023-03-08

## 2023-03-08 RX ORDER — BISACODYL 10 MG
10 SUPPOSITORY, RECTAL RECTAL
Status: DISCONTINUED | OUTPATIENT
Start: 2023-03-08 | End: 2023-03-08 | Stop reason: HOSPADM

## 2023-03-08 RX ORDER — ATORVASTATIN CALCIUM 10 MG/1
20 TABLET, FILM COATED ORAL DAILY
Status: DISCONTINUED | OUTPATIENT
Start: 2023-03-09 | End: 2023-03-08 | Stop reason: HOSPADM

## 2023-03-08 RX ORDER — ZOLPIDEM TARTRATE 5 MG/1
5 TABLET ORAL NIGHTLY PRN
Status: DISCONTINUED | OUTPATIENT
Start: 2023-03-08 | End: 2023-03-08 | Stop reason: HOSPADM

## 2023-03-08 RX ORDER — MECLIZINE HCL 12.5 MG/1
25 TABLET ORAL 3 TIMES DAILY PRN
Qty: 30 TABLET | Refills: 0 | Status: SHIPPED | OUTPATIENT
Start: 2023-03-08 | End: 2023-07-19

## 2023-03-08 RX ORDER — FLUTICASONE PROPIONATE 44 UG/1
2 AEROSOL, METERED RESPIRATORY (INHALATION)
Status: DISCONTINUED | OUTPATIENT
Start: 2023-03-09 | End: 2023-03-08 | Stop reason: HOSPADM

## 2023-03-08 RX ORDER — POLYETHYLENE GLYCOL 3350 17 G/17G
1 POWDER, FOR SOLUTION ORAL
Status: DISCONTINUED | OUTPATIENT
Start: 2023-03-08 | End: 2023-03-08 | Stop reason: HOSPADM

## 2023-03-08 RX ORDER — ALBUTEROL SULFATE 90 UG/1
2 AEROSOL, METERED RESPIRATORY (INHALATION) DAILY
Status: DISCONTINUED | OUTPATIENT
Start: 2023-03-09 | End: 2023-03-08 | Stop reason: HOSPADM

## 2023-03-08 RX ADMIN — IOHEXOL 80 ML: 350 INJECTION, SOLUTION INTRAVENOUS at 13:02

## 2023-03-08 RX ADMIN — IOHEXOL 40 ML: 350 INJECTION, SOLUTION INTRAVENOUS at 12:31

## 2023-03-08 RX ADMIN — LORAZEPAM 0.5 MG: 2 INJECTION INTRAMUSCULAR; INTRAVENOUS at 12:47

## 2023-03-08 RX ADMIN — MECLIZINE HYDROCHLORIDE 12.5 MG: 25 TABLET ORAL at 15:04

## 2023-03-08 ASSESSMENT — FIBROSIS 4 INDEX: FIB4 SCORE: 1.06

## 2023-03-08 NOTE — ED PROVIDER NOTES
"ED Provider Note    Primary care provider: Naveen Duncan M.D.  Means of arrival: Private vehicle  History obtained from: Patient  History limited by: None    CHIEF COMPLAINT  Chief Complaint   Patient presents with    Weakness     LUE x 1 mon Associated with pain    Neck Pain     Posterior neck pain x 2 d  Denies CP Has chronic SOB secondary to COPD  Denies trauma    Vertigo     Acute onset 0500 this am while coming back to the bed after going to the restroom  \" It starts when I bend over and move my head \"  Denies N/V Pt reports having diff walking due to vertigo  Denies similar past hx       HPI/ROS  Narendra Sun is a 72 y.o. male who presents to the Emergency Department for evaluation of vertigo, weakness and neck pain.  Patient reports that this morning he got up and went to the restroom at 5 AM, immediately after when walking back to his bed he developed severe vertigo where the room was spinning.  He reports no prior history of vertigo in the past.  States that is really difficult for him to walk and he is unsteady on his feet given the spinning.  The symptoms have been persistent and therefore he came in for further evaluation.  He has no history of stroke.  Reports a remote history of hypertension but is no longer on lisinopril as his blood pressure has been controlled without.  Aside from the vertigo he reports that he has been feeling generally weak with left shoulder discomfort over the last month.  Over the last 2 days he has developed neck pain in the posterior neck that he describes as a mild aching pain.  Denies any trauma.  No history of stroke in the past.  Denies nausea, vomiting, fevers, chills, chest pain.  He has chronic shortness of breath related to COPD, no acutely worsening shortness of breath.    EXTERNAL RECORDS REVIEWED  Per outside record review patient is seen by primary care physician Dr. Duncan.  He has a history of COPD, hypertension, dyslipidemia, splenectomy, " "polycythemia, pulmonary nodule and aortic atherosclerosis.     LIMITATION TO HISTORY   None    OUTSIDE HISTORIAN(S):  None      PAST MEDICAL HISTORY   has a past medical history of Bronchitis chronic, COPD (chronic obstructive pulmonary disease) (HCC) (2009), Dyslipidemia, Gout (2017), PNEUMOTHORAX, Preventative health care (2009), S/P Splenectomy (2009), Tobacco abuse (2009), and Tubular adenoma (2009).    SURGICAL HISTORY   has a past surgical history that includes splenectomy and general lung surgery (Left, 1980s).    SOCIAL HISTORY  Social History     Tobacco Use    Smoking status: Every Day     Packs/day: 1.50     Years: 47.00     Pack years: 70.50     Types: Cigars, Cigarettes     Last attempt to quit:      Years since quittin.1    Smokeless tobacco: Never    Tobacco comments:     3 cigars/day   Vaping Use    Vaping Use: Never used   Substance Use Topics    Alcohol use: Yes     Alcohol/week: 0.0 - 0.6 oz    Drug use: No      Social History     Substance and Sexual Activity   Drug Use No       FAMILY HISTORY  Family History   Problem Relation Age of Onset    Cancer Brother         prostate and likely 2 other cancers       CURRENT MEDICATIONS  Home Medications       Reviewed by Reinaldo Durant (Pharmacy Tech) on 23 at 1229  Med List Status: Complete     Medication Last Dose Status   albuterol 108 (90 Base) MCG/ACT Aero Soln inhalation aerosol 3/8/2023 Active   atorvastatin (LIPITOR) 20 MG Tab 3/8/2023 Active   Cholecalciferol (D3 PO) 3/7/2023 Active   cyanocobalamin (VITAMIN B12) 1000 MCG TABS 3/7/2023 Active   TRELEGY ELLIPTA 100-62.5-25 MCG/INH AEROSOL POWDER, BREATH ACTIVATED inhalation 3/8/2023 Active   zolpidem (AMBIEN) 10 MG Tab NEW RX Active                    ALLERGIES  No Known Allergies    PHYSICAL EXAM  VITAL SIGNS: BP (!) 154/92   Pulse 78   Temp 36.6 °C (97.9 °F) (Temporal)   Resp 16   Ht 1.676 m (5' 6\")   Wt 69.6 kg (153 lb 7 oz)   SpO2 96%  "  BMI 24.77 kg/m²   Vitals reviewed by myself.  Physical Exam  Nursing note and vitals reviewed.  Constitutional: Well-developed and well-nourished. No distress.   HENT: Head is normocephalic and atraumatic. Oropharynx is clear and moist without exudate or erythema.   Eyes: Pupils are equal, round, and reactive to light. No horizontal or vertical nystagmus. Conjunctiva are normal.   Cardiovascular: Normal rate and regular rhythm. No murmur heard. Normal radial pulses.  Pulmonary/Chest: Breath sounds normal. No wheezes or rales.   Abdominal: Soft and non-tender. No distention.    Musculoskeletal: Extremities exhibit normal range of motion without edema or tenderness.  Patient is able to ambulate although seems slightly ataxic with ambulation  Neurological: Awake, alert and oriented to person, place, and time. No focal deficits noted. Cranial nerves II - XII intact. No pronator drift.  No dysmetria on cerebellar testing. Normal speech and language. Normal strength and sensation in bilateral upper and lower extremities.  NIH 0  Skin: Skin is warm and dry. No rash.   Psychiatric: Normal mood and affect. Appropriate for clinical situation.      DIAGNOSTIC STUDIES:  LABS  Labs Reviewed   CBC WITH DIFFERENTIAL - Abnormal; Notable for the following components:       Result Value    WBC 12.0 (*)     Hemoglobin 19.5 (*)     Hematocrit 57.3 (*)     .5 (*)     MCH 34.2 (*)     RDW 50.4 (*)     Monos (Absolute) 1.09 (*)     All other components within normal limits    Narrative:     Indicate which anticoagulants the patient is on:->UNKNOWN  Biotin intake of greater than 5 mg per day may interfere with  troponin levels, causing false low values.   COMP METABOLIC PANEL - Abnormal; Notable for the following components:    Glucose 104 (*)     Alkaline Phosphatase 101 (*)     All other components within normal limits    Narrative:     Indicate which anticoagulants the patient is on:->UNKNOWN  Biotin intake of greater than 5 mg  per day may interfere with  troponin levels, causing false low values.   PROTHROMBIN TIME    Narrative:     Indicate which anticoagulants the patient is on:->UNKNOWN  Biotin intake of greater than 5 mg per day may interfere with  troponin levels, causing false low values.   APTT    Narrative:     Indicate which anticoagulants the patient is on:->UNKNOWN  Biotin intake of greater than 5 mg per day may interfere with  troponin levels, causing false low values.   COD (ADULT)   TROPONIN    Narrative:     Indicate which anticoagulants the patient is on:->UNKNOWN  Biotin intake of greater than 5 mg per day may interfere with  troponin levels, causing false low values.   ABO RH CONFIRM   CORRECTED CALCIUM    Narrative:     Indicate which anticoagulants the patient is on:->UNKNOWN  Biotin intake of greater than 5 mg per day may interfere with  troponin levels, causing false low values.   ESTIMATED GFR    Narrative:     Indicate which anticoagulants the patient is on:->UNKNOWN  Biotin intake of greater than 5 mg per day may interfere with  troponin levels, causing false low values.   POCT GLUCOSE DEVICE RESULTS       All labs reviewed and independently interpreted by myself    EKG Interpretation:    12 Lead EKG independently interpreted by myself to show:  EKG at 12:06 AM: Normal sinus rhythm, heart rate 68, normal axis, normal intervals, , QRS 86, QTc 437, no acute ST-T segment changes, no evidence of acute arrhythmia or ischemia per my interpretation    RADIOLOGY    CT-CTA HEAD WITH & W/O-POST PROCESS   Final Result         1. No hemodynamically significant narrowing of the major intracranial vessels.      CT-CTA NECK WITH & W/O-POST PROCESSING   Final Result      1. No evidence of flow-limiting stenosis in the cervical carotid or cervical vertebral arteries.      DX-CHEST-PORTABLE (1 VIEW)   Final Result         No acute cardiopulmonary process.      CT-CEREBRAL PERFUSION ANALYSIS   Final Result      1.  Cerebral  blood flow less than 30% likely representing completed infarct = 0 mL.      2.  T Max more than 6 seconds likely representing combination of completed infarct and ischemia = 0 mL.      3.  Mismatched volume likely representing ischemic brain/penumbra = None      4.  Please note that the cerebral perfusion was performed on the limited brain tissue around the basal ganglia region. Infarct/ischemia outside the CT perfusion sections can be missed in this study.      CT-HEAD W/O   Final Result         1. No acute intracranial abnormality. No evidence of acute intracranial hemorrhage or mass lesion.                       The radiologist's interpretation of all radiological studies have been reviewed by me.        COURSE & MEDICAL DECISION MAKING    ED Observation Status? No    INITIAL ASSESSMENT AND PLAN    Patient is a 72-year-old male who presents for sudden onset vertigo with associated weakness and neck pain.  Differential diagnosis includes peripheral vertigo, central vertigo, electrolyte derangement, arrhythmia.  Given the onset of vertigo at 5 AM patient is activated as a stroke IR on arrival.  NIH is 0, he is not a TNK candidate.  Slightly ataxic when walking.       REASSESSMENTS   12:12 PM: Patient seen and evaluated at bedside by myself, plan of care discussed, patient activated as a stroke IR given sudden onset vertigo, NIH is 0 although he is slightly ataxic when ambulating, given timing of symptoms he is not a candidate for TNK    1:20 PM: Patient reevaluated bedside, updated on results.  His vertigo is greatly improved.  Patient needs to go to the restroom and I assisted him, he is still unsteady on his feet when ambulating.    ER COURSE AND FINAL DISPOSITION   Patient's initial vitals notable for slight hypertension.  He is treated with Ativan for his vertigo symptoms.  EKG returns and demonstrates no evidence of acute arrhythmia or ischemia.  Imaging returns and demonstrates no acute large vessel  occlusion, no obvious large stroke.  No vascular abnormalities.  Labs returned and are independently interpreted by myself to demonstrate:  -CBC notable for slight leukocytosis and elevated hemoglobin, he does have a history of polycythemia  -Troponin is within normal limits  -Electrolytes and renal function within normal limits  - Labs are otherwise unremarkable    Upon reassessment patient reports that his vertigo is slightly improved after Ativan.  He is still noted to be slightly unsteady on his feet when attempted late.  At this time I cannot definitively rule out central cause of vertigo as this is a new onset vertigo in an elderly patient with risk factors for stroke I will hospitalize him for further work-up.  I discussed the case with Dr. Santoro who has accepted patient for hospitalization.  Patient is in guarded condition.    ADDITIONAL PROBLEM LIST AND RESOURCE UTILIZATION    Additional problems aside from the chief complaint that I have addressed: none    I have discussed management of the patient with the following physicians and MEKA's: Dr. Santoro    Discussion of management with other [facility] or appropriate source(s): None    Escalation of care considered, and ultimately not performed: See above.     Barriers to care at this time, including but not limited to: None.     Decision tools and prescription drugs considered including, but not limited to: See above.    Please see review of records as noted above      FINAL IMPRESSION  1. Vertigo    2. Weakness

## 2023-03-08 NOTE — ED NOTES
Med rec updated and complete, per pt   Allergies reviewed, per pt  Interviewed pt with wife at bedside with permission from pt.  Pt reports that he uses his ALBUTEROL inhaler 1 puff once a day with his TRELEGY 1 puff once a day.  Pt reports that he has not taken his AMBIEN 10MG, it for his trip to the [facility].

## 2023-03-09 NOTE — ASSESSMENT & PLAN NOTE
Presentation most consistent with BPPV.   Patient's CTA head/neck/CTH without acute stroke.   Patient prefers to get MRI as outpatient. Will try to arrange for MRI from ER or as outpatient and dc patient  Since no evidence of acute stroke, no further work up is necessary  DC with meclizine PRN , arrange MR Brain  Pt is going to travel in the [facility] next week  Discussed with bedside RN, messaged MRI tech, and ultimately arranged for MRI brain at 1830 from ER, and dc after

## 2023-03-09 NOTE — ED NOTES
Pt. Verbalizes understanding of discharge instructions. Accompanied to lobby with spouse. Pt. Alert/awake in NAD.   All questions answered and understood.

## 2023-03-09 NOTE — ED NOTES
This RN spoke with hospitalist and plan is to have pt get an MRI this evening and then be discharged from ED. MRI is aware and will pick pt up for procedure at 1830. Pt sitting in room watching TV and up to date on plan of care.

## 2023-03-09 NOTE — H&P
"Hospital Medicine History & Physical Note    Date of Service  3/8/2023    Primary Care Physician  Naveen Duncan M.D.    Code Status  Full Code    Chief Complaint  Chief Complaint   Patient presents with    Weakness     LUE x 1 mon Associated with pain    Neck Pain     Posterior neck pain x 2 d  Denies CP Has chronic SOB secondary to COPD  Denies trauma    Vertigo     Acute onset 0500 this am while coming back to the bed after going to the restroom  \" It starts when I bend over and move my head \"  Denies N/V Pt reports having diff walking due to vertigo  Denies similar past hx       History of Presenting Illness  Narendra Sun is a 72 y.o. male presents with sudden onset vertigo around 0500 as he bent over to sit down on his bed. Has a violent onset of spinning sensation and felt like he was going to fall. He rested and as vertigo subsided he still felt a bit foggy and unsteady on his feet. Hours later felt again like it was coming on so he got on his knees to not fall. No headache, nausea, vision changes, weakness or speech problems. Never had this before. No new meds. EtOh use last a week ago. No recent URI. In ER vital signs stable. Given ativan and felt improved. Meclizine seemed to completely resolve his symptoms.     Also w/ recent LUE /arm pain, worse when he bears weight on it. In the Er at the moment \"for the first time\" he has pain free.     I discussed the plan of care with patient.    Review of Systems  Review of Systems   All other systems reviewed and are negative.    Past Medical History   has a past medical history of Bronchitis chronic, COPD (chronic obstructive pulmonary disease) (HCC) (7/27/2009), Dyslipidemia, Gout (12/1/2017), PNEUMOTHORAX, Preventative health care (7/27/2009), S/P Splenectomy (7/27/2009), Tobacco abuse (7/27/2009), and Tubular adenoma (7/27/2009).    Surgical History   has a past surgical history that includes splenectomy and general lung surgery (Left, 1980s).     Family " History  family history includes Cancer in his brother.   Family history reviewed with patient. There is no family history that is pertinent to the chief complaint.     Social History   reports that he has been smoking cigars. He has a 70.50 pack-year smoking history. He has never used smokeless tobacco. He reports current alcohol use. He reports that he does not use drugs.    Allergies  No Known Allergies    Medications  Prior to Admission Medications   Prescriptions Last Dose Informant Patient Reported? Taking?   Cholecalciferol (D3 PO) 3/7/2023 at 1200 Patient Yes Yes   Sig: Take 1 Capsule by mouth every day.   TRELEGY ELLIPTA 100-62.5-25 MCG/INH AEROSOL POWDER, BREATH ACTIVATED inhalation 3/8/2023 at 0800 Patient No No   Sig: INHALE 1 INHALATION EVERY DAY BY MOUTH   Patient taking differently: Inhale 1 Inhalation every day.   albuterol 108 (90 Base) MCG/ACT Aero Soln inhalation aerosol 3/8/2023 at 0800 Patient Yes Yes   Sig: Inhale 2 Puffs every day.   atorvastatin (LIPITOR) 20 MG Tab 3/8/2023 at 0800 Patient No No   Sig: TAKE 1 TABLET BY MOUTH EVERY DAY   Patient taking differently: Take 20 mg by mouth every day.   cyanocobalamin (VITAMIN B12) 1000 MCG TABS 3/7/2023 at 1200 Patient No No   Sig: Take 1 Tab by mouth every day.   zolpidem (AMBIEN) 10 MG Tab NEW RX Patient No No   Sig: Take 1 Tablet by mouth at bedtime as needed for Sleep for up to 30 days.      Facility-Administered Medications: None       Physical Exam  Temp:  [36.6 °C (97.9 °F)] 36.6 °C (97.9 °F)  Pulse:  [73-83] 83  Resp:  [13-20] 20  BP: (140-173)/(88-98) 140/88  SpO2:  [94 %-98 %] 94 %  Blood Pressure : (!) 140/88   Temperature: 36.6 °C (97.9 °F)   Pulse: 83   Respiration: 20   Pulse Oximetry: 94 %       Physical Exam  Constitutional:       General: He is not in acute distress.     Appearance: Normal appearance. He is normal weight.   HENT:      Head: Normocephalic and atraumatic.      Mouth/Throat:      Mouth: Mucous membranes are moist.    Eyes:      Extraocular Movements: Extraocular movements intact.      Conjunctiva/sclera: Conjunctivae normal.      Pupils: Pupils are equal, round, and reactive to light.   Cardiovascular:      Rate and Rhythm: Normal rate and regular rhythm.   Pulmonary:      Effort: Pulmonary effort is normal.      Breath sounds: Normal breath sounds.   Abdominal:      General: Abdomen is flat.      Palpations: Abdomen is soft.   Musculoskeletal:         General: Normal range of motion.   Skin:     General: Skin is warm and dry.   Neurological:      General: No focal deficit present.      Mental Status: He is alert and oriented to person, place, and time. Mental status is at baseline.   Psychiatric:         Mood and Affect: Mood normal.       Laboratory:  Recent Labs     03/08/23  1215   WBC 12.0*   RBC 5.70   HEMOGLOBIN 19.5*   HEMATOCRIT 57.3*   .5*   MCH 34.2*   MCHC 34.0   RDW 50.4*   PLATELETCT 394   MPV 9.4     Recent Labs     03/08/23  1215   SODIUM 139   POTASSIUM 4.3   CHLORIDE 103   CO2 24   GLUCOSE 104*   BUN 11   CREATININE 1.01   CALCIUM 9.6     Recent Labs     03/08/23  1215   ALTSGPT 20   ASTSGOT 26   ALKPHOSPHAT 101*   TBILIRUBIN 0.5   GLUCOSE 104*     Recent Labs     03/08/23  1215   APTT 32.7   INR 0.97     No results for input(s): NTPROBNP in the last 72 hours.      Recent Labs     03/08/23  1215   TROPONINT 9       Imaging:  CT-CTA HEAD WITH & W/O-POST PROCESS   Final Result         1. No hemodynamically significant narrowing of the major intracranial vessels.      CT-CTA NECK WITH & W/O-POST PROCESSING   Final Result      1. No evidence of flow-limiting stenosis in the cervical carotid or cervical vertebral arteries.      DX-CHEST-PORTABLE (1 VIEW)   Final Result         No acute cardiopulmonary process.      CT-CEREBRAL PERFUSION ANALYSIS   Final Result      1.  Cerebral blood flow less than 30% likely representing completed infarct = 0 mL.      2.  T Max more than 6 seconds likely representing  combination of completed infarct and ischemia = 0 mL.      3.  Mismatched volume likely representing ischemic brain/penumbra = None      4.  Please note that the cerebral perfusion was performed on the limited brain tissue around the basal ganglia region. Infarct/ischemia outside the CT perfusion sections can be missed in this study.      CT-HEAD W/O   Final Result         1. No acute intracranial abnormality. No evidence of acute intracranial hemorrhage or mass lesion.                     MR-BRAIN-W/O    (Results Pending)       X-Ray:  I have personally reviewed the images and compared with prior images.    Assessment/Plan:  Justification for Admission Status  I anticipate this patient is appropriate for observation status at this time because vertigo    Patient will need a Med/Surg bed on MEDICAL service .  The need is secondary to vertigo/rule out acute stroke    * Vertigo- (present on admission)  Assessment & Plan  Presentation most consistent with BPPV.   Patient's CTA head/neck/CTH without acute stroke.   Patient prefers to get MRI as outpatient. Will try to arrange for MRI from ER or as outpatient and dc patient  Since no evidence of acute stroke, no further work up is necessary  DC with meclizine PRN , arrange MR Brain  Pt is going to travel in the [facility] next week  Discussed with bedside RN, messaged MRI tech, and ultimately arranged for MRI brain at 1830 from ER, and dc after    Left shoulder pain  Assessment & Plan  Likely MSK, exam benign. F/u with PCP    Polycythemia- (present on admission)  Assessment & Plan  Chronic, f/u with PCP    CAD (coronary artery disease)- (present on admission)  Assessment & Plan  Stable, continue home meds    BPH (benign prostatic hypertrophy)- (present on admission)  Assessment & Plan  Noted    Dyslipidemia- (present on admission)  Assessment & Plan  Continue lipitor    COPD (chronic obstructive pulmonary disease) (HCC)- (present on admission)  Assessment & Plan  Home  inhalers        VTE prophylaxis: enoxaparin ppx

## 2023-03-10 ENCOUNTER — PATIENT OUTREACH (OUTPATIENT)
Dept: MEDICAL GROUP | Facility: MEDICAL CENTER | Age: 73
End: 2023-03-10
Payer: MEDICARE

## 2023-03-10 NOTE — PROGRESS NOTES
Patient outreach for TCM follow up.  Reviewed discharge instructions and new and current medications.  Patient verbalized understanding.  Confirmed follow up appointment for 3/13/23 .   This follow up appointment was made by the patient and is for 20 minutes. It is a follow up to an ED visit for 8 hours and as such not technically a TCM.

## 2023-03-13 ENCOUNTER — OFFICE VISIT (OUTPATIENT)
Dept: MEDICAL GROUP | Facility: MEDICAL CENTER | Age: 73
End: 2023-03-13
Payer: MEDICARE

## 2023-03-13 VITALS
DIASTOLIC BLOOD PRESSURE: 78 MMHG | HEIGHT: 66 IN | WEIGHT: 152 LBS | BODY MASS INDEX: 24.43 KG/M2 | SYSTOLIC BLOOD PRESSURE: 126 MMHG | OXYGEN SATURATION: 95 % | TEMPERATURE: 97.2 F | HEART RATE: 78 BPM

## 2023-03-13 DIAGNOSIS — M25.512 ACUTE PAIN OF LEFT SHOULDER: ICD-10-CM

## 2023-03-13 DIAGNOSIS — D75.1 POLYCYTHEMIA: ICD-10-CM

## 2023-03-13 PROBLEM — H81.10 BENIGN POSITIONAL VERTIGO: Status: ACTIVE | Noted: 2023-03-08

## 2023-03-13 PROCEDURE — 99214 OFFICE O/P EST MOD 30 MIN: CPT | Performed by: INTERNAL MEDICINE

## 2023-03-13 ASSESSMENT — PATIENT HEALTH QUESTIONNAIRE - PHQ9: CLINICAL INTERPRETATION OF PHQ2 SCORE: 0

## 2023-03-13 ASSESSMENT — FIBROSIS 4 INDEX: FIB4 SCORE: 1.06

## 2023-03-13 NOTE — PROGRESS NOTES
Subjective     Narednra Sun is a 72 y.o. male who presents with Hospital Follow-up (ER visit for Vertigo)            HPI    Patient seen in the ER March 8.  States that he got up at 5 am to use the bathroom which he did with no problem and walked back to his bed, going to lay down, and when he got into bed had onset of room spinning sensation.  No alcohol the night before, no recent head cold, sinus congestion, upper respiratory tract infection, no history of falls, no history of positional vertigo.  No associated headache, vision changes, nausea, weakness.  The initial dizziness resolved.  Patient got up later that morning at 830 and was doing fine with no symptoms and was talking on the phone to his friend and perhaps bent down and noticed a room spinning sensation again, no loss of vision, no emesis, no speech changes, was taken to the ER for evaluation.  3/8/23 CT CTA neck with and without postprocessing no evidence of flow-limiting stenosis  3/8/23 CT CTA head with and without postprocessing  3/8/23 MRI brain no acute infarct or hemorrhage, subacute chronic small white matter infarct right frontal lobe  3/8/23 CT head negative  Patient went home after the ER no further recurrence of symptoms discharged on meclizine  Also tried Ambien for insomnia with upcoming trip to the [facility] without daytime drowsiness or hangover effect       Current Outpatient Medications   Medication Sig Dispense Refill    albuterol 108 (90 Base) MCG/ACT Aero Soln inhalation aerosol Inhale 2 Puffs every day.      Cholecalciferol (D3 PO) Take 1 Capsule by mouth every day.      meclizine (ANTIVERT) 12.5 MG Tab Take 2 Tablets by mouth 3 times a day as needed for Dizziness (vertigo). 30 Tablet 0    zolpidem (AMBIEN) 10 MG Tab Take 1 Tablet by mouth at bedtime as needed for Sleep for up to 30 days. 15 Each 0    atorvastatin (LIPITOR) 20 MG Tab TAKE 1 TABLET BY MOUTH EVERY DAY (Patient taking differently: Take 20 mg by mouth every  day.) 100 Tablet 2    TRELEGY ELLIPTA 100-62.5-25 MCG/INH AEROSOL POWDER, BREATH ACTIVATED inhalation INHALE 1 INHALATION EVERY DAY BY MOUTH (Patient taking differently: Inhale 1 Inhalation every day.) 180 Each 3    cyanocobalamin (VITAMIN B12) 1000 MCG TABS Take 1 Tab by mouth every day. 90 Tab 3     No current facility-administered medications for this visit.         Adenoma  12/05 colonoscopy [facility] tubular adenoma  6/29/12 colon per [facility], neg, repeat 5 years  7/28/17 colon per [facility] 3 mm polyp descending colon, 10 mm polyp descending colon, 5-8 mm polyp sigmoid colon, 10 mm polyp distal sigmoid colon, 6-8 mm polyp rectum, pathology adenoma and hyperplastic polyps repeat 3 years  11/16/20 colon per [facility] 4 polyps hyperplastic and benign polypoid, repeat in 3 years     atherosclerosis aorta  10/25/16 atherosclerotic plaque aorta on CT scan thorax     BPH  5/11 psa 0.7  4/12 psa 7.5 given course of cipro  5/12/12 psa 2.3 after cipro  12/31/13 psa 1.4  2/7/15 psa 1.1  5/18/16 psa 1.2  10/16/18 psa 1.0  10/10/19 psa 2.1  3/30/21 psa 1.34  6/8/22 psa 1.5      cad  8/1/18 CT cardiac scoring LMA 0.0, LCX 1.5, .5, RCA 0.0, PDA 0.0     COPD  5/08 chest x-ray negative  6/08 PFT FEV1.6 L (54% predicted), FEV/FVC ratio 47%, positive bronchodilator response  6/10 quit smoking tobacco  5/11 still off cigs, smoking 2 cigars per day  5/11 restart spiriva  5/11 cxr negative  12/12 off cigs, still smokes cigars 3-4 per day  12/12 on spiriva, add symbicort 80/4.5  2/7/13 PFT severe stage III COPD, FEV1 1.4 L (46% predicted), FEV/FVC 47% improvement after bronchodilator 13%, normal DLCO; continue spiriva, symbicort 80/1.5, smoking cessation  6/24/16  CT thorax lung cancer screening to spiculated 9 mm nodules RUL underlying emphysematous changes and tiny 3 mm nodule RML, recommend 3 month follow-up CT vs CT/PET  2/7/13 cxr negative  12/26/13 still 3 cigars per day; on spiriva and symbicort  1/23/15 still 3 cigars per day, on spiriva  and symbicort  1/26/16 change symbicort to advair 250/50 (insurance) and continue spiriva  6/2/16 CT lung cancer screening visit  7/8/16 [facility] note right upper lobe lung nodules, suspicious in nature, patient agrees to CT/PET  7/8/16 CT/PET spiculated right upper lobe nodule SUV 1.1, not FDG avid, scarring left upper lobe noted, nonspecific findings, low-grade neoplasm cannot be excluded; per [facility] repeat CT 3 months  10/24/16 CT thorax without; 2 spiculated right upper lobe pulmonary nodules 8 mm and 9 mm in size unchanged, 3 mm right middle lobe unchanged nodule  10/27/16 lung cancer screening program note, recommend referral to pulmonary nodule clinic  4/6/17 CT thorax without; 2 stable 8-9 mm spiculated right upper lobe nodules, stable probably postinflammatory tiny 3 mm RML and RLL nodules, no new suspicious nodules  12/1/17 declines PFT   4/15/18 CT lung lung cancer screening stable 9 mm right posterior/apical ill-defined nodule, stable right upper/lateral 9 mm solid pulmonary nodule, postoperative changes left upper lobe, emphysema, aortic and coronary atherosclerotic plaque, previous splenectomy   8/1/18 CT/PET no abnormal uptake within either right upper lobe or right apical nodule, nodules unchanged dating back to previous lung cancer screening CT 6/24/16, downgraded to category 3 RADS, no change 3 mm right middle lobe nodule, no increased uptake neck, abdomen, pelvis  4/19/19 CT lung cancer screening spiculated nodular density right lung apex 9 x 9 mm with cavitary change and spiculated nodule right lung apex posteriorly 7 x 8 mm overall unchanged from previous exam, postoperative change left upper lobe, recommend repeat 6 months  8/1/19 on advair and spiriva declines PFT  10/8/19 CT thorax without; unchanged 9 mm and 8 mm noncalcified pulmonary nodules right lung apex, postoperative scarring left upper lobe again noted  2/13/20 change advair to breo once daily (had difficulty remembering to take Advair twice  a day) continue spiriva  5/7/20 declines PFT   10/16/20 CT thorax without, pulmonary nodules 7.9 mm right apex, 9.1 mm RUL, 2.8 mm RUL unchanged compared to previous, new 5.0 mm nodule RUL, ill-defined opacities lingula unchanged consistent with atelectasis/fibrosis, no effusions, hyperexpanded and emphysematous lungs borderline enlarged right hilar lymph node unchanged 9.1 mm, atherosclerotic changes  10/17/20 change breo plus spiriva to trelegy inhaler due to cost  3/29/21 CT thorax; new left lower lobe 4 mm noncalcified nodule, otherwise stable 10 mm and 8 mm spiculated right upper lobe and small nodules, emphysematous changes, left upper lobe resection, recommend repeat CT 3 to 6 months  8/2/21 CT thorax without, spiculated nodule right upper lobe apex 8 mm unchanged, nodule right upper lobe central cavitation 10 mm in size unchanged, 4 mm nodule right lower lobe not significantly changed, stable 4 mm left upper lobe nodule, emphysematous and bullous change multiple areas of pulmonary scarring, surgical changes left upper lobe, repeat CT in 6 months  5/16/22 declines PFT  5/15/22 CT thorax without, stable 9 mm nodule right apex, stable 9 mm nodule RUL, 5 mm nodule RLL increased from 3 mm compared to 2018, 3 mm nodule LLL, no significant adenopathy, coronary calcifications, repeat 6 months     Dyslipidemia  5/08 chol  239, trig 91,hdl 71,ldl 150  8/09 chol 268,trig 107,hdl 56,ldl 191  8/09 start zocor 20  8/09 stress echo negative  5/11 chol 231,trig 114,hdl 53,ldl 155 off zocor  5/12 chol 215,trig 97,hdl 48,ldl 148 off zocor  12/31/13 chol 215,trig 101,hdl 55,ldl 140,crp 3.0 off statin; 10 year risk 12%, I recommend statin therapy he declines  1/13/14 echo technically difficult study, possible mild LVH  1/13/14 treadmill thallium exercise 6 minutes 30 seconds lashell protocol, limited by fatigue; no ischemia, EF 59%  2/7/15 chol 250,trig 86,hdl 51,ldl 182; urine mac <0.5; 10 year risk calculation 19%, start zocor  20 mg  5/16/16 did not take zocor  5/18/16 chol 239,trig 152,hdl 44,ldl 165  8/14/17 chol 205,trig 142,hdl 36,ldl 141 off zocor, 10 year cardiac risk, declines medication  8/1/18 CT cardiac scoring LMA 0.0, LCX 1.5, .5, RCA 0.0, PDA 0.0  10/16/8 chol 261,trig 155,hdl 42,ldl 188 declines statin therapy  10/18/18 start lipitor 20 mg  10/10/19 chol 159,trig 94,hdl 44,ldl 96 on lipitor 20 mg  3/30/21 chol 186,trig 138,hdl 42,ldl 116 on lipitor 20 mg  6/8/22 chol 165,trig 143,hdl 45,ldl 91 on lipitor 20 mg  1/16/23 chol 169,trig 122,hdl 48,ldl 97 on lipitor 20 mg     History hypertension  1/13/14 echo technically difficult study, possible mild LVH  1/13/14 treadmill thallium exercise 6 minutes 30 seconds lashell protocol, limited by fatigue; no ischemia, EF 59%  4/27/15 start lisinopril 10 mg   5/16/16 off lisinopril   10/10/19 urine mac 6     history brao  Records from ophthalmology july 2009 from [facility] indicate right branch retinal artery occlusion  8/09 MRI/MRA head and neck negative  8/09 protein C, protein S, anticardiolipin antibody, beta 2 glycoprotein antibody, factor II, factor V leyden, homocysteine antithrombin III all negative  8/09 stress echo negative  8/09 holter monitor negative, need to modify risk factors we'll start on statin, he needs to quit smoking     gout  11/29/17 right first toe pain given indocin uric 5.6  10/16/18 uric 6.1    insomnia  10/4/18 ambien refill #15  3/2/23 ambien refill#15     Leukocytosis  8/09 wbc 8.9  5/11 wbc 11.4  4/12 wbc 12.4 (54%N,34%L)  12/31/13 wbc 12.9 (53%N,22%L),hgb 17,hct 52,mcv 102,platelet 364, CRP 3.0; ? Related to tobacco  2/7/15 wbc 12.4 (49%N,34%L),hgb 17,hct 53,plt [id number]; b12 201,folate 5.3  2/13/15 leukocyte alkaline phosphatase 108 normal  5/18/16 wbc 13.5 (51%N,30%L),hgb 17.8,hct 54,plt 427,b12 249,folate 7  5/18/16 b12 249,folate 7 not on b12  8/14/17 wbc 12.1 (50%N,32%L),hgb 17,hct 51,mcv 100.8,b12 273,jak2 negative   11/29/17 wbc  19.5 (51%N, 32%L)  10/16/18 wbc 14 (48%N,36%L), hgb 18,hct 55, plt 436, b12 324  10/8/19 CT thorax without lung; unchanged 19 mm and 8 mm noncalcified pulmonary nodules right lung apex, postoperative scarring left upper lobe again noted  10/10/19 wbc 14 (53%N,31%L),hgb 18,hct 55,mcv 103,iron 120,ferritin 116,%sat 39,b12 300,folate 20  7/16/20 wbc 14.4 (57%N,28%L),hgb 18.6,hct 56.8,plt 368, flow cytometry normal myeloid cells  3/30/21 wbc 12.9,hgb 19,hct 60,mcv 103   6/8/22 wbc 12.4,b12 468,flow cytometry negative  1/16/23 wbc 12.6 (51%N,33%L)  ??related to splenectomy    low b12  12/7/15 b12 201, folate 5.3, wbc 12.4(49%N,34%L),hgb 17,hct 53,plt [id number]; start b12 1000 mcg daily  5/18/16 b12 249,folate 7,MMA<0.1,Intrinsic factor Ab negative, not on b12  8/14/17 b12 273  10/16/18 b12 324, hgb 18,hct 55,mcv 101  10/10/19 b2 300, hgb 18,hct 55,mcv 103  3/30/21 b12 398, hgb 19,hct 60,mcv 103  6/8/22 b12 468,hgb 18,hct 54.8     polycythemia  12/31/13 wbc 12.9 (53%N,22%L),hgb 17,hct 52,mcv 102,platelet 364, CRP 3.0; ? Related to tobacco  2/7/15 wbc 12.4 (49%N,34%L),hgb 17,hct 53,plt [id number]; b12 201,folate 5.3  2/13/15 leukocyte alkaline phosphatase 108 normal  5/18/16 wbc 13.5 (51%N,30%L),hgb 17.8,hct 54,plt 427,b12 249,folate 7  5/18/16 b12 249,folate 7 not on b12  8/14/17 wbc 12.1 (50%N,32%L),hgb 17,hct 51,mcv 100.8,b12 273,jak2 negative   11/29/17 wbc 19.5 (51%N, 32%L)  8/1/18 CT/PET stable pulmonary nodules, no uptake in the right upper lobe or right apical nodule, no abnormal FDG uptake neck, abdomen, pelvis  10/16/18 wbc 14 (48%N,36%L), hgb 18,hct 55, plt 436, b12 324, flow cytometry phenotypically normal slightly left shifted myeloid cells without evidence of monoclonality, leukemia, or lymphoproliferative disorder  10/8/19 CT thorax without lung; unchanged 19 mm and 8 mm noncalcified pulmonary nodules right lung apex, postoperative scarring left upper lobe again noted  10/10/19 wbc 14 (53%N,31%L),hgb 18,hct 55,mcv  103,iron 120,ferritin 116,%sat 39,b12 300,folate 20  7/16/20 wbc 14.4 (57%N,28%L),hgb 18.6,hct 56.8,plt 368, flow cytometry normal myeloid cells  3/30/21 wbc 12.9,hgb 19,hct 60,mcv 103   6/8/22 hgb 18,hct 54.8,EPO 7,LIUDMILA,MPL,CALR mutation negative,flow cytometry negative  1/16/23 wbc 12.6,hgb 18.6,hct 55.5,mcv 102     Preventative health  8/7/17 pneumovax  10/12/19 hep c Ab reactive, follow up HCV RNA negative  5/7/20 prevnar   11/16/20 colon per [facility] 4 polyps hyperplastic and benign polypoid, repeat in 3 years  3/5/21 menactra   6/27/21 shingrix first   5/13/22 covid moderna 4th  5/16/22 trumenba third   5/16/22 tdap  6/8/22 psa 1.5  6/8/22 vit d 36  11/21/22 hep b third  11/21/22 flu  11/21/22 declines prevnar 20     Pulmonary nodule  6/24/16  CT thorax lung cancer screening to spiculated 9 mm nodules RUL underlying emphysematous changes and tiny 3 mm nodule RML, recommend 3 month follow-up CT vs CT/PET  7/8/16 [facility] note right upper lobe lung nodules, suspicious in nature, patient agrees to CT/PET  7/8/16 CT/PET spiculated right upper lobe nodule SUV 1.1, not FDG avid, scarring left upper lobe noted, nonspecific findings, low-grade neoplasm cannot be excluded; per [facility] repeat CT 3 months  10/24/16 CT thorax without; 2 spiculated right upper lobe pulmonary nodules 8 mm and 9 mm in size unchanged, 3 mm right middle lobe unchanged nodule  10/27/16 lung cancer screening program note, recommend referral to pulmonary nodule clinic  11/3/16 lung cancer screening note recent follow-up CT scan unchanged pulmonary nodules, recommend repeat CT scan 6 months with myself  4/6/17 CT thorax without; 2 stable 8-9 mm spiculated right upper lobe nodules, stable probably postinflammatory tiny 3 mm RML and RLL nodules, no new suspicious nodules  4/15/18 CT lung lung cancer screening stable 9 mm right posterior/apical ill-defined nodule, stable right upper/lateral 9 mm solid pulmonary nodule, postoperative changes left upper lobe,  emphysema, aortic and coronary atherosclerotic plaque, previous splenectomy   8/1/18 CT/PET no abnormal uptake within either right upper lobe or right apical nodule, nodules unchanged dating back to previous lung cancer screening CT 6/24/16, downgraded to category 3 RADS, no change 3 mm right middle lobe nodule, no increased uptake neck, abdomen, pelvis  4/19/19 CT lung cancer screening spiculated nodular density right lung apex 9 x 9 mm with cavitary change and spiculated nodule right lung apex posteriorly 7 x 8 mm overall unchanged from previous exam, postoperative change left upper lobe, recommend repeat 6 months  10/8/19 CT thorax without lung; unchanged 19 mm and 8 mm noncalcified pulmonary nodules right lung apex, postoperative scarring left upper lobe again noted  2/12/20 cxr negative   10/16/20 CT thorax without, pulmonary nodules 7.9 mm right apex, 9.1 mm RUL, 2.8 mm RUL unchanged compared to previous, new 5.0 mm nodule RUL, ill-defined opacities lingula unchanged consistent with atelectasis/fibrosis, no effusions, hyperexpanded and emphysematous lungs borderline enlarged right hilar lymph node unchanged 9.1 mm, atherosclerotic changes  3/29/21 CT thorax; new left lower lobe 4 mm noncalcified nodule, otherwise stable 10 mm and 8 mm spiculated right upper lobe and small nodules, emphysematous changes, left upper lobe resection, recommend repeat CT 3 to 6 months  5/15/22 CT thorax without, stable 9 mm nodule right apex, stable 9 mm nodule RUL, 5 mm nodule RLL increased from 3 mm compared to 2018, 3 mm nodule LLL, no significant adenopathy, coronary calcifications, repeat 6 months     Status post splenectomy  During childhood  8/7/17 pneumovax  5/7/20 prevnar   3/5/21 menactra second  5/16/22 trumenba third      Tobacco  12/12 off cigs had been smoking 1 to 1.5 packs per day for 40+ years, still smokes cigars 3-4 per day  2/4/13 cxr negative  4/13 off cigar using electronic cig  12/26/13 still smokes 3 cigar  per day  1/13/14 echo technically difficult study, possible mild LVH  1/13/14 treadmill thallium exercise 6 minutes 30 seconds lashell protocol, limited by fatigue; no ischemia, EF 59%  5/16/16 still smoking 3 cigars per day  6/24/16  CT thorax lung cancer screening to spiculated 9 mm nodules RUL underlying emphysematous changes and tiny 3 mm nodule RML, recommend 3 month follow-up CT vs CT/PET  7/8/16 [facility] note right upper lobe lung nodules, suspicious in nature, patient agrees to CT/PET  7/8/16 [facility] note right upper lobe lung nodules, suspicious in nature, patient agrees to CT/PET  7/8/16 CT/PET spiculated right upper lobe nodule SUV 1.1, not FDG avid, scarring left upper lobe noted, nonspecific findings, low-grade neoplasm cannot be excluded; per [facility] repeat CT 3 months  10/24/16 CT thorax without; 2 spiculated right upper lobe pulmonary nodules 8 mm and 9 mm in size unchanged, 3 mm right middle lobe unchanged nodule  10/27/16 lung cancer screening program note, recommend referral to pulmonary nodule clinic  4/6/17 CT thorax without; 2 stable 8-9 mm spiculated right upper lobe nodules, stable probably postinflammatory tiny 3 mm RML and RLL nodules, no new suspicious nodules  8/7/17 no cigarettes, 4 cigars per day  12/1/17 4 cigars per day  4/15/18 CT lung lung cancer screening stable 9 mm right posterior/apical ill-defined nodule, stable right upper/lateral 9 mm solid pulmonary nodule, postoperative changes left upper lobe, emphysema, aortic and coronary atherosclerotic plaque, previous splenectomy   8/1/18 CT/PET no abnormal uptake within either right upper lobe or right apical nodule, nodules unchanged dating back to previous lung cancer screening CT 6/24/16, downgraded to category 3 RADS, no change 3 mm right middle lobe nodule, no increased uptake neck, abdomen, pelvis  10/4/18 declines PFT, smoking 3 cigars per day  4/19/19 CT lung cancer screening spiculated nodular density right lung apex 9 x 9 mm with cavitary  change and spiculated nodule right lung apex posteriorly 7 x 8 mm overall unchanged from previous exam, postoperative change left upper lobe, recommend repeat 6 months  8/1/19 3 cigars/day declines stop smoking classes  10/8/19 CT thorax without lung; unchanged 19 mm and 8 mm noncalcified pulmonary nodules right lung apex, postoperative scarring left upper lobe again noted  2/12/20 cxr negative   2/13/20 2 cigars per day  5/7/20 2 cigars per day  10/16/20 CT thorax without, pulmonary nodules 7.9 mm right apex, 9.1 mm RUL, 2.8 mm RUL unchanged compared to previous, new 5.0 mm nodule RUL, ill-defined opacities lingula unchanged consistent with atelectasis/fibrosis, no effusions, hyperexpanded and emphysematous lungs borderline enlarged right hilar lymph node unchanged 9.1 mm, atherosclerotic changes  3/29/21 CT thorax; new left lower lobe 4 mm noncalcified nodule, otherwise stable 10 mm and 8 mm spiculated right upper lobe and small nodules, emphysematous changes, left upper lobe resection, recommend repeat CT 3 to 6 months  5/16/22 declines PFT smoking 2 cigars per day  5/15/22 CT thorax without, stable 9 mm nodule right apex, stable 9 mm nodule RUL, 5 mm nodule RLL increased from 3 mm compared to 2018, 3 mm nodule LLL, no significant adenopathy, coronary calcifications, repeat 6 months  11/21/22 cigar 3 per day  11/21/22 declines PFT                                 Patient Active Problem List   Diagnosis    COPD (chronic obstructive pulmonary disease) (HCC)    Dyslipidemia    History of pneumothorax    S/P splenectomy    Preventative health care    Tobacco abuse    History of BRAO (branch retinal artery occlusion)    BPH (benign prostatic hypertrophy)    Adenomatous colon polyp    Leukocytosis    Low vitamin B12 level    History of hypertension    Pulmonary nodule    Atherosclerosis of aorta (HCC)    CAD (coronary artery disease)    Polycythemia    Insomnia    Vertigo    Left shoulder pain   Depression  "Screening  Little interest or pleasure in doing things?  0 - not at all  Feeling down, depressed , or hopeless? 0 - not at all  Patient Health Questionnaire Score: 0                Patient Care Team:  Naveen Duncan M.D. as PCP - General  Naveen Duncan M.D. as PCP - [facility]  CAROL Rodríguez as Consulting Physician (Medical Oncology)  Valencia Hull as [facility]       ROS           Objective     /78 (BP Location: Right arm, Patient Position: Sitting, BP Cuff Size: Adult)   Pulse 78   Temp 36.2 °C (97.2 °F)   Ht 1.664 m (5' 5.5\")   Wt 68.9 kg (152 lb)   SpO2 95%   BMI 24.91 kg/m²      Physical Exam  Vitals and nursing note reviewed.   Constitutional:       Appearance: Normal appearance.   HENT:      Head: Normocephalic and atraumatic.      Right Ear: Tympanic membrane and external ear normal.      Left Ear: Tympanic membrane and external ear normal.   Eyes:      Conjunctiva/sclera: Conjunctivae normal.   Cardiovascular:      Rate and Rhythm: Normal rate and regular rhythm.      Heart sounds: Normal heart sounds.   Pulmonary:      Effort: No respiratory distress.      Breath sounds: Normal breath sounds.   Abdominal:      General: There is no distension.   Musculoskeletal:      Cervical back: Neck supple.   Skin:     Coloration: Skin is not jaundiced.      Findings: No bruising.   Neurological:      General: No focal deficit present.      Mental Status: He is alert.   Psychiatric:         Behavior: Behavior normal.   Cranial nerves intact, pupils equal, reactive, no nystagmus, extraocular movement intact  Normal upper and lower extremity strength  Normal finger-to-nose  Negative Romberg  Left shoulder nontender to palpation glenohumeral joint and clavicle, no biceps tenderness, normal left  strength, normal left elbow range of motion.  Limited extension to 120 degrees, abduction 120 degrees, positive Moore, painful arc sign                      Assessment " & Plan        Assessment  #1 benign positional vertigo, seen in the emergency room March 8, symptoms have resolved, CT head negative, MRI brain old frontal infarct, no acute lesions or bleed, CTA head and neck negative, no focal deficits on exam    #2 left shoulder pain question impingement syndrome or rotator cuff strain    #3 pulmonary nodule last CT 5/15/22 CT thorax without, stable 9 mm nodule right apex, stable 9 mm nodule RUL, 5 mm nodule RLL increased from 3 mm compared to 2018, 3 mm nodule LLL, no significant adenopathy, coronary calcifications, repeat 6 months    #4 tobacco smokes cigars 2 to 3-day not interested in cessation    #5 COPD diagnosed by PFT, last PFT 10 years ago, declines follow-up PFT on Trelegy    #6 dyslipidemia on Lipitor    #7 chronic 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 Reviewed ER notes and test results, vertigo as result he can discontinue meclizine    #2 no need for vestibular therapy    #3 left shoulder x-ray, physical therapy ordered    #4 CT thorax due in June he can call to schedule an appointment when he returns from his trip    #5 continue medications no changes    #6 if x-ray negative consider MRI left shoulder    #7 follow-up this summer

## 2023-05-22 ENCOUNTER — APPOINTMENT (OUTPATIENT)
Dept: MEDICAL GROUP | Facility: MEDICAL CENTER | Age: 73
End: 2023-05-22
Payer: MEDICARE

## 2023-06-14 PROBLEM — M25.512 LEFT SHOULDER PAIN: Status: RESOLVED | Noted: 2023-03-08 | Resolved: 2023-06-14

## 2023-06-14 PROBLEM — R93.1 ELEVATED CORONARY ARTERY CALCIUM SCORE: Status: ACTIVE | Noted: 2018-08-01

## 2023-06-16 ENCOUNTER — OFFICE VISIT (OUTPATIENT)
Dept: MEDICAL GROUP | Facility: MEDICAL CENTER | Age: 73
End: 2023-06-16
Payer: MEDICARE

## 2023-06-16 VITALS
RESPIRATION RATE: 16 BRPM | HEART RATE: 67 BPM | OXYGEN SATURATION: 95 % | DIASTOLIC BLOOD PRESSURE: 78 MMHG | HEIGHT: 66 IN | SYSTOLIC BLOOD PRESSURE: 136 MMHG | TEMPERATURE: 97.8 F | WEIGHT: 152 LBS | BODY MASS INDEX: 24.43 KG/M2

## 2023-06-16 DIAGNOSIS — M25.512 LEFT SHOULDER PAIN, UNSPECIFIED CHRONICITY: ICD-10-CM

## 2023-06-16 DIAGNOSIS — Z90.81 S/P SPLENECTOMY: Chronic | ICD-10-CM

## 2023-06-16 DIAGNOSIS — R91.1 PULMONARY NODULE: ICD-10-CM

## 2023-06-16 DIAGNOSIS — R35.1 NOCTURIA: ICD-10-CM

## 2023-06-16 DIAGNOSIS — Z23 NEED FOR PNEUMOCOCCAL VACCINE: ICD-10-CM

## 2023-06-16 DIAGNOSIS — E55.9 VITAMIN D DEFICIENCY: ICD-10-CM

## 2023-06-16 DIAGNOSIS — Z00.00 PREVENTATIVE HEALTH CARE: Chronic | ICD-10-CM

## 2023-06-16 DIAGNOSIS — Z12.5 PROSTATE CANCER SCREENING: ICD-10-CM

## 2023-06-16 DIAGNOSIS — R73.09 IMPAIRED GLUCOSE METABOLISM: ICD-10-CM

## 2023-06-16 DIAGNOSIS — E78.5 DYSLIPIDEMIA: Chronic | ICD-10-CM

## 2023-06-16 PROCEDURE — G0009 ADMIN PNEUMOCOCCAL VACCINE: HCPCS | Performed by: INTERNAL MEDICINE

## 2023-06-16 PROCEDURE — 90677 PCV20 VACCINE IM: CPT | Performed by: INTERNAL MEDICINE

## 2023-06-16 PROCEDURE — 3078F DIAST BP <80 MM HG: CPT | Performed by: INTERNAL MEDICINE

## 2023-06-16 PROCEDURE — 3075F SYST BP GE 130 - 139MM HG: CPT | Performed by: INTERNAL MEDICINE

## 2023-06-16 PROCEDURE — 99214 OFFICE O/P EST MOD 30 MIN: CPT | Mod: 25 | Performed by: INTERNAL MEDICINE

## 2023-06-16 ASSESSMENT — FIBROSIS 4 INDEX: FIB4 SCORE: 1.06

## 2023-06-16 NOTE — PROGRESS NOTES
Subjective     Narendra Sun is a 72 y.o. male who presents with lung nodule Follow-Up            HPI      Here follow up lung nodules, last CT thorax a year ago stable lung nodules, due for follow-up scanning, patient has been in the [facility] for the past month with his wife, they visit there at least once a year.  He enjoys spending time there as the area is clear, and his breathing is better there compared to here, he is more active in the tends to eat healthier as well there.  Has not been as active here, the golf course may have been into but he does not get regular activity otherwise.  Still smoking 3 cigarettes a day.  Continues to use Trelegy inhaler, albuterol once a day in the morning as needed.  Does have runny nose, watery eyes here does not take anything on a regular basis for that.  Has been having more left shoulder pain, typically worse with laying on the left side no radiation to his hands, no trauma, no neck pain, no weakness of his left hand, right-handed male.  Still tries to eat a healthy diet, remains on Lipitor 20 mg, tries to minimize processed foods  Medications, allergies, medical history, surgical history, social history, family history  reviewed and updated        Current Outpatient Medications   Medication Sig Dispense Refill    albuterol 108 (90 Base) MCG/ACT Aero Soln inhalation aerosol Inhale 2 Puffs every day.      Cholecalciferol (D3 PO) Take 1 Capsule by mouth every day.      atorvastatin (LIPITOR) 20 MG Tab TAKE 1 TABLET BY MOUTH EVERY DAY (Patient taking differently: Take 20 mg by mouth every day.) 100 Tablet 2    TRELEGY ELLIPTA 100-62.5-25 MCG/INH AEROSOL POWDER, BREATH ACTIVATED inhalation INHALE 1 INHALATION EVERY DAY BY MOUTH (Patient taking differently: Inhale 1 Inhalation every day.) 180 Each 3    cyanocobalamin (VITAMIN B12) 1000 MCG TABS Take 1 Tab by mouth every day. 90 Tab 3    meclizine (ANTIVERT) 12.5 MG Tab Take 2 Tablets by mouth 3 times a day as needed for  Dizziness (vertigo). (Patient not taking: Reported on 6/16/2023) 30 Tablet 0     No current facility-administered medications for this visit.         Adenoma  12/05 colonoscopy [facility] tubular adenoma  6/29/12 colon per [facility], neg, repeat 5 years  7/28/17 colon per [facility] 3 mm polyp descending colon, 10 mm polyp descending colon, 5-8 mm polyp sigmoid colon, 10 mm polyp distal sigmoid colon, 6-8 mm polyp rectum, pathology adenoma and hyperplastic polyps repeat 3 years  11/16/20 colon per [facility] 4 polyps hyperplastic and benign polypoid, repeat in 3 years     atherosclerosis aorta  10/25/16 atherosclerotic plaque aorta on CT scan thorax    b12 deficiency  12/7/15 b12 201, folate 5.3, wbc 12.4(49%N,34%L),hgb 17,hct 53,plt [id number]; start b12 1000 mcg daily  5/18/16 b12 249,folate 7,MMA<0.1,Intrinsic factor Ab negative, not on b12  8/14/17 b12 273  10/16/18 b12 324, hgb 18,hct 55,mcv 101  10/10/19 b2 300, hgb 18,hct 55,mcv 103  3/30/21 b12 398, hgb 19,hct 60,mcv 103  6/8/22 b12 468,hgb 18,hct 54.8     BPH  5/11 psa 0.7  4/12 psa 7.5 given course of cipro  5/12/12 psa 2.3 after cipro  12/31/13 psa 1.4  2/7/15 psa 1.1  5/18/16 psa 1.2  10/16/18 psa 1.0  10/10/19 psa 2.1  3/30/21 psa 1.34  6/8/22 psa 1.5       COPD  5/08 chest x-ray negative  6/08 PFT FEV1.6 L (54% predicted), FEV/FVC ratio 47%, positive bronchodilator response  6/10 quit smoking tobacco  5/11 still off cigs, smoking 2 cigars per day  5/11 restart spiriva  5/11 cxr negative  12/12 off cigs, still smokes cigars 3-4 per day  12/12 on spiriva, add symbicort 80/4.5  2/7/13 PFT severe stage III COPD, FEV1 1.4 L (46% predicted), FEV/FVC 47% improvement after bronchodilator 13%, normal DLCO; continue spiriva, symbicort 80/1.5, smoking cessation  6/24/16  CT thorax lung cancer screening to spiculated 9 mm nodules RUL underlying emphysematous changes and tiny 3 mm nodule RML, recommend 3 month follow-up CT vs CT/PET  2/7/13 cxr negative  12/26/13 still 3 cigars per day; on  spiriva and symbicort  1/23/15 still 3 cigars per day, on spiriva and symbicort  1/26/16 change symbicort to advair 250/50 (insurance) and continue spiriva  6/2/16 CT lung cancer screening visit  7/8/16 [facility] note right upper lobe lung nodules, suspicious in nature, patient agrees to CT/PET  7/8/16 CT/PET spiculated right upper lobe nodule SUV 1.1, not FDG avid, scarring left upper lobe noted, nonspecific findings, low-grade neoplasm cannot be excluded; per [facility] repeat CT 3 months  10/24/16 CT thorax without; 2 spiculated right upper lobe pulmonary nodules 8 mm and 9 mm in size unchanged, 3 mm right middle lobe unchanged nodule  10/27/16 lung cancer screening program note, recommend referral to pulmonary nodule clinic  4/6/17 CT thorax without; 2 stable 8-9 mm spiculated right upper lobe nodules, stable probably postinflammatory tiny 3 mm RML and RLL nodules, no new suspicious nodules  12/1/17 declines PFT   4/15/18 CT lung lung cancer screening stable 9 mm right posterior/apical ill-defined nodule, stable right upper/lateral 9 mm solid pulmonary nodule, postoperative changes left upper lobe, emphysema, aortic and coronary atherosclerotic plaque, previous splenectomy   8/1/18 CT/PET no abnormal uptake within either right upper lobe or right apical nodule, nodules unchanged dating back to previous lung cancer screening CT 6/24/16, downgraded to category 3 RADS, no change 3 mm right middle lobe nodule, no increased uptake neck, abdomen, pelvis  4/19/19 CT lung cancer screening spiculated nodular density right lung apex 9 x 9 mm with cavitary change and spiculated nodule right lung apex posteriorly 7 x 8 mm overall unchanged from previous exam, postoperative change left upper lobe, recommend repeat 6 months  8/1/19 on advair and spiriva declines PFT  10/8/19 CT thorax without; unchanged 9 mm and 8 mm noncalcified pulmonary nodules right lung apex, postoperative scarring left upper lobe again noted  2/13/20 change advair  to breo once daily (had difficulty remembering to take Advair twice a day) continue spiriva  5/7/20 declines PFT   10/16/20 CT thorax without, pulmonary nodules 7.9 mm right apex, 9.1 mm RUL, 2.8 mm RUL unchanged compared to previous, new 5.0 mm nodule RUL, ill-defined opacities lingula unchanged consistent with atelectasis/fibrosis, no effusions, hyperexpanded and emphysematous lungs borderline enlarged right hilar lymph node unchanged 9.1 mm, atherosclerotic changes  10/17/20 change breo plus spiriva to trelegy inhaler due to cost  3/29/21 CT thorax; new left lower lobe 4 mm noncalcified nodule, otherwise stable 10 mm and 8 mm spiculated right upper lobe and small nodules, emphysematous changes, left upper lobe resection, recommend repeat CT 3 to 6 months  8/2/21 CT thorax without, spiculated nodule right upper lobe apex 8 mm unchanged, nodule right upper lobe central cavitation 10 mm in size unchanged, 4 mm nodule right lower lobe not significantly changed, stable 4 mm left upper lobe nodule, emphysematous and bullous change multiple areas of pulmonary scarring, surgical changes left upper lobe, repeat CT in 6 months  5/16/22 declines PFT  5/15/22 CT thorax without, stable 9 mm nodule right apex, stable 9 mm nodule RUL, 5 mm nodule RLL increased from 3 mm compared to 2018, 3 mm nodule LLL, no significant adenopathy, coronary calcifications, repeat 6 months  11/21/22 declines PFT, cigar 3 per day     Dyslipidemia  5/08 chol  239, trig 91,hdl 71,ldl 150  8/09 chol 268,trig 107,hdl 56,ldl 191  8/09 start zocor 20  8/09 stress echo negative  5/11 chol 231,trig 114,hdl 53,ldl 155 off zocor  5/12 chol 215,trig 97,hdl 48,ldl 148 off zocor  12/31/13 chol 215,trig 101,hdl 55,ldl 140,crp 3.0 off statin; 10 year risk 12%, I recommend statin therapy he declines  1/13/14 echo technically difficult study, possible mild LVH  1/13/14 treadmill thallium exercise 6 minutes 30 seconds lashell protocol, limited by fatigue; no  ischemia, EF 59%  2/7/15 chol 250,trig 86,hdl 51,ldl 182; urine mac <0.5; 10 year risk calculation 19%, start zocor 20 mg  5/16/16 did not take zocor  5/18/16 chol 239,trig 152,hdl 44,ldl 165  8/14/17 chol 205,trig 142,hdl 36,ldl 141 off zocor, 10 year cardiac risk, declines medication  8/1/18 CT cardiac scoring LMA 0.0, LCX 1.5, .5, RCA 0.0, PDA 0.0  10/16/8 chol 261,trig 155,hdl 42,ldl 188 declines statin therapy  10/18/18 start lipitor 20 mg  10/10/19 chol 159,trig 94,hdl 44,ldl 96 on lipitor 20 mg  3/30/21 chol 186,trig 138,hdl 42,ldl 116 on lipitor 20 mg  6/8/22 chol 165,trig 143,hdl 45,ldl 91 on lipitor 20 mg  1/16/23 chol 169,trig 122,hdl 48,ldl 97 on lipitor 20 mg    elevated coronary calcium score  8/1/18 CT cardiac scoring LMA 0.0, LCX 1.5, .5, RCA 0.0, PDA 0.0= 282    history benign positional vertigo  3/8/23 ER note  3/8/23 CT CTA neck with and without postprocessing no evidence of flow-limiting stenosis  3/8/23 CT CTA head with and without postprocessing  3/8/23 MRI brain no acute infarct or hemorrhage, subacute chronic small white matter infarct right frontal lobe  3/8/23 CT head negative     history brao  Records from ophthalmology july 2009 from [facility] indicate right branch retinal artery occlusion  8/09 MRI/MRA head and neck negative  8/09 protein C, protein S, anticardiolipin antibody, beta 2 glycoprotein antibody, factor II, factor V leyden, homocysteine antithrombin III all negative  8/09 stress echo negative  8/09 holter monitor negative, need to modify risk factors we'll start on statin, he needs to quit smoking    History hypertension  1/13/14 echo technically difficult study, possible mild LVH  1/13/14 treadmill thallium exercise 6 minutes 30 seconds lashell protocol, limited by fatigue; no ischemia, EF 59%  4/27/15 start lisinopril 10 mg   5/16/16 off lisinopril   10/10/19 urine mac 6     gout  11/29/17 right first toe pain given indocin uric 5.6  10/16/18 uric  6.1     insomnia  10/4/18 ambien refill #15  3/2/23 ambien refill#15     Leukocytosis  8/09 wbc 8.9  5/11 wbc 11.4  4/12 wbc 12.4 (54%N,34%L)  12/31/13 wbc 12.9 (53%N,22%L),hgb 17,hct 52,mcv 102,platelet 364, CRP 3.0; ? Related to tobacco  2/7/15 wbc 12.4 (49%N,34%L),hgb 17,hct 53,plt [id number]; b12 201,folate 5.3  2/13/15 leukocyte alkaline phosphatase 108 normal  5/18/16 wbc 13.5 (51%N,30%L),hgb 17.8,hct 54,plt 427,b12 249,folate 7  5/18/16 b12 249,folate 7 not on b12  8/14/17 wbc 12.1 (50%N,32%L),hgb 17,hct 51,mcv 100.8,b12 273,jak2 negative   11/29/17 wbc 19.5 (51%N, 32%L)  10/16/18 wbc 14 (48%N,36%L), hgb 18,hct 55, plt 436, b12 324  10/8/19 CT thorax without lung; unchanged 19 mm and 8 mm noncalcified pulmonary nodules right lung apex, postoperative scarring left upper lobe again noted  10/10/19 wbc 14 (53%N,31%L),hgb 18,hct 55,mcv 103,iron 120,ferritin 116,%sat 39,b12 300,folate 20  7/16/20 wbc 14.4 (57%N,28%L),hgb 18.6,hct 56.8,plt 368, flow cytometry normal myeloid cells  3/30/21 wbc 12.9,hgb 19,hct 60,mcv 103   6/8/22 wbc 12.4,b12 468,flow cytometry negative  1/16/23 wbc 12.6 (51%N,33%L)  3/8/23 wbc 12 (59%N,26%L)  ??related to splenectomy     polycythemia  12/31/13 wbc 12.9 (53%N,22%L),hgb 17,hct 52,mcv 102,platelet 364, CRP 3.0; ? Related to tobacco  2/7/15 wbc 12.4 (49%N,34%L),hgb 17,hct 53,plt [id number]; b12 201,folate 5.3  2/13/15 leukocyte alkaline phosphatase 108 normal  5/18/16 wbc 13.5 (51%N,30%L),hgb 17.8,hct 54,plt 427,b12 249,folate 7  5/18/16 b12 249,folate 7 not on b12  8/14/17 wbc 12.1 (50%N,32%L),hgb 17,hct 51,mcv 100.8,b12 273,jak2 negative   11/29/17 wbc 19.5 (51%N, 32%L)  8/1/18 CT/PET stable pulmonary nodules, no uptake in the right upper lobe or right apical nodule, no abnormal FDG uptake neck, abdomen, pelvis  10/16/18 wbc 14 (48%N,36%L), hgb 18,hct 55, plt 436, b12 324, flow cytometry phenotypically normal slightly left shifted myeloid cells without evidence of monoclonality, leukemia, or  lymphoproliferative disorder  10/8/19 CT thorax without lung; unchanged 19 mm and 8 mm noncalcified pulmonary nodules right lung apex, postoperative scarring left upper lobe again noted  10/10/19 wbc 14 (53%N,31%L),hgb 18,hct 55,mcv 103,iron 120,ferritin 116,%sat 39,b12 300,folate 20  7/16/20 wbc 14.4 (57%N,28%L),hgb 18.6,hct 56.8,plt 368, flow cytometry normal myeloid cells  3/30/21 wbc 12.9,hgb 19,hct 60,mcv 103   6/8/22 hgb 18,hct 54.8,EPO 7,LIUDMILA,MPL,CALR mutation negative,flow cytometry negative  1/16/23 wbc 12.6,hgb 18.6,hct 55.5,mcv 102     Preventative health  8/7/17 pneumovax  10/12/19 hep c Ab reactive, follow up HCV RNA negative  5/7/20 prevnar   5/7/20 menactra second  11/16/20 colon per [facility] 4 polyps hyperplastic and benign polypoid, repeat in 3 years  6/27/21 shingrix first   5/13/22 covid moderna 4th  5/16/22 trumenba third   5/16/22 tdap  6/8/22 psa 1.5  6/8/22 vit d 36  11/21/22 hep b third  11/21/22 declines prevnar 20     Pulmonary nodule  6/24/16  CT thorax lung cancer screening to spiculated 9 mm nodules RUL underlying emphysematous changes and tiny 3 mm nodule RML, recommend 3 month follow-up CT vs CT/PET  7/8/16 [facility] note right upper lobe lung nodules, suspicious in nature, patient agrees to CT/PET  7/8/16 CT/PET spiculated right upper lobe nodule SUV 1.1, not FDG avid, scarring left upper lobe noted, nonspecific findings, low-grade neoplasm cannot be excluded; per [facility] repeat CT 3 months  10/24/16 CT thorax without; 2 spiculated right upper lobe pulmonary nodules 8 mm and 9 mm in size unchanged, 3 mm right middle lobe unchanged nodule  10/27/16 lung cancer screening program note, recommend referral to pulmonary nodule clinic  11/3/16 lung cancer screening note recent follow-up CT scan unchanged pulmonary nodules, recommend repeat CT scan 6 months with myself  4/6/17 CT thorax without; 2 stable 8-9 mm spiculated right upper lobe nodules, stable probably postinflammatory tiny 3 mm RML and RLL  nodules, no new suspicious nodules  4/15/18 CT lung lung cancer screening stable 9 mm right posterior/apical ill-defined nodule, stable right upper/lateral 9 mm solid pulmonary nodule, postoperative changes left upper lobe, emphysema, aortic and coronary atherosclerotic plaque, previous splenectomy   8/1/18 CT/PET no abnormal uptake within either right upper lobe or right apical nodule, nodules unchanged dating back to previous lung cancer screening CT 6/24/16, downgraded to category 3 RADS, no change 3 mm right middle lobe nodule, no increased uptake neck, abdomen, pelvis  4/19/19 CT lung cancer screening spiculated nodular density right lung apex 9 x 9 mm with cavitary change and spiculated nodule right lung apex posteriorly 7 x 8 mm overall unchanged from previous exam, postoperative change left upper lobe, recommend repeat 6 months  10/8/19 CT thorax without lung; unchanged 19 mm and 8 mm noncalcified pulmonary nodules right lung apex, postoperative scarring left upper lobe again noted  2/12/20 cxr negative   10/16/20 CT thorax without, pulmonary nodules 7.9 mm right apex, 9.1 mm RUL, 2.8 mm RUL unchanged compared to previous, new 5.0 mm nodule RUL, ill-defined opacities lingula unchanged consistent with atelectasis/fibrosis, no effusions, hyperexpanded and emphysematous lungs borderline enlarged right hilar lymph node unchanged 9.1 mm, atherosclerotic changes  3/29/21 CT thorax; new left lower lobe 4 mm noncalcified nodule, otherwise stable 10 mm and 8 mm spiculated right upper lobe and small nodules, emphysematous changes, left upper lobe resection, recommend repeat CT 3 to 6 months  5/15/22 CT thorax without, stable 9 mm nodule right apex, stable 9 mm nodule RUL, 5 mm nodule RLL increased from 3 mm compared to 2018, 3 mm nodule LLL, no significant adenopathy, coronary calcifications, repeat 6 months     Status post splenectomy  During childhood  8/7/17 pneumovax  5/7/20 prevnar   5/7/20 menactra  second  5/16/22 trumenba third      Tobacco  12/12 off cigs had been smoking 1 to 1.5 packs per day for 40+ years, still smokes cigars 3-4 per day  2/4/13 cxr negative  4/13 off cigar using electronic cig  12/26/13 still smokes 3 cigar per day  1/13/14 echo technically difficult study, possible mild LVH  1/13/14 treadmill thallium exercise 6 minutes 30 seconds lashell protocol, limited by fatigue; no ischemia, EF 59%  5/16/16 still smoking 3 cigars per day  6/24/16  CT thorax lung cancer screening to spiculated 9 mm nodules RUL underlying emphysematous changes and tiny 3 mm nodule RML, recommend 3 month follow-up CT vs CT/PET  7/8/16 [facility] note right upper lobe lung nodules, suspicious in nature, patient agrees to CT/PET  7/8/16 [facility] note right upper lobe lung nodules, suspicious in nature, patient agrees to CT/PET  7/8/16 CT/PET spiculated right upper lobe nodule SUV 1.1, not FDG avid, scarring left upper lobe noted, nonspecific findings, low-grade neoplasm cannot be excluded; per [facility] repeat CT 3 months  10/24/16 CT thorax without; 2 spiculated right upper lobe pulmonary nodules 8 mm and 9 mm in size unchanged, 3 mm right middle lobe unchanged nodule  10/27/16 lung cancer screening program note, recommend referral to pulmonary nodule clinic  4/6/17 CT thorax without; 2 stable 8-9 mm spiculated right upper lobe nodules, stable probably postinflammatory tiny 3 mm RML and RLL nodules, no new suspicious nodules  8/7/17 no cigarettes, 4 cigars per day  12/1/17 4 cigars per day  4/15/18 CT lung lung cancer screening stable 9 mm right posterior/apical ill-defined nodule, stable right upper/lateral 9 mm solid pulmonary nodule, postoperative changes left upper lobe, emphysema, aortic and coronary atherosclerotic plaque, previous splenectomy   8/1/18 CT/PET no abnormal uptake within either right upper lobe or right apical nodule, nodules unchanged dating back to previous lung cancer screening CT 6/24/16, downgraded to category 3  RADS, no change 3 mm right middle lobe nodule, no increased uptake neck, abdomen, pelvis  10/4/18 declines PFT, smoking 3 cigars per day  4/19/19 CT lung cancer screening spiculated nodular density right lung apex 9 x 9 mm with cavitary change and spiculated nodule right lung apex posteriorly 7 x 8 mm overall unchanged from previous exam, postoperative change left upper lobe, recommend repeat 6 months  8/1/19 3 cigars/day declines stop smoking classes  10/8/19 CT thorax without lung; unchanged 19 mm and 8 mm noncalcified pulmonary nodules right lung apex, postoperative scarring left upper lobe again noted  2/12/20 cxr negative   2/13/20 2 cigars per day  5/7/20 2 cigars per day  10/16/20 CT thorax without, pulmonary nodules 7.9 mm right apex, 9.1 mm RUL, 2.8 mm RUL unchanged compared to previous, new 5.0 mm nodule RUL, ill-defined opacities lingula unchanged consistent with atelectasis/fibrosis, no effusions, hyperexpanded and emphysematous lungs borderline enlarged right hilar lymph node unchanged 9.1 mm, atherosclerotic changes  3/29/21 CT thorax; new left lower lobe 4 mm noncalcified nodule, otherwise stable 10 mm and 8 mm spiculated right upper lobe and small nodules, emphysematous changes, left upper lobe resection, recommend repeat CT 3 to 6 months  5/16/22 declines PFT smoking 2 cigars per day  5/15/22 CT thorax without, stable 9 mm nodule right apex, stable 9 mm nodule RUL, 5 mm nodule RLL increased from 3 mm compared to 2018, 3 mm nodule LLL, no significant adenopathy, coronary calcifications, repeat 6 months  11/21/22 cigar 3 per day  11/21/22 declines PFT        Patient Active Problem List   Diagnosis    COPD (chronic obstructive pulmonary disease) (HCC)    Dyslipidemia    History of pneumothorax    S/P splenectomy    Preventative health care    Tobacco abuse    History of BRAO (branch retinal artery occlusion)    BPH (benign prostatic hypertrophy)    Adenomatous colon polyp    Leukocytosis    B12  "deficiency    History of hypertension    Pulmonary nodule    Atherosclerosis of aorta (HCC)    Elevated coronary artery calcium score    Polycythemia    Insomnia    History of benign positional vertigo                     Patient Care Team:  Naveen Duncan M.D. as PCP - General  Naveen Duncan M.D. as PCP - [facility]  CAROL Rodríguez as Consulting Physician (Medical Oncology)  Valencia Hull as [facility]       ROS           Objective     /78 (BP Location: Right arm, Patient Position: Sitting, BP Cuff Size: Adult)   Pulse 67   Temp 36.6 °C (97.8 °F)   Resp 16   Ht 1.676 m (5' 6\")   Wt 68.9 kg (152 lb)   SpO2 95%   BMI 24.53 kg/m²      Physical Exam  Vitals and nursing note reviewed.   Constitutional:       Appearance: Normal appearance.   HENT:      Head: Normocephalic and atraumatic.      Right Ear: External ear normal.      Left Ear: External ear normal.   Eyes:      Conjunctiva/sclera: Conjunctivae normal.   Cardiovascular:      Rate and Rhythm: Normal rate and regular rhythm.      Heart sounds: Normal heart sounds.   Pulmonary:      Effort: Pulmonary effort is normal.      Breath sounds: Normal breath sounds.   Abdominal:      General: There is no distension.   Musculoskeletal:         General: No swelling.   Skin:     Coloration: Skin is not jaundiced.   Neurological:      General: No focal deficit present.      Mental Status: He is alert.   Psychiatric:         Mood and Affect: Mood normal.     Left shoulder limited extension 150 degrees, abduction limited to 90 degrees, no tenderness to palpation left bicep, normal left elbow range of motion, normal left  strength, no left upper extremity, normal sensation light touch hands, positive painful arc sign and Moore sign left shoulder                        Assessment & Plan   Assessment     #1 pulmonary nodules 5/15/22 CT thorax without, stable 9 mm nodule right apex, stable 9 mm nodule RUL, 5 mm nodule " RLL increased from 3 mm compared to 2018, 3 mm nodule LLL, no significant adenopathy      #2 tobacco 3 cigarettes/day, no recent PFTs he has declined but remains on Trelegy as patient did have stage III COPD last pulmonary function test 10 years ago and has elected not to pursue follow-up PFTs    #3 COPD, on Trelegy inhaler and albuterol once a day stable no recurrent lung infections    #4 dyslipidemia on Lipitor    #5 adenoma 11/16/20 colon per [facility] 4 polyps hyperplastic and benign polypoid, repeat in 3 years due for follow-up colonoscopy and is here    #6 left shoulder pain, chronic, no trauma, consider impingement syndrome, rotator cuff disorder with limited range of motion, painful arc sign, positive Moore sign    #7 allergic rhinits runny nose runny eyes     #8 chronic leukocytosis likely related to splenomegaly    #9 chronic polycythemia previous work-up negative 6/8/22 hgb 18,hct 54.8,EPO 7,LIUDMILA,MPL,CALR mutation negative,flow cytometry negative, question secondary component due to tobacco, no history of sleep apnea    #10 status post splenectomy due for Prevnar 20    #11 vitamin D deficiency    #12 impaired glucose metabolism    #13 nocturia      Plan  #1 repeat CT thorax follow-up pulmonary nodules    #2 Prevnar 20 today    #3 continue Trelegy inhaler, albuterol    #4 work on smoking cessation as long-term tobacco increases risk for worsening lung disease    #5 has declined PFT follow-up    #6 Labs    #7 continue work on a good nutrition and exercise program    #8 x-ray of the shoulder, consider MRI but will obtain x-ray first, physical therapy would be an option as well but will wait for the imaging result, avoid heavy overhead lifting    #9 follow-up 6 months

## 2023-06-30 ENCOUNTER — HOSPITAL ENCOUNTER (OUTPATIENT)
Dept: LAB | Facility: MEDICAL CENTER | Age: 73
End: 2023-06-30
Attending: INTERNAL MEDICINE
Payer: MEDICARE

## 2023-06-30 ENCOUNTER — HOSPITAL ENCOUNTER (OUTPATIENT)
Dept: RADIOLOGY | Facility: MEDICAL CENTER | Age: 73
End: 2023-06-30
Attending: INTERNAL MEDICINE
Payer: MEDICARE

## 2023-06-30 DIAGNOSIS — R73.09 IMPAIRED GLUCOSE METABOLISM: ICD-10-CM

## 2023-06-30 DIAGNOSIS — E78.5 DYSLIPIDEMIA: Chronic | ICD-10-CM

## 2023-06-30 DIAGNOSIS — R35.1 NOCTURIA: ICD-10-CM

## 2023-06-30 DIAGNOSIS — M25.512 LEFT SHOULDER PAIN, UNSPECIFIED CHRONICITY: ICD-10-CM

## 2023-06-30 DIAGNOSIS — E55.9 VITAMIN D DEFICIENCY: ICD-10-CM

## 2023-06-30 DIAGNOSIS — Z12.5 PROSTATE CANCER SCREENING: ICD-10-CM

## 2023-06-30 LAB
ALBUMIN SERPL BCP-MCNC: 4.1 G/DL (ref 3.2–4.9)
ALBUMIN/GLOB SERPL: 1.6 G/DL
ALP SERPL-CCNC: 90 U/L (ref 30–99)
ALT SERPL-CCNC: 19 U/L (ref 2–50)
ANION GAP SERPL CALC-SCNC: 14 MMOL/L (ref 7–16)
APPEARANCE UR: CLEAR
AST SERPL-CCNC: 19 U/L (ref 12–45)
BASOPHILS # BLD AUTO: 0.7 % (ref 0–1.8)
BASOPHILS # BLD: 0.1 K/UL (ref 0–0.12)
BILIRUB SERPL-MCNC: 0.6 MG/DL (ref 0.1–1.5)
BILIRUB UR QL STRIP.AUTO: NEGATIVE
BUN SERPL-MCNC: 10 MG/DL (ref 8–22)
CALCIUM ALBUM COR SERPL-MCNC: 9.5 MG/DL (ref 8.5–10.5)
CALCIUM SERPL-MCNC: 9.6 MG/DL (ref 8.5–10.5)
CHLORIDE SERPL-SCNC: 105 MMOL/L (ref 96–112)
CHOLEST SERPL-MCNC: 164 MG/DL (ref 100–199)
CO2 SERPL-SCNC: 21 MMOL/L (ref 20–33)
COLOR UR: YELLOW
CREAT SERPL-MCNC: 0.99 MG/DL (ref 0.5–1.4)
EOSINOPHIL # BLD AUTO: 0.7 K/UL (ref 0–0.51)
EOSINOPHIL NFR BLD: 4.6 % (ref 0–6.9)
ERYTHROCYTE [DISTWIDTH] IN BLOOD BY AUTOMATED COUNT: 54.4 FL (ref 35.9–50)
EST. AVERAGE GLUCOSE BLD GHB EST-MCNC: 120 MG/DL
FASTING STATUS PATIENT QL REPORTED: NORMAL
GFR SERPLBLD CREATININE-BSD FMLA CKD-EPI: 80 ML/MIN/1.73 M 2
GLOBULIN SER CALC-MCNC: 2.6 G/DL (ref 1.9–3.5)
GLUCOSE SERPL-MCNC: 70 MG/DL (ref 65–99)
GLUCOSE UR STRIP.AUTO-MCNC: NEGATIVE MG/DL
HBA1C MFR BLD: 5.8 % (ref 4–5.6)
HCT VFR BLD AUTO: 53.9 % (ref 42–52)
HDLC SERPL-MCNC: 41 MG/DL
HGB BLD-MCNC: 17.4 G/DL (ref 14–18)
IMM GRANULOCYTES # BLD AUTO: 0.09 K/UL (ref 0–0.11)
IMM GRANULOCYTES NFR BLD AUTO: 0.6 % (ref 0–0.9)
KETONES UR STRIP.AUTO-MCNC: NEGATIVE MG/DL
LDLC SERPL CALC-MCNC: 80 MG/DL
LEUKOCYTE ESTERASE UR QL STRIP.AUTO: NEGATIVE
LYMPHOCYTES # BLD AUTO: 5 K/UL (ref 1–4.8)
LYMPHOCYTES NFR BLD: 32.6 % (ref 22–41)
MCH RBC QN AUTO: 33.3 PG (ref 27–33)
MCHC RBC AUTO-ENTMCNC: 32.3 G/DL (ref 32.3–36.5)
MCV RBC AUTO: 103.1 FL (ref 81.4–97.8)
MICRO URNS: NORMAL
MONOCYTES # BLD AUTO: 1.44 K/UL (ref 0–0.85)
MONOCYTES NFR BLD AUTO: 9.4 % (ref 0–13.4)
NEUTROPHILS # BLD AUTO: 7.99 K/UL (ref 1.82–7.42)
NEUTROPHILS NFR BLD: 52.1 % (ref 44–72)
NITRITE UR QL STRIP.AUTO: NEGATIVE
NRBC # BLD AUTO: 0 K/UL
NRBC BLD-RTO: 0 /100 WBC (ref 0–0.2)
PH UR STRIP.AUTO: 6 [PH] (ref 5–8)
PLATELET # BLD AUTO: 369 K/UL (ref 164–446)
PMV BLD AUTO: 9.9 FL (ref 9–12.9)
POTASSIUM SERPL-SCNC: 4.4 MMOL/L (ref 3.6–5.5)
PROT SERPL-MCNC: 6.7 G/DL (ref 6–8.2)
PROT UR QL STRIP: NEGATIVE MG/DL
RBC # BLD AUTO: 5.23 M/UL (ref 4.7–6.1)
RBC UR QL AUTO: NEGATIVE
SODIUM SERPL-SCNC: 140 MMOL/L (ref 135–145)
SP GR UR STRIP.AUTO: 1.02
TRIGL SERPL-MCNC: 214 MG/DL (ref 0–149)
UROBILINOGEN UR STRIP.AUTO-MCNC: 1 MG/DL
WBC # BLD AUTO: 15.3 K/UL (ref 4.8–10.8)

## 2023-06-30 PROCEDURE — 85025 COMPLETE CBC W/AUTO DIFF WBC: CPT

## 2023-06-30 PROCEDURE — 73030 X-RAY EXAM OF SHOULDER: CPT | Mod: LT

## 2023-06-30 PROCEDURE — 81003 URINALYSIS AUTO W/O SCOPE: CPT

## 2023-06-30 PROCEDURE — 84153 ASSAY OF PSA TOTAL: CPT

## 2023-06-30 PROCEDURE — 83036 HEMOGLOBIN GLYCOSYLATED A1C: CPT

## 2023-06-30 PROCEDURE — 36415 COLL VENOUS BLD VENIPUNCTURE: CPT

## 2023-06-30 PROCEDURE — 82306 VITAMIN D 25 HYDROXY: CPT

## 2023-06-30 PROCEDURE — 84443 ASSAY THYROID STIM HORMONE: CPT

## 2023-06-30 PROCEDURE — 80053 COMPREHEN METABOLIC PANEL: CPT

## 2023-06-30 PROCEDURE — 80061 LIPID PANEL: CPT

## 2023-07-01 PROBLEM — M25.519 SHOULDER PAIN: Status: ACTIVE | Noted: 2023-03-08

## 2023-07-01 LAB
25(OH)D3 SERPL-MCNC: 36 NG/ML (ref 30–100)
PSA SERPL-MCNC: 1.31 NG/ML (ref 0–4)
TSH SERPL DL<=0.005 MIU/L-ACNC: 2.52 UIU/ML (ref 0.38–5.33)

## 2023-07-02 ENCOUNTER — TELEPHONE (OUTPATIENT)
Dept: MEDICAL GROUP | Facility: MEDICAL CENTER | Age: 73
End: 2023-07-02
Payer: MEDICARE

## 2023-07-02 DIAGNOSIS — M25.512 CHRONIC LEFT SHOULDER PAIN: ICD-10-CM

## 2023-07-02 DIAGNOSIS — G89.29 CHRONIC LEFT SHOULDER PAIN: ICD-10-CM

## 2023-07-02 PROBLEM — R73.09 IMPAIRED GLUCOSE METABOLISM: Status: ACTIVE | Noted: 2023-07-02

## 2023-07-03 NOTE — TELEPHONE ENCOUNTER
Notified with labs, chronic leukocytosis and splenectomy stable, no significant change in percentage neutrophils, lymphocytes, we will continue to monitor.  Previous work-up B12, flow cytometry last year negative.  No recent infection or acute inflammation.  Triglycerides slightly elevated, A1c slightly elevated, continue work on a good nutrition and activity program.  Limit sweets, candies in his diet.  Sweetened ice tea is fine 2 glasses/day.  Notified with left shoulder x-ray, order MRI for persistent left shoulder pain, arthritis on x-ray.

## 2023-07-14 ENCOUNTER — TELEPHONE (OUTPATIENT)
Dept: HEALTH INFORMATION MANAGEMENT | Facility: OTHER | Age: 73
End: 2023-07-14
Payer: MEDICARE

## 2023-07-19 ENCOUNTER — APPOINTMENT (OUTPATIENT)
Dept: RADIOLOGY | Facility: MEDICAL CENTER | Age: 73
DRG: 522 | End: 2023-07-19
Attending: EMERGENCY MEDICINE
Payer: MEDICARE

## 2023-07-19 ENCOUNTER — APPOINTMENT (OUTPATIENT)
Dept: RADIOLOGY | Facility: MEDICAL CENTER | Age: 73
DRG: 522 | End: 2023-07-19
Attending: STUDENT IN AN ORGANIZED HEALTH CARE EDUCATION/TRAINING PROGRAM
Payer: MEDICARE

## 2023-07-19 ENCOUNTER — HOSPITAL ENCOUNTER (INPATIENT)
Facility: MEDICAL CENTER | Age: 73
LOS: 2 days | DRG: 522 | End: 2023-07-21
Attending: EMERGENCY MEDICINE | Admitting: STUDENT IN AN ORGANIZED HEALTH CARE EDUCATION/TRAINING PROGRAM
Payer: MEDICARE

## 2023-07-19 DIAGNOSIS — S72.002A CLOSED FRACTURE OF LEFT HIP, INITIAL ENCOUNTER (HCC): ICD-10-CM

## 2023-07-19 DIAGNOSIS — J41.0 SIMPLE CHRONIC BRONCHITIS (HCC): Chronic | ICD-10-CM

## 2023-07-19 DIAGNOSIS — S72.002A LEFT DISPLACED FEMORAL NECK FRACTURE (HCC): ICD-10-CM

## 2023-07-19 DIAGNOSIS — R09.02 HYPOXIA: ICD-10-CM

## 2023-07-19 LAB
ALBUMIN SERPL BCP-MCNC: 3.3 G/DL (ref 3.2–4.9)
ALBUMIN/GLOB SERPL: 1.3 G/DL
ALP SERPL-CCNC: 71 U/L (ref 30–99)
ALT SERPL-CCNC: 18 U/L (ref 2–50)
ANION GAP SERPL CALC-SCNC: 13 MMOL/L (ref 7–16)
APTT PPP: 46.8 SEC (ref 24.7–36)
AST SERPL-CCNC: 16 U/L (ref 12–45)
BASOPHILS # BLD AUTO: 0.4 % (ref 0–1.8)
BASOPHILS # BLD: 0.07 K/UL (ref 0–0.12)
BILIRUB SERPL-MCNC: 0.2 MG/DL (ref 0.1–1.5)
BUN SERPL-MCNC: 15 MG/DL (ref 8–22)
CALCIUM ALBUM COR SERPL-MCNC: 7.9 MG/DL (ref 8.5–10.5)
CALCIUM SERPL-MCNC: 7.3 MG/DL (ref 8.5–10.5)
CHLORIDE SERPL-SCNC: 114 MMOL/L (ref 96–112)
CO2 SERPL-SCNC: 18 MMOL/L (ref 20–33)
CREAT SERPL-MCNC: 0.85 MG/DL (ref 0.5–1.4)
EKG IMPRESSION: NORMAL
EOSINOPHIL # BLD AUTO: 0.29 K/UL (ref 0–0.51)
EOSINOPHIL NFR BLD: 1.5 % (ref 0–6.9)
ERYTHROCYTE [DISTWIDTH] IN BLOOD BY AUTOMATED COUNT: 53.3 FL (ref 35.9–50)
GFR SERPLBLD CREATININE-BSD FMLA CKD-EPI: 92 ML/MIN/1.73 M 2
GLOBULIN SER CALC-MCNC: 2.6 G/DL (ref 1.9–3.5)
GLUCOSE SERPL-MCNC: 87 MG/DL (ref 65–99)
HCT VFR BLD AUTO: 51 % (ref 42–52)
HGB BLD-MCNC: 16.9 G/DL (ref 14–18)
IMM GRANULOCYTES # BLD AUTO: 0.14 K/UL (ref 0–0.11)
IMM GRANULOCYTES NFR BLD AUTO: 0.7 % (ref 0–0.9)
INR PPP: 2.41 (ref 0.87–1.13)
LYMPHOCYTES # BLD AUTO: 2.7 K/UL (ref 1–4.8)
LYMPHOCYTES NFR BLD: 13.9 % (ref 22–41)
MCH RBC QN AUTO: 34 PG (ref 27–33)
MCHC RBC AUTO-ENTMCNC: 33.1 G/DL (ref 32.3–36.5)
MCV RBC AUTO: 102.6 FL (ref 81.4–97.8)
MONOCYTES # BLD AUTO: 1.2 K/UL (ref 0–0.85)
MONOCYTES NFR BLD AUTO: 6.2 % (ref 0–13.4)
NEUTROPHILS # BLD AUTO: 15.09 K/UL (ref 1.82–7.42)
NEUTROPHILS NFR BLD: 77.3 % (ref 44–72)
NRBC # BLD AUTO: 0 K/UL
NRBC BLD-RTO: 0 /100 WBC (ref 0–0.2)
PLATELET # BLD AUTO: 332 K/UL (ref 164–446)
PMV BLD AUTO: 9.4 FL (ref 9–12.9)
POTASSIUM SERPL-SCNC: 3.5 MMOL/L (ref 3.6–5.5)
PROCALCITONIN SERPL-MCNC: <0.05 NG/ML
PROT SERPL-MCNC: 5.9 G/DL (ref 6–8.2)
PROTHROMBIN TIME: 25.5 SEC (ref 12–14.6)
RBC # BLD AUTO: 4.97 M/UL (ref 4.7–6.1)
SODIUM SERPL-SCNC: 145 MMOL/L (ref 135–145)
WBC # BLD AUTO: 19.5 K/UL (ref 4.8–10.8)

## 2023-07-19 PROCEDURE — 96376 TX/PRO/DX INJ SAME DRUG ADON: CPT

## 2023-07-19 PROCEDURE — 99497 ADVNCD CARE PLAN 30 MIN: CPT | Mod: 25 | Performed by: STUDENT IN AN ORGANIZED HEALTH CARE EDUCATION/TRAINING PROGRAM

## 2023-07-19 PROCEDURE — 700111 HCHG RX REV CODE 636 W/ 250 OVERRIDE (IP): Mod: JZ | Performed by: EMERGENCY MEDICINE

## 2023-07-19 PROCEDURE — 36415 COLL VENOUS BLD VENIPUNCTURE: CPT

## 2023-07-19 PROCEDURE — 84145 PROCALCITONIN (PCT): CPT

## 2023-07-19 PROCEDURE — 770001 HCHG ROOM/CARE - MED/SURG/GYN PRIV*

## 2023-07-19 PROCEDURE — 700111 HCHG RX REV CODE 636 W/ 250 OVERRIDE (IP): Performed by: STUDENT IN AN ORGANIZED HEALTH CARE EDUCATION/TRAINING PROGRAM

## 2023-07-19 PROCEDURE — 73552 X-RAY EXAM OF FEMUR 2/>: CPT | Mod: LT

## 2023-07-19 PROCEDURE — 71045 X-RAY EXAM CHEST 1 VIEW: CPT

## 2023-07-19 PROCEDURE — 96375 TX/PRO/DX INJ NEW DRUG ADDON: CPT

## 2023-07-19 PROCEDURE — 85730 THROMBOPLASTIN TIME PARTIAL: CPT

## 2023-07-19 PROCEDURE — 99223 1ST HOSP IP/OBS HIGH 75: CPT | Mod: AI | Performed by: STUDENT IN AN ORGANIZED HEALTH CARE EDUCATION/TRAINING PROGRAM

## 2023-07-19 PROCEDURE — 93005 ELECTROCARDIOGRAM TRACING: CPT | Performed by: EMERGENCY MEDICINE

## 2023-07-19 PROCEDURE — 85610 PROTHROMBIN TIME: CPT

## 2023-07-19 PROCEDURE — 80053 COMPREHEN METABOLIC PANEL: CPT

## 2023-07-19 PROCEDURE — 99291 CRITICAL CARE FIRST HOUR: CPT

## 2023-07-19 PROCEDURE — 96374 THER/PROPH/DIAG INJ IV PUSH: CPT

## 2023-07-19 PROCEDURE — 72170 X-RAY EXAM OF PELVIS: CPT

## 2023-07-19 PROCEDURE — 85025 COMPLETE CBC W/AUTO DIFF WBC: CPT

## 2023-07-19 RX ORDER — MORPHINE SULFATE 4 MG/ML
4 INJECTION INTRAVENOUS ONCE
Status: COMPLETED | OUTPATIENT
Start: 2023-07-19 | End: 2023-07-19

## 2023-07-19 RX ORDER — BISACODYL 10 MG
10 SUPPOSITORY, RECTAL RECTAL
Status: DISCONTINUED | OUTPATIENT
Start: 2023-07-19 | End: 2023-07-20

## 2023-07-19 RX ORDER — ALBUTEROL SULFATE 90 UG/1
2 AEROSOL, METERED RESPIRATORY (INHALATION)
Status: DISCONTINUED | OUTPATIENT
Start: 2023-07-20 | End: 2023-07-19

## 2023-07-19 RX ORDER — ALBUTEROL SULFATE 90 UG/1
2 AEROSOL, METERED RESPIRATORY (INHALATION)
Status: DISCONTINUED | OUTPATIENT
Start: 2023-07-20 | End: 2023-07-22 | Stop reason: HOSPADM

## 2023-07-19 RX ORDER — KETOROLAC TROMETHAMINE 30 MG/ML
15 INJECTION, SOLUTION INTRAMUSCULAR; INTRAVENOUS ONCE
Status: COMPLETED | OUTPATIENT
Start: 2023-07-19 | End: 2023-07-19

## 2023-07-19 RX ORDER — HYDROMORPHONE HYDROCHLORIDE 1 MG/ML
1 INJECTION, SOLUTION INTRAMUSCULAR; INTRAVENOUS; SUBCUTANEOUS EVERY 4 HOURS PRN
Status: DISCONTINUED | OUTPATIENT
Start: 2023-07-19 | End: 2023-07-20

## 2023-07-19 RX ORDER — ATORVASTATIN CALCIUM 20 MG/1
20 TABLET, FILM COATED ORAL DAILY
Status: DISCONTINUED | OUTPATIENT
Start: 2023-07-20 | End: 2023-07-22 | Stop reason: HOSPADM

## 2023-07-19 RX ORDER — POLYETHYLENE GLYCOL 3350 17 G/17G
1 POWDER, FOR SOLUTION ORAL
Status: DISCONTINUED | OUTPATIENT
Start: 2023-07-19 | End: 2023-07-20

## 2023-07-19 RX ORDER — ACETAMINOPHEN 500 MG
1000 TABLET ORAL EVERY 6 HOURS PRN
Status: DISCONTINUED | OUTPATIENT
Start: 2023-07-19 | End: 2023-07-20

## 2023-07-19 RX ORDER — NICOTINE 21 MG/24HR
21 PATCH, TRANSDERMAL 24 HOURS TRANSDERMAL
Status: DISCONTINUED | OUTPATIENT
Start: 2023-07-20 | End: 2023-07-22 | Stop reason: HOSPADM

## 2023-07-19 RX ORDER — AMOXICILLIN 250 MG
2 CAPSULE ORAL 2 TIMES DAILY
Status: DISCONTINUED | OUTPATIENT
Start: 2023-07-19 | End: 2023-07-20

## 2023-07-19 RX ADMIN — KETOROLAC TROMETHAMINE 15 MG: 30 INJECTION, SOLUTION INTRAMUSCULAR; INTRAVENOUS at 20:37

## 2023-07-19 RX ADMIN — MORPHINE SULFATE 4 MG: 4 INJECTION, SOLUTION INTRAMUSCULAR; INTRAVENOUS at 18:25

## 2023-07-19 RX ADMIN — HYDROMORPHONE HYDROCHLORIDE 1 MG: 1 INJECTION, SOLUTION INTRAMUSCULAR; INTRAVENOUS; SUBCUTANEOUS at 20:36

## 2023-07-19 ASSESSMENT — ENCOUNTER SYMPTOMS
FALLS: 1
DOUBLE VISION: 0
PALPITATIONS: 0
HEMOPTYSIS: 0
DEPRESSION: 0
CHILLS: 0
HEADACHES: 0
MYALGIAS: 0
BLURRED VISION: 0
HEARTBURN: 0
NECK PAIN: 0
FEVER: 0
COUGH: 0
NAUSEA: 0
DIZZINESS: 0
BRUISES/BLEEDS EASILY: 0

## 2023-07-19 ASSESSMENT — PAIN DESCRIPTION - PAIN TYPE: TYPE: ACUTE PAIN

## 2023-07-19 ASSESSMENT — FIBROSIS 4 INDEX: FIB4 SCORE: 0.85

## 2023-07-20 ENCOUNTER — ANESTHESIA (OUTPATIENT)
Dept: SURGERY | Facility: MEDICAL CENTER | Age: 73
DRG: 522 | End: 2023-07-20
Payer: MEDICARE

## 2023-07-20 ENCOUNTER — ANESTHESIA EVENT (OUTPATIENT)
Dept: SURGERY | Facility: MEDICAL CENTER | Age: 73
DRG: 522 | End: 2023-07-20
Payer: MEDICARE

## 2023-07-20 LAB
APPEARANCE UR: CLEAR
BILIRUB UR QL STRIP.AUTO: NEGATIVE
COLOR UR: YELLOW
GLUCOSE UR STRIP.AUTO-MCNC: NEGATIVE MG/DL
INR PPP: 1.02 (ref 0.87–1.13)
KETONES UR STRIP.AUTO-MCNC: NEGATIVE MG/DL
LEUKOCYTE ESTERASE UR QL STRIP.AUTO: NEGATIVE
MICRO URNS: NORMAL
NITRITE UR QL STRIP.AUTO: NEGATIVE
PH UR STRIP.AUTO: 5.5 [PH] (ref 5–8)
PROT UR QL STRIP: NEGATIVE MG/DL
PROTHROMBIN TIME: 13.3 SEC (ref 12–14.6)
RBC UR QL AUTO: NEGATIVE
SP GR UR STRIP.AUTO: 1.02
UROBILINOGEN UR STRIP.AUTO-MCNC: 1 MG/DL

## 2023-07-20 PROCEDURE — 99100 ANES PT EXTEME AGE<1 YR&>70: CPT | Performed by: ANESTHESIOLOGY

## 2023-07-20 PROCEDURE — 81003 URINALYSIS AUTO W/O SCOPE: CPT

## 2023-07-20 PROCEDURE — 700111 HCHG RX REV CODE 636 W/ 250 OVERRIDE (IP): Performed by: ORTHOPAEDIC SURGERY

## 2023-07-20 PROCEDURE — 700111 HCHG RX REV CODE 636 W/ 250 OVERRIDE (IP): Performed by: STUDENT IN AN ORGANIZED HEALTH CARE EDUCATION/TRAINING PROGRAM

## 2023-07-20 PROCEDURE — 700102 HCHG RX REV CODE 250 W/ 637 OVERRIDE(OP): Performed by: STUDENT IN AN ORGANIZED HEALTH CARE EDUCATION/TRAINING PROGRAM

## 2023-07-20 PROCEDURE — 51798 US URINE CAPACITY MEASURE: CPT

## 2023-07-20 PROCEDURE — 01210 ANES OPEN PX HIP JOINT NOS: CPT | Performed by: ANESTHESIOLOGY

## 2023-07-20 PROCEDURE — 160048 HCHG OR STATISTICAL LEVEL 1-5: Performed by: ORTHOPAEDIC SURGERY

## 2023-07-20 PROCEDURE — 700105 HCHG RX REV CODE 258: Performed by: ORTHOPAEDIC SURGERY

## 2023-07-20 PROCEDURE — 99232 SBSQ HOSP IP/OBS MODERATE 35: CPT

## 2023-07-20 PROCEDURE — A9270 NON-COVERED ITEM OR SERVICE: HCPCS | Performed by: ANESTHESIOLOGY

## 2023-07-20 PROCEDURE — 27236 TREAT THIGH FRACTURE: CPT | Mod: ASROC,LT | Performed by: PHYSICIAN ASSISTANT

## 2023-07-20 PROCEDURE — 160036 HCHG PACU - EA ADDL 30 MINS PHASE I: Performed by: ORTHOPAEDIC SURGERY

## 2023-07-20 PROCEDURE — 770001 HCHG ROOM/CARE - MED/SURG/GYN PRIV*

## 2023-07-20 PROCEDURE — 27236 TREAT THIGH FRACTURE: CPT | Mod: LT | Performed by: ORTHOPAEDIC SURGERY

## 2023-07-20 PROCEDURE — 502000 HCHG MISC OR IMPLANTS RC 0278: Performed by: ORTHOPAEDIC SURGERY

## 2023-07-20 PROCEDURE — 160042 HCHG SURGERY MINUTES - EA ADDL 1 MIN LEVEL 5: Performed by: ORTHOPAEDIC SURGERY

## 2023-07-20 PROCEDURE — 700101 HCHG RX REV CODE 250: Performed by: ANESTHESIOLOGY

## 2023-07-20 PROCEDURE — 85610 PROTHROMBIN TIME: CPT

## 2023-07-20 PROCEDURE — 160002 HCHG RECOVERY MINUTES (STAT): Performed by: ORTHOPAEDIC SURGERY

## 2023-07-20 PROCEDURE — C1776 JOINT DEVICE (IMPLANTABLE): HCPCS | Performed by: ORTHOPAEDIC SURGERY

## 2023-07-20 PROCEDURE — A9270 NON-COVERED ITEM OR SERVICE: HCPCS | Mod: JZ

## 2023-07-20 PROCEDURE — 700111 HCHG RX REV CODE 636 W/ 250 OVERRIDE (IP): Performed by: ANESTHESIOLOGY

## 2023-07-20 PROCEDURE — A9270 NON-COVERED ITEM OR SERVICE: HCPCS | Performed by: STUDENT IN AN ORGANIZED HEALTH CARE EDUCATION/TRAINING PROGRAM

## 2023-07-20 PROCEDURE — 99222 1ST HOSP IP/OBS MODERATE 55: CPT | Mod: 57 | Performed by: ORTHOPAEDIC SURGERY

## 2023-07-20 PROCEDURE — 700102 HCHG RX REV CODE 250 W/ 637 OVERRIDE(OP): Mod: JZ

## 2023-07-20 PROCEDURE — 700102 HCHG RX REV CODE 250 W/ 637 OVERRIDE(OP): Performed by: ORTHOPAEDIC SURGERY

## 2023-07-20 PROCEDURE — 160031 HCHG SURGERY MINUTES - 1ST 30 MINS LEVEL 5: Performed by: ORTHOPAEDIC SURGERY

## 2023-07-20 PROCEDURE — A9270 NON-COVERED ITEM OR SERVICE: HCPCS | Performed by: ORTHOPAEDIC SURGERY

## 2023-07-20 PROCEDURE — 160035 HCHG PACU - 1ST 60 MINS PHASE I: Performed by: ORTHOPAEDIC SURGERY

## 2023-07-20 PROCEDURE — 0SRS019 REPLACEMENT OF LEFT HIP JOINT, FEMORAL SURFACE WITH METAL SYNTHETIC SUBSTITUTE, CEMENTED, OPEN APPROACH: ICD-10-PCS | Performed by: ORTHOPAEDIC SURGERY

## 2023-07-20 PROCEDURE — 700102 HCHG RX REV CODE 250 W/ 637 OVERRIDE(OP): Performed by: ANESTHESIOLOGY

## 2023-07-20 PROCEDURE — 160009 HCHG ANES TIME/MIN: Performed by: ORTHOPAEDIC SURGERY

## 2023-07-20 PROCEDURE — 700105 HCHG RX REV CODE 258: Mod: JZ | Performed by: ANESTHESIOLOGY

## 2023-07-20 DEVICE — IMPLANTABLE DEVICE: Type: IMPLANTABLE DEVICE | Site: HIP | Status: FUNCTIONAL

## 2023-07-20 RX ORDER — HYDROMORPHONE HYDROCHLORIDE 1 MG/ML
0.5 INJECTION, SOLUTION INTRAMUSCULAR; INTRAVENOUS; SUBCUTANEOUS
Status: DISCONTINUED | OUTPATIENT
Start: 2023-07-20 | End: 2023-07-22 | Stop reason: HOSPADM

## 2023-07-20 RX ORDER — HYDROMORPHONE HYDROCHLORIDE 1 MG/ML
1 INJECTION, SOLUTION INTRAMUSCULAR; INTRAVENOUS; SUBCUTANEOUS ONCE
Status: ACTIVE | OUTPATIENT
Start: 2023-07-20 | End: 2023-07-21

## 2023-07-20 RX ORDER — DEXAMETHASONE SODIUM PHOSPHATE 4 MG/ML
INJECTION, SOLUTION INTRA-ARTICULAR; INTRALESIONAL; INTRAMUSCULAR; INTRAVENOUS; SOFT TISSUE PRN
Status: DISCONTINUED | OUTPATIENT
Start: 2023-07-20 | End: 2023-07-20 | Stop reason: SURG

## 2023-07-20 RX ORDER — SODIUM CHLORIDE, SODIUM LACTATE, POTASSIUM CHLORIDE, CALCIUM CHLORIDE 600; 310; 30; 20 MG/100ML; MG/100ML; MG/100ML; MG/100ML
INJECTION, SOLUTION INTRAVENOUS
Status: DISCONTINUED | OUTPATIENT
Start: 2023-07-20 | End: 2023-07-20 | Stop reason: SURG

## 2023-07-20 RX ORDER — ENOXAPARIN SODIUM 100 MG/ML
40 INJECTION SUBCUTANEOUS
Status: DISCONTINUED | OUTPATIENT
Start: 2023-07-21 | End: 2023-07-20

## 2023-07-20 RX ORDER — OXYCODONE HYDROCHLORIDE 5 MG/1
10 TABLET ORAL
Status: DISCONTINUED | OUTPATIENT
Start: 2023-07-20 | End: 2023-07-22 | Stop reason: HOSPADM

## 2023-07-20 RX ORDER — SODIUM CHLORIDE, SODIUM LACTATE, POTASSIUM CHLORIDE, CALCIUM CHLORIDE 600; 310; 30; 20 MG/100ML; MG/100ML; MG/100ML; MG/100ML
INJECTION, SOLUTION INTRAVENOUS CONTINUOUS
Status: DISCONTINUED | OUTPATIENT
Start: 2023-07-20 | End: 2023-07-20 | Stop reason: HOSPADM

## 2023-07-20 RX ORDER — AMOXICILLIN 250 MG
1 CAPSULE ORAL
Status: DISCONTINUED | OUTPATIENT
Start: 2023-07-20 | End: 2023-07-22 | Stop reason: HOSPADM

## 2023-07-20 RX ORDER — ACETAMINOPHEN 325 MG/1
650 TABLET ORAL EVERY 6 HOURS PRN
Status: DISCONTINUED | OUTPATIENT
Start: 2023-07-25 | End: 2023-07-22 | Stop reason: HOSPADM

## 2023-07-20 RX ORDER — ENOXAPARIN SODIUM 100 MG/ML
40 INJECTION SUBCUTANEOUS EVERY 12 HOURS
Status: DISCONTINUED | OUTPATIENT
Start: 2023-07-20 | End: 2023-07-20

## 2023-07-20 RX ORDER — ONDANSETRON 2 MG/ML
INJECTION INTRAMUSCULAR; INTRAVENOUS PRN
Status: DISCONTINUED | OUTPATIENT
Start: 2023-07-20 | End: 2023-07-20 | Stop reason: SURG

## 2023-07-20 RX ORDER — POTASSIUM CHLORIDE 20 MEQ/1
40 TABLET, EXTENDED RELEASE ORAL ONCE
Status: COMPLETED | OUTPATIENT
Start: 2023-07-20 | End: 2023-07-20

## 2023-07-20 RX ORDER — OXYCODONE HCL 5 MG/5 ML
5 SOLUTION, ORAL ORAL
Status: COMPLETED | OUTPATIENT
Start: 2023-07-20 | End: 2023-07-20

## 2023-07-20 RX ORDER — POLYETHYLENE GLYCOL 3350 17 G/17G
1 POWDER, FOR SOLUTION ORAL 2 TIMES DAILY PRN
Status: DISCONTINUED | OUTPATIENT
Start: 2023-07-20 | End: 2023-07-22 | Stop reason: HOSPADM

## 2023-07-20 RX ORDER — KETOROLAC TROMETHAMINE 30 MG/ML
15 INJECTION, SOLUTION INTRAMUSCULAR; INTRAVENOUS EVERY 6 HOURS
Status: DISCONTINUED | OUTPATIENT
Start: 2023-07-20 | End: 2023-07-22 | Stop reason: HOSPADM

## 2023-07-20 RX ORDER — HALOPERIDOL 5 MG/ML
1 INJECTION INTRAMUSCULAR EVERY 6 HOURS PRN
Status: DISCONTINUED | OUTPATIENT
Start: 2023-07-20 | End: 2023-07-22 | Stop reason: HOSPADM

## 2023-07-20 RX ORDER — ENEMA 19; 7 G/133ML; G/133ML
1 ENEMA RECTAL
Status: DISCONTINUED | OUTPATIENT
Start: 2023-07-20 | End: 2023-07-22 | Stop reason: HOSPADM

## 2023-07-20 RX ORDER — DOCUSATE SODIUM 100 MG/1
100 CAPSULE, LIQUID FILLED ORAL 2 TIMES DAILY
Status: DISCONTINUED | OUTPATIENT
Start: 2023-07-20 | End: 2023-07-22 | Stop reason: HOSPADM

## 2023-07-20 RX ORDER — DIPHENHYDRAMINE HYDROCHLORIDE 50 MG/ML
25 INJECTION INTRAMUSCULAR; INTRAVENOUS EVERY 6 HOURS PRN
Status: DISCONTINUED | OUTPATIENT
Start: 2023-07-20 | End: 2023-07-22 | Stop reason: HOSPADM

## 2023-07-20 RX ORDER — HYDROMORPHONE HYDROCHLORIDE 1 MG/ML
0.2 INJECTION, SOLUTION INTRAMUSCULAR; INTRAVENOUS; SUBCUTANEOUS
Status: DISCONTINUED | OUTPATIENT
Start: 2023-07-20 | End: 2023-07-20 | Stop reason: HOSPADM

## 2023-07-20 RX ORDER — HYDROMORPHONE HYDROCHLORIDE 1 MG/ML
0.4 INJECTION, SOLUTION INTRAMUSCULAR; INTRAVENOUS; SUBCUTANEOUS
Status: DISCONTINUED | OUTPATIENT
Start: 2023-07-20 | End: 2023-07-20 | Stop reason: HOSPADM

## 2023-07-20 RX ORDER — OXYCODONE HYDROCHLORIDE 10 MG/1
10 TABLET ORAL EVERY 4 HOURS PRN
Status: DISCONTINUED | OUTPATIENT
Start: 2023-07-20 | End: 2023-07-20

## 2023-07-20 RX ORDER — BISACODYL 10 MG
10 SUPPOSITORY, RECTAL RECTAL
Status: DISCONTINUED | OUTPATIENT
Start: 2023-07-20 | End: 2023-07-22 | Stop reason: HOSPADM

## 2023-07-20 RX ORDER — IBUPROFEN 200 MG
800 TABLET ORAL 3 TIMES DAILY PRN
Status: DISCONTINUED | OUTPATIENT
Start: 2023-07-23 | End: 2023-07-22 | Stop reason: HOSPADM

## 2023-07-20 RX ORDER — OXYCODONE HYDROCHLORIDE 5 MG/1
5 TABLET ORAL
Status: DISCONTINUED | OUTPATIENT
Start: 2023-07-20 | End: 2023-07-22 | Stop reason: HOSPADM

## 2023-07-20 RX ORDER — DIPHENHYDRAMINE HYDROCHLORIDE 50 MG/ML
12.5 INJECTION INTRAMUSCULAR; INTRAVENOUS
Status: DISCONTINUED | OUTPATIENT
Start: 2023-07-20 | End: 2023-07-20 | Stop reason: HOSPADM

## 2023-07-20 RX ORDER — ACETAMINOPHEN 325 MG/1
650 TABLET ORAL EVERY 6 HOURS
Status: DISCONTINUED | OUTPATIENT
Start: 2023-07-20 | End: 2023-07-22 | Stop reason: HOSPADM

## 2023-07-20 RX ORDER — ENOXAPARIN SODIUM 100 MG/ML
40 INJECTION SUBCUTANEOUS DAILY
Status: DISCONTINUED | OUTPATIENT
Start: 2023-07-21 | End: 2023-07-22 | Stop reason: HOSPADM

## 2023-07-20 RX ORDER — AMOXICILLIN 250 MG
1 CAPSULE ORAL NIGHTLY
Status: DISCONTINUED | OUTPATIENT
Start: 2023-07-20 | End: 2023-07-22 | Stop reason: HOSPADM

## 2023-07-20 RX ORDER — OXYCODONE HCL 5 MG/5 ML
10 SOLUTION, ORAL ORAL
Status: COMPLETED | OUTPATIENT
Start: 2023-07-20 | End: 2023-07-20

## 2023-07-20 RX ORDER — TRANEXAMIC ACID 100 MG/ML
INJECTION, SOLUTION INTRAVENOUS PRN
Status: DISCONTINUED | OUTPATIENT
Start: 2023-07-20 | End: 2023-07-20 | Stop reason: SURG

## 2023-07-20 RX ORDER — CEFAZOLIN SODIUM 1 G/3ML
INJECTION, POWDER, FOR SOLUTION INTRAMUSCULAR; INTRAVENOUS PRN
Status: DISCONTINUED | OUTPATIENT
Start: 2023-07-20 | End: 2023-07-20 | Stop reason: SURG

## 2023-07-20 RX ORDER — ROCURONIUM BROMIDE 10 MG/ML
INJECTION, SOLUTION INTRAVENOUS PRN
Status: DISCONTINUED | OUTPATIENT
Start: 2023-07-20 | End: 2023-07-20 | Stop reason: SURG

## 2023-07-20 RX ORDER — SCOLOPAMINE TRANSDERMAL SYSTEM 1 MG/1
1 PATCH, EXTENDED RELEASE TRANSDERMAL
Status: DISCONTINUED | OUTPATIENT
Start: 2023-07-20 | End: 2023-07-22 | Stop reason: HOSPADM

## 2023-07-20 RX ORDER — ONDANSETRON 2 MG/ML
4 INJECTION INTRAMUSCULAR; INTRAVENOUS
Status: DISCONTINUED | OUTPATIENT
Start: 2023-07-20 | End: 2023-07-20 | Stop reason: HOSPADM

## 2023-07-20 RX ORDER — PHENYLEPHRINE HCL IN 0.9% NACL 0.5 MG/5ML
SYRINGE (ML) INTRAVENOUS PRN
Status: DISCONTINUED | OUTPATIENT
Start: 2023-07-20 | End: 2023-07-20 | Stop reason: SURG

## 2023-07-20 RX ORDER — ONDANSETRON 2 MG/ML
4 INJECTION INTRAMUSCULAR; INTRAVENOUS EVERY 4 HOURS PRN
Status: DISCONTINUED | OUTPATIENT
Start: 2023-07-20 | End: 2023-07-22 | Stop reason: HOSPADM

## 2023-07-20 RX ORDER — DEXAMETHASONE SODIUM PHOSPHATE 4 MG/ML
4 INJECTION, SOLUTION INTRA-ARTICULAR; INTRALESIONAL; INTRAMUSCULAR; INTRAVENOUS; SOFT TISSUE
Status: DISCONTINUED | OUTPATIENT
Start: 2023-07-20 | End: 2023-07-22 | Stop reason: HOSPADM

## 2023-07-20 RX ORDER — HYDROMORPHONE HYDROCHLORIDE 1 MG/ML
0.1 INJECTION, SOLUTION INTRAMUSCULAR; INTRAVENOUS; SUBCUTANEOUS
Status: DISCONTINUED | OUTPATIENT
Start: 2023-07-20 | End: 2023-07-20 | Stop reason: HOSPADM

## 2023-07-20 RX ADMIN — ROCURONIUM BROMIDE 50 MG: 50 INJECTION, SOLUTION INTRAVENOUS at 11:14

## 2023-07-20 RX ADMIN — FENTANYL CITRATE 50 MCG: 50 INJECTION, SOLUTION INTRAMUSCULAR; INTRAVENOUS at 12:15

## 2023-07-20 RX ADMIN — HYDROMORPHONE HYDROCHLORIDE 1 MG: 1 INJECTION, SOLUTION INTRAMUSCULAR; INTRAVENOUS; SUBCUTANEOUS at 00:37

## 2023-07-20 RX ADMIN — FENTANYL CITRATE 100 MCG: 50 INJECTION, SOLUTION INTRAMUSCULAR; INTRAVENOUS at 11:33

## 2023-07-20 RX ADMIN — HYDROMORPHONE HYDROCHLORIDE 0.4 MG: 1 INJECTION, SOLUTION INTRAMUSCULAR; INTRAVENOUS; SUBCUTANEOUS at 13:33

## 2023-07-20 RX ADMIN — ATORVASTATIN CALCIUM 20 MG: 20 TABLET, FILM COATED ORAL at 05:40

## 2023-07-20 RX ADMIN — HYDROMORPHONE HYDROCHLORIDE 1 MG: 1 INJECTION, SOLUTION INTRAMUSCULAR; INTRAVENOUS; SUBCUTANEOUS at 08:59

## 2023-07-20 RX ADMIN — Medication 100 MCG: at 11:16

## 2023-07-20 RX ADMIN — HYDROMORPHONE HYDROCHLORIDE 0.2 MG: 1 INJECTION, SOLUTION INTRAMUSCULAR; INTRAVENOUS; SUBCUTANEOUS at 15:29

## 2023-07-20 RX ADMIN — DEXAMETHASONE SODIUM PHOSPHATE 8 MG: 4 INJECTION INTRA-ARTICULAR; INTRALESIONAL; INTRAMUSCULAR; INTRAVENOUS; SOFT TISSUE at 11:21

## 2023-07-20 RX ADMIN — FENTANYL CITRATE 50 MCG: 50 INJECTION, SOLUTION INTRAMUSCULAR; INTRAVENOUS at 11:17

## 2023-07-20 RX ADMIN — ACETAMINOPHEN 650 MG: 325 TABLET, FILM COATED ORAL at 20:06

## 2023-07-20 RX ADMIN — PROPOFOL 150 MG: 10 INJECTION, EMULSION INTRAVENOUS at 11:12

## 2023-07-20 RX ADMIN — TRANEXAMIC ACID 1000 MG: 100 INJECTION, SOLUTION INTRAVENOUS at 11:22

## 2023-07-20 RX ADMIN — CEFAZOLIN 2 G: 1 INJECTION, POWDER, FOR SOLUTION INTRAMUSCULAR; INTRAVENOUS at 11:19

## 2023-07-20 RX ADMIN — FENTANYL CITRATE 100 MCG: 50 INJECTION, SOLUTION INTRAMUSCULAR; INTRAVENOUS at 11:08

## 2023-07-20 RX ADMIN — OXYCODONE HYDROCHLORIDE 10 MG: 5 TABLET ORAL at 20:06

## 2023-07-20 RX ADMIN — FENTANYL CITRATE 50 MCG: 50 INJECTION, SOLUTION INTRAMUSCULAR; INTRAVENOUS at 12:25

## 2023-07-20 RX ADMIN — CEFAZOLIN 2 G: 2 INJECTION, POWDER, FOR SOLUTION INTRAMUSCULAR; INTRAVENOUS at 16:03

## 2023-07-20 RX ADMIN — HYDROMORPHONE HYDROCHLORIDE 0.4 MG: 1 INJECTION, SOLUTION INTRAMUSCULAR; INTRAVENOUS; SUBCUTANEOUS at 13:38

## 2023-07-20 RX ADMIN — SUGAMMADEX 200 MG: 100 INJECTION, SOLUTION INTRAVENOUS at 11:54

## 2023-07-20 RX ADMIN — Medication 100 MCG: at 11:19

## 2023-07-20 RX ADMIN — NICOTINE TRANSDERMAL SYSTEM 21 MG: 21 PATCH, EXTENDED RELEASE TRANSDERMAL at 05:41

## 2023-07-20 RX ADMIN — SODIUM CHLORIDE, POTASSIUM CHLORIDE, SODIUM LACTATE AND CALCIUM CHLORIDE: 600; 310; 30; 20 INJECTION, SOLUTION INTRAVENOUS at 11:08

## 2023-07-20 RX ADMIN — POTASSIUM CHLORIDE 40 MEQ: 1500 TABLET, EXTENDED RELEASE ORAL at 20:06

## 2023-07-20 RX ADMIN — HYDROMORPHONE HYDROCHLORIDE 1 MG: 1 INJECTION, SOLUTION INTRAMUSCULAR; INTRAVENOUS; SUBCUTANEOUS at 04:33

## 2023-07-20 RX ADMIN — KETOROLAC TROMETHAMINE 15 MG: 30 INJECTION, SOLUTION INTRAMUSCULAR; INTRAVENOUS at 20:08

## 2023-07-20 RX ADMIN — OXYCODONE HYDROCHLORIDE 10 MG: 10 TABLET ORAL at 05:39

## 2023-07-20 RX ADMIN — SENNOSIDES AND DOCUSATE SODIUM 2 TABLET: 50; 8.6 TABLET ORAL at 05:39

## 2023-07-20 RX ADMIN — ALBUTEROL SULFATE 2 PUFF: 90 AEROSOL, METERED RESPIRATORY (INHALATION) at 09:25

## 2023-07-20 RX ADMIN — ONDANSETRON 8 MG: 2 INJECTION INTRAMUSCULAR; INTRAVENOUS at 11:47

## 2023-07-20 RX ADMIN — OXYCODONE HYDROCHLORIDE 10 MG: 5 TABLET ORAL at 23:17

## 2023-07-20 RX ADMIN — OXYCODONE HYDROCHLORIDE 10 MG: 5 SOLUTION ORAL at 12:17

## 2023-07-20 RX ADMIN — SUGAMMADEX 200 MG: 100 INJECTION, SOLUTION INTRAVENOUS at 11:47

## 2023-07-20 ASSESSMENT — ENCOUNTER SYMPTOMS
HEADACHES: 0
CHILLS: 0
DIZZINESS: 0
DIARRHEA: 0
COUGH: 0
VOMITING: 0
FEVER: 0
HEARTBURN: 0
ABDOMINAL PAIN: 0
SHORTNESS OF BREATH: 1
PALPITATIONS: 0
NAUSEA: 0

## 2023-07-20 ASSESSMENT — PAIN DESCRIPTION - PAIN TYPE
TYPE: SURGICAL PAIN
TYPE: ACUTE PAIN;SURGICAL PAIN
TYPE: SURGICAL PAIN
TYPE: ACUTE PAIN;SURGICAL PAIN
TYPE: SURGICAL PAIN
TYPE: SURGICAL PAIN
TYPE: ACUTE PAIN;SURGICAL PAIN
TYPE: ACUTE PAIN;SURGICAL PAIN
TYPE: SURGICAL PAIN
TYPE: SURGICAL PAIN
TYPE: ACUTE PAIN;SURGICAL PAIN

## 2023-07-20 ASSESSMENT — PAIN SCALES - GENERAL: PAIN_LEVEL: 0

## 2023-07-20 NOTE — ANESTHESIA PROCEDURE NOTES
Airway    Date/Time: 7/20/2023 11:17 AM    Performed by: Ramy Quezada M.D.  Authorized by: Ramy Quezada M.D.    Location:  OR  Urgency:  Elective  Indications for Airway Management:  Anesthesia      Spontaneous Ventilation: absent    Sedation Level:  Deep  Preoxygenated: Yes    Patient Position:  Sniffing  Mask Difficulty Assessment:  1 - vent by mask  Final Airway Type:  Endotracheal airway  Final Endotracheal Airway:  ETT  Cuffed: Yes    Technique Used for Successful ETT Placement:  Video laryngoscopy    Insertion Site:  Oral  Blade Type:  Glide  Laryngoscope Blade/Videolaryngoscope Blade Size:  4  ETT Size (mm):  7.5  Measured from:  Teeth  ETT to Teeth (cm):  24  Placement Verified by: auscultation and capnometry    Cormack-Lehane Classification:  Grade I - full view of glottis  Number of Attempts at Approach:  1

## 2023-07-20 NOTE — ED NOTES
Bedside report from MUSA Stewart  Pt provided with mouth swabs  Pt placed on oxygen and cardiac leads

## 2023-07-20 NOTE — ANESTHESIA POSTPROCEDURE EVALUATION
Patient: Narendra Sun    Procedure Summary     Date: 07/20/23 Room / Location: [place] [id number] / SURGERY [facility]    Anesthesia Start: 1108 Anesthesia Stop: 1204    Procedure: HEMIARTHROPLASTY, HIP (Left: Hip) Diagnosis: (LEFT FEMORAL NECK FRACTURE)    Surgeons: Roe Anthony M.D. Responsible Provider: Ramy Quezada M.D.    Anesthesia Type: general ASA Status: 3          Final Anesthesia Type: general  Last vitals  BP   Blood Pressure : 129/70    Temp   36.3 °C (97.3 °F)    Pulse   87   Resp   18    SpO2   92 %      Anesthesia Post Evaluation    Patient location during evaluation: PACU  Patient participation: complete - patient participated  Level of consciousness: awake and alert  Pain score: 0    Airway patency: patent  Anesthetic complications: no  Cardiovascular status: hemodynamically stable  Respiratory status: acceptable  Hydration status: euvolemic    PONV: none          No notable events documented.     Nurse Pain Score: 8 (NPRS)

## 2023-07-20 NOTE — ED TRIAGE NOTES
"Chief Complaint   Patient presents with    GLF       BIB EMS to [facility] picked up from home with MGLF, patient stated he tripped over a object and fell on his left hip, denies hitting head, -ve thinner, pt endorses he has 2 canes of alcoholic beverage couple of hour ago. Patient AAO X4, respirations even and unlabored on room.     Medications given en route:  100 mcg Fentanyl IV   4 mg Zofran IV     /66   Pulse 98   Temp 36 °C (96.8 °F) (Temporal)   Resp 17   Ht 1.707 m (5' 7.2\")   Wt 68 kg (150 lb)   SpO2 91%   BMI 23.35 kg/m²     "

## 2023-07-20 NOTE — ASSESSMENT & PLAN NOTE
Status post fall  Displaced transcervical left femoral neck fracture  N.p.o. at midnight  IV narcotics with close respiratory monitoring  Tylenol 1000 mg every 6 hours  Orthopedics consulted and performed hip fracture repair on 7/20.  They recommended:  The patient should bear weight as tolerated on their operative extremity with posterior hip precautions.  Gait aids (crutch or crutches, cane, walker) may be used as needed, and may be discontinued when no longer required.  2.  IV antibiotics - may be continued for 24 hours  3.  DVT prophylaxis - SCD's and Lovenox 40 mg SQ daily while inpatient.  The patient may transition to Aspirin 325 mg PO BID as an outpatient

## 2023-07-20 NOTE — ASSESSMENT & PLAN NOTE
Tobacco cessation education provided for more than 3 minutes, discussed options of nicotine patch, medical treatment with wellbutrin and chantix. Discussed the risks of smoking including increased risk of heart disease, stroke, cancer and COPD  Nicotine patch protocol

## 2023-07-20 NOTE — ED NOTES
Assist RN: Went in to assess pt for pain and revital d/t sats dropping. Pt found to be sleeping comfortably in bed with NC out of nose. Canula replaced, vss. Primary RN updated on pt status. Holding additional pain medication at this time. Call light within reach

## 2023-07-20 NOTE — ED PROVIDER NOTES
ED Provider Note    CHIEF COMPLAINT  Chief Complaint   Patient presents with    GLF       EXTERNAL RECORDS REVIEWED  Other EMS signout said they received a call after the patient had a mechanical fall and could not get back up complaining of left hip pain.  They did administer 100 mcg of fentanyl and 4 mg of Zofran.    HPI/ROS    Narendra Sun is a 72 y.o. male who presents with left hip pain.  The patient states he had a mechanical fall ended on his left hip.  He presents with left hip and pelvic discomfort.  He states he did not strike his head.  He does have COPD but has not had any change in his breathing pattern.  He does not have any paresthesias nor functional loss of the lower extremities but does have severe pain with any attempted movement at the left hip.  The patient does not take any anticoagulants.    PAST MEDICAL HISTORY   has a past medical history of Bronchitis chronic, COPD (chronic obstructive pulmonary disease) (HCC) (2009), Dyslipidemia, Gout (2017), PNEUMOTHORAX, Preventative health care (2009), S/P Splenectomy (2009), Tobacco abuse (2009), and Tubular adenoma (2009).    SURGICAL HISTORY   has a past surgical history that includes splenectomy and general lung surgery (Left, 1980s).    FAMILY HISTORY  Family History   Problem Relation Age of Onset    Cancer Brother         prostate and likely 2 other cancers       SOCIAL HISTORY  Social History     Tobacco Use    Smoking status: Every Day     Packs/day: 1.50     Years: 47.00     Pack years: 70.50     Types: Cigars, Cigarettes     Last attempt to quit:      Years since quittin.5    Smokeless tobacco: Never    Tobacco comments:     3 cigars/day   Vaping Use    Vaping Use: Never used   Substance and Sexual Activity    Alcohol use: Yes     Alcohol/week: 0.0 - 0.6 oz    Drug use: No    Sexual activity: Yes     Partners: Female       CURRENT MEDICATIONS  Home Medications    **Home medications have not yet  "been reviewed for this encounter**         ALLERGIES  No Known Allergies    PHYSICAL EXAM  VITAL SIGNS: /66   Pulse 98   Temp 36 °C (96.8 °F) (Temporal)   Resp 17   Ht 1.707 m (5' 7.2\")   Wt 68 kg (150 lb)   SpO2 91%   BMI 23.35 kg/m²    In general the patient appears uncomfortable    Head is normocephalic atraumatic    ENT atraumatic, eyes pupils are 3 and reactive bilaterally    Cervical, thoracic, lumbar spine does not have any midline tenderness nor step-offs    Pulmonary the patient's lungs are symmetrically diminished throughout and the patient does not have any pain with AP or lateral compression    Cardiovascular S1-S2 with a regular rate and rhythm    GI abdomen is soft    Pelvis is stable    Extremities the patient does keep his left lower extremity externally rotated he has severe pain with any internal extra rotation at the left hip.  He has a normal left knee and left ankle exam.    Skin no lesions appreciated    Neurologic examination GCS of 15    DIAGNOSTIC STUDIES  Results for orders placed or performed during the hospital encounter of 07/19/23   CBC WITH DIFFERENTIAL   Result Value Ref Range    WBC 19.5 (H) 4.8 - 10.8 K/uL    RBC 4.97 4.70 - 6.10 M/uL    Hemoglobin 16.9 14.0 - 18.0 g/dL    Hematocrit 51.0 42.0 - 52.0 %    .6 (H) 81.4 - 97.8 fL    MCH 34.0 (H) 27.0 - 33.0 pg    MCHC 33.1 32.3 - 36.5 g/dL    RDW 53.3 (H) 35.9 - 50.0 fL    Platelet Count 332 164 - 446 K/uL    MPV 9.4 9.0 - 12.9 fL    Neutrophils-Polys 77.30 (H) 44.00 - 72.00 %    Lymphocytes 13.90 (L) 22.00 - 41.00 %    Monocytes 6.20 0.00 - 13.40 %    Eosinophils 1.50 0.00 - 6.90 %    Basophils 0.40 0.00 - 1.80 %    Immature Granulocytes 0.70 0.00 - 0.90 %    Nucleated RBC 0.00 0.00 - 0.20 /100 WBC    Neutrophils (Absolute) 15.09 (H) 1.82 - 7.42 K/uL    Lymphs (Absolute) 2.70 1.00 - 4.80 K/uL    Monos (Absolute) 1.20 (H) 0.00 - 0.85 K/uL    Eos (Absolute) 0.29 0.00 - 0.51 K/uL    Baso (Absolute) 0.07 0.00 - 0.12 " K/uL    Immature Granulocytes (abs) 0.14 (H) 0.00 - 0.11 K/uL    NRBC (Absolute) 0.00 K/uL   PROTHROMBIN TIME   Result Value Ref Range    PT 25.5 (H) 12.0 - 14.6 sec    INR 2.41 (H) 0.87 - 1.13   APTT   Result Value Ref Range    APTT 46.8 (H) 24.7 - 36.0 sec   COMP METABOLIC PANEL   Result Value Ref Range    Sodium 145 135 - 145 mmol/L    Potassium 3.5 (L) 3.6 - 5.5 mmol/L    Chloride 114 (H) 96 - 112 mmol/L    Co2 18 (L) 20 - 33 mmol/L    Anion Gap 13.0 7.0 - 16.0    Glucose 87 65 - 99 mg/dL    Bun 15 8 - 22 mg/dL    Creatinine 0.85 0.50 - 1.40 mg/dL    Calcium 7.3 (L) 8.5 - 10.5 mg/dL    AST(SGOT) 16 12 - 45 U/L    ALT(SGPT) 18 2 - 50 U/L    Alkaline Phosphatase 71 30 - 99 U/L    Total Bilirubin 0.2 0.1 - 1.5 mg/dL    Albumin 3.3 3.2 - 4.9 g/dL    Total Protein 5.9 (L) 6.0 - 8.2 g/dL    Globulin 2.6 1.9 - 3.5 g/dL    A-G Ratio 1.3 g/dL   ESTIMATED GFR   Result Value Ref Range    GFR (CKD-EPI) 92 >60 mL/min/1.73 m 2   CORRECTED CALCIUM   Result Value Ref Range    Correct Calcium 7.9 (L) 8.5 - 10.5 mg/dL   EKG   Result Value Ref Range    Report       [facility] Emergency Dept.    Test Date:  2023  Pt Name:    KYLE HARTLEY                  Department: ER  MRN:        [id number]                      Room:        15  Gender:     Male                         Technician: [id number]  :        1950                   Requested By:TREASURE DELONG  Order #:    [id number]                    Madelyn MD:    Measurements  Intervals                                Axis  Rate:       92                           P:          78  GA:         205                          QRS:        71  QRSD:       83                           T:          36  QT:         362  QTc:        448    Interpretive Statements  Sinus rhythm  Atrial premature complexes  Probable left atrial enlargement  Compared to ECG 2023 12:06:26  Atrial premature complex(es) now present           RADIOLOGY  DX-CHEST-LIMITED (1 VIEW)   Final  Result      1.  No acute cardiac or pulmonary abnormalities are identified.      DX-FEMUR-2+ LEFT   Final Result      Mildly displaced transcervical left femoral neck fracture      DX-PELVIS-1 OR 2 VIEWS   Final Result      1.  Limited single view of the pelvis due to technique.   2.  There is a comminuted fracture of the left femoral neck.          EKG interpretation  See my interpretation above    COURSE & MEDICAL DECISION MAKING    This a 72-year-old male who presents the emerged part with left hip pain after a fall.  X-ray does show evidence of a comminuted fracture through the left femoral neck.  Otherwise no fractures of the pelvis appreciated.  I did order preoperative work-up.  I spoke with orthopedics Dr. Anthony for surgical intervention.  The patient be admitted to the medicine team for medical clearance.  At the time of admission the patient has remained hemodynamically stable and neurologically intact.  I do not see any other evidence of injury.  The patient did receive morphine for pain control which has seemed to be effective.    FINAL DIAGNOSIS  Left hip fracture    Disposition  Patient will be admitted in stable condition       Electronically signed by: Murphy Yanez M.D., 7/19/2023 6:19 PM

## 2023-07-20 NOTE — OR NURSING
1202 Patient arrived from  via gurney, siderails up, brake locked. Maintaining own airway, responds to voice. Report from Dr. Quezada and Tequila SMITH RN, assumed care. VSS. Left hip dressing CDI, Left foot warm, capillary refill less than 3, pedal pulse +2. Patient appears comfortable with no s/s of pain or nausea. 1210 Oriented x 4, reports 10/10 pain. Denies nausea. 1215 Tolerating sips of water. Oxycodone and Fentanyl administered per MAR. 1255 Wife updated. 1333 Dilaudid administered for continued report of pain. 1345 Reports pain improved and tolerable, resting comfortably. VSS. Left hip dressing remains CDI. Recovery complete. Awaiting room on floor. 1400 Patient unable to void, bladder scan reveals 360 mL. No complaints of bladder distention,continuing to monitor. 1500 Wife updated with room number. 1600 Ancef administered per MAR. 1715 Report to Teresa MCKENNA RN, bladder scan = 534 mL, patient not complaining of bladder distention. 1730 Void 150 mL. 1753 Discharged to room with oxygen.

## 2023-07-20 NOTE — ED NOTES
Bedside report from Oksana VIGIL. Pt appears to be resting in gurney, VSS, RR even and unlabored, NAD. Pt remains on 3L NC.

## 2023-07-20 NOTE — ASSESSMENT & PLAN NOTE
I discussed advance care planning with the patient for at least 18 minutes, including diagnosis, prognosis, plan of care, risks and benefits of any therapies that could be offered, as well as alternatives including palliation and hospice, as appropriate.     Full code.

## 2023-07-20 NOTE — ED NOTES
Pt reports last PO intake apx 2200 last noc. Appears to be resting in gurney, VSS, NAD. Pt updated on POC and has no additional requests at this time.

## 2023-07-20 NOTE — ANESTHESIA TIME REPORT
Anesthesia Start and Stop Event Times     Date Time Event    7/20/2023 1108 Anesthesia Start     1204 Anesthesia Stop        Responsible Staff  07/20/23    Name Role Begin End    Ramy Quezada M.D. Anesth 1108 1204        Overtime Reason:  no overtime (within assigned shift)    Comments:

## 2023-07-20 NOTE — DISCHARGE PLANNING
HTH/SCP TCN chart review completed. Collaborated with ED  Oswaldo.The most current review of medical record, knowledge of pt's PLOF and social support, LACE+ score of 75, 6 clicks scores of ( not entered)  mobility were considered.      Pt seen at bedside,very pleasant. Introduced TCN program. Provided education regarding post acute levels of care.  Pt verbalizes understanding.   Pt endorses being very independent with mobility, IADLs , ADLs and driving- at baseline. Pt reports he does not use DME for mobility. There are no reported acute issues regarding resources during this time ( food, housing, transportation). Orthopedic intervention is planned. Will await therapy recommendations.Choice proactively obtained for SNF, IRF, HH, DME  and given to HARSH Meza to kindly scan to  in EPIC. TCN will continue to follow and collaborate with discharge planning team as additional post acute needs arise. Thank you.     Completed today:  Therapy recommendations pending  Choice obtained: IRF , SNF ( #1 [facility] 2 [facility]); HH, DME ---7/20/2023  [facility] referral  not sent

## 2023-07-20 NOTE — ED NOTES
Med Rec complete per patient   Allergies reviewed  No oral antibiotics in the last 30 days  Preferred pharmacy: [facility] on [facility]

## 2023-07-20 NOTE — CONSULTS
7/20/2023    The patient was seen at the request of Dr Yanez    HPI: Narendra Sun is a 72 y.o. male who presents with complaints of pain to left hip.  This started yesterday after fall.  The pain is 8/10 and is described as sharp.  The pain is made worse by palpation of the area and made better by rest and immobilization. He is an ambulator    Past Medical History:   Diagnosis Date    Bronchitis chronic     COPD (chronic obstructive pulmonary disease) (HCC) 7/27/2009    Dyslipidemia     Gout 12/1/2017 11/29/17 right first toe pain given indocin uric 5.6 10/16/18 uric 6.1    PNEUMOTHORAX     [facility] health care 7/27/2009    S/P Splenectomy 7/27/2009    Tobacco abuse 7/27/2009    Tubular adenoma 7/27/2009       Past Surgical History:   Procedure Laterality Date    GENERAL LUNG SURGERY Left 1980s    Lung biopsy - benign    SPLENECTOMY         Medications  No current facility-administered medications on file prior to encounter.     Current Outpatient Medications on File Prior to Encounter   Medication Sig Dispense Refill    Multiple Vitamin (MULTIVITAMIN ADULT PO) Take 1 Tablet by mouth every day.      TRELEGY ELLIPTA 100-62.5-25 MCG/ACT AEROSOL POWDER, BREATH ACTIVATED inhalation INHALE 1 INHALATION EVERY DAY BY MOUTH (Patient taking differently: Inhale 1 Inhalation every day.) 180 Each 3    albuterol 108 (90 Base) MCG/ACT Aero Soln inhalation aerosol Inhale 2 Puffs every day.      Cholecalciferol (D3 PO) Take 1 Capsule by mouth every day.      atorvastatin (LIPITOR) 20 MG Tab TAKE 1 TABLET BY MOUTH EVERY DAY (Patient taking differently: Take 20 mg by mouth every day.) 100 Tablet 2       Allergies  Patient has no known allergies.    ROS  Left hip pain. All other systems were reviewed and found to be negative    Family History   Problem Relation Age of Onset    Cancer Brother         prostate and likely 2 other cancers       Social History     Socioeconomic History    Marital status:   "  Occupational History     Comment: retired   Tobacco Use    Smoking status: Every Day     Packs/day: 1.50     Years: 47.00     Pack years: 70.50     Types: Cigars, Cigarettes     Last attempt to quit: 2012     Years since quittin.5    Smokeless tobacco: Never    Tobacco comments:     3 cigars/day   Vaping Use    Vaping Use: Never used   Substance and Sexual Activity    Alcohol use: Yes     Alcohol/week: 0.0 - 0.6 oz    Drug use: No    Sexual activity: Yes     Partners: Female     Social Determinants of Health     Food Insecurity: No Food Insecurity (2019)    Hunger Vital Sign     Worried About Running Out of Food in the Last Year: Never true     Ran Out of Food in the Last Year: Never true   Transportation Needs: No Transportation Needs (2019)    PRAPARE - Transportation     Lack of Transportation (Medical): No     Lack of Transportation (Non-Medical): No       Physical Exam  Vitals  /80   Pulse 82   Temp 36 °C (96.8 °F) (Temporal)   Resp 18   Ht 1.707 m (5' 7.2\")   Wt 68 kg (150 lb)   SpO2 93%   General: Well Developed, Well Nourished, Age appropriate appearance  HEENT: Normocephalic, atraumatic  Psych: Normal mood and affect  Neck: Supple, nontender, no masses  Lungs: Breathing unlabored, No audible wheezing  Heart: Regular heart rate and rhythm  Abdomen: Soft, NT, ND  Neuro: Sensation grossly intact to BUE and BLE, moving all four extremities  Skin: Intact, no open wounds  Vascular: 2+DP/PT, Capillary refill <2 seconds  MSK: Left hip pain and deformity      Radiographs:  Displaced left femoral neck fracture  DX-CHEST-LIMITED (1 VIEW)   Final Result      1.  No acute cardiac or pulmonary abnormalities are identified.      DX-FEMUR-2+ LEFT   Final Result      Mildly displaced transcervical left femoral neck fracture      DX-PELVIS-1 OR 2 VIEWS   Final Result      1.  Limited single view of the pelvis due to technique.   2.  There is a comminuted fracture of the left femoral neck.    "       Laboratory Values  Recent Labs     07/19/23 1910   WBC 19.5*   RBC 4.97   HEMOGLOBIN 16.9   HEMATOCRIT 51.0   .6*   MCH 34.0*   MCHC 33.1   RDW 53.3*   PLATELETCT 332   MPV 9.4     Recent Labs     07/19/23 1910   SODIUM 145   POTASSIUM 3.5*   CHLORIDE 114*   CO2 18*   GLUCOSE 87   BUN 15     Recent Labs     07/19/23 1910 07/20/23  0904   APTT 46.8*  --    INR 2.41* 1.02         Impression:Left femoral neck fracture    Plan:We discussed the diagnosis and findings with the patient at length.  We reviewed possible non operative and operative interventions and the risks and benefits of each of these.  he had a chance to ask questions and all of these were answered to his satisfaction. The patient chose to proceed with  operative intervention. Risks and benefits of surgery were discussed which include but are not limited to bleeding, infection, neurovascular damage, malunion, nonunion, instability, limb length discrepancy, DVT, PE, MI, Stroke and death. They understand these risks and wish to proceed.    Surgical treatment of hip fractures is urgent as delay in intervention can increase the risk of neurovascular injury, progressive soft tissue injury, compartment syndrome, infection, malunion, nonunion, loss of motion, DVT and death.

## 2023-07-20 NOTE — ED NOTES
Break RN:    Pt currently sleeping in NAD, VSS  Breathing even and unlabored   Call light in reach

## 2023-07-20 NOTE — ED NOTES
Pt appears to be sleeping comfortably in [facility] upon this RNs entrance to the room, RR even and unlabored. Pain reassessed at this time, pt states pain in 7/10.   Pt medicated per MAR with albuterol, education provided on how to use inhaler chamber. Pt tolerated well.   Pt using urinal appropriately. Urine collected and sent to the lab at this time.   Pt updated on POC and has no additional requests at this time.   Message sent to the pharmacy to send pt's Anoro Ellipta inhaler as there are none in the department at this time.

## 2023-07-20 NOTE — HOSPITAL COURSE
Narendra Sun is a 72 y.o. male past medical history of COPD on inhalers, hyperlipidemia, cigar smoker who presented 7/19/2023 with status post fall while getting out of car.  Patient reports having a 4 x 4 in the garage and he reports losing his balance and tripping over 4 x 4 wood piece.  Reports landing on his left hip.  Denies any fevers or chills.  Does report smoking 3 cigars daily.  Denies any recreational drug use.  Occasional alcohol use.  Reports the pain is exacerbated with weightbearing.  Not really relieved with anything.  In the ER, diagnostic imaging revealed left displaced femoral neck fracture.  Orthopedic consulted plan for intervention in a.m.  Admitted to medicine service.

## 2023-07-20 NOTE — H&P
Hospital Medicine History & Physical Note    Date of Service  7/19/2023    Primary Care Physician  Naveen Duncan M.D.    Consultants  orthopedics    Specialist Names: Dr. Anthony    Code Status  Full Code    Chief Complaint  Chief Complaint   Patient presents with    GLF       History of Presenting Illness  Narendra Sun is a 72 y.o. male past medical history of COPD on inhalers, hyperlipidemia, cigar smoker who presented 7/19/2023 with status post fall while getting out of car.  Patient reports having a 4 x 4 in the garage and he reports losing his balance and tripping over 4 x 4 wood piece.  Reports landing on his left hip.  Denies any fevers or chills.  Does report smoking 3 cigars daily.  Denies any recreational drug use.  Occasional alcohol use.  Reports the pain is exacerbated with weightbearing.  Not really relieved with anything.  In the ER, diagnostic imaging revealed left displaced femoral neck fracture.  Orthopedic consulted plan for intervention in a.m.  Admitted to medicine service.    I discussed the plan of care with patient, family, bedside RN, and ER physician .    Review of Systems  Review of Systems   Constitutional:  Negative for chills and fever.   HENT:  Negative for hearing loss and tinnitus.    Eyes:  Negative for blurred vision and double vision.   Respiratory:  Negative for cough and hemoptysis.    Cardiovascular:  Negative for chest pain and palpitations.   Gastrointestinal:  Negative for heartburn and nausea.   Genitourinary:  Negative for dysuria and urgency.   Musculoskeletal:  Positive for falls and joint pain. Negative for myalgias and neck pain.   Neurological:  Negative for dizziness and headaches.   Endo/Heme/Allergies:  Does not bruise/bleed easily.   Psychiatric/Behavioral:  Negative for depression and suicidal ideas.        Past Medical History   has a past medical history of Bronchitis chronic, COPD (chronic obstructive pulmonary disease) ([facility]) (7/27/2009),  Dyslipidemia, Gout (12/1/2017), PNEUMOTHORAX, Preventative health care (7/27/2009), S/P Splenectomy (7/27/2009), Tobacco abuse (7/27/2009), and Tubular adenoma (7/27/2009).    Surgical History   has a past surgical history that includes splenectomy and general lung surgery (Left, 1980s).     Family History  family history includes Cancer in his brother.   Family history reviewed with patient. There is no family history that is pertinent to the chief complaint.     Social History   reports that he has been smoking cigars. He has a 70.50 pack-year smoking history. He has never used smokeless tobacco. He reports current alcohol use. He reports that he does not use drugs.    Allergies  No Known Allergies    Medications  Prior to Admission Medications   Prescriptions Last Dose Informant Patient Reported? Taking?   Cholecalciferol (D3 PO) 7/19/2023 at AM Patient Yes No   Sig: Take 1 Capsule by mouth every day.   Multiple Vitamin (MULTIVITAMIN ADULT PO) 7/19/2023 at AM Patient Yes Yes   Sig: Take 1 Tablet by mouth every day.   TRELEGY ELLIPTA 100-62.5-25 MCG/ACT AEROSOL POWDER, BREATH ACTIVATED inhalation 7/19/2023 at AM Patient No No   Sig: INHALE 1 INHALATION EVERY DAY BY MOUTH   Patient taking differently: Inhale 1 Inhalation every day.   albuterol 108 (90 Base) MCG/ACT Aero Soln inhalation aerosol 7/19/2023 at AM Patient Yes No   Sig: Inhale 2 Puffs every day.   atorvastatin (LIPITOR) 20 MG Tab 7/19/2023 at AM Patient No No   Sig: TAKE 1 TABLET BY MOUTH EVERY DAY   Patient taking differently: Take 20 mg by mouth every day.      Facility-Administered Medications: None       Physical Exam  Temp:  [36 °C (96.8 °F)] 36 °C (96.8 °F)  Pulse:  [90-98] 96  Resp:  [17-18] 18  BP: (114-127)/(59-66) 127/65  SpO2:  [88 %-91 %] 88 %  Blood Pressure : 127/65   Temperature: 36 °C (96.8 °F)   Pulse: 96   Respiration: 18   Pulse Oximetry: 88 %       Physical Exam  Vitals and nursing note reviewed.   Constitutional:       Appearance:  Normal appearance.   HENT:      Head: Normocephalic and atraumatic.      Right Ear: Tympanic membrane normal.      Left Ear: Tympanic membrane normal.      Nose: Nose normal.      Mouth/Throat:      Mouth: Mucous membranes are moist.      Pharynx: Oropharynx is clear.   Eyes:      Extraocular Movements: Extraocular movements intact.      Pupils: Pupils are equal, round, and reactive to light.   Cardiovascular:      Rate and Rhythm: Normal rate and regular rhythm.      Pulses: Normal pulses.      Heart sounds: Normal heart sounds.   Pulmonary:      Effort: Pulmonary effort is normal.      Breath sounds: Normal breath sounds.   Abdominal:      General: Bowel sounds are normal. There is no distension.      Palpations: Abdomen is soft. There is no mass.   Musculoskeletal:         General: Tenderness (Left hip TTP) present.      Cervical back: Neck supple.   Skin:     General: Skin is warm.      Capillary Refill: Capillary refill takes less than 2 seconds.   Neurological:      General: No focal deficit present.      Mental Status: He is alert and oriented to person, place, and time. Mental status is at baseline.   Psychiatric:         Mood and Affect: Mood normal.         Behavior: Behavior normal.         Laboratory:  Recent Labs     07/19/23 1910   WBC 19.5*   RBC 4.97   HEMOGLOBIN 16.9   HEMATOCRIT 51.0   .6*   MCH 34.0*   MCHC 33.1   RDW 53.3*   PLATELETCT 332   MPV 9.4     Recent Labs     07/19/23 1910   SODIUM 145   POTASSIUM 3.5*   CHLORIDE 114*   CO2 18*   GLUCOSE 87   BUN 15   CREATININE 0.85   CALCIUM 7.3*     Recent Labs     07/19/23 1910   ALTSGPT 18   ASTSGOT 16   ALKPHOSPHAT 71   TBILIRUBIN 0.2   GLUCOSE 87     Recent Labs     07/19/23 1910   APTT 46.8*   INR 2.41*     No results for input(s): NTPROBNP in the last 72 hours.      No results for input(s): TROPONINT in the last 72 hours.    Imaging:  DX-CHEST-LIMITED (1 VIEW)   Final Result      1.  No acute cardiac or pulmonary abnormalities are  identified.      DX-FEMUR-2+ LEFT   Final Result      Mildly displaced transcervical left femoral neck fracture      DX-PELVIS-1 OR 2 VIEWS   Final Result      1.  Limited single view of the pelvis due to technique.   2.  There is a comminuted fracture of the left femoral neck.          X-Ray:  I have personally reviewed the images and compared with prior images.    Assessment/Plan:  Justification for Admission Status  I anticipate this patient will require at least two midnights for appropriate medical management, necessitating inpatient admission because left femoral neck fracture requiring orthopedic intervention and pain control.     Patient will need a Med/Surg bed on ORTHOPEDICS service .  The need is secondary to see above.    * Left displaced femoral neck fracture (HCC)- (present on admission)  Assessment & Plan  Status post fall  Displaced transcervical left femoral neck fracture  N.p.o. at midnight  IV narcotics with close respiratory monitoring  Tylenol 1000 mg every 6 hours  PT    Hypoxia  Assessment & Plan  Likely secondary to narcotic administration   Oxygen per protocol       Leukocytosis- (present on admission)  Assessment & Plan  Likely reactive  Serial CBC    Tobacco abuse- (present on admission)  Assessment & Plan  Tobacco cessation education provided for more than 3 minutes, discussed options of nicotine patch, medical treatment with wellbutrin and chantix. Discussed the risks of smoking including increased risk of heart disease, stroke, cancer and COPD  Nicotine patch protocol     Dyslipidemia- (present on admission)  Assessment & Plan  Continue with statin    COPD (chronic obstructive pulmonary disease) (HCC)- (present on admission)  Assessment & Plan  Not in exacerbation  Continue with home inhalers    ACP (advance care planning)  Assessment & Plan  I discussed advance care planning with the patient for at least 18 minutes, including diagnosis, prognosis, plan of care, risks and benefits of  any therapies that could be offered, as well as alternatives including palliation and hospice, as appropriate.     Full code.        I independently reviewed pertinent clinical lab tests since admission and ordered additional follow up clinical lab tests.  I independently reviewed pertinent radiographic images and the radiologist's reports since admission and ordered additional follow up radiographic studies.  The details of the available patient records in [facility] (including laboratory tests, culture data, medications, imaging, and other pertinent diagnostic tests) and that information was utilized as warranted in today's medical decision making for this patient.  I personally evaluated the patient condition at bedside.     Care interventions include:   Review of interval medical and surgical history, current medications and outpatient medication reconciliation, interval imaging studies and radiologist interpretation, and interval laboratory values.     Aggregated care time spent evaluating, reassessing, reviewing documentation, providing care, and managing this patient exclusive of procedures: 75 minutes      VTE prophylaxis: SCDs/TEDs

## 2023-07-20 NOTE — OP REPORT
DATE OF OPERATION: 7/20/2023     PREOPERATIVE DIAGNOSIS: Left displaced femoral neck fracture    POSTOPERATIVE DIAGNOSIS: Same    PROCEDURE PERFORMED: Open treatment of left femoral fracture, proximal end, neck with prosthetic replacement    SURGEON: Roe Anthony M.D.     ASSISTANT: Luisito FARLEY    ANESTHESIOLOGIST: Damien    ANESTHESIA: General    ESTIMATED BLOOD LOSS: 25 mL    INDICATIONS: The patient is a 72 y.o. male with a left femoral neck fracture resulting from a ground level fall.  The patient denies antecedent pain, and was found to have a normal neurovascular exam and skin envelope.  Radiographs reviewed by myself demonstrated a displaced femoral neck fracture.  Given these findings, the patient is a candidate for surgical treatment of the femoral neck fracture with hemiarthroplasty.  I discussed the risks and benefits of the procedure, including the risks of infection, wound healing complication, neurovascular injury, instability, limb length discrepancy, need for revision surgery, and the medical risks of anesthesia including DVT, PE, MI, stroke, and death.  Benefits include early mobilization and reduction in the medical risks of hip fractures.  Alternatives to surgery were also discussed, including non-operative management, percutaneous screw fixation and total hip arthroplasty.  The patient was in agreement with the plan to proceed, the informed consent was signed and documented and the operative extremity was marked.      PROCEDURE:  The patient was sedated with general anesthesia, and positioned in the lateral decubitus position on a beanbag with all bony prominences well padded and an axillary roll placed.  Perioperative antibiotics were administered. Sequential compression devices were employed.  The correct operative site was prepped and draped into a sterile field.  A procedural pause was conducted to verify correct patient, correct extremity, and presence of the surgeons initials on  the operative extremity.    A posterior approach to the hip was performed with care taken to avoid all neurovascular structures.  The fascia was split in line with the incision to the tip of the greater troch, then curved posteriorly to parallel the gluteal fibers.  The short external rotators and capsule were taken down en bloc and tagged with No 5 Ticron suture for future repair. The labrum was preserved and the sciatic nerve was protected at all times.    A femoral neck cut was made one finger breadth above the lesser trochanter and the femoral head was removed without difficulty.  The acetabulum was inspected and cleared of foreign material.  A femoral neck retractor was placed, and the canal was sequentially reamed and broached to a size 6.  After preparation of the canal, a cement restrictor was placed. A [employer] Ion size 6 stem was cemented in the appropriate version using third generation cement techniques.    Once cement had dried completely a 50mm shell and 26 +2.5mm head were impacted. The hip was then reduced and stability was tested. Hip was stable in full extension and external rotation and stable to 90 degrees internal rotation at 90 degrees forward flexion. Wounds were irrigated and capsule was closed to greater trochanter with #5 Ticron sutures.  The wound was then closed in layers, with #1 Vicryl in the fascia, 2-0 vicryl in the subcutaneous tissue, and staples in the skin.  Sterile dressings were applied, and the patient was transferred to PACU in stable condition.    The patient tolerated the procedure well. There were no apparent complications. All sponge, needle, and instrument counts were correct on two separate occasions. He was awakened, extubated, and transferred to the recovery room in satisfactory condition.     The use of Luisito Landeros as a surgical assistant was necessary for assistance with exposure, retraction, fracture reduction, instrumentation, and closure.      Post-Operative  Plan:    1.  The patient should bear weight as tolerated on their operative extremity with posterior hip precautions.  Gait aids (crutch or crutches, cane, walker) may be used as needed, and may be discontinued when no longer required.  2.  IV antibiotics - may be continued for 24 hours  3.  DVT prophylaxis - SCD's and Lovenox 40 mg SQ daily while inpatient.  The patient may transition to Aspirin 325 mg PO BID as an outpatient  4.  Discharge planning  ____________________________________   Roe Anthony M.D.   DD: 7/20/2023  11:51 AM

## 2023-07-20 NOTE — PROGRESS NOTES
[facility] Medicine Daily Progress Note    Date of Service  7/20/2023    Chief Complaint  Narendra Sun is a 72 y.o. male admitted 7/19/2023 with ground-level fall resulting in left hip fracture.    Hospital Course  Narendra Sun is a 72 y.o. male past medical history of COPD on inhalers, hyperlipidemia, cigar smoker who presented 7/19/2023 with status post fall while getting out of car.  Patient reports having a 4 x 4 in the garage and he reports losing his balance and tripping over 4 x 4 wood piece.  Reports landing on his left hip.  Denies any fevers or chills.  Does report smoking 3 cigars daily.  Denies any recreational drug use.  Occasional alcohol use.  Reports the pain is exacerbated with weightbearing.  Not really relieved with anything.  In the ER, diagnostic imaging revealed left displaced femoral neck fracture.  Orthopedic consulted plan for intervention in a.m.  Admitted to medicine service.    Interval Problem Update  7/20 afebrile, vitals are stable, on 3 L nasal cannula.  White count is elevated, likely reactive-we will continue to monitor.  Potassium is low, replaced.  Ortho took patient to surgery this morning, which was successful.  They are recommending IV antibiotics for 24 hours as well as Lovenox 40 mg daily while inpatient, which has been ordered.        I have discussed this patient's plan of care and discharge plan at IDT rounds today with Case Management, Nursing, Nursing leadership, and other members of the IDT team.    Consultants/Specialty  orthopedics    Code Status  Full Code    Disposition  The patient is not medically cleared for discharge to home or a post-acute facility.  Anticipate discharge to: home with organized home healthcare and close outpatient follow-up    I have placed the appropriate orders for post-discharge needs.    Review of Systems  Review of Systems   Constitutional:  Negative for chills, fever and malaise/fatigue.   Respiratory:  Positive for shortness of  breath. Negative for cough.    Cardiovascular:  Negative for chest pain, palpitations and leg swelling.   Gastrointestinal:  Negative for abdominal pain, diarrhea, heartburn, nausea and vomiting.   Genitourinary:  Negative for dysuria, frequency and urgency.   Musculoskeletal:  Positive for joint pain.   Neurological:  Negative for dizziness and headaches.   All other systems reviewed and are negative.       Physical Exam  Temp:  [36 °C (96.8 °F)-36.3 °C (97.3 °F)] 36.3 °C (97.3 °F)  Pulse:  [] 87  Resp:  [13-18] 18  BP: (114-153)/(59-86) 129/70  SpO2:  [88 %-97 %] 92 %    Physical Exam  Vitals and nursing note reviewed.   Constitutional:       General: He is awake.      Appearance: Normal appearance. He is not ill-appearing.   HENT:      Head: Normocephalic and atraumatic.      Jaw: There is normal jaw occlusion.      Right Ear: Hearing normal.      Left Ear: Hearing normal.      Nose: Nose normal.      Mouth/Throat:      Lips: Pink.      Mouth: Mucous membranes are moist.   Eyes:      Extraocular Movements: Extraocular movements intact.      Conjunctiva/sclera: Conjunctivae normal.      Pupils: Pupils are equal, round, and reactive to light.   Neck:      Vascular: No carotid bruit.   Cardiovascular:      Rate and Rhythm: Normal rate and regular rhythm.      Pulses: Normal pulses.      Heart sounds: Normal heart sounds, S1 normal and S2 normal.   Pulmonary:      Effort: Pulmonary effort is normal.      Breath sounds: Normal breath sounds and air entry. No stridor.   Abdominal:      General: Bowel sounds are normal.      Palpations: Abdomen is soft.      Tenderness: There is no abdominal tenderness.   Musculoskeletal:      Cervical back: Normal range of motion and neck supple.      Right lower leg: No edema.      Left lower leg: No edema.   Skin:     General: Skin is warm and dry.      Capillary Refill: Capillary refill takes less than 2 seconds.   Neurological:      General: No focal deficit present.       Mental Status: He is alert and oriented to person, place, and time. Mental status is at baseline.      Sensory: Sensation is intact.      Motor: Motor function is intact.   Psychiatric:         Attention and Perception: Attention and perception normal.         Mood and Affect: Mood and affect normal.         Speech: Speech normal.         Behavior: Behavior normal. Behavior is cooperative.         Fluids  No intake or output data in the 24 hours ending 07/20/23 1222    Laboratory  Recent Labs     07/19/23 1910   WBC 19.5*   RBC 4.97   HEMOGLOBIN 16.9   HEMATOCRIT 51.0   .6*   MCH 34.0*   MCHC 33.1   RDW 53.3*   PLATELETCT 332   MPV 9.4     Recent Labs     07/19/23 1910   SODIUM 145   POTASSIUM 3.5*   CHLORIDE 114*   CO2 18*   GLUCOSE 87   BUN 15   CREATININE 0.85   CALCIUM 7.3*     Recent Labs     07/19/23 1910 07/20/23  0904   APTT 46.8*  --    INR 2.41* 1.02               Imaging  DX-CHEST-LIMITED (1 VIEW)   Final Result      1.  No acute cardiac or pulmonary abnormalities are identified.      DX-FEMUR-2+ LEFT   Final Result      Mildly displaced transcervical left femoral neck fracture      DX-PELVIS-1 OR 2 VIEWS   Final Result      1.  Limited single view of the pelvis due to technique.   2.  There is a comminuted fracture of the left femoral neck.           Assessment/Plan  * Left displaced femoral neck fracture (HCC)- (present on admission)  Assessment & Plan  Status post fall  Displaced transcervical left femoral neck fracture  N.p.o. at midnight  IV narcotics with close respiratory monitoring  Tylenol 1000 mg every 6 hours  Orthopedics consulted and performed hip fracture repair on 7/20.  They recommended:  The patient should bear weight as tolerated on their operative extremity with posterior hip precautions.  Gait aids (crutch or crutches, cane, walker) may be used as needed, and may be discontinued when no longer required.  2.  IV antibiotics - may be continued for 24 hours  3.  DVT prophylaxis  - SCD's and Lovenox 40 mg SQ daily while inpatient.  The patient may transition to Aspirin 325 mg PO BID as an outpatient    Hypoxia  Assessment & Plan  Likely secondary to narcotic administration   Oxygen per protocol       Leukocytosis- (present on admission)  Assessment & Plan  Likely reactive  Serial CBC    COPD (chronic obstructive pulmonary disease) (HCC)- (present on admission)  Assessment & Plan  Not in exacerbation  Continue with home inhalers    Tobacco abuse- (present on admission)  Assessment & Plan  Tobacco cessation education provided for more than 3 minutes, discussed options of nicotine patch, medical treatment with wellbutrin and chantix. Discussed the risks of smoking including increased risk of heart disease, stroke, cancer and COPD  Nicotine patch protocol     ACP (advance care planning)  Assessment & Plan  I discussed advance care planning with the patient for at least 18 minutes, including diagnosis, prognosis, plan of care, risks and benefits of any therapies that could be offered, as well as alternatives including palliation and hospice, as appropriate.     Full code.      Dyslipidemia- (present on admission)  Assessment & Plan  Continue with statin         VTE prophylaxis: enoxaparin ppx    I have performed a physical exam and reviewed and updated ROS and Plan today (7/20/2023). In review of yesterday's note (7/19/2023), there are no changes except as documented above.

## 2023-07-20 NOTE — ANESTHESIA PREPROCEDURE EVALUATION
Case: [id number] Date/Time: 07/20/23 1113    Procedure: HEMIARTHROPLASTY, HIP (Left)    [place] [address] [place] [facility]    Surgeons: Roe Anthony M.D.          Relevant Problems   PULMONARY   (positive) COPD (chronic obstructive pulmonary disease) (HCC)      CARDIAC   (positive) Atherosclerosis of aorta (HCC)   (positive) Elevated coronary artery calcium score   (positive) History of BRAO (branch retinal artery occlusion)       Physical Exam    Airway   Mallampati: II  TM distance: >3 FB  Neck ROM: full       Cardiovascular - normal exam  Rhythm: regular  Rate: normal  (-) murmur     Dental - normal exam        Very poor dentition   Pulmonary - normal exam  Breath sounds clear to auscultation     Abdominal    Neurological - normal exam                 Anesthesia Plan    ASA 3   ASA physical status 3 criteria: COPD - poorly controlled    Plan - general       Airway plan will be ETT          Induction: intravenous    Postoperative Plan: Postoperative administration of opioids is intended.    Pertinent diagnostic labs and testing reviewed    Informed Consent:    Anesthetic plan and risks discussed with patient.    Use of blood products discussed with: patient whom consented to blood products.

## 2023-07-21 ENCOUNTER — HOME HEALTH ADMISSION (OUTPATIENT)
Dept: HOME HEALTH SERVICES | Facility: HOME HEALTHCARE | Age: 73
End: 2023-07-21
Payer: MEDICARE

## 2023-07-21 ENCOUNTER — APPOINTMENT (OUTPATIENT)
Dept: RADIOLOGY | Facility: MEDICAL CENTER | Age: 73
DRG: 522 | End: 2023-07-21
Payer: MEDICARE

## 2023-07-21 ENCOUNTER — PHARMACY VISIT (OUTPATIENT)
Dept: PHARMACY | Facility: MEDICAL CENTER | Age: 73
End: 2023-07-21
Payer: COMMERCIAL

## 2023-07-21 VITALS
BODY MASS INDEX: 23.54 KG/M2 | RESPIRATION RATE: 16 BRPM | TEMPERATURE: 98.1 F | DIASTOLIC BLOOD PRESSURE: 56 MMHG | WEIGHT: 150 LBS | SYSTOLIC BLOOD PRESSURE: 95 MMHG | HEIGHT: 67 IN | HEART RATE: 90 BPM | OXYGEN SATURATION: 89 %

## 2023-07-21 LAB
ALBUMIN SERPL BCP-MCNC: 3.3 G/DL (ref 3.2–4.9)
ALBUMIN/GLOB SERPL: 1.2 G/DL
ALP SERPL-CCNC: 77 U/L (ref 30–99)
ALT SERPL-CCNC: 17 U/L (ref 2–50)
ANION GAP SERPL CALC-SCNC: 7 MMOL/L (ref 7–16)
AST SERPL-CCNC: 39 U/L (ref 12–45)
BASOPHILS # BLD AUTO: 0.4 % (ref 0–1.8)
BASOPHILS # BLD: 0.1 K/UL (ref 0–0.12)
BILIRUB SERPL-MCNC: 0.9 MG/DL (ref 0.1–1.5)
BUN SERPL-MCNC: 13 MG/DL (ref 8–22)
CALCIUM ALBUM COR SERPL-MCNC: 9.1 MG/DL (ref 8.5–10.5)
CALCIUM SERPL-MCNC: 8.5 MG/DL (ref 8.5–10.5)
CHLORIDE SERPL-SCNC: 103 MMOL/L (ref 96–112)
CO2 SERPL-SCNC: 27 MMOL/L (ref 20–33)
CREAT SERPL-MCNC: 1.02 MG/DL (ref 0.5–1.4)
EOSINOPHIL # BLD AUTO: 0.25 K/UL (ref 0–0.51)
EOSINOPHIL NFR BLD: 1.1 % (ref 0–6.9)
ERYTHROCYTE [DISTWIDTH] IN BLOOD BY AUTOMATED COUNT: 54.4 FL (ref 35.9–50)
GFR SERPLBLD CREATININE-BSD FMLA CKD-EPI: 78 ML/MIN/1.73 M 2
GLOBULIN SER CALC-MCNC: 2.8 G/DL (ref 1.9–3.5)
GLUCOSE SERPL-MCNC: 128 MG/DL (ref 65–99)
HCT VFR BLD AUTO: 50 % (ref 42–52)
HGB BLD-MCNC: 15.8 G/DL (ref 14–18)
IMM GRANULOCYTES # BLD AUTO: 0.24 K/UL (ref 0–0.11)
IMM GRANULOCYTES NFR BLD AUTO: 1 % (ref 0–0.9)
LYMPHOCYTES # BLD AUTO: 1.88 K/UL (ref 1–4.8)
LYMPHOCYTES NFR BLD: 8.2 % (ref 22–41)
MCH RBC QN AUTO: 33.3 PG (ref 27–33)
MCHC RBC AUTO-ENTMCNC: 31.6 G/DL (ref 32.3–36.5)
MCV RBC AUTO: 105.3 FL (ref 81.4–97.8)
MONOCYTES # BLD AUTO: 1.69 K/UL (ref 0–0.85)
MONOCYTES NFR BLD AUTO: 7.4 % (ref 0–13.4)
NEUTROPHILS # BLD AUTO: 18.74 K/UL (ref 1.82–7.42)
NEUTROPHILS NFR BLD: 81.9 % (ref 44–72)
NRBC # BLD AUTO: 0 K/UL
NRBC BLD-RTO: 0 /100 WBC (ref 0–0.2)
PLATELET # BLD AUTO: 316 K/UL (ref 164–446)
PMV BLD AUTO: 9.9 FL (ref 9–12.9)
POTASSIUM SERPL-SCNC: 4.3 MMOL/L (ref 3.6–5.5)
PROCALCITONIN SERPL-MCNC: 0.11 NG/ML
PROT SERPL-MCNC: 6.1 G/DL (ref 6–8.2)
RBC # BLD AUTO: 4.75 M/UL (ref 4.7–6.1)
SODIUM SERPL-SCNC: 137 MMOL/L (ref 135–145)
WBC # BLD AUTO: 22.9 K/UL (ref 4.8–10.8)

## 2023-07-21 PROCEDURE — 97163 PT EVAL HIGH COMPLEX 45 MIN: CPT

## 2023-07-21 PROCEDURE — 97535 SELF CARE MNGMENT TRAINING: CPT

## 2023-07-21 PROCEDURE — 700102 HCHG RX REV CODE 250 W/ 637 OVERRIDE(OP): Performed by: ORTHOPAEDIC SURGERY

## 2023-07-21 PROCEDURE — RXMED WILLOW AMBULATORY MEDICATION CHARGE: Performed by: INTERNAL MEDICINE

## 2023-07-21 PROCEDURE — 99024 POSTOP FOLLOW-UP VISIT: CPT | Performed by: ORTHOPAEDIC SURGERY

## 2023-07-21 PROCEDURE — 36415 COLL VENOUS BLD VENIPUNCTURE: CPT

## 2023-07-21 PROCEDURE — 97166 OT EVAL MOD COMPLEX 45 MIN: CPT

## 2023-07-21 PROCEDURE — 85025 COMPLETE CBC W/AUTO DIFF WBC: CPT

## 2023-07-21 PROCEDURE — 99406 BEHAV CHNG SMOKING 3-10 MIN: CPT

## 2023-07-21 PROCEDURE — 97165 OT EVAL LOW COMPLEX 30 MIN: CPT

## 2023-07-21 PROCEDURE — 700102 HCHG RX REV CODE 250 W/ 637 OVERRIDE(OP): Performed by: STUDENT IN AN ORGANIZED HEALTH CARE EDUCATION/TRAINING PROGRAM

## 2023-07-21 PROCEDURE — 94664 DEMO&/EVAL PT USE INHALER: CPT

## 2023-07-21 PROCEDURE — 99239 HOSP IP/OBS DSCHRG MGMT >30: CPT | Performed by: INTERNAL MEDICINE

## 2023-07-21 PROCEDURE — 84145 PROCALCITONIN (PCT): CPT

## 2023-07-21 PROCEDURE — A9270 NON-COVERED ITEM OR SERVICE: HCPCS | Performed by: STUDENT IN AN ORGANIZED HEALTH CARE EDUCATION/TRAINING PROGRAM

## 2023-07-21 PROCEDURE — A9270 NON-COVERED ITEM OR SERVICE: HCPCS | Performed by: ORTHOPAEDIC SURGERY

## 2023-07-21 PROCEDURE — 80053 COMPREHEN METABOLIC PANEL: CPT

## 2023-07-21 PROCEDURE — 51798 US URINE CAPACITY MEASURE: CPT

## 2023-07-21 PROCEDURE — 700111 HCHG RX REV CODE 636 W/ 250 OVERRIDE (IP): Performed by: ORTHOPAEDIC SURGERY

## 2023-07-21 PROCEDURE — 71045 X-RAY EXAM CHEST 1 VIEW: CPT

## 2023-07-21 RX ORDER — OXYCODONE HYDROCHLORIDE 5 MG/1
10 TABLET ORAL EVERY 4 HOURS PRN
Qty: 60 TABLET | Refills: 0 | Status: SHIPPED | OUTPATIENT
Start: 2023-07-21 | End: 2023-07-26

## 2023-07-21 RX ORDER — ACETAMINOPHEN 325 MG/1
650 TABLET ORAL EVERY 6 HOURS PRN
Qty: 30 TABLET | Refills: 0 | COMMUNITY
Start: 2023-07-25 | End: 2024-01-30

## 2023-07-21 RX ORDER — AMOXICILLIN 250 MG
1 CAPSULE ORAL
Qty: 10 TABLET | Refills: 0 | Status: SHIPPED | OUTPATIENT
Start: 2023-07-21 | End: 2023-07-31

## 2023-07-21 RX ORDER — OXYCODONE HYDROCHLORIDE 10 MG/1
10 TABLET ORAL EVERY 4 HOURS PRN
Qty: 30 TABLET | Refills: 0 | Status: SHIPPED | OUTPATIENT
Start: 2023-07-21 | End: 2023-07-21

## 2023-07-21 RX ORDER — ASPIRIN 325 MG
325 TABLET ORAL 2 TIMES DAILY
Qty: 56 TABLET | Refills: 0 | Status: SHIPPED | OUTPATIENT
Start: 2023-07-21 | End: 2023-08-02

## 2023-07-21 RX ADMIN — OXYCODONE HYDROCHLORIDE 10 MG: 5 TABLET ORAL at 14:06

## 2023-07-21 RX ADMIN — DOCUSATE SODIUM 100 MG: 100 CAPSULE, LIQUID FILLED ORAL at 17:40

## 2023-07-21 RX ADMIN — NICOTINE TRANSDERMAL SYSTEM 21 MG: 21 PATCH, EXTENDED RELEASE TRANSDERMAL at 05:50

## 2023-07-21 RX ADMIN — KETOROLAC TROMETHAMINE 15 MG: 30 INJECTION, SOLUTION INTRAMUSCULAR; INTRAVENOUS at 17:40

## 2023-07-21 RX ADMIN — KETOROLAC TROMETHAMINE 15 MG: 30 INJECTION, SOLUTION INTRAMUSCULAR; INTRAVENOUS at 05:53

## 2023-07-21 RX ADMIN — KETOROLAC TROMETHAMINE 15 MG: 30 INJECTION, SOLUTION INTRAMUSCULAR; INTRAVENOUS at 12:54

## 2023-07-21 RX ADMIN — ACETAMINOPHEN 650 MG: 325 TABLET, FILM COATED ORAL at 17:41

## 2023-07-21 RX ADMIN — OXYCODONE HYDROCHLORIDE 10 MG: 5 TABLET ORAL at 19:15

## 2023-07-21 RX ADMIN — ACETAMINOPHEN 650 MG: 325 TABLET, FILM COATED ORAL at 05:51

## 2023-07-21 RX ADMIN — OXYCODONE HYDROCHLORIDE 10 MG: 5 TABLET ORAL at 05:51

## 2023-07-21 RX ADMIN — HYDROMORPHONE HYDROCHLORIDE 0.5 MG: 1 INJECTION, SOLUTION INTRAMUSCULAR; INTRAVENOUS; SUBCUTANEOUS at 00:35

## 2023-07-21 RX ADMIN — UMECLIDINIUM BROMIDE AND VILANTEROL TRIFENATATE 1 PUFF: 62.5; 25 POWDER RESPIRATORY (INHALATION) at 10:59

## 2023-07-21 RX ADMIN — ATORVASTATIN CALCIUM 20 MG: 20 TABLET, FILM COATED ORAL at 05:51

## 2023-07-21 RX ADMIN — HYDROMORPHONE HYDROCHLORIDE 0.5 MG: 1 INJECTION, SOLUTION INTRAMUSCULAR; INTRAVENOUS; SUBCUTANEOUS at 10:56

## 2023-07-21 RX ADMIN — ACETAMINOPHEN 650 MG: 325 TABLET, FILM COATED ORAL at 12:53

## 2023-07-21 RX ADMIN — OXYCODONE HYDROCHLORIDE 10 MG: 5 TABLET ORAL at 09:20

## 2023-07-21 RX ADMIN — ALBUTEROL SULFATE 2 PUFF: 90 AEROSOL, METERED RESPIRATORY (INHALATION) at 08:13

## 2023-07-21 ASSESSMENT — COPD QUESTIONNAIRES
IN THE PAST 12 MONTHS DO YOU DO LESS THAN YOU USED TO BECAUSE OF YOUR BREATHING PROBLEMS: DISAGREE/UNSURE
COPD SCREENING SCORE: 5
DO YOU EVER COUGH UP ANY MUCUS OR PHLEGM?: NO/ONLY WITH OCCASIONAL COLDS OR INFECTIONS
DURING THE PAST 4 WEEKS HOW MUCH DID YOU FEEL SHORT OF BREATH: SOME OF THE TIME
HAVE YOU SMOKED AT LEAST 100 CIGARETTES IN YOUR ENTIRE LIFE: YES

## 2023-07-21 ASSESSMENT — COGNITIVE AND FUNCTIONAL STATUS - GENERAL
WALKING IN HOSPITAL ROOM: A LITTLE
STANDING UP FROM CHAIR USING ARMS: A LITTLE
TOILETING: A LITTLE
DAILY ACTIVITIY SCORE: 21
SUGGESTED CMS G CODE MODIFIER MOBILITY: CK
TURNING FROM BACK TO SIDE WHILE IN FLAT BAD: A LITTLE
HELP NEEDED FOR BATHING: A LITTLE
DRESSING REGULAR LOWER BODY CLOTHING: A LITTLE
MOVING FROM LYING ON BACK TO SITTING ON SIDE OF FLAT BED: A LITTLE
MOBILITY SCORE: 18
SUGGESTED CMS G CODE MODIFIER DAILY ACTIVITY: CJ
CLIMB 3 TO 5 STEPS WITH RAILING: A LITTLE
MOVING TO AND FROM BED TO CHAIR: A LITTLE

## 2023-07-21 ASSESSMENT — PAIN DESCRIPTION - PAIN TYPE
TYPE: ACUTE PAIN;SURGICAL PAIN
TYPE: SURGICAL PAIN
TYPE: ACUTE PAIN;SURGICAL PAIN
TYPE: ACUTE PAIN;SURGICAL PAIN
TYPE: SURGICAL PAIN

## 2023-07-21 ASSESSMENT — GAIT ASSESSMENTS
DISTANCE (FEET): 150
GAIT LEVEL OF ASSIST: SUPERVISED
DEVIATION: ANTALGIC;STEP TO;DECREASED HEEL STRIKE;DECREASED TOE OFF;BRADYKINETIC
ASSISTIVE DEVICE: FRONT WHEEL WALKER

## 2023-07-21 ASSESSMENT — LIFESTYLE VARIABLES: PACK_YEARS: 70

## 2023-07-21 ASSESSMENT — ACTIVITIES OF DAILY LIVING (ADL): TOILETING: INDEPENDENT

## 2023-07-21 NOTE — PROGRESS NOTES
Report received. Assumed care. Pt in bed awake. A/O x4. VSS. Responds appropriately. C/O pain, medicated per MAR, no SOB. Assessment complete. Silver mepilex to left hip in place, cdi. WBAT. Discussed POC, , pt verbalizes understanding. Explained importance of calling before getting OOB. Call light and belongings within reach. Bed alarm on. Bed in the lowest position. Treaded socks in place. Hourly rounding in progress. Will continue to monitor .

## 2023-07-21 NOTE — PROGRESS NOTES
NOC HOSPITALIST CROSS COVER    Notified by RN regarding being unable to titrate patient's oxygen down.  Per RN patient has abnormal.  Previously patient had procalcitonin and chest x-ray which were within normal limits.  Given patient's lack of improvement and uptrending white blood cell count these tests will be repeated today.          -----------------------------------------------------------------------------------------------------------    Electronically signed by:  JUN Villar PA-C  Hospitalist Services

## 2023-07-21 NOTE — RESPIRATORY CARE
"COPD EDUCATION by COPD CLINICAL EDUCATOR  7/21/2023 at 2:39 PM by Morelia Bolanos RRT     Patient interviewed by COPD education team. Patient refused COPD program at this time. An Action Plan was completed in the EMR to reflect current Respiratory Medication use.                     Smoking Cessation Intervention and education completed, 3 minutes spent on smoking cessation education with patient.    Provided smoking cessation packet with \"Tips to Quit\" and brochure for \"Free Smoking Cessation Classes\".            COPD Screen  COPD Risk Screening  Do you have a history of COPD?: Yes  Do you have a Pulmonologist?: No  COPD Population Screener  During the past 4 weeks, how much did you feel short of breath?: Some of the time  Do you ever cough up any mucus or phlegm?: No/only with occasional colds or infections  In the past 12 months, you do less than you used to because of your breathing problems: Disagree/unsure  Have you smoked at least 100 cigarettes in your entire life?: Yes  How old are you?: 60+  COPD Screening Score: 5  COPD Coordinator Recommended: Yes    COPD Assessment  COPD Clinical Specialists ONLY  COPD Education Initiated: Yes--Short Intervention (Pt declined COPD literature, education and spacer. Accepted Smoking Cessation booklet)  Is this a COPD exacerbation patient?: No  DME Company: none prior  DME Equipment Type: none prior  Physician Follow Up Appointment:  (declined apt assistance)  Physician Name: Naveen Duncan M.D.  Pulmonologist Name: declined list of local pulmonary clinics  Referrals Initiated: Yes  Pulmonary Rehab: N/A  Smoking Cessation: Yes  $ Smoking Cessation 3-10 Minutes: Symptomatic (3 min)  Smoking Pack Years: 70  Hospice: N/A  Home Health Care: N/A (pending SNF upon discharge)  Mobile Urgent Care Services: N/A  Geriatric Specialty Group: N/A  Private In-Home Care Agency: N/A  $ Demo/Eval of SVN's, MDI's and Aerosols: Yes (reviewed meds)  (OP) Pulmonary Function Testing: Yes (2013: " "FEV1 1.41 L(46%), ratio 47%)  Interdisciplinary Rounds: Attendance at Rounds (30 Min)    PFT Results    No results found for: PFT    Meds to Beds        MY COPD ACTION PLAN     It is recommended that patients and physicians /healthcare providers complete this action plan together. This plan should be discussed at each physician visit and updated as needed.    The green, yellow and red zones show groups of symptoms of COPD. This list of symptoms is not comprehensive, and you may experience other symptoms. In the \"Actions\" column, your healthcare provider has recommended actions for you to take based on your symptoms.    Patient Name: Narendra Sun   YOB: 1950   Last Updated on:     Green Zone:  I am doing well today Actions     Usual activitiy and exercise level   Take daily medications     Usual amounts of cough and phlegm/mucus   Use oxygen as prescribed     Sleep well at night   Continue regular exercise/diet plan     Appetite is good   At all times avoid cigarette smoke, inhaled irritants     Daily Medications (these medications are taken every day):   Fluticasone and Umeclidinium and Vilanterol (Trelogy) 1 Puff Once daily     Additional Information:  Rinse mouth after taking    Yellow Zone:  I am having a bad day or a COPD flare Actions     More breathless than usual   Continue daily medications     I have less energy for my daily activities   Use quick relief inhaler as ordered     Increased or thicker phlegm/mucus   Use oxygen as prescribed     Using quick relief inhaler/nebulizer more often   Get plenty of rest     Swelling of ankles more than usual   Use pursed lip breathing     More coughing than usual   At all times avoid cigarette smoke, inhaled irritants     I feel like I have a \"chest cold\"     Poor sleep and my symptoms woke me up     My appetite is not good     My medicine is not helping      Call provider immediately if symptoms don’t improve     Continue daily medications, add " rescue medications:   Albuterol 2 Puffs         Medications to be used during a flare up, (as Discussed with Provider):           Additional Information:  Take as directed, it is recommended to use with a spacer.    Red Zone:  I need urgent medical care Actions     Severe shortness of breath even at rest   Call 911 or seek medical care immediately     Not able to do any activity because of breathing      Fever or shaking chills      Feeling confused or very drowsy       Chest pains      Coughing up blood

## 2023-07-21 NOTE — CARE PLAN
The patient is Stable - Low risk of patient condition declining or worsening    Shift Goals  Clinical Goals: Pain management  Patient Goals: Pain, rest  Family Goals: discharge    Progress made toward(s) clinical / shift goals:    Problem: Fall Risk  Goal: Patient will remain free from falls  Outcome: Progressing  Note: Patient is moderate fall risk, Bed alarm is on, he is alert and oriented       Patient is not progressing towards the following goals:

## 2023-07-21 NOTE — CARE PLAN
The patient is Stable - Low risk of patient condition declining or worsening    Shift Goals  Clinical Goals: stable VS, pain control, PT/OT  Patient Goals: pain control, rest  Family Goals: discharge    Progress made toward(s) clinical / shift goals:    Problem: Pain - Standard  Goal: Alleviation of pain or a reduction in pain to the patient’s comfort goal  Outcome: Progressing  Note: Medicated with oxy 10 per MAR with adequate pain control per pt report, hourly rounding in progress     Problem: Knowledge Deficit - Standard  Goal: Patient and family/care givers will demonstrate understanding of plan of care, disease process/condition, diagnostic tests and medications  Outcome: Progressing  Note: Educated pt on POC, monitor VS, pain control, PT/OT, safety, DC planning, all questions answered, hourly rounding in progress       Patient is not progressing towards the following goals:

## 2023-07-21 NOTE — THERAPY
"Occupational Therapy   Initial Evaluation     Patient Name: Narendra Sun  Age:  72 y.o., Sex:  male  Medical Record #: [id number]  Today's Date: 7/21/2023     Precautions  Precautions: Fall Risk, Posterior Hip Precautions, Weight Bearing As Tolerated Left Lower Extremity      Assessment  Patient is 72 y.o. male admitted to [facility] due to MGLF after tripping over an object resulting in L femoral neck fx s/p L posterior hip hemiarthroplasty. PMHx of COPD, dyslipidemia, pneumothorax, and bronchitis. During OT eval, pt participated in seated ADLs (LBD/UBD). Pt presented with good postural control, core strength, AROM of BUE, activity tolerance, pain management, dynamic sitting balance, dynamic standing balance, and endurance while completing ADLs. At this time, pt does not have any acute IP OT needs but should receive home health OT services to increase independence w/ADLs post sx.    Plan  DC Equipment Recommendations: Sock Aide, Reacher, Tub / Shower Seat  Discharge Recommendations: (P) Recommend home health for continued occupational therapy services     Subjective  \"Am I going home today?\"     Objective   07/21/23 1019   Initial Contact Note    Initial Contact Note Order Received and Verified, Occupational Therapy Evaluation in Progress with Full Report to Follow.   Prior Living Situation   Prior Services None   Housing / Facility 1 Story House   Bathroom Set up Walk In Shower;Shower Chair  (Pt reports that he utilizes a shower stool without back support.)   Equipment Owned Tub / Shower Seat   Lives with - Patient's Self Care Capacity Spouse   Prior Level of ADL Function   Self Feeding Independent   Grooming / Hygiene Independent   Bathing Independent   Dressing Independent   Toileting Independent   Prior Level of IADL Function   Medication Management Independent   Laundry Independent   Kitchen Mobility Independent   Finances Independent   Home Management Independent   Shopping Independent   Prior Level Of Mobility " Independent Without Device in Community;Independent Without Device in Home   Driving / Transportation Driving Independent   Occupation (Pre-Hospital Vocational) Retired Due To Age   Leisure Interests Gardening  (Pt reports he enjoys gardening but due to his sx his wife will be responsible for handling the gardening tasks.)   History of Falls   History of Falls Yes   Date of Last Fall   (reason for admit)   Precautions   Precautions Fall Risk;Posterior Hip Precautions;Weight Bearing As Tolerated Left Lower Extremity   Vitals   O2 (LPM) 3   O2 Delivery Device Nasal Cannula   Pain 0 - 10 Group   Therapist Pain Assessment Post Activity Pain Same as Prior to Activity;Nurse Notified  (Pt reports that he has mild pain but can work through it. Post OT samreen, RN was alerted that pt requested more pain medication.)   Cognition    Cognition / Consciousness WDL   Level of Consciousness Alert   Comments Pt is pleasant and cooperative.   Active ROM Upper Body   Active ROM Upper Body  WDL   Strength Upper Body   Upper Body Strength  WDL   Neurological Concerns   Neurological Concerns No   Coordination Upper Body   Coordination WDL   Balance Assessment   Sitting Balance (Static) Good   Sitting Balance (Dynamic) Fair +   Standing Balance (Static) Fair +   Standing Balance (Dynamic) Fair   Weight Shift Sitting Good   Weight Shift Standing Fair   Comments w/FWW  (w/o signs of fatigue or LOB)   Bed Mobility    Supine to Sit Supervised  (HOB elevated/semi franco's position, pt was able to walk BLE across bed to sit EOB)   Scooting Supervised  (Pt was able to weight shift L/R in order to sit EOB)   ADL Assessment   Upper Body Dressing Supervision  (donned pullover shirt)   Lower Body Dressing Standby Assist  (OT demo'd use of reacher to adhere to posterior hip precautions. Pt rehearsed use of reacher and donned shorts while sitting EOB.)   Comments Pt politely declined to participate in other ADLs as he recently went to the bathroom and  did not want to complete grooming task.   How much help from another person does the patient currently need...   Putting on and taking off regular lower body clothing? 3   Bathing (including washing, rinsing, and drying)? 3   Toileting, which includes using a toilet, bedpan, or urinal? 3   Putting on and taking off regular upper body clothing? 4   Taking care of personal grooming such as brushing teeth? 4   Eating meals? 4   6 Clicks Daily Activity Score 21   Functional Mobility   Sit to Stand Standby Assist  (Pt was able to stand EOB in order to manage pulling up shorts.)   Mobility semi-fowlers position<>EOB   Activity Tolerance   Sitting Edge of Bed +10 mins   Standing <3 mins  (Pt did not display any signs of fatigue or LOB.)   Education Group   Education Provided Hip Precautions;Role of Occupational Therapist;Activities of Daily Living;Adaptive Equipment   Role of Occupational Therapist Patient Response Patient;Acceptance;Significant Other;Explanation;Verbal Demonstration   Hip Precautions Patient Response Patient;Eager;Acceptance;Handout;Verbal Demonstration;Action Demonstration   ADL Patient Response Patient;Acceptance;Eager;Explanation;Action Demonstration   Adaptive Equipment Patient Response Patient;Eager;Acceptance;Verbal Demonstration;Action Demonstration   Additional Comments Pt received education regarding posterior hip precautions during completion of ADLs. Pt verbalized understanding. OT recommends use of AE for LBD and pt was very receptive of utilizing equipment. Pt's wife present during OT evaluation and reported that she would be available to assist him with any ADLs he has difficulty with upon d/c.   Anticipated Discharge Equipment and Recommendations   DC Equipment Recommendations Sock Aide;Reacher;Tub / Shower Seat   Discharge Recommendations Recommend home health for continued occupational therapy services   Interdisciplinary Plan of Care Collaboration   IDT Collaboration with  Nursing    Patient Position at End of Therapy Tray Table within Reach;Phone within Reach;Family / Friend in Room  (Pt left sitting EOB post OT evaluation)   Collaboration Comments RN updated regarding pt's functional status

## 2023-07-21 NOTE — PROGRESS NOTES
Report received from PACU RN. Assumed care of pt. A/O x4. On 2L O2 via nasal cannula. Responds appropriately. Denies pain, SOB. Bladder scanned due to not being able to void within the last 6hrs 417mls noted. Explained importance of calling before getting OOB. Call light and belongings within reach.

## 2023-07-21 NOTE — DISCHARGE INSTRUCTIONS
Discharge Instructions    Discharged to home by car with relative. Discharged via wheelchair, hospital escort: Yes.  Special equipment needed: Oxygen and Walker    Be sure to schedule a follow-up appointment with your primary care doctor or any specialists as instructed.     Discharge Plan:        I understand that a diet low in cholesterol, fat, and sodium is recommended for good health. Unless I have been given specific instructions below for another diet, I accept this instruction as my diet prescription.   Other diet: Regular diet    Special Instructions: None    -Is this patient being discharged with medication to prevent blood clots?  No    Is patient discharged on Warfarin / Coumadin?   No   Special Equipment

## 2023-07-21 NOTE — PROGRESS NOTES
Messaged hospitalist, patient has been on 5L oxygen via oxymask through the night, upon ascultation he has inspiratory wheezes, and his WBC is 22.9 this morning. Morro Villar ordered CXR and procalcitonin.

## 2023-07-21 NOTE — DISCHARGE PLANNING
[facility] Transitional Care Coordination    Referral from: Dr Anthony  Insurance Provider on Facesheet: SCP  Potential Rehab Diagnosis: Hip fx    Chart review indicates patient may have on going medical management and may have therapy needs to possibly meet inpatient rehab facility criteria with the goal of returning to community.    D/C support: TBD     Physiatry consultation pended per protocol.     Hip fx - physiatry consult pended, awaiting therapy evals as appropriate. TCC will follow.     Thank you for the referral.

## 2023-07-21 NOTE — DISCHARGE SUMMARY
Discharge Summary    CHIEF COMPLAINT ON ADMISSION  Chief Complaint   Patient presents with    GLF       Reason for Admission  ems     Admission Date  7/19/2023    CODE STATUS  Full Code    HPI & HOSPITAL COURSE  This is a 72 y.o. male here with left hip fracture.     72 y.o. male past medical history of COPD on inhalers, hyperlipidemia, cigar smoker who presented 7/19/2023 with status post fall while getting out of car.  Patient reports having a 4 x 4 in the garage and he reports losing his balance and tripping over 4 x 4 wood piece.  Reports landing on his left hip. In the ER, diagnostic imaging revealed left displaced femoral neck fracture.  Orthopedic consulted, s/p ORIF of left humeral fracture with proximal and neck with prosthetic replacement.  Patient had postop hypoxia still requiring 3 L as well as elevated leukocytosis.  Infectious work-up was negative including procalcitonin, chest x-ray and UA.  Patient does have a history of COPD from chronic cigar smoking.  Orthopedic surgery has cleared the patient for discharge on weightbearing as tolerated with posterior hip precautions of the left leg.  They recommended aspirin 325 mg twice daily for DVT prophylaxis on discharge.  Patient's vital signs are stable on 3 L of oxygen and he is requesting to be discharged home.  Patient has been assessed for home oxygen needs and this has been ordered and delivered to the bedside prior to discharge.  He does not follow with a pulmonologist, I have placed a referral for him to be seen by outpatient pulmonology.  PT/OT recommended home health therapy which has also been set up.  Patient will be discharged on opiate pain medications and will need to follow-up with his primary care physician if he continues to need pain control.  He is to return to the ER if his condition worsens.    Therefore, he is discharged in good and stable condition to home with organized home healthcare and close outpatient follow-up.    The patient  met 2-midnight criteria for an inpatient stay at the time of discharge.    Discharge Date  7/21/2023    FOLLOW UP ITEMS POST DISCHARGE  Follow up with primary care in 1-2 weeks   Follow up with orthopedic surgery   Referral placed for outpatient pulmonology    DISCHARGE DIAGNOSES  Principal Problem:    Left displaced femoral neck fracture (HCC) (POA: Yes)  Active Problems:    COPD (chronic obstructive pulmonary disease) (HCC) (Chronic) (POA: Yes)      Overview: 5/08 chest x-ray negative      6/08 PFT FEV1.6 L (54% predicted), FEV/FVC ratio 47%, positive       bronchodilator response      6/10 quit smoking tobacco      5/11 still off cigs, smoking 2 cigars per day      5/11 restart spiriva      5/11 cxr negative      12/12 off cigs, still smokes cigars 3-4 per day      12/12 on spiriva, add symbicort 80/4.5      2/7/13 PFT severe stage III COPD, FEV1 1.4 L (46% predicted), FEV/FVC 47%       improvement after bronchodilator 13%, normal DLCO; continue spiriva,       symbicort 80/1.5, smoking cessation      6/24/16  CT thorax lung cancer screening to spiculated 9 mm nodules RUL       underlying emphysematous changes and tiny 3 mm nodule RML, recommend 3       month follow-up CT vs CT/PET      2/7/13 cxr negative      12/26/13 still 3 cigars per day; on spiriva and symbicort      1/23/15 still 3 cigars per day, on spiriva and symbicort      1/26/16 change symbicort to advair 250/50 (insurance) and continue spiriva      6/2/16 CT lung cancer screening visit      7/8/16 [facility] note right upper lobe lung nodules, suspicious in nature,       patient agrees to CT/PET      7/8/16 CT/PET spiculated right upper lobe nodule SUV 1.1, not FDG avid,       scarring left upper lobe noted, nonspecific findings, low-grade neoplasm       cannot be excluded; per [facility] repeat CT 3 months      10/24/16 CT thorax without; 2 spiculated right upper lobe pulmonary       nodules 8 mm and 9 mm in size unchanged, 3 mm right middle lobe unchanged        nodule      10/27/16 lung cancer screening program note, recommend referral to       pulmonary nodule clinic      4/6/17 CT thorax without; 2 stable 8-9 mm spiculated right upper lobe       nodules, stable probably postinflammatory tiny 3 mm RML and RLL nodules,       no new suspicious nodules      12/1/17 declines PFT       4/15/18 CT lung lung cancer screening stable 9 mm right posterior/apical       ill-defined nodule, stable right upper/lateral 9 mm solid pulmonary       nodule, postoperative changes left upper lobe, emphysema, aortic and       coronary atherosclerotic plaque, previous splenectomy       8/1/18 CT/PET no abnormal uptake within either right upper lobe or right       apical nodule, nodules unchanged dating back to previous lung cancer       screening CT 6/24/16, downgraded to category 3 RADS, no change 3 mm right       middle lobe nodule, no increased uptake neck, abdomen, pelvis      4/19/19 CT lung cancer screening spiculated nodular density right lung       apex 9 x 9 mm with cavitary change and spiculated nodule right lung apex       posteriorly 7 x 8 mm overall unchanged from previous exam, postoperative       change left upper lobe, recommend repeat 6 months      8/1/19 on advair and spiriva declines PFT      10/8/19 CT thorax without; unchanged 9 mm and 8 mm noncalcified pulmonary       nodules right lung apex, postoperative scarring left upper lobe again       noted      2/13/20 change advair to breo once daily (had difficulty remembering to       take advair twice a day) continue spiriva      5/7/20 declines PFT       10/16/20 CT thorax without, pulmonary nodules 7.9 mm right apex, 9.1 mm       RUL, 2.8 mm RUL unchanged compared to previous, new 5.0 mm nodule RUL,       ill-defined opacities lingula unchanged consistent with       atelectasis/fibrosis, no effusions, hyperexpanded and emphysematous lungs       borderline enlarged right hilar lymph node unchanged 9.1 mm,        atherosclerotic changes      10/17/20 change breo plus spiriva to trelegy inhaler due to cost      3/29/21 CT thorax; new left lower lobe 4 mm noncalcified nodule, otherwise       stable 10 mm and 8 mm spiculated right upper lobe and small nodules,       emphysematous changes, left upper lobe resection, recommend repeat CT 3 to       6 months      8/2/21 CT thorax without, spiculated nodule right upper lobe apex 8 mm       unchanged, nodule right upper lobe central cavitation 10 mm in size       unchanged, 4 mm nodule right lower lobe not significantly changed, stable       4 mm left upper lobe nodule, emphysematous and bullous change multiple       areas of pulmonary scarring, surgical changes left upper lobe, repeat CT       in 6 months      5/16/22 declines PFT      5/15/22 CT thorax without, stable 9 mm nodule right apex, stable 9 mm       nodule RUL, 5 mm nodule RLL increased from 3 mm compared to 2018, 3 mm       nodule LLL, no significant adenopathy, coronary calcifications, repeat 6       months      11/21/22 declines PFT, cigar 3 per day          Dyslipidemia (Chronic) (POA: Yes)      Overview: 5/08 chol  239, trig 91,hdl 71,ldl 150      8/09 chol 268,trig 107,hdl 56,ldl 191      8/09 start zocor 20      8/09 stress echo negative      5/11 chol 231,trig 114,hdl 53,ldl 155 off zocor      5/12 chol 215,trig 97,hdl 48,ldl 148 off zocor      12/31/13 chol 215,trig 101,hdl 55,ldl 140,crp 3.0 off statin; 10 year risk       12%, I recommend statin therapy he declines      1/13/14 echo technically difficult study, possible mild LVH      1/13/14 treadmill thallium exercise 6 minutes 30 seconds lashell protocol,       limited by fatigue; no ischemia, EF 59%      2/7/15 chol 250,trig 86,hdl 51,ldl 182; urine mac <0.5; 10 year risk       calculation 19%, start zocor 20 mg      5/16/16 did not take zocor      5/18/16 chol 239,trig 152,hdl 44,ldl 165      8/14/17 chol 205,trig 142,hdl 36,ldl 141 off zocor, 10 year cardiac  risk,       declines medication      8/1/18 CT cardiac scoring LMA 0.0, LCX 1.5, .5, RCA 0.0, PDA 0.0 =       282.0      10/16/8 chol 261,trig 155,hdl 42,ldl 188 declines statin therapy      10/18/18 start lipitor 20 mg      10/10/19 chol 159,trig 94,hdl 44,ldl 96 on lipitor 20 mg      3/30/21 chol 186,trig 138,hdl 42,ldl 116 on lipitor 20 mg      6/8/22 chol 165,trig 143,hdl 45,ldl 91 on lipitor 20 mg      1/16/23 chol 169,trig 122,hdl 48,ldl 97 on lipitor 20 mg      6/30/23 chol 164,trig 214,hdl 41,ldl 80 on lipitor 20 mg                ACP (advance care planning) (POA: Unknown)      Overview: 8/7/17 pneumovax      10/12/19 hep c Ab reactive, follow up HCV RNA negative      5/7/20 prevnar 13      5/7/20 menactra second      11/16/20 colon per [facility] 4 polyps hyperplastic and benign polypoid, repeat       in 3 years      6/27/21 shingrix first       5/13/22 covid moderna 4th      5/16/22 trumenba third       5/16/22 tdap      11/21/22 hep b third      11/21/22 declines prevnar 20      6/30/23 vit d 36      6/30/23 psa 1.3                            Tobacco abuse (Chronic) (POA: Yes)      Overview: 12/12 off cigs had been smoking 1 to 1.5 packs per day for 40+ years,       still smokes cigars 3-4 per day      2/4/13 cxr negative      4/13 off cigar using electronic cig      12/26/13 still smokes 3 cigar per day      1/13/14 echo technically difficult study, possible mild LVH      1/13/14 treadmill thallium exercise 6 minutes 30 seconds lashell protocol,       limited by fatigue; no ischemia, EF 59%      5/16/16 still smoking 3 cigars per day      6/24/16  CT thorax lung cancer screening to spiculated 9 mm nodules RUL       underlying emphysematous changes and tiny 3 mm nodule RML, recommend 3       month follow-up CT vs CT/PET      7/8/16 [facility] note right upper lobe lung nodules, suspicious in nature,       patient agrees to CT/PET      7/8/16 [facility] note right upper lobe lung nodules, suspicious in nature,        patient agrees to CT/PET      7/8/16 CT/PET spiculated right upper lobe nodule SUV 1.1, not FDG avid,       scarring left upper lobe noted, nonspecific findings, low-grade neoplasm       cannot be excluded; per [facility] repeat CT 3 months      10/24/16 CT thorax without; 2 spiculated right upper lobe pulmonary       nodules 8 mm and 9 mm in size unchanged, 3 mm right middle lobe unchanged       nodule      10/27/16 lung cancer screening program note, recommend referral to       pulmonary nodule clinic      4/6/17 CT thorax without; 2 stable 8-9 mm spiculated right upper lobe       nodules, stable probably postinflammatory tiny 3 mm RML and RLL nodules,       no new suspicious nodules      8/7/17 no cigarettes, 4 cigars per day      12/1/17 4 cigars per day      4/15/18 CT lung lung cancer screening stable 9 mm right posterior/apical       ill-defined nodule, stable right upper/lateral 9 mm solid pulmonary       nodule, postoperative changes left upper lobe, emphysema, aortic and       coronary atherosclerotic plaque, previous splenectomy       8/1/18 CT/PET no abnormal uptake within either right upper lobe or right       apical nodule, nodules unchanged dating back to previous lung cancer       screening CT 6/24/16, downgraded to category 3 RADS, no change 3 mm right       middle lobe nodule, no increased uptake neck, abdomen, pelvis      10/4/18 declines PFT, smoking 3 cigars per day      4/19/19 CT lung cancer screening spiculated nodular density right lung       apex 9 x 9 mm with cavitary change and spiculated nodule right lung apex       posteriorly 7 x 8 mm overall unchanged from previous exam, postoperative       change left upper lobe, recommend repeat 6 months      8/1/19 3 cigars/day declines stop smoking classes      10/8/19 CT thorax without lung; unchanged 19 mm and 8 mm noncalcified       pulmonary nodules right lung apex, postoperative scarring left upper lobe       again noted      2/12/20 cxr negative        2/13/20 2 cigars per day      5/7/20 2 cigars per day      10/16/20 CT thorax without, pulmonary nodules 7.9 mm right apex, 9.1 mm       RUL, 2.8 mm RUL unchanged compared to previous, new 5.0 mm nodule RUL,       ill-defined opacities lingula unchanged consistent with       atelectasis/fibrosis, no effusions, hyperexpanded and emphysematous lungs       borderline enlarged right hilar lymph node unchanged 9.1 mm,       atherosclerotic changes      3/29/21 CT thorax; new left lower lobe 4 mm noncalcified nodule, otherwise       stable 10 mm and 8 mm spiculated right upper lobe and small nodules,       emphysematous changes, left upper lobe resection, recommend repeat CT 3 to       6 months      5/16/22 declines PFT smoking 2 cigars per day      5/15/22 CT thorax without, stable 9 mm nodule right apex, stable 9 mm       nodule RUL, 5 mm nodule RLL increased from 3 mm compared to 2018, 3 mm       nodule LLL, no significant adenopathy, coronary calcifications, repeat 6       months      11/21/22 cigar 3 per day      11/21/22 declines PFT                Leukocytosis (Chronic) (POA: Yes)      Overview: 8/09 wbc 8.9      5/11 wbc 11.4      4/12 wbc 12.4 (54%N,34%L)      12/31/13 wbc 12.9 (53%N,22%L),hgb 17,hct 52,mcv 102,platelet 364, CRP 3.0;       ? Related to tobacco      2/7/15 wbc 12.4 (49%N,34%L),hgb 17,hct 53,plt [id number]; b12 201,folate 5.3      2/13/15 leukocyte alkaline phosphatase 108 normal      5/18/16 wbc 13.5 (51%N,30%L),hgb 17.8,hct 54,plt 427,b12 249,folate 7      5/18/16 b12 249,folate 7 not on b12      8/14/17 wbc 12.1 (50%N,32%L),hgb 17,hct 51,mcv 100.8,b12 273,jak2 negative             11/29/17 wbc 19.5 (51%N, 32%L)      8/1/18 CT/PET stable pulmonary nodules, no uptake in the right upper lobe       or right apical nodule, no abnormal FDG uptake neck, abdomen, pelvis      10/16/18 wbc 14 (48%N,36%L), hgb 18,hct 55, plt 436, b12 324, flow       cytometry phenotypically normal slightly left shifted  myeloid cells       without evidence of monoclonality, leukemia, or lymphoproliferative       disorder      10/8/19 CT thorax without lung; unchanged 19 mm and 8 mm noncalcified       pulmonary nodules right lung apex, postoperative scarring left upper lobe       again noted      10/10/19 wbc 14 (53%N,31%L),hgb 18,hct 55,mcv 103,iron 120,ferritin       116,%sat 39,b12 300,folate 20      7/16/20 wbc 14.4 (57%N,28%L),hgb 18.6,hct 56.8,plt 368, flow cytometry       normal myeloid cells      3/30/21 wbc 12.9,hgb 19,hct 60,mcv 103       6/8/22 wbc 12.4,b12 468,flow cytometry negative      1/16/23 wbc 12.6 (51%N,33%L)      3/8/23 wbc 12 (59%N,26%L)      6/30/23 wbc 15 (52%N,32.6%L),hgb 17,hct 54,mcv 103,plt 369      ??related to splenectomy                Hypoxia (POA: Unknown)  Resolved Problems:    * No resolved hospital problems. *      FOLLOW UP  Future Appointments   Date Time Provider Department Center   8/21/2023 11:00 AM [facility]   8/21/2023 11:40 AM [facility]   12/14/2023 11:00 AM [facility]     Naveen Duncan M.D.  [address]  [place] [id number] [phone]    Follow up  Follow up with primary care in 1-2 weeks    Roe Anthony M.D.  [address] [place] [address]  [phone]    Follow up  Follow up with orthopedic surgery in 2-3 weeks for post op check      MEDICATIONS ON DISCHARGE     Medication List        START taking these medications        Instructions   acetaminophen 325 MG Tabs  Start taking on: July 25, 2023  Commonly known as: Tylenol   Take 2 Tablets by mouth every 6 hours as needed for Fever, Moderate Pain or Mild Pain.  Dose: 650 mg     aspirin 325 MG Tabs  Commonly known as: Asa   Take 1 Tablet by mouth 2 times a day for 28 days.  Dose: 325 mg     oxyCODONE immediate-release 5 MG Tabs  Commonly known as: Roxicodone   Take 2 Tablets by mouth every four hours as needed for Severe Pain for up to 5 days.  Dose: 10 mg      senna-docusate 8.6-50 MG Tabs  Commonly known as: Pericolace Or Senokot S   Take 1 Tablet by mouth 1 time a day as needed for Constipation for up to 10 days.  Dose: 1 Tablet            CHANGE how you take these medications        Instructions   atorvastatin 20 MG Tabs  What changed: when to take this  Commonly known as: Lipitor   TAKE 1 TABLET BY MOUTH EVERY DAY     Trelegy Ellipta 100-62.5-25 MCG/ACT Aepb inhalation  What changed: See the new instructions.  Generic drug: fluticasone-umeclidin-vilant   INHALE 1 INHALATION EVERY DAY BY MOUTH            CONTINUE taking these medications        Instructions   albuterol 108 (90 Base) MCG/ACT Aers inhalation aerosol   Inhale 2 Puffs every day.  Dose: 2 Puff     D3 PO   Take 1 Capsule by mouth every day.  Dose: 1 Capsule     MULTIVITAMIN ADULT PO   Take 1 Tablet by mouth every day.  Dose: 1 Tablet              Allergies  No Known Allergies    DIET  Orders Placed This Encounter   Procedures    Diet Order Diet: Regular     Standing Status:   Standing     Number of Occurrences:   1     Order Specific Question:   Diet:     Answer:   Regular [1]       ACTIVITY  As tolerated.  Weight bearing as tolerated    CONSULTATIONS  Orthopedic surgery     PROCEDURES  7/20/2023  PROCEDURE PERFORMED: Open treatment of left femoral fracture, proximal end, neck with prosthetic replacement    LABORATORY  Lab Results   Component Value Date    SODIUM 137 07/21/2023    POTASSIUM 4.3 07/21/2023    CHLORIDE 103 07/21/2023    CO2 27 07/21/2023    GLUCOSE 128 (H) 07/21/2023    BUN 13 07/21/2023    CREATININE 1.02 07/21/2023    CREATININE 0.87 08/03/2009    GLOMRATE >59 08/03/2009        Lab Results   Component Value Date    WBC 22.9 (H) 07/21/2023    WBC 11.4 (H) 05/02/2011    HEMOGLOBIN 15.8 07/21/2023    HEMATOCRIT 50.0 07/21/2023    PLATELETCT 316 07/21/2023        Total time of the discharge process exceeds 38 minutes.

## 2023-07-21 NOTE — DISCHARGE PLANNING
Received choice form at: 1200  Agency/Facility name: [facility]  Referral sent per choice form at:  1207    Received choice form at: 1208  Agency/Facility name: [facility]  Referral sent per choice form at:  1213    Received choice form at: 1210  Agency/Facility name: [facility]  Referral sent per choice form at:  1215    Received choice form at: 1213  Agency/Facility name: [facility]  Referral sent per choice form at:  1219      All of the above choice forms were in Media, placed by [facility]    Sent choice form to [facility], spoke to Alba, and she will be calling back for DME

## 2023-07-21 NOTE — FACE TO FACE
"Face to Face Note  -  Durable Medical Equipment    Gale Ledesma D.O. - NPI: [id number]  I certify that this patient is under my care and that they have had a durable medical equipment(DME)face to face encounter by myself that meets the physician DME face-to-face encounter requirements with this patient on:    Date of encounter:   Patient:                    MRN:                       YOB: 2023  Narendra Sun  [id number]  1950     The encounter with the patient was in whole, or in part, for the following medical condition, which is the primary reason for durable medical equipment:  COPD    I certify that, based on my findings, the following durable medical equipment is medically necessary:  Oxygen   HOME O2 Saturation Measurements:(Values must be present for Home Oxygen orders)  Room air sat at rest: 85  Room air sat with amb: 75  With liters of O2: 3, O2 sat at rest with O2: 92  With Liters of O2: 3, O2 sat with amb with O2 : 93  Is the patient mobile?: Yes  If patient feels more short of breath, they can go up to 6 liters per minute and contact healthcare provider.    Supporting Symptoms: The patient requires supplemental oxygen, as the following interventions have been tried with limited or no improvement: \"Ambulation with oximetry and \"Incentive spirometry.        ------------------------------------------------------------------------------------------------------------------    Face to Face Supporting Documentation - Home Health    The encounter with this patient was in whole or in part the primary reason for home health admission.    Date of encounter:   Patient:                    MRN:                       YOB: 2023  Narendra Sun  [id number]  1950     Home health to see patient for:  Skilled Nursing care for assessment, interventions & education, Home health aide, Physical Therapy evaluation and treatment, and Occupational therapy evaluation " and treatment    Skilled need for:  Surgical Aftercare left hip fracture     Skilled nursing interventions to include:  Line/Drain/Airway education and care    Homebound evidenced status by:  Need the aid of supportive devices such as crutches, canes, wheelchairs or walkers. Leaving home must require a considerable and taxing effort. There must exist a normal inability to leave the home.    Community Physician to provide follow up care: Naveen Duncan M.D.     Optional Interventions    Wound information & treatment:    Home Infusion Therapy orders:    Line/Drain/Airway:    I certify the face to face encounter for this home care referral meets the CMS requirements and the encounter/clinical assessment with the patient was, in whole, or in part, for the medical condition(s) listed above, which is the primary reason for home health care. Based on my clinical findings: the service(s) are medically necessary, support the need for home health care, and the homebound criteria are met.  I certify that this patient has had a face to face encounter by myself.  Gale Ledesma D.O. - NPI: [id number]    *Debility, frailty and advanced age in the absence of an acute deterioration or exacerbation of a condition do not qualify a patient for home health.

## 2023-07-21 NOTE — PROGRESS NOTES
"      Orthopaedic Progress Note    Interval changes:  Patient doing well post op  LLE dressings are CDI  Cleared for DC to SNF vs home by ortho pending medicine clearance    ROS - Patient denies any new issues.  Pain well controlled.    BP 95/56   Pulse 90   Temp 36.7 °C (98.1 °F) (Temporal)   Resp 16   Ht 1.707 m (5' 7.2\")   Wt 68 kg (150 lb)   SpO2 89%     Patient seen and examined  No acute distress  Breathing non labored  RRR  LLE Surgical dressing is clean, dry, and intact. Patient clearly fires tibialis anterior, EHL, and gastrocnemius/soleus. Sensation is intact to light touch throughout superficial peroneal, deep peroneal, tibial, saphenous, and sural nerve distributions. Strong and palpable 2+ dorsalis pedis and posterior tibial pulses with capillary refill less than 2 seconds. No lower leg tenderness or discomfort.     Recent Labs     07/19/23  1910 07/21/23  0021   WBC 19.5* 22.9*   RBC 4.97 4.75   HEMOGLOBIN 16.9 15.8   HEMATOCRIT 51.0 50.0   .6* 105.3*   MCH 34.0* 33.3*   MCHC 33.1 31.6*   RDW 53.3* 54.4*   PLATELETCT 332 316   MPV 9.4 9.9       Active Hospital Problems    Diagnosis     Left displaced femoral neck fracture (HCC) [S72.002A]     Hypoxia [R09.02]     Leukocytosis [D72.829]      8/09 wbc 8.9  5/11 wbc 11.4  4/12 wbc 12.4 (54%N,34%L)  12/31/13 wbc 12.9 (53%N,22%L),hgb 17,hct 52,mcv 102,platelet 364, CRP 3.0; ? Related to tobacco  2/7/15 wbc 12.4 (49%N,34%L),hgb 17,hct 53,plt [id number]; b12 201,folate 5.3  2/13/15 leukocyte alkaline phosphatase 108 normal  5/18/16 wbc 13.5 (51%N,30%L),hgb 17.8,hct 54,plt 427,b12 249,folate 7  5/18/16 b12 249,folate 7 not on b12  8/14/17 wbc 12.1 (50%N,32%L),hgb 17,hct 51,mcv 100.8,b12 273,jak2 negative   11/29/17 wbc 19.5 (51%N, 32%L)  8/1/18 CT/PET stable pulmonary nodules, no uptake in the right upper lobe or right apical nodule, no abnormal FDG uptake neck, abdomen, pelvis  10/16/18 wbc 14 (48%N,36%L), hgb 18,hct 55, plt 436, b12 324, flow " cytometry phenotypically normal slightly left shifted myeloid cells without evidence of monoclonality, leukemia, or lymphoproliferative disorder  10/8/19 CT thorax without lung; unchanged 19 mm and 8 mm noncalcified pulmonary nodules right lung apex, postoperative scarring left upper lobe again noted  10/10/19 wbc 14 (53%N,31%L),hgb 18,hct 55,mcv 103,iron 120,ferritin 116,%sat 39,b12 300,folate 20  7/16/20 wbc 14.4 (57%N,28%L),hgb 18.6,hct 56.8,plt 368, flow cytometry normal myeloid cells  3/30/21 wbc 12.9,hgb 19,hct 60,mcv 103   6/8/22 wbc 12.4,b12 468,flow cytometry negative  1/16/23 wbc 12.6 (51%N,33%L)  3/8/23 wbc 12 (59%N,26%L)  6/30/23 wbc 15 (52%N,32.6%L),hgb 17,hct 54,mcv 103,plt 369  ??related to splenectomy          Tobacco abuse [Z72.0]      12/12 off cigs had been smoking 1 to 1.5 packs per day for 40+ years, still smokes cigars 3-4 per day  2/4/13 cxr negative  4/13 off cigar using electronic cig  12/26/13 still smokes 3 cigar per day  1/13/14 echo technically difficult study, possible mild LVH  1/13/14 treadmill thallium exercise 6 minutes 30 seconds lashell protocol, limited by fatigue; no ischemia, EF 59%  5/16/16 still smoking 3 cigars per day  6/24/16  CT thorax lung cancer screening to spiculated 9 mm nodules RUL underlying emphysematous changes and tiny 3 mm nodule RML, recommend 3 month follow-up CT vs CT/PET  7/8/16 I[facility] note right upper lobe lung nodules, suspicious in nature, patient agrees to CT/PET  7/8/16 [facility] note right upper lobe lung nodules, suspicious in nature, patient agrees to CT/PET  7/8/16 CT/PET spiculated right upper lobe nodule SUV 1.1, not FDG avid, scarring left upper lobe noted, nonspecific findings, low-grade neoplasm cannot be excluded; per IOC repeat CT 3 months  10/24/16 CT thorax without; 2 spiculated right upper lobe pulmonary nodules 8 mm and 9 mm in size unchanged, 3 mm right middle lobe unchanged nodule  10/27/16 lung cancer screening program note, recommend referral  to pulmonary nodule clinic  4/6/17 CT thorax without; 2 stable 8-9 mm spiculated right upper lobe nodules, stable probably postinflammatory tiny 3 mm RML and RLL nodules, no new suspicious nodules  8/7/17 no cigarettes, 4 cigars per day  12/1/17 4 cigars per day  4/15/18 CT lung lung cancer screening stable 9 mm right posterior/apical ill-defined nodule, stable right upper/lateral 9 mm solid pulmonary nodule, postoperative changes left upper lobe, emphysema, aortic and coronary atherosclerotic plaque, previous splenectomy   8/1/18 CT/PET no abnormal uptake within either right upper lobe or right apical nodule, nodules unchanged dating back to previous lung cancer screening CT 6/24/16, downgraded to category 3 RADS, no change 3 mm right middle lobe nodule, no increased uptake neck, abdomen, pelvis  10/4/18 declines PFT, smoking 3 cigars per day  4/19/19 CT lung cancer screening spiculated nodular density right lung apex 9 x 9 mm with cavitary change and spiculated nodule right lung apex posteriorly 7 x 8 mm overall unchanged from previous exam, postoperative change left upper lobe, recommend repeat 6 months  8/1/19 3 cigars/day declines stop smoking classes  10/8/19 CT thorax without lung; unchanged 19 mm and 8 mm noncalcified pulmonary nodules right lung apex, postoperative scarring left upper lobe again noted  2/12/20 cxr negative   2/13/20 2 cigars per day  5/7/20 2 cigars per day  10/16/20 CT thorax without, pulmonary nodules 7.9 mm right apex, 9.1 mm RUL, 2.8 mm RUL unchanged compared to previous, new 5.0 mm nodule RUL, ill-defined opacities lingula unchanged consistent with atelectasis/fibrosis, no effusions, hyperexpanded and emphysematous lungs borderline enlarged right hilar lymph node unchanged 9.1 mm, atherosclerotic changes  3/29/21 CT thorax; new left lower lobe 4 mm noncalcified nodule, otherwise stable 10 mm and 8 mm spiculated right upper lobe and small nodules, emphysematous changes, left upper lobe  resection, recommend repeat CT 3 to 6 months  5/16/22 declines PFT smoking 2 cigars per day  5/15/22 CT thorax without, stable 9 mm nodule right apex, stable 9 mm nodule RUL, 5 mm nodule RLL increased from 3 mm compared to 2018, 3 mm nodule LLL, no significant adenopathy, coronary calcifications, repeat 6 months  11/21/22 cigar 3 per day  11/21/22 declines PFT          COPD (chronic obstructive pulmonary disease) (HCC) [J44.9]      5/08 chest x-ray negative  6/08 PFT FEV1.6 L (54% predicted), FEV/FVC ratio 47%, positive bronchodilator response  6/10 quit smoking tobacco  5/11 still off cigs, smoking 2 cigars per day  5/11 restart spiriva  5/11 cxr negative  12/12 off cigs, still smokes cigars 3-4 per day  12/12 on spiriva, add symbicort 80/4.5  2/7/13 PFT severe stage III COPD, FEV1 1.4 L (46% predicted), FEV/FVC 47% improvement after bronchodilator 13%, normal DLCO; continue spiriva, symbicort 80/1.5, smoking cessation  6/24/16  CT thorax lung cancer screening to spiculated 9 mm nodules RUL underlying emphysematous changes and tiny 3 mm nodule RML, recommend 3 month follow-up CT vs CT/PET  2/7/13 cxr negative  12/26/13 still 3 cigars per day; on spiriva and symbicort  1/23/15 still 3 cigars per day, on spiriva and symbicort  1/26/16 change symbicort to advair 250/50 (insurance) and continue spiriva  6/2/16 CT lung cancer screening visit  7/8/16 I[facility] note right upper lobe lung nodules, suspicious in nature, patient agrees to CT/PET  7/8/16 CT/PET spiculated right upper lobe nodule SUV 1.1, not FDG avid, scarring left upper lobe noted, nonspecific findings, low-grade neoplasm cannot be excluded; per IOC repeat CT 3 months  10/24/16 CT thorax without; 2 spiculated right upper lobe pulmonary nodules 8 mm and 9 mm in size unchanged, 3 mm right middle lobe unchanged nodule  10/27/16 lung cancer screening program note, recommend referral to pulmonary nodule clinic  4/6/17 CT thorax without; 2 stable 8-9 mm spiculated right  upper lobe nodules, stable probably postinflammatory tiny 3 mm RML and RLL nodules, no new suspicious nodules  12/1/17 declines PFT   4/15/18 CT lung lung cancer screening stable 9 mm right posterior/apical ill-defined nodule, stable right upper/lateral 9 mm solid pulmonary nodule, postoperative changes left upper lobe, emphysema, aortic and coronary atherosclerotic plaque, previous splenectomy   8/1/18 CT/PET no abnormal uptake within either right upper lobe or right apical nodule, nodules unchanged dating back to previous lung cancer screening CT 6/24/16, downgraded to category 3 RADS, no change 3 mm right middle lobe nodule, no increased uptake neck, abdomen, pelvis  4/19/19 CT lung cancer screening spiculated nodular density right lung apex 9 x 9 mm with cavitary change and spiculated nodule right lung apex posteriorly 7 x 8 mm overall unchanged from previous exam, postoperative change left upper lobe, recommend repeat 6 months  8/1/19 on advair and spiriva declines PFT  10/8/19 CT thorax without; unchanged 9 mm and 8 mm noncalcified pulmonary nodules right lung apex, postoperative scarring left upper lobe again noted  2/13/20 change advair to breo once daily (had difficulty remembering to take advair twice a day) continue spiriva  5/7/20 declines PFT   10/16/20 CT thorax without, pulmonary nodules 7.9 mm right apex, 9.1 mm RUL, 2.8 mm RUL unchanged compared to previous, new 5.0 mm nodule RUL, ill-defined opacities lingula unchanged consistent with atelectasis/fibrosis, no effusions, hyperexpanded and emphysematous lungs borderline enlarged right hilar lymph node unchanged 9.1 mm, atherosclerotic changes  10/17/20 change breo plus spiriva to trelegy inhaler due to cost  3/29/21 CT thorax; new left lower lobe 4 mm noncalcified nodule, otherwise stable 10 mm and 8 mm spiculated right upper lobe and small nodules, emphysematous changes, left upper lobe resection, recommend repeat CT 3 to 6 months  8/2/21 CT thorax  without, spiculated nodule right upper lobe apex 8 mm unchanged, nodule right upper lobe central cavitation 10 mm in size unchanged, 4 mm nodule right lower lobe not significantly changed, stable 4 mm left upper lobe nodule, emphysematous and bullous change multiple areas of pulmonary scarring, surgical changes left upper lobe, repeat CT in 6 months  5/16/22 declines PFT  5/15/22 CT thorax without, stable 9 mm nodule right apex, stable 9 mm nodule RUL, 5 mm nodule RLL increased from 3 mm compared to 2018, 3 mm nodule LLL, no significant adenopathy, coronary calcifications, repeat 6 months  11/21/22 declines PFT, cigar 3 per day        Dyslipidemia [E78.5]      5/08 chol  239, trig 91,hdl 71,ldl 150  8/09 chol 268,trig 107,hdl 56,ldl 191  8/09 start zocor 20  8/09 stress echo negative  5/11 chol 231,trig 114,hdl 53,ldl 155 off zocor  5/12 chol 215,trig 97,hdl 48,ldl 148 off zocor  12/31/13 chol 215,trig 101,hdl 55,ldl 140,crp 3.0 off statin; 10 year risk 12%, I recommend statin therapy he declines  1/13/14 echo technically difficult study, possible mild LVH  1/13/14 treadmill thallium exercise 6 minutes 30 seconds lashell protocol, limited by fatigue; no ischemia, EF 59%  2/7/15 chol 250,trig 86,hdl 51,ldl 182; urine mac <0.5; 10 year risk calculation 19%, start zocor 20 mg  5/16/16 did not take zocor  5/18/16 chol 239,trig 152,hdl 44,ldl 165  8/14/17 chol 205,trig 142,hdl 36,ldl 141 off zocor, 10 year cardiac risk, declines medication  8/1/18 CT cardiac scoring LMA 0.0, LCX 1.5, .5, RCA 0.0, PDA 0.0 = 282.0  10/16/8 chol 261,trig 155,hdl 42,ldl 188 declines statin therapy  10/18/18 start lipitor 20 mg  10/10/19 chol 159,trig 94,hdl 44,ldl 96 on lipitor 20 mg  3/30/21 chol 186,trig 138,hdl 42,ldl 116 on lipitor 20 mg  6/8/22 chol 165,trig 143,hdl 45,ldl 91 on lipitor 20 mg  1/16/23 chol 169,trig 122,hdl 48,ldl 97 on lipitor 20 mg  6/30/23 chol 164,trig 214,hdl 41,ldl 80 on lipitor 20 mg          ACP (advance  care planning) [Z71.89]      8/7/17 pneumovax  10/12/19 hep c Ab reactive, follow up HCV RNA negative  5/7/20 prevnar 13  5/7/20 menactra second  11/16/20 colon per [facility] 4 polyps hyperplastic and benign polypoid, repeat in 3 years  6/27/21 shingrix first   5/13/22 covid moderna 4th  5/16/22 trumenba third   5/16/22 tdap  11/21/22 hep b third  11/21/22 declines prevnar 20  6/30/23 vit d 36  6/30/23 psa 1.3                 Assessment/Plan:  Patient doing well post op  LLE dressings are CDI  Cleared for DC to SNF vs home by ortho pending medicine clearance  POD#1 S/P Open treatment of left femoral fracture, proximal end, neck with prosthetic replacement   Wt bearing status - LLE WBAT with post hip precautions  Wound care/Drains - Dressings to be left in place  Future Procedures - none planned   Lovenox: Start 7/21, Duration-until ambulatory > 150'  Sutures/Staples out- 14-21 days post operatively. Removal will completed by ortho mid levels only.  PT/OT-initiated  Antibiotics: Perioperative completed  DVT Prophylaxis- TEDS/SCDs/Foot pumps  [place]-not needed per ortho  Case Coordination for Discharge Planning - Disposition per therapy recs.

## 2023-07-21 NOTE — PROGRESS NOTES
Pt. Straight cathed per order, 420ml urine return. Will continue to monitor pt. For voiding difficulty.

## 2023-07-21 NOTE — PROGRESS NOTES
4 Eyes Skin Assessment Completed by Dayanara RN and Sonali RN.    Head: Bruising to left cheek, appears to be from nasal cannula  Ears Redness and Blanching  Nose WDL  Mouth WDL  Neck WDL  Breast/Chest WDL  Shoulder Blades WDL  Spine WDL  (R) Arm/Elbow/Hand WDL  (L) Arm/Elbow/Hand WDL  Abdomen WDL  Groin WDL  Scrotum/Coccyx/Buttocks WDL  (R) Leg Bruising to knee  (L) Leg surgical incision to left hip with silver mepilex  (R) Heel/Foot/Toe: dry   (L) Heel/Foot/Toe :dry          Devices In Places Pulse Ox and Oxy Mask      Interventions In Place NC W/Ear Foams and Waffle Overlay    Possible Skin Injury No    Pictures Uploaded Into Epic N/A  Wound Consult Placed N/A  RN Wound Prevention Protocol Ordered Yes

## 2023-07-22 ENCOUNTER — HOSPITAL ENCOUNTER (INPATIENT)
Facility: REHABILITATION | Age: 73
End: 2023-07-22
Attending: PHYSICAL MEDICINE & REHABILITATION | Admitting: PHYSICAL MEDICINE & REHABILITATION

## 2023-07-22 NOTE — DISCHARGE PLANNING
HTH/SCP TCN chart review completed. Collaborated with HARSH Greer. Current discharge considerations are home with home health and DME (O2). No further TCN needs.     TCN will continue to follow and collaborate with discharge planning team as additional post acute needs arise. Thank you.    Completed:  Awaiting PT recommendations   OT recommending home with home health  Choice obtained: IRF , SNF   [facility] referral  not sent

## 2023-07-22 NOTE — DISCHARGE PLANNING
[facility] has advised that they will havwe the DME to the Pt in his room within 2 hours    Left a message with [facility]  to see if they will be able to accommodate     Agency/Facility name: [facility]  Spoke to: Jorge  Outcome: Will be available to have the equipment delivered to room within the hour

## 2023-07-24 ENCOUNTER — PATIENT OUTREACH (OUTPATIENT)
Dept: MEDICAL GROUP | Facility: MEDICAL CENTER | Age: 73
End: 2023-07-24
Payer: MEDICARE

## 2023-07-24 NOTE — PROGRESS NOTES
Pt declines to be seen by alternate provider, on waitlist for sooner appt for tcm    Transitional Care Management  TCM Outreach Date and Time: Filed (7/24/2023  9:21 AM)    Discharge Questions  Actual Discharge Date: 07/21/23  Now that you are home, how are you feeling?: Good  Did you receive any new prescriptions?: Yes  Were you able to get them filled?: Yes  Meds to Bed or Pharmacy filled?: Pharmacy  Do you have any questions about your current medications or new medications (Review Med Rec)?: No  Do you have a follow up appointment scheduled with your PCP?: Yes  Appointment Date: 08/11/23  Appointment Time: 0900 (on waitlist for sooner appt)  Any issues or paperwork you wish to discuss with your PCP?: No  Does this patient qualify for the CCM program?: No (enrolled in , once dc'd from  may become eligible for [facility])    Transitional Care  Number of attempts made to contact patient: 1  Current or previous attempts competed within two business days of discharge? : Yes  Provided education regarding treatment plan, medications, self-management, ADLs?: Yes  Has patient completed an Advanced Directive?: No  Has the Care Manager's phone number provided?: No  Is there anything else I can help you with?: No    Discharge Summary  Chief Complaint: GLF  Admitting Diagnosis: EMS  Discharge Diagnosis: Left displaced femoral neck fracture

## 2023-07-25 ENCOUNTER — HOME CARE VISIT (OUTPATIENT)
Dept: HOME HEALTH SERVICES | Facility: HOME HEALTHCARE | Age: 73
End: 2023-07-25
Payer: MEDICARE

## 2023-07-25 VITALS
TEMPERATURE: 98.4 F | WEIGHT: 150 LBS | HEART RATE: 100 BPM | DIASTOLIC BLOOD PRESSURE: 60 MMHG | HEIGHT: 67 IN | BODY MASS INDEX: 23.54 KG/M2 | OXYGEN SATURATION: 93 % | SYSTOLIC BLOOD PRESSURE: 105 MMHG | RESPIRATION RATE: 20 BRPM

## 2023-07-25 PROCEDURE — 665001 SOC-HOME HEALTH

## 2023-07-25 PROCEDURE — G0493 RN CARE EA 15 MIN HH/HOSPICE: HCPCS

## 2023-07-25 ASSESSMENT — ENCOUNTER SYMPTOMS
VOMITING: DENIES
LAST BOWEL MOVEMENT: 66675
PAIN LOCATION - PAIN QUALITY: ACHY
PAIN: 1
DRY SKIN: 1
SUBJECTIVE PAIN PROGRESSION: UNCHANGED
MENTAL STATUS CHANGE: 0
NAUSEA: DENIES
FATIGUES EASILY: 1
PAIN LOCATION - EXACERBATING FACTORS: WITH ACTIVITY
DEBILITATING PAIN: 1
PAIN LOCATION - RELIEVING FACTORS: PAIN MEDICATION
CONSTIPATION: 1
PERSON REPORTING PAIN: PATIENT
DYSPNEA ACTIVITY LEVEL: AT REST
PAIN LOCATION: LEFT HIP
PAIN LOCATION - PAIN SEVERITY: 5/10
STOOL FREQUENCY: DAILY
HIGHEST PAIN SEVERITY IN PAST 24 HOURS: 5/10
SHORTNESS OF BREATH: 1
LOWEST PAIN SEVERITY IN PAST 24 HOURS: 0/10
PAIN LOCATION - PAIN DURATION: 1-2 HRS
PAIN LOCATION - PAIN FREQUENCY: INTERMITTENT
PAIN SEVERITY GOAL: 0/10

## 2023-07-25 ASSESSMENT — FIBROSIS 4 INDEX: FIB4 SCORE: 2.19

## 2023-07-26 PROBLEM — R09.02 HYPOXIA: Status: RESOLVED | Noted: 2023-07-19 | Resolved: 2023-07-26

## 2023-07-27 ENCOUNTER — HOME CARE VISIT (OUTPATIENT)
Dept: HOME HEALTH SERVICES | Facility: HOME HEALTHCARE | Age: 73
End: 2023-07-27
Payer: MEDICARE

## 2023-07-27 ENCOUNTER — DOCUMENTATION (OUTPATIENT)
Dept: CARDIOLOGY | Facility: MEDICAL CENTER | Age: 73
End: 2023-07-27
Payer: MEDICARE

## 2023-07-27 VITALS
RESPIRATION RATE: 19 BRPM | TEMPERATURE: 97.9 F | SYSTOLIC BLOOD PRESSURE: 102 MMHG | DIASTOLIC BLOOD PRESSURE: 58 MMHG | HEART RATE: 88 BPM | OXYGEN SATURATION: 93 %

## 2023-07-27 PROCEDURE — G0151 HHCP-SERV OF PT,EA 15 MIN: HCPCS

## 2023-07-27 PROCEDURE — G0299 HHS/HOSPICE OF RN EA 15 MIN: HCPCS

## 2023-07-27 PROCEDURE — G0152 HHCP-SERV OF OT,EA 15 MIN: HCPCS

## 2023-07-27 SDOH — ECONOMIC STABILITY: HOUSING INSECURITY: EVIDENCE OF SMOKING MATERIAL: 0

## 2023-07-27 ASSESSMENT — ENCOUNTER SYMPTOMS
SHORTNESS OF BREATH: 1
CONSTIPATION: 1
PAIN LOCATION - PAIN SEVERITY: 5/10
PAIN LOCATION - RELIEVING FACTORS: PAIN MED, REPOSITONING
PAIN LOCATION: LEFT HIP/LEG
LIMITED RANGE OF MOTION: 1
LOWEST PAIN SEVERITY IN PAST 24 HOURS: 4/10
LOWER EXTREMITY EDEMA: 1
MUSCLE WEAKNESS: 1
FATIGUES EASILY: 1
PAIN LOCATION - PAIN FREQUENCY: INTERMITTENT
FLATUS: 1
PAIN LOCATION - PAIN QUALITY: ACHY
HIGHEST PAIN SEVERITY IN PAST 24 HOURS: 6/10
ASSOCIATED SYMPTOMS: DENIES
LAST BOWEL MOVEMENT: 66675
PAIN SEVERITY GOAL: 1/10
PAIN LOCATION - EXACERBATING FACTORS: MOVEMENT
PERSON REPORTING PAIN: PATIENT
SUBJECTIVE PAIN PROGRESSION: WAXING AND WANING
PAIN: 1
DYSPNEA ON EXERTION: 1
PAIN LOCATION - PAIN DURATION: DAILY
STOOL FREQUENCY: LESS THAN DAILY
DRY SKIN: 1

## 2023-07-27 NOTE — PROGRESS NOTES
Medication chart review for [facility] services    Received referral from [facility].   Medications reviewed  compared with discharge summary if available.  Discharge summary date, if applicable:   7/21    Current medication list per [facility]     Medication list one, patient is currently taking    Current Outpatient Medications:     tolnaftate, 1 Application, Topical, QDAY PRN    Home Care Oxygen, 3 L/min, Inhalation, Continuous    albuterol, 2 Puff, Inhalation, Q6HRS PRN    atorvastatin, TAKE 1 TABLET BY MOUTH EVERY DAY    acetaminophen, 650 mg, Oral, Q6HRS PRN (Patient not taking: Reported on 7/25/2023)    senna-docusate, 1 Tablet, Oral, QDAY PRN    aspirin, 325 mg, Oral, BID    Multiple Vitamin (MULTIVITAMIN ADULT PO), 1 Tablet, Oral, QDAY    Trelegy Ellipta, INHALE 1 INHALATION EVERY DAY BY MOUTH (Patient taking differently: 1 Inhalation, Inhalation, DAILY)    Cholecalciferol (D3 PO), 1 Capsule, Oral, DAILY      Medication list two, drugs that the patient has been prescribed or recommended to take by their healthcare provider on discharge summary     START taking these medications         Instructions   acetaminophen 325 MG Tabs  Start taking on: July 25, 2023  Commonly known as: Tylenol    Take 2 Tablets by mouth every 6 hours as needed for Fever, Moderate Pain or Mild Pain.  Dose: 650 mg      aspirin 325 MG Tabs  Commonly known as: Asa    Take 1 Tablet by mouth 2 times a day for 28 days.  Dose: 325 mg      oxyCODONE immediate-release 5 MG Tabs  Commonly known as: Roxicodone    Take 2 Tablets by mouth every four hours as needed for Severe Pain for up to 5 days.  Dose: 10 mg      senna-docusate 8.6-50 MG Tabs  Commonly known as: Pericolace Or Senokot S    Take 1 Tablet by mouth 1 time a day as needed for Constipation for up to 10 days.  Dose: 1 Tablet                CHANGE how you take these medications         Instructions   atorvastatin 20 MG Tabs  What changed: when to take this  Commonly known  as: Lipitor    TAKE 1 TABLET BY MOUTH EVERY DAY      Aglemilagros Ellipta 100-62.5-25 MCG/ACT Aepb inhalation  What changed: See the new instructions.  Generic drug: fluticasone-umeclidin-vilant    INHALE 1 INHALATION EVERY DAY BY MOUTH                CONTINUE taking these medications         Instructions   albuterol 108 (90 Base) MCG/ACT Aers inhalation aerosol    Inhale 2 Puffs every day.  Dose: 2 Puff      D3 PO    Take 1 Capsule by mouth every day.  Dose: 1 Capsule      MULTIVITAMIN ADULT PO    Take 1 Tablet by mouth every day.  Dose: 1 Tablet          No Known Allergies    Labs     Lab Results   Component Value Date/Time    SODIUM 137 07/21/2023 12:21 AM    POTASSIUM 4.3 07/21/2023 12:21 AM    CHLORIDE 103 07/21/2023 12:21 AM    CO2 27 07/21/2023 12:21 AM    GLUCOSE 128 (H) 07/21/2023 12:21 AM    BUN 13 07/21/2023 12:21 AM    CREATININE 1.02 07/21/2023 12:21 AM    CREATININE 0.87 08/03/2009 09:46 AM    BUNCREATRAT 15 08/03/2009 09:46 AM    GLOMRATE >59 08/03/2009 09:46 AM     Lab Results   Component Value Date/Time    ALKPHOSPHAT 77 07/21/2023 12:21 AM    ASTSGOT 39 07/21/2023 12:21 AM    ALTSGPT 17 07/21/2023 12:21 AM    TBILIRUBIN 0.9 07/21/2023 12:21 AM    INR 1.02 07/20/2023 09:04 AM    ALBUMIN 3.3 07/21/2023 12:21 AM    ALBUMIN 3.75 10/09/2019 10:35 AM        Assessment for clinically significant drug interactions, drug omissions/additions, duplicative therapies.            CC   Naveen Duncan M.D.  [id number]  [place] [address]  Fax: [phone]    [facility]  Phone [phone] fax [phone]    This note was created using voice recognition software (Dragon). The accuracy of the dictation is limited by the abilities of the software. I have reviewed the note prior to signing, however some errors in grammar and context are still possible. If you have any questions related to this note please do not hesitate to contact our office.

## 2023-07-28 ENCOUNTER — TELEPHONE (OUTPATIENT)
Dept: HEALTH INFORMATION MANAGEMENT | Facility: OTHER | Age: 73
End: 2023-07-28
Payer: MEDICARE

## 2023-07-28 VITALS
HEART RATE: 96 BPM | DIASTOLIC BLOOD PRESSURE: 58 MMHG | RESPIRATION RATE: 19 BRPM | TEMPERATURE: 97.9 F | OXYGEN SATURATION: 97 % | SYSTOLIC BLOOD PRESSURE: 100 MMHG

## 2023-07-28 SDOH — ECONOMIC STABILITY: HOUSING INSECURITY
HOME SAFETY: PT LIVES IN A SINGLE STORY HOME WITH SPOUSE; 1-2 STEPS TO ENTER/EXIT THE HOME VIA FRONT AND BACK DOOR. PT WILL HAVE HELP FROM HIS SPOUSE AND HIS NEPHEW. PT'S SPOUSE WILL BE GOING BACK TO WORK SOON. PT RECOMMENDED GRAB BAR PLACEMENT AND FLOOR RUG REMO

## 2023-07-28 SDOH — ECONOMIC STABILITY: HOUSING INSECURITY: HOME SAFETY: VAL. PT DECLINED AT THIS TIME

## 2023-07-28 ASSESSMENT — ACTIVITIES OF DAILY LIVING (ADL)
AMBULATION ASSISTANCE ON FLAT SURFACES: 1
GROOMING_COMMENTS: REFER TO OT EVALUATION

## 2023-07-28 ASSESSMENT — ENCOUNTER SYMPTOMS
MUSCLE WEAKNESS: 1
PAIN LOCATION - PAIN FREQUENCY: CONSTANT
PAIN LOCATION - RELIEVING FACTORS: REST, MEDS
DEBILITATING PAIN: 1
PAIN: 1
LOWEST PAIN SEVERITY IN PAST 24 HOURS: 5/10
PAIN LOCATION: LEFT HIP
PAIN LOCATION - PAIN QUALITY: ACHE
PERSON REPORTING PAIN: PATIENT
HIGHEST PAIN SEVERITY IN PAST 24 HOURS: 7/10
LIMITED RANGE OF MOTION: 1
PAIN LOCATION - PAIN DURATION: DAILY
PAIN LOCATION - PAIN SEVERITY: 5/10

## 2023-07-29 VITALS
DIASTOLIC BLOOD PRESSURE: 58 MMHG | RESPIRATION RATE: 19 BRPM | SYSTOLIC BLOOD PRESSURE: 100 MMHG | TEMPERATURE: 97.9 F | HEART RATE: 96 BPM | OXYGEN SATURATION: 97 %

## 2023-07-30 PROBLEM — S72.002A LEFT DISPLACED FEMORAL NECK FRACTURE (HCC): Status: RESOLVED | Noted: 2023-07-19 | Resolved: 2023-07-30

## 2023-07-30 PROBLEM — Z87.81 S/P ORIF (OPEN REDUCTION INTERNAL FIXATION) FRACTURE: Status: ACTIVE | Noted: 2023-07-30

## 2023-07-30 PROBLEM — Z98.890 S/P ORIF (OPEN REDUCTION INTERNAL FIXATION) FRACTURE: Status: ACTIVE | Noted: 2023-07-30

## 2023-07-30 ASSESSMENT — ACTIVITIES OF DAILY LIVING (ADL)
TOILETING: STAND BY ASSIST
LIGHT HOUSEKEEPING: DEPENDENT
BATHING_CURRENT_FUNCTION: MINIMUM ASSIST
TOILETING: 1
FEEDING: INDEPENDENT
PHYSICAL TRANSFERS ASSESSED: 1
CURRENT_FUNCTION: STAND BY ASSIST
TOILETING: CONTACT GUARD ASSIST
BATHING ASSESSED: 1
HOUSEKEEPING ASSESSED: 1
GROOMING_CURRENT_FUNCTION: INDEPENDENT
DRESSING_LB_CURRENT_FUNCTION: CONTACT GUARD ASSIST
CURRENT_FUNCTION: CONTACT GUARD ASSIST
DRESSING_UB_CURRENT_FUNCTION: INDEPENDENT
GROOMING ASSESSED: 1
FEEDING ASSESSED: 1

## 2023-07-30 ASSESSMENT — ENCOUNTER SYMPTOMS
PAIN SEVERITY GOAL: 0/10
PAIN LOCATION - PAIN SEVERITY: 5/10
LOWEST PAIN SEVERITY IN PAST 24 HOURS: 3/10
PERSON REPORTING PAIN: PATIENT
PAIN: 1
SUBJECTIVE PAIN PROGRESSION: WAXING AND WANING
HIGHEST PAIN SEVERITY IN PAST 24 HOURS: 5/10
DEPRESSION: 1

## 2023-07-31 ENCOUNTER — HOME CARE VISIT (OUTPATIENT)
Dept: HOME HEALTH SERVICES | Facility: HOME HEALTHCARE | Age: 73
End: 2023-07-31
Payer: MEDICARE

## 2023-07-31 VITALS
RESPIRATION RATE: 18 BRPM | HEART RATE: 98 BPM | OXYGEN SATURATION: 96 % | TEMPERATURE: 98 F | DIASTOLIC BLOOD PRESSURE: 60 MMHG | SYSTOLIC BLOOD PRESSURE: 95 MMHG

## 2023-07-31 PROCEDURE — G0493 RN CARE EA 15 MIN HH/HOSPICE: HCPCS

## 2023-07-31 ASSESSMENT — ENCOUNTER SYMPTOMS
NAUSEA: DENIES
VOMITING: DENIES
FATIGUES EASILY: 1

## 2023-08-01 ENCOUNTER — OFFICE VISIT (OUTPATIENT)
Dept: MEDICAL GROUP | Facility: MEDICAL CENTER | Age: 73
End: 2023-08-01
Payer: MEDICARE

## 2023-08-01 VITALS
OXYGEN SATURATION: 97 % | BODY MASS INDEX: 24.99 KG/M2 | DIASTOLIC BLOOD PRESSURE: 46 MMHG | HEIGHT: 65 IN | HEART RATE: 87 BPM | SYSTOLIC BLOOD PRESSURE: 96 MMHG | WEIGHT: 150 LBS | TEMPERATURE: 97 F

## 2023-08-01 DIAGNOSIS — D75.1 POLYCYTHEMIA: ICD-10-CM

## 2023-08-01 DIAGNOSIS — J44.9 CHRONIC OBSTRUCTIVE PULMONARY DISEASE, UNSPECIFIED COPD TYPE (HCC): ICD-10-CM

## 2023-08-01 DIAGNOSIS — D64.9 ANEMIA, UNSPECIFIED TYPE: ICD-10-CM

## 2023-08-01 DIAGNOSIS — Z87.81 S/P ORIF (OPEN REDUCTION INTERNAL FIXATION) FRACTURE: ICD-10-CM

## 2023-08-01 DIAGNOSIS — Z98.890 S/P ORIF (OPEN REDUCTION INTERNAL FIXATION) FRACTURE: ICD-10-CM

## 2023-08-01 DIAGNOSIS — S72.90XS CLOSED FRACTURE OF FEMUR, UNSPECIFIED FRACTURE MORPHOLOGY, UNSPECIFIED LATERALITY, UNSPECIFIED PORTION OF FEMUR, SEQUELA: ICD-10-CM

## 2023-08-01 PROCEDURE — 3074F SYST BP LT 130 MM HG: CPT | Performed by: INTERNAL MEDICINE

## 2023-08-01 PROCEDURE — 99214 OFFICE O/P EST MOD 30 MIN: CPT | Performed by: INTERNAL MEDICINE

## 2023-08-01 PROCEDURE — 3078F DIAST BP <80 MM HG: CPT | Performed by: INTERNAL MEDICINE

## 2023-08-01 ASSESSMENT — ENCOUNTER SYMPTOMS
PAIN LOCATION - PAIN SEVERITY: 5/10
LOWEST PAIN SEVERITY IN PAST 24 HOURS: 0/10
PAIN LOCATION - PAIN FREQUENCY: INTERMITTENT
DEBILITATING PAIN: 1
PAIN LOCATION - PAIN DURATION: 2-3 HRS
SUBJECTIVE PAIN PROGRESSION: UNCHANGED
PAIN: 1
PAIN LOCATION: LEFT HIP
PAIN LOCATION - PAIN QUALITY: ACHY
PERSON REPORTING PAIN: PATIENT
SHORTNESS OF BREATH: 1
LAST BOWEL MOVEMENT: 66686
DYSPNEA ACTIVITY LEVEL: AFTER AMBULATING 10 - 20 FT
BOWEL PATTERN NORMAL: 1
PAIN LOCATION - EXACERBATING FACTORS: INCREASED ACTIVITY
PAIN LOCATION - RELIEVING FACTORS: PAIN MEDICATION, REST
HIGHEST PAIN SEVERITY IN PAST 24 HOURS: 5/10
STOOL FREQUENCY: DAILY
PAIN SEVERITY GOAL: 0/10

## 2023-08-01 ASSESSMENT — FIBROSIS 4 INDEX: FIB4 SCORE: 2.19

## 2023-08-01 NOTE — PROGRESS NOTES
Subjective     Narendra Sun is a 73 y.o. male who presents with hospital Follow-Up            HPI    Here for follow-up hospitalization, hospital records reviewed, problem list updated, patient is here with his wife.  Has home health through [facility], most recent home health visit yesterday, patient is receiving pain medications from orthopedics, advised by orthopedics to take aspirin to prevent blood clots, not using supplemental oxygen.  Patient was on oxycodone 5 mg 2 tabs every 4 hours pain level was 5/10 and stopped the medication as it ran out and has been trying to get that filled through orthopedics.  7/19/23 hospital admit, 7/21/23 hospital discharge, patient fell getting out of a car in his garage tripping over a wood piece landing on his left hip, displaced left femoral neck fracture, orthopedics consulted, ORIF left femur fracture with proximal and neck prosthetic replacement performed, discharged home  7/19/23 x-ray pelvis comminuted fracture left femoral neck  7/19/23 x-ray left femur mildly displaced left femoral neck fracture  7/20/23  orthopedic operative note ORIF left femoral fracture, proximal end, neck with prosthetic replacement  7/21/23 occupational therapy hospital note discharge home sock aide, reacher, tub and shower seat  7/19/23 wbc 19.5 (77%N,14%L)  7/21/23 wbc 22.9 (82%N,8%L)  Currently has home health, has a bedside commode, shower chair, 2 wheeled front walker, requires services standby, has excellent help with his wife and nephew who is visiting.  Did initially have constipation but has not had any problems since his last bowel movement Saturday.  He also has a DM[employer] parking placard.  Currently does not have any shortness of breath, using oxygen as needed from [facility], continues to use his Trelegy inhaler.  No other steps in the house, just 1 step to get into the house.  The block of wood that he tripped over has been removed from the garage.  No subsequent falls since  home.        Current Outpatient Medications   Medication Sig Dispense Refill    tolnaftate (ANTIFUNGAL, TOLNAFTATE,) 1 % Cream Apply 1 Application topically 1 time a day as needed (for fungal issue of feet). Indications: fungal infection      Home Care Oxygen Inhale 3 L/min as needed (shortness of breath, to maintain oxygen level >90%). Oxygen dose range: 3 L/min  Respiratory route via: Nasal Cannula   Oxygen supplier: [facility]      Indications: COPD      albuterol 108 (90 Base) MCG/ACT Aero Soln inhalation aerosol INHALE 2 PUFFS BY MOUTH EVERY 6 HOURS AS NEEDED FOR SHORTNESS OF BREATH 17 Each 3    atorvastatin (LIPITOR) 20 MG Tab TAKE 1 TABLET BY MOUTH EVERY  Tablet 2    acetaminophen (TYLENOL) 325 MG Tab Take 2 Tablets by mouth every 6 hours as needed for Fever, Moderate Pain or Mild Pain. (Patient not taking: Reported on 7/25/2023) 30 Tablet 0    aspirin (ASA) 325 MG Tab Take 1 Tablet by mouth 2 times a day for 28 days. 56 Tablet 0    Multiple Vitamin (MULTIVITAMIN ADULT PO) Take 1 Tablet by mouth every day. Indications: supplement      TRELEGY ELLIPTA 100-62.5-25 MCG/ACT AEROSOL POWDER, BREATH ACTIVATED inhalation INHALE 1 INHALATION EVERY DAY BY MOUTH (Patient taking differently: Inhale 1 Inhalation every day.) 180 Each 3    Cholecalciferol (D3 PO) Take 1 Capsule by mouth every day. 2 gummies (2000 units)  Indications: supplement       No current facility-administered medications for this visit.             Adenoma  12/05 colonoscopy [facility] tubular adenoma  6/29/12 colon per [facility], neg, repeat 5 years  7/28/17 colon per [facility] 3 mm polyp descending colon, 10 mm polyp descending colon, 5-8 mm polyp sigmoid colon, 10 mm polyp distal sigmoid colon, 6-8 mm polyp rectum, pathology adenoma and hyperplastic polyps repeat 3 years  11/16/20 colon per [facility] 4 polyps hyperplastic and benign polypoid, repeat in 3 years     atherosclerosis aorta  10/25/16 atherosclerotic plaque aorta on CT scan thorax     b12 deficiency  12/7/15  b12 201, folate 5.3, wbc 12.4(49%N,34%L),hgb 17,hct 53,plt [id number]; start b12 1000 mcg daily  5/18/16 b12 249,folate 7,MMA<0.1,Intrinsic factor Ab negative, not on b12  8/14/17 b12 273  10/16/18 b12 324, hgb 18,hct 55,mcv 101  10/10/19 b2 300, hgb 18,hct 55,mcv 103  3/30/21 b12 398, hgb 19,hct 60,mcv 103  6/8/22 b12 468,hgb 18,hct 54.8     BPH  5/11 psa 0.7  4/12 psa 7.5 given course of cipro  5/12/12 psa 2.3 after cipro  12/31/13 psa 1.4  2/7/15 psa 1.1  5/18/16 psa 1.2  10/16/18 psa 1.0  10/10/19 psa 2.1  3/30/21 psa 1.34  6/8/22 psa 1.5  6/30/23 psa 1.3       COPD  5/08 chest x-ray negative  6/08 PFT FEV1.6 L (54% predicted), FEV/FVC ratio 47%, positive bronchodilator response  6/10 quit smoking tobacco  5/11 still off cigs, smoking 2 cigars per day  5/11 restart spiriva  5/11 cxr negative  12/12 off cigs, still smokes cigars 3-4 per day  12/12 on spiriva, add symbicort 80/4.5  2/7/13 PFT severe stage III COPD, FEV1 1.4 L (46% predicted), FEV/FVC 47% improvement after bronchodilator 13%, normal DLCO; continue spiriva, symbicort 80/1.5, smoking cessation  6/24/16  CT thorax lung cancer screening to spiculated 9 mm nodules RUL underlying emphysematous changes and tiny 3 mm nodule RML, recommend 3 month follow-up CT vs CT/PET  2/7/13 cxr negative  12/26/13 still 3 cigars per day; on spiriva and symbicort  1/23/15 still 3 cigars per day, on spiriva and symbicort  1/26/16 change symbicort to advair 250/50 (insurance) and continue spiriva  6/2/16 CT lung cancer screening visit  7/8/16 [facility] note right upper lobe lung nodules, suspicious in nature, patient agrees to CT/PET  7/8/16 CT/PET spiculated right upper lobe nodule SUV 1.1, not FDG avid, scarring left upper lobe noted, nonspecific findings, low-grade neoplasm cannot be excluded; per [facility] repeat CT 3 months  10/24/16 CT thorax without; 2 spiculated right upper lobe pulmonary nodules 8 mm and 9 mm in size unchanged, 3 mm right middle lobe unchanged nodule  10/27/16  lung cancer screening program note, recommend referral to pulmonary nodule clinic  4/6/17 CT thorax without; 2 stable 8-9 mm spiculated right upper lobe nodules, stable probably postinflammatory tiny 3 mm RML and RLL nodules, no new suspicious nodules  12/1/17 declines PFT   4/15/18 CT lung lung cancer screening stable 9 mm right posterior/apical ill-defined nodule, stable right upper/lateral 9 mm solid pulmonary nodule, postoperative changes left upper lobe, emphysema, aortic and coronary atherosclerotic plaque, previous splenectomy   8/1/18 CT/PET no abnormal uptake within either right upper lobe or right apical nodule, nodules unchanged dating back to previous lung cancer screening CT 6/24/16, downgraded to category 3 RADS, no change 3 mm right middle lobe nodule, no increased uptake neck, abdomen, pelvis  4/19/19 CT lung cancer screening spiculated nodular density right lung apex 9 x 9 mm with cavitary change and spiculated nodule right lung apex posteriorly 7 x 8 mm overall unchanged from previous exam, postoperative change left upper lobe, recommend repeat 6 months  8/1/19 on advair and spiriva declines PFT  10/8/19 CT thorax without; unchanged 9 mm and 8 mm noncalcified pulmonary nodules right lung apex, postoperative scarring left upper lobe again noted  2/13/20 change advair to breo once daily (had difficulty remembering to take Advair twice a day) continue spiriva  5/7/20 declines PFT   10/16/20 CT thorax without, pulmonary nodules 7.9 mm right apex, 9.1 mm RUL, 2.8 mm RUL unchanged compared to previous, new 5.0 mm nodule RUL, ill-defined opacities lingula unchanged consistent with atelectasis/fibrosis, no effusions, hyperexpanded and emphysematous lungs borderline enlarged right hilar lymph node unchanged 9.1 mm, atherosclerotic changes  10/17/20 change breo plus spiriva to trelegy inhaler due to cost  3/29/21 CT thorax; new left lower lobe 4 mm noncalcified nodule, otherwise stable 10 mm and 8 mm  spiculated right upper lobe and small nodules, emphysematous changes, left upper lobe resection, recommend repeat CT 3 to 6 months  8/2/21 CT thorax without, spiculated nodule right upper lobe apex 8 mm unchanged, nodule right upper lobe central cavitation 10 mm in size unchanged, 4 mm nodule right lower lobe not significantly changed, stable 4 mm left upper lobe nodule, emphysematous and bullous change multiple areas of pulmonary scarring, surgical changes left upper lobe, repeat CT in 6 months  5/16/22 declines PFT  5/15/22 CT thorax without, stable 9 mm nodule right apex, stable 9 mm nodule RUL, 5 mm nodule RLL increased from 3 mm compared to 2018, 3 mm nodule LLL, no significant adenopathy, coronary calcifications, repeat 6 months  11/21/22 declines PFT, cigar 3 per day     Dyslipidemia  5/08 chol  239, trig 91,hdl 71,ldl 150  8/09 chol 268,trig 107,hdl 56,ldl 191  8/09 start zocor 20  8/09 stress echo negative  5/11 chol 231,trig 114,hdl 53,ldl 155 off zocor  5/12 chol 215,trig 97,hdl 48,ldl 148 off zocor  12/31/13 chol 215,trig 101,hdl 55,ldl 140,crp 3.0 off statin; 10 year risk 12%, I recommend statin therapy he declines  1/13/14 echo technically difficult study, possible mild LVH  1/13/14 treadmill thallium exercise 6 minutes 30 seconds lashell protocol, limited by fatigue; no ischemia, EF 59%  2/7/15 chol 250,trig 86,hdl 51,ldl 182; urine mac <0.5; 10 year risk calculation 19%, start zocor 20 mg  5/16/16 did not take zocor  5/18/16 chol 239,trig 152,hdl 44,ldl 165  8/14/17 chol 205,trig 142,hdl 36,ldl 141 off zocor, 10 year cardiac risk, declines medication  8/1/18 CT cardiac scoring LMA 0.0, LCX 1.5, .5, RCA 0.0, PDA 0.0  10/16/8 chol 261,trig 155,hdl 42,ldl 188 declines statin therapy  10/18/18 start lipitor 20 mg  10/10/19 chol 159,trig 94,hdl 44,ldl 96 on lipitor 20 mg  3/30/21 chol 186,trig 138,hdl 42,ldl 116 on lipitor 20 mg  6/8/22 chol 165,trig 143,hdl 45,ldl 91 on lipitor 20 mg  1/16/23 chol  169,trig 122,hdl 48,ldl 97 on lipitor 20 mg  6/30/23 chol 164,trig 214,hdl 41,ldl 80 on lipitor 20 mg     elevated coronary calcium score  8/1/18 CT cardiac scoring LMA 0.0, LCX 1.5, .5, RCA 0.0, PDA 0.0= 282     history benign positional vertigo  3/8/23 ER note  3/8/23 CT CTA neck with and without postprocessing no evidence of flow-limiting stenosis  3/8/23 CT CTA head with and without postprocessing  3/8/23 MRI brain no acute infarct or hemorrhage, subacute chronic small white matter infarct right frontal lobe  3/8/23 CT head negative     history brao  Records from ophthalmology july 2009 from [facility] indicate right branch retinal artery occlusion  8/09 MRI/MRA head and neck negative  8/09 protein C, protein S, anticardiolipin antibody, beta 2 glycoprotein antibody, factor II, factor V leyden, homocysteine antithrombin III all negative  8/09 stress echo negative  8/09 holter monitor negative, need to modify risk factors we'll start on statin, he needs to quit smoking     History hypertension  1/13/14 echo technically difficult study, possible mild LVH  1/13/14 treadmill thallium exercise 6 minutes 30 seconds lashell protocol, limited by fatigue; no ischemia, EF 59%  4/27/15 start lisinopril 10 mg   5/16/16 off lisinopril   10/10/19 urine mac 6     gout  11/29/17 right first toe pain given indocin uric 5.6  10/16/18 uric 6.1     impaired glucose metabolism  6/30/23 A1c 5.8%    insomnia  10/4/18 ambien refill #15  3/2/23 ambien refill#15     Leukocytosis  8/09 wbc 8.9  5/11 wbc 11.4  4/12 wbc 12.4 (54%N,34%L)  12/31/13 wbc 12.9 (53%N,22%L),hgb 17,hct 52,mcv 102,platelet 364, CRP 3.0; ? Related to tobacco  2/7/15 wbc 12.4 (49%N,34%L),hgb 17,hct 53,plt [id number]; b12 201,folate 5.3  2/13/15 leukocyte alkaline phosphatase 108 normal  5/18/16 wbc 13.5 (51%N,30%L),hgb 17.8,hct 54,plt 427,b12 249,folate 7  5/18/16 b12 249,folate 7 not on b12  8/14/17 wbc 12.1 (50%N,32%L),hgb 17,hct 51,mcv 100.8,b12  273,jak2 negative   11/29/17 wbc 19.5 (51%N, 32%L)  10/16/18 wbc 14 (48%N,36%L), hgb 18,hct 55, plt 436, b12 324  10/8/19 CT thorax without lung; unchanged 19 mm and 8 mm noncalcified pulmonary nodules right lung apex, postoperative scarring left upper lobe again noted  10/10/19 wbc 14 (53%N,31%L),hgb 18,hct 55,mcv 103,iron 120,ferritin 116,%sat 39,b12 300,folate 20  7/16/20 wbc 14.4 (57%N,28%L),hgb 18.6,hct 56.8,plt 368, flow cytometry normal myeloid cells  3/30/21 wbc 12.9,hgb 19,hct 60,mcv 103   6/8/22 wbc 12.4,b12 468,flow cytometry negative  1/16/23 wbc 12.6 (51%N,33%L)  3/8/23 wbc 12 (59%N,26%L)  6/30/23 wbc 15 (52%N,32.6%L),hgb 17,hct 54,mcv 103,plt 369  7/19/23 hospital admit, 7/21/23 hospital discharge, patient fell getting out of a car in his garage tripping over a wood piece landing on his left hip, displaced left femoral neck fracture, orthopedics consulted, ORIF left femur fracture with proximal and neck prosthetic replacement performed, discharged home  7/19/23 wbc 19.5 (77%N,14%L)  7/20/23  orthopedic operative note ORIF left femoral fracture, proximal end, neck with prosthetic replacement  7/21/23 wbc 22.9 (82%N,8%L)  7/21/23 cxr negative  ??related to splenectomy     polycythemia  12/31/13 wbc 12.9 (53%N,22%L),hgb 17,hct 52,mcv 102,platelet 364, CRP 3.0; ? Related to tobacco  2/7/15 wbc 12.4 (49%N,34%L),hgb 17,hct 53,plt [id number]; b12 201,folate 5.3  2/13/15 leukocyte alkaline phosphatase 108 normal  5/18/16 wbc 13.5 (51%N,30%L),hgb 17.8,hct 54,plt 427,b12 249,folate 7  5/18/16 b12 249,folate 7 not on b12  8/14/17 wbc 12.1 (50%N,32%L),hgb 17,hct 51,mcv 100.8,b12 273,jak2 negative   11/29/17 wbc 19.5 (51%N, 32%L)  8/1/18 CT/PET stable pulmonary nodules, no uptake in the right upper lobe or right apical nodule, no abnormal FDG uptake neck, abdomen, pelvis  10/16/18 wbc 14 (48%N,36%L), hgb 18,hct 55, plt 436, b12 324, flow cytometry phenotypically normal slightly left shifted myeloid cells  without evidence of monoclonality, leukemia, or lymphoproliferative disorder  10/8/19 CT thorax without lung; unchanged 19 mm and 8 mm noncalcified pulmonary nodules right lung apex, postoperative scarring left upper lobe again noted  10/10/19 wbc 14 (53%N,31%L),hgb 18,hct 55,mcv 103,iron 120,ferritin 116,%sat 39,b12 300,folate 20  7/16/20 wbc 14.4 (57%N,28%L),hgb 18.6,hct 56.8,plt 368, flow cytometry normal myeloid cells  3/30/21 wbc 12.9,hgb 19,hct 60,mcv 103   6/8/22 hgb 18,hct 54.8,EPO 7,LIUDMILA,MPL,CALR mutation negative,flow cytometry negative  1/16/23 wbc 12.6,hgb 18.6,hct 55.5,mcv 102  7/21/23 hgb 15.8,hct 50     Preventative health  8/7/17 pneumovax  10/12/19 hep c Ab reactive, follow up HCV RNA negative  5/7/20 prevnar 13  5/7/20 menactra second  11/16/20 colon per [facility] 4 polyps hyperplastic and benign polypoid, repeat in 3 years  6/27/21 shingrix first   5/13/22 covid moderna 4th  5/16/22 trumenba third   5/16/22 tdap  11/21/22 hep b third  11/21/22 declines prevnar 20  6/30/23 vit d 36  6/30/23 psa 1.3     Pulmonary nodule  6/24/16  CT thorax lung cancer screening to spiculated 9 mm nodules RUL underlying emphysematous changes and tiny 3 mm nodule RML, recommend 3 month follow-up CT vs CT/PET  7/8/16 [facility] note right upper lobe lung nodules, suspicious in nature, patient agrees to CT/PET  7/8/16 CT/PET spiculated right upper lobe nodule SUV 1.1, not FDG avid, scarring left upper lobe noted, nonspecific findings, low-grade neoplasm cannot be excluded; per [facility] repeat CT 3 months  10/24/16 CT thorax without; 2 spiculated right upper lobe pulmonary nodules 8 mm and 9 mm in size unchanged, 3 mm right middle lobe unchanged nodule  10/27/16 lung cancer screening program note, recommend referral to pulmonary nodule clinic  11/3/16 lung cancer screening note recent follow-up CT scan unchanged pulmonary nodules, recommend repeat CT scan 6 months with myself  4/6/17 CT thorax without; 2 stable 8-9 mm spiculated right  upper lobe nodules, stable probably postinflammatory tiny 3 mm RML and RLL nodules, no new suspicious nodules  4/15/18 CT lung lung cancer screening stable 9 mm right posterior/apical ill-defined nodule, stable right upper/lateral 9 mm solid pulmonary nodule, postoperative changes left upper lobe, emphysema, aortic and coronary atherosclerotic plaque, previous splenectomy   8/1/18 CT/PET no abnormal uptake within either right upper lobe or right apical nodule, nodules unchanged dating back to previous lung cancer screening CT 6/24/16, downgraded to category 3 RADS, no change 3 mm right middle lobe nodule, no increased uptake neck, abdomen, pelvis  4/19/19 CT lung cancer screening spiculated nodular density right lung apex 9 x 9 mm with cavitary change and spiculated nodule right lung apex posteriorly 7 x 8 mm overall unchanged from previous exam, postoperative change left upper lobe, recommend repeat 6 months  10/8/19 CT thorax without lung; unchanged 19 mm and 8 mm noncalcified pulmonary nodules right lung apex, postoperative scarring left upper lobe again noted  2/12/20 cxr negative   10/16/20 CT thorax without, pulmonary nodules 7.9 mm right apex, 9.1 mm RUL, 2.8 mm RUL unchanged compared to previous, new 5.0 mm nodule RUL, ill-defined opacities lingula unchanged consistent with atelectasis/fibrosis, no effusions, hyperexpanded and emphysematous lungs borderline enlarged right hilar lymph node unchanged 9.1 mm, atherosclerotic changes  3/29/21 CT thorax; new left lower lobe 4 mm noncalcified nodule, otherwise stable 10 mm and 8 mm spiculated right upper lobe and small nodules, emphysematous changes, left upper lobe resection, recommend repeat CT 3 to 6 months  5/15/22 CT thorax without, stable 9 mm nodule right apex, stable 9 mm nodule RUL, 5 mm nodule RLL increased from 3 mm compared to 2018, 3 mm nodule LLL, no significant adenopathy, coronary calcifications, repeat 6 months    shoulder pain  6/16/23 xray left  shoulder, consider MRI, physical therapy  6/30/23 x-ray left shoulder moderate osteoarthritis, MRI left shoulder ordered    s/p hip left orif  7/19/23 hospital admit, 7/21/23 hospital discharge, patient fell getting out of a car in his garage tripping over a wood piece landing on his left hip, displaced left femoral neck fracture, orthopedics consulted, ORIF left femur fracture with proximal and neck prosthetic replacement performed, discharged home  7/19/23 x-ray pelvis comminuted fracture left femoral neck  7/19/23 x-ray left femur mildly displaced left femoral neck fracture  7/20/23  orthopedic operative note ORIF left femoral fracture, proximal end, neck with prosthetic replacement  7/21/23 occupational therapy hospital note discharge home sock aide, reacher, tub and shower seat  7/19/23 wbc 19.5 (77%N,14%L)  7/21/23 wbc 22.9 (82%N,8%L)     Status post splenectomy  During childhood  8/7/17 pneumovax  5/7/20 prevnar   5/7/20 menactra second  5/16/22 trumenba third   6/16/23 prevnar 20     Tobacco  12/12 off cigs had been smoking 1 to 1.5 packs per day for 40+ years, still smokes cigars 3-4 per day  2/4/13 cxr negative  4/13 off cigar using electronic cig  12/26/13 still smokes 3 cigar per day  1/13/14 echo technically difficult study, possible mild LVH  1/13/14 treadmill thallium exercise 6 minutes 30 seconds lashell protocol, limited by fatigue; no ischemia, EF 59%  5/16/16 still smoking 3 cigars per day  6/24/16  CT thorax lung cancer screening to spiculated 9 mm nodules RUL underlying emphysematous changes and tiny 3 mm nodule RML, recommend 3 month follow-up CT vs CT/PET  7/8/16 [facility] note right upper lobe lung nodules, suspicious in nature, patient agrees to CT/PET  7/8/16 [facility] note right upper lobe lung nodules, suspicious in nature, patient agrees to CT/PET  7/8/16 CT/PET spiculated right upper lobe nodule SUV 1.1, not FDG avid, scarring left upper lobe noted, nonspecific findings, low-grade  neoplasm cannot be excluded; per [facility] repeat CT 3 months  10/24/16 CT thorax without; 2 spiculated right upper lobe pulmonary nodules 8 mm and 9 mm in size unchanged, 3 mm right middle lobe unchanged nodule  10/27/16 lung cancer screening program note, recommend referral to pulmonary nodule clinic  4/6/17 CT thorax without; 2 stable 8-9 mm spiculated right upper lobe nodules, stable probably postinflammatory tiny 3 mm RML and RLL nodules, no new suspicious nodules  8/7/17 no cigarettes, 4 cigars per day  12/1/17 4 cigars per day  4/15/18 CT lung lung cancer screening stable 9 mm right posterior/apical ill-defined nodule, stable right upper/lateral 9 mm solid pulmonary nodule, postoperative changes left upper lobe, emphysema, aortic and coronary atherosclerotic plaque, previous splenectomy   8/1/18 CT/PET no abnormal uptake within either right upper lobe or right apical nodule, nodules unchanged dating back to previous lung cancer screening CT 6/24/16, downgraded to category 3 RADS, no change 3 mm right middle lobe nodule, no increased uptake neck, abdomen, pelvis  10/4/18 declines PFT, smoking 3 cigars per day  4/19/19 CT lung cancer screening spiculated nodular density right lung apex 9 x 9 mm with cavitary change and spiculated nodule right lung apex posteriorly 7 x 8 mm overall unchanged from previous exam, postoperative change left upper lobe, recommend repeat 6 months  8/1/19 3 cigars/day declines stop smoking classes  10/8/19 CT thorax without lung; unchanged 19 mm and 8 mm noncalcified pulmonary nodules right lung apex, postoperative scarring left upper lobe again noted  2/12/20 cxr negative   2/13/20 2 cigars per day  5/7/20 2 cigars per day  10/16/20 CT thorax without, pulmonary nodules 7.9 mm right apex, 9.1 mm RUL, 2.8 mm RUL unchanged compared to previous, new 5.0 mm nodule RUL, ill-defined opacities lingula unchanged consistent with atelectasis/fibrosis, no effusions, hyperexpanded and emphysematous  "lungs borderline enlarged right hilar lymph node unchanged 9.1 mm, atherosclerotic changes  3/29/21 CT thorax; new left lower lobe 4 mm noncalcified nodule, otherwise stable 10 mm and 8 mm spiculated right upper lobe and small nodules, emphysematous changes, left upper lobe resection, recommend repeat CT 3 to 6 months  5/16/22 declines PFT smoking 2 cigars per day  5/15/22 CT thorax without, stable 9 mm nodule right apex, stable 9 mm nodule RUL, 5 mm nodule RLL increased from 3 mm compared to 2018, 3 mm nodule LLL, no significant adenopathy, coronary calcifications, repeat 6 months  11/21/22 cigar 3 per day  11/21/22 declines PFT       Patient Active Problem List   Diagnosis    COPD (chronic obstructive pulmonary disease) (HCC)    Dyslipidemia    History of pneumothorax    S/P splenectomy    Preventative health care    Tobacco abuse    History of BRAO (branch retinal artery occlusion)    BPH (benign prostatic hypertrophy)    Adenomatous colon polyp    Leukocytosis    B12 deficiency    History of hypertension    Pulmonary nodule    Atherosclerosis of aorta (HCC)    Elevated coronary artery calcium score    Polycythemia    Insomnia    History of benign positional vertigo    Shoulder pain    Impaired glucose metabolism    S/p hip left ORIF                Patient Care Team:  Naveen Duncan M.D. as PCP - General  Naveen Duncan M.D. as PCP - [facility] Paneled  CAROL Rodríguez as Consulting Physician (Medical Oncology)  Valencia Hull as [facility]   [facility] (Home Health)    ROS           Objective     BP 96/46   Pulse 87   Temp 36.1 °C (97 °F) (Temporal)   Ht 1.651 m (5' 5\")   Wt 68 kg (150 lb)   SpO2 97%   BMI 24.96 kg/m²      Physical Exam  Vitals and nursing note reviewed.   Constitutional:       Appearance: Normal appearance.   HENT:      Head: Normocephalic and atraumatic.      Right Ear: External ear normal.      Left Ear: External ear normal.   Eyes:      " Conjunctiva/sclera: Conjunctivae normal.   Cardiovascular:      Rate and Rhythm: Normal rate and regular rhythm.      Heart sounds: Normal heart sounds.   Pulmonary:      Effort: Pulmonary effort is normal.      Breath sounds: Normal breath sounds.   Abdominal:      General: There is no distension.   Musculoskeletal:         General: No swelling.   Skin:     Coloration: Skin is not jaundiced.   Neurological:      General: No focal deficit present.      Mental Status: He is alert.   Psychiatric:         Mood and Affect: Mood normal.         Behavior: Behavior normal.         Thought Content: Thought content normal.     Some left lower extremity edema although he states that has improved since hospitalization                        Assessment & Plan        Assessment  #1 status post left femoral fracture ORIF July 20 followed by orthopedics, pain medications currently provided taking 5 mg every 4 hours through orthopedics as he remains on oxycodone 1 to 2 tablets, although he did run out of his medications and has been trying to get a refill through orthopedics.     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, previous flow cytometry last year negative, probably a component of stress demargination after surgery    Plan  #1 reviewed hospital records and updated his problem list which took 30 minutes outside his appointment additionally    #2 he has home health nursing, will start home health physical therapy, Occupational Therapy and work on strengthening, follow-up  orthopedics    #3 orthopedics is prescribing his pain medication and it is best to have 1 provider group doing that instead of multiple providers so orthopedics will continue to refill his oxycodone    #4 he can take over-the-counter MiraLAX or Metamucil for constipation, try to keep well-hydrated as well with water    #5 he can use the oxygen as needed since there is no indication that he desaturates as his oximetry readings have been normal    #6 recommend aspirin 81 mg twice daily as recommended by orthopedics for deep venous thrombosis prophylaxis    #7 we will need to repeat labs, CBC and B12 ordered, faxed to home health.    #8 follow-up with myself 2 months

## 2023-08-02 ENCOUNTER — HOME CARE VISIT (OUTPATIENT)
Dept: HOME HEALTH SERVICES | Facility: HOME HEALTHCARE | Age: 73
End: 2023-08-02
Payer: MEDICARE

## 2023-08-02 VITALS
SYSTOLIC BLOOD PRESSURE: 98 MMHG | HEART RATE: 79 BPM | OXYGEN SATURATION: 95 % | TEMPERATURE: 98.3 F | RESPIRATION RATE: 18 BRPM | DIASTOLIC BLOOD PRESSURE: 52 MMHG

## 2023-08-02 PROCEDURE — G0151 HHCP-SERV OF PT,EA 15 MIN: HCPCS

## 2023-08-02 ASSESSMENT — ENCOUNTER SYMPTOMS
PAIN LOCATION - PAIN QUALITY: NAGGING
PAIN: 1
PERSON REPORTING PAIN: PATIENT
PAIN LOCATION: LEFT HIP
PAIN LOCATION - PAIN SEVERITY: 6/10

## 2023-08-03 ENCOUNTER — HOME CARE VISIT (OUTPATIENT)
Dept: HOME HEALTH SERVICES | Facility: HOME HEALTHCARE | Age: 73
End: 2023-08-03
Payer: MEDICARE

## 2023-08-03 VITALS
OXYGEN SATURATION: 96 % | RESPIRATION RATE: 18 BRPM | DIASTOLIC BLOOD PRESSURE: 60 MMHG | HEART RATE: 76 BPM | TEMPERATURE: 97.5 F | SYSTOLIC BLOOD PRESSURE: 106 MMHG

## 2023-08-03 PROCEDURE — G0299 HHS/HOSPICE OF RN EA 15 MIN: HCPCS

## 2023-08-03 SDOH — ECONOMIC STABILITY: HOUSING INSECURITY: EVIDENCE OF SMOKING MATERIAL: 0

## 2023-08-03 ASSESSMENT — ENCOUNTER SYMPTOMS
SUBJECTIVE PAIN PROGRESSION: UNCHANGED
VOMITING: DENIES
PAIN LOCATION - PAIN SEVERITY: 6/10
LOWEST PAIN SEVERITY IN PAST 24 HOURS: 6/10
MUSCLE WEAKNESS: 1
PAIN SEVERITY GOAL: 2/10
SUBJECTIVE PAIN PROGRESSION: GRADUALLY WORSENING
HIGHEST PAIN SEVERITY IN PAST 24 HOURS: 7/10
DYSPNEA ON EXERTION: 1
ASSOCIATED SYMPTOMS: DENIES
LIMITED RANGE OF MOTION: 1
PERSON REPORTING PAIN: PATIENT
BOWEL PATTERN NORMAL: 1
PAIN SEVERITY GOAL: 5/10
NAUSEA: DENIES
PAIN LOCATION - RELIEVING FACTORS: PAIN MED
FATIGUES EASILY: 1
PAIN LOCATION - PAIN FREQUENCY: CONSTANT
PAIN LOCATION: LEFT HIP
STOOL FREQUENCY: LESS THAN DAILY
LOWER EXTREMITY EDEMA: 1
PAIN LOCATION - PAIN QUALITY: ACHY THROBS
PAIN LOCATION - EXACERBATING FACTORS: MOVEMENT
LOWEST PAIN SEVERITY IN PAST 24 HOURS: 5/10
DEBILITATING PAIN: 1
PAIN LOCATION - PAIN DURATION: DAILY
LAST BOWEL MOVEMENT: 66689
PAIN: 1
HIGHEST PAIN SEVERITY IN PAST 24 HOURS: 6/10

## 2023-08-03 ASSESSMENT — ACTIVITIES OF DAILY LIVING (ADL)
AMBULATION ASSISTANCE ON FLAT SURFACES: 1
TRANSPORTATION COMMENTS: PT REQUIRES ASSIST AND ASSISTIVE DEVICE TO LEAVE HOME; FALL RISK
AMBULATION_DISTANCE/DURATION_TOLERATED: 50 FT

## 2023-08-04 ENCOUNTER — HOME CARE VISIT (OUTPATIENT)
Dept: HOME HEALTH SERVICES | Facility: HOME HEALTHCARE | Age: 73
End: 2023-08-04
Payer: MEDICARE

## 2023-08-04 VITALS
SYSTOLIC BLOOD PRESSURE: 100 MMHG | TEMPERATURE: 97.6 F | DIASTOLIC BLOOD PRESSURE: 58 MMHG | HEART RATE: 98 BPM | RESPIRATION RATE: 18 BRPM | OXYGEN SATURATION: 95 %

## 2023-08-04 PROCEDURE — G0151 HHCP-SERV OF PT,EA 15 MIN: HCPCS

## 2023-08-04 ASSESSMENT — ACTIVITIES OF DAILY LIVING (ADL)
TRANSPORTATION COMMENTS: PT REQUIRES ASSIST AND ASSISTIVE DEVICE TO LEAVE HOME; FALL RISK
AMBULATION_DISTANCE/DURATION_TOLERATED: 50 FT
AMBULATION ASSISTANCE ON FLAT SURFACES: 1

## 2023-08-04 ASSESSMENT — ENCOUNTER SYMPTOMS
PERSON REPORTING PAIN: PATIENT
PAIN LOCATION - PAIN SEVERITY: 5/10
LOWEST PAIN SEVERITY IN PAST 24 HOURS: 5/10
PAIN SEVERITY GOAL: 1/10
PAIN LOCATION: LEFT HIP
PAIN: 1
HIGHEST PAIN SEVERITY IN PAST 24 HOURS: 6/10
DEBILITATING PAIN: 1

## 2023-08-06 ENCOUNTER — HOME CARE VISIT (OUTPATIENT)
Dept: HOME HEALTH SERVICES | Facility: HOME HEALTHCARE | Age: 73
End: 2023-08-06
Payer: MEDICARE

## 2023-08-06 ASSESSMENT — ACTIVITIES OF DAILY LIVING (ADL): OASIS_M1830: 05

## 2023-08-06 NOTE — CASE COMMUNICATION
Quality Review for SOC OASIS by JUN Peterson, MUSA on  August 6, 2023      Edits completed by JUN Peterson RN:  1.  and  diagnosis coding updated per chart review.  2.  checked continuous oxygen  3.  is 4 and  is 5 per OT recommendation for 3 in 1 commode and grab bars in shower for safety. [id number] changed to 7/27 per the collaboration convention  4.  is 3 per ambulation status  5.  is 12 per care pl an therapy sets.

## 2023-08-07 ENCOUNTER — HOSPITAL ENCOUNTER (OUTPATIENT)
Facility: MEDICAL CENTER | Age: 73
End: 2023-08-07
Attending: INTERNAL MEDICINE
Payer: MEDICARE

## 2023-08-07 ENCOUNTER — HOME CARE VISIT (OUTPATIENT)
Dept: HOME HEALTH SERVICES | Facility: HOME HEALTHCARE | Age: 73
End: 2023-08-07
Payer: MEDICARE

## 2023-08-07 LAB
BASOPHILS # BLD AUTO: 0.8 % (ref 0–1.8)
BASOPHILS # BLD: 0.09 K/UL (ref 0–0.12)
EOSINOPHIL # BLD AUTO: 0.6 K/UL (ref 0–0.51)
EOSINOPHIL NFR BLD: 5.1 % (ref 0–6.9)
ERYTHROCYTE [DISTWIDTH] IN BLOOD BY AUTOMATED COUNT: 55.2 FL (ref 35.9–50)
HCT VFR BLD AUTO: 44.7 % (ref 42–52)
HGB BLD-MCNC: 14.1 G/DL (ref 14–18)
IMM GRANULOCYTES # BLD AUTO: 0.13 K/UL (ref 0–0.11)
IMM GRANULOCYTES NFR BLD AUTO: 1.1 % (ref 0–0.9)
LYMPHOCYTES # BLD AUTO: 2.44 K/UL (ref 1–4.8)
LYMPHOCYTES NFR BLD: 20.6 % (ref 22–41)
MCH RBC QN AUTO: 32.8 PG (ref 27–33)
MCHC RBC AUTO-ENTMCNC: 31.5 G/DL (ref 32.3–36.5)
MCV RBC AUTO: 104 FL (ref 81.4–97.8)
MONOCYTES # BLD AUTO: 0.63 K/UL (ref 0–0.85)
MONOCYTES NFR BLD AUTO: 5.3 % (ref 0–13.4)
NEUTROPHILS # BLD AUTO: 7.98 K/UL (ref 1.82–7.42)
NEUTROPHILS NFR BLD: 67.1 % (ref 44–72)
NRBC # BLD AUTO: 0 K/UL
NRBC BLD-RTO: 0 /100 WBC (ref 0–0.2)
PLATELET # BLD AUTO: 779 K/UL (ref 164–446)
PMV BLD AUTO: 9.6 FL (ref 9–12.9)
RBC # BLD AUTO: 4.3 M/UL (ref 4.7–6.1)
VIT B12 SERPL-MCNC: 558 PG/ML (ref 211–911)
WBC # BLD AUTO: 11.9 K/UL (ref 4.8–10.8)

## 2023-08-07 PROCEDURE — 85025 COMPLETE CBC W/AUTO DIFF WBC: CPT

## 2023-08-07 PROCEDURE — G0493 RN CARE EA 15 MIN HH/HOSPICE: HCPCS

## 2023-08-07 PROCEDURE — 82607 VITAMIN B-12: CPT

## 2023-08-08 VITALS
RESPIRATION RATE: 18 BRPM | TEMPERATURE: 98.5 F | HEART RATE: 91 BPM | DIASTOLIC BLOOD PRESSURE: 60 MMHG | OXYGEN SATURATION: 94 % | SYSTOLIC BLOOD PRESSURE: 95 MMHG

## 2023-08-08 SDOH — ECONOMIC STABILITY: HOUSING INSECURITY: EVIDENCE OF SMOKING MATERIAL: 0

## 2023-08-08 ASSESSMENT — ENCOUNTER SYMPTOMS
PERSON REPORTING PAIN: PATIENT
PAIN: 1
LAST BOWEL MOVEMENT: 66693
PAIN LOCATION - PAIN QUALITY: ACHY
STOOL FREQUENCY: DAILY
PAIN LOCATION - EXACERBATING FACTORS: INCREASED ACTIVITY
SHORTNESS OF BREATH: 1
PAIN LOCATION - PAIN FREQUENCY: CONSTANT
DEBILITATING PAIN: 1
NAUSEA: DENIES
FATIGUES EASILY: 1
SUBJECTIVE PAIN PROGRESSION: GRADUALLY IMPROVING
PAIN LOCATION - PAIN SEVERITY: 6/10
PAIN SEVERITY GOAL: 2/10
BOWEL PATTERN NORMAL: 1
VOMITING: DENIES
LOWEST PAIN SEVERITY IN PAST 24 HOURS: 5/10
PAIN LOCATION: LEFT HIP
DYSPNEA ACTIVITY LEVEL: AFTER AMBULATING 10 - 20 FT
PAIN LOCATION - PAIN DURATION: 24/7
HIGHEST PAIN SEVERITY IN PAST 24 HOURS: 6/10

## 2023-08-09 ENCOUNTER — TELEPHONE (OUTPATIENT)
Dept: MEDICAL GROUP | Facility: MEDICAL CENTER | Age: 73
End: 2023-08-09

## 2023-08-09 ENCOUNTER — HOME CARE VISIT (OUTPATIENT)
Dept: HOME HEALTH SERVICES | Facility: HOME HEALTHCARE | Age: 73
End: 2023-08-09
Payer: MEDICARE

## 2023-08-09 PROCEDURE — G0151 HHCP-SERV OF PT,EA 15 MIN: HCPCS

## 2023-08-09 PROCEDURE — G0180 MD CERTIFICATION HHA PATIENT: HCPCS | Performed by: INTERNAL MEDICINE

## 2023-08-09 NOTE — TELEPHONE ENCOUNTER
Thank you for the notification, I completed the order yesterday and it is on the counter to fax to his respiratory company at [facility] to discontinue his oxygen.

## 2023-08-09 NOTE — CASE COMMUNICATION
Noted and agree with all changes. Thank you.     Erin Walker RN    ----- Message -----  From: Eli Peterson R.N.  Sent: 8/6/2023   6:53 AM PDT  To: Papo Walker R.N.      Quality Review for SOC OASIS by JUN Peterson RN on  August 6, 2023      Edits completed by JUN Peterson RN:  1.  and  diagnosis coding updated per chart review.  2.  checked continuous oxygen  3.  is 4 and  is 5 per OT rec ommendation for 3 in 1 commode and grab bars in shower for safety. [id number] changed to 7/27 per the collaboration convention  4.  is 3 per ambulation status  5.  is 12 per care plan therapy sets.

## 2023-08-09 NOTE — TELEPHONE ENCOUNTER
Narendra Sun pt would like to get an order to discontinue oxygen sent to ordering office and for them to  oxygen supply

## 2023-08-10 ENCOUNTER — HOME CARE VISIT (OUTPATIENT)
Dept: HOME HEALTH SERVICES | Facility: HOME HEALTHCARE | Age: 73
End: 2023-08-10
Payer: MEDICARE

## 2023-08-10 ENCOUNTER — TELEPHONE (OUTPATIENT)
Dept: MEDICAL GROUP | Facility: MEDICAL CENTER | Age: 73
End: 2023-08-10
Payer: MEDICARE

## 2023-08-10 VITALS
SYSTOLIC BLOOD PRESSURE: 118 MMHG | HEART RATE: 62 BPM | DIASTOLIC BLOOD PRESSURE: 64 MMHG | TEMPERATURE: 97.9 F | RESPIRATION RATE: 17 BRPM | OXYGEN SATURATION: 94 %

## 2023-08-10 VITALS
DIASTOLIC BLOOD PRESSURE: 68 MMHG | TEMPERATURE: 98.2 F | SYSTOLIC BLOOD PRESSURE: 122 MMHG | OXYGEN SATURATION: 93 % | RESPIRATION RATE: 18 BRPM | HEART RATE: 73 BPM

## 2023-08-10 PROCEDURE — G0493 RN CARE EA 15 MIN HH/HOSPICE: HCPCS

## 2023-08-10 ASSESSMENT — ENCOUNTER SYMPTOMS
PAIN LOCATION - PAIN QUALITY: ACHE
DEBILITATING PAIN: 1
PAIN LOCATION - RELIEVING FACTORS: SITTING
SUBJECTIVE PAIN PROGRESSION: GRADUALLY IMPROVING
LOWEST PAIN SEVERITY IN PAST 24 HOURS: 2/10
PAIN LOCATION - PAIN FREQUENCY: CONSTANT
BOWEL PATTERN NORMAL: 1
PAIN LOCATION - PAIN QUALITY: ACHY
LOWER EXTREMITY EDEMA: 1
PAIN LOCATION - PAIN DURATION: DAILY
PAIN LOCATION - EXACERBATING FACTORS: REST
PERSON REPORTING PAIN: PATIENT
PAIN LOCATION - PAIN SEVERITY: 3/10
PAIN LOCATION - PAIN SEVERITY: 4/10
PAIN LOCATION - EXACERBATING FACTORS: WALKING/STANDING
PAIN: 1
PERSON REPORTING PAIN: PATIENT
PAIN LOCATION: LEFT HIP
PAIN LOCATION - RELIEVING FACTORS: MEDICATION
PAIN: 1
HIGHEST PAIN SEVERITY IN PAST 24 HOURS: 7/10
PAIN LOCATION: LEFT HIP

## 2023-08-10 NOTE — Clinical Note
Thank you for the update  ----- Message -----  From: Sarah Palomares R.N.  Sent: 8/10/2023  11:03 AM PDT  To: Alanis Cheek, PT; Tia Klein, OT; *      TODDI:  Pt still has slight +1 edema in his left leg. SN asked pt if he is still taking his Asprin and pt said that he is not taking it all the time. Pt says that since the swelling has gone down, pt is not as concerned for a blood clot and he hates taking more medications. Encouraged pt to not stop taking it without checking with a doctor first. Pt says that he took it yesterday, but today he has not taken any.

## 2023-08-10 NOTE — TELEPHONE ENCOUNTER
Notified with labs, leukocytosis improved, thrombocytosis likely reactive after his surgery and recovery.  He continues on the aspirin per orthopedic status post surgery.  Will continue to monitor.

## 2023-08-10 NOTE — CASE COMMUNICATION
FYI:  Pt still has slight +1 edema in his left leg. SN asked pt if he is still taking his Asprin and pt said that he is not taking it all the time. Pt says that since the swelling has gone down, pt is not as concerned for a blood clot and he hates taking more medications. Encouraged pt to not stop taking it without checking with a doctor first. Pt says that he took it yesterday, but today he has not taken any.

## 2023-08-11 ENCOUNTER — APPOINTMENT (OUTPATIENT)
Dept: MEDICAL GROUP | Facility: MEDICAL CENTER | Age: 73
End: 2023-08-11
Payer: MEDICARE

## 2023-08-11 ENCOUNTER — HOME CARE VISIT (OUTPATIENT)
Dept: HOME HEALTH SERVICES | Facility: HOME HEALTHCARE | Age: 73
End: 2023-08-11
Payer: MEDICARE

## 2023-08-11 VITALS
TEMPERATURE: 97.9 F | SYSTOLIC BLOOD PRESSURE: 118 MMHG | HEART RATE: 89 BPM | DIASTOLIC BLOOD PRESSURE: 64 MMHG | OXYGEN SATURATION: 96 % | RESPIRATION RATE: 17 BRPM

## 2023-08-11 PROCEDURE — G0151 HHCP-SERV OF PT,EA 15 MIN: HCPCS

## 2023-08-11 ASSESSMENT — ENCOUNTER SYMPTOMS
PAIN: 1
PAIN LOCATION - PAIN SEVERITY: 2/10
HIGHEST PAIN SEVERITY IN PAST 24 HOURS: 4/10
LOWEST PAIN SEVERITY IN PAST 24 HOURS: 0/10
PERSON REPORTING PAIN: PATIENT
PAIN LOCATION: LEFT HIP
DEBILITATING PAIN: 1
PAIN LOCATION - PAIN QUALITY: ACHE

## 2023-08-12 ENCOUNTER — HOME CARE VISIT (OUTPATIENT)
Dept: HOME HEALTH SERVICES | Facility: HOME HEALTHCARE | Age: 73
End: 2023-08-12
Payer: MEDICARE

## 2023-08-14 ASSESSMENT — ACTIVITIES OF DAILY LIVING (ADL)
CURRENT_FUNCTION: SUPERVISION
BATHING ASSESSED: 1
TOILETING: 1
DRESSING_LB_CURRENT_FUNCTION: INDEPENDENT
DRESSING_UB_CURRENT_FUNCTION: INDEPENDENT
TOILETING: INDEPENDENT
BATHING_CURRENT_FUNCTION: INDEPENDENT
PHYSICAL TRANSFERS ASSESSED: 1

## 2023-08-15 ENCOUNTER — HOME CARE VISIT (OUTPATIENT)
Dept: HOME HEALTH SERVICES | Facility: HOME HEALTHCARE | Age: 73
End: 2023-08-15
Payer: MEDICARE

## 2023-08-15 VITALS
HEART RATE: 92 BPM | RESPIRATION RATE: 18 BRPM | SYSTOLIC BLOOD PRESSURE: 116 MMHG | TEMPERATURE: 97.5 F | OXYGEN SATURATION: 95 % | DIASTOLIC BLOOD PRESSURE: 62 MMHG

## 2023-08-15 PROCEDURE — G0151 HHCP-SERV OF PT,EA 15 MIN: HCPCS

## 2023-08-15 ASSESSMENT — ENCOUNTER SYMPTOMS
HIGHEST PAIN SEVERITY IN PAST 24 HOURS: 4/10
PAIN: 1
PAIN LOCATION - PAIN DURATION: DAILY
PERSON REPORTING PAIN: PATIENT
PAIN LOCATION - PAIN QUALITY: ACHE
LOWEST PAIN SEVERITY IN PAST 24 HOURS: 2/10
PAIN LOCATION - PAIN SEVERITY: 2/10
PAIN LOCATION: LEFT HIP
PAIN LOCATION - PAIN FREQUENCY: INTERMITTENT

## 2023-08-17 ENCOUNTER — HOME CARE VISIT (OUTPATIENT)
Dept: HOME HEALTH SERVICES | Facility: HOME HEALTHCARE | Age: 73
End: 2023-08-17
Payer: MEDICARE

## 2023-08-17 VITALS
OXYGEN SATURATION: 95 % | TEMPERATURE: 97.8 F | DIASTOLIC BLOOD PRESSURE: 68 MMHG | HEART RATE: 104 BPM | RESPIRATION RATE: 20 BRPM | SYSTOLIC BLOOD PRESSURE: 118 MMHG

## 2023-08-17 PROCEDURE — G0151 HHCP-SERV OF PT,EA 15 MIN: HCPCS

## 2023-08-17 ASSESSMENT — ENCOUNTER SYMPTOMS
PERSON REPORTING PAIN: PATIENT
DEPRESSION: 1
PAIN LOCATION - PAIN QUALITY: ACHE
LOWEST PAIN SEVERITY IN PAST 24 HOURS: 2/10
PAIN LOCATION - RELIEVING FACTORS: CHANGING POSITIONS
PAIN LOCATION: LEFT HIP
PAIN: 1
SUBJECTIVE PAIN PROGRESSION: WAXING AND WANING
PAIN LOCATION - PAIN DURATION: DAILY
HIGHEST PAIN SEVERITY IN PAST 24 HOURS: 4/10

## 2023-08-18 ENCOUNTER — HOME CARE VISIT (OUTPATIENT)
Dept: HOME HEALTH SERVICES | Facility: HOME HEALTHCARE | Age: 73
End: 2023-08-18
Payer: MEDICARE

## 2023-08-18 ENCOUNTER — TELEPHONE (OUTPATIENT)
Dept: MEDICAL GROUP | Facility: MEDICAL CENTER | Age: 73
End: 2023-08-18
Payer: MEDICARE

## 2023-08-18 VITALS
DIASTOLIC BLOOD PRESSURE: 62 MMHG | TEMPERATURE: 97.9 F | RESPIRATION RATE: 18 BRPM | HEART RATE: 82 BPM | SYSTOLIC BLOOD PRESSURE: 122 MMHG | OXYGEN SATURATION: 96 %

## 2023-08-18 PROCEDURE — G0493 RN CARE EA 15 MIN HH/HOSPICE: HCPCS

## 2023-08-18 ASSESSMENT — ENCOUNTER SYMPTOMS
PAIN LOCATION: LEFT HIP
PERSON REPORTING PAIN: PATIENT
LOWER EXTREMITY EDEMA: 1
NAUSEA: DENIES
DIARRHEA: 1
VOMITING: DENIES
PAIN LOCATION - PAIN SEVERITY: 2/10
PAIN: 1
LAST BOWEL MOVEMENT: 66704
MUSCLE WEAKNESS: 1

## 2023-08-18 NOTE — TELEPHONE ENCOUNTER
Called patient, was feeling tired a few days ago after not sleeping at all Tuesday or Wednesday night, hip pain is better down to 2 out of 10, pain scale, still decreased energy, some loose stools in the morning, no nausea, vomiting, melena, still able to keep down food, water, tea.  Advised patient if he gets worse over the weekend to go to the emergency room for evaluation.

## 2023-08-18 NOTE — TELEPHONE ENCOUNTER
Sarah @ [facility] called to report that the pt did not feel well. W  Lethargic, woozy, 82 HR, Irregular HB, SOB, not able to sleep and having a hard time urinating. Called Home Health and explained that we advised him to go to the ER yesterday and that would be our directive again today.

## 2023-08-18 NOTE — TELEPHONE ENCOUNTER
Alansi from Home Health called and LM. States that Narendra's having some issues today and his symptoms are: Increase fatigue, increased HR (104), Resp 20, labored breathing. They suggested that he go to ER but he declined. Alanis wanted you to know in case you could convince him to go.    You can access the [facility] Portal, offered by [facility], by registering with the following website: [email or web address]

## 2023-08-21 ENCOUNTER — HOSPITAL ENCOUNTER (OUTPATIENT)
Dept: RADIOLOGY | Facility: MEDICAL CENTER | Age: 73
End: 2023-08-21
Attending: INTERNAL MEDICINE
Payer: MEDICARE

## 2023-08-21 ENCOUNTER — TELEPHONE (OUTPATIENT)
Dept: MEDICAL GROUP | Facility: MEDICAL CENTER | Age: 73
End: 2023-08-21
Payer: MEDICARE

## 2023-08-21 DIAGNOSIS — M25.512 CHRONIC LEFT SHOULDER PAIN: ICD-10-CM

## 2023-08-21 DIAGNOSIS — G89.29 CHRONIC LEFT SHOULDER PAIN: ICD-10-CM

## 2023-08-21 DIAGNOSIS — R91.1 PULMONARY NODULE: ICD-10-CM

## 2023-08-21 PROCEDURE — 71250 CT THORAX DX C-: CPT

## 2023-08-21 PROCEDURE — 73221 MRI JOINT UPR EXTREM W/O DYE: CPT | Mod: LT

## 2023-08-22 ENCOUNTER — HOME CARE VISIT (OUTPATIENT)
Dept: HOME HEALTH SERVICES | Facility: HOME HEALTHCARE | Age: 73
End: 2023-08-22
Payer: MEDICARE

## 2023-08-22 ENCOUNTER — TELEPHONE (OUTPATIENT)
Dept: MEDICAL GROUP | Facility: MEDICAL CENTER | Age: 73
End: 2023-08-22
Payer: MEDICARE

## 2023-08-22 VITALS
OXYGEN SATURATION: 95 % | DIASTOLIC BLOOD PRESSURE: 64 MMHG | HEART RATE: 83 BPM | SYSTOLIC BLOOD PRESSURE: 124 MMHG | TEMPERATURE: 97.9 F | RESPIRATION RATE: 17 BRPM

## 2023-08-22 PROCEDURE — G0151 HHCP-SERV OF PT,EA 15 MIN: HCPCS

## 2023-08-22 ASSESSMENT — ENCOUNTER SYMPTOMS
PAIN LOCATION - PAIN QUALITY: ACHE
PERSON REPORTING PAIN: PATIENT
PAIN: 1
PAIN LOCATION: LEFT HIP
SUBJECTIVE PAIN PROGRESSION: GRADUALLY IMPROVING
HIGHEST PAIN SEVERITY IN PAST 24 HOURS: 3/10
LOWEST PAIN SEVERITY IN PAST 24 HOURS: 2/10
PAIN LOCATION - PAIN SEVERITY: 2/10
DEBILITATING PAIN: 1

## 2023-08-22 NOTE — TELEPHONE ENCOUNTER
Notified with CT result, multiple nodules without changes, 1 right lower lung base nodule 5 mm perhaps a change in size and radiologist recommends CT in 12 months.  Notified patient repeat CT 1 year.

## 2023-08-24 ENCOUNTER — HOME CARE VISIT (OUTPATIENT)
Dept: HOME HEALTH SERVICES | Facility: HOME HEALTHCARE | Age: 73
End: 2023-08-24
Payer: MEDICARE

## 2023-08-24 PROCEDURE — 665001 SOC-HOME HEALTH

## 2023-08-24 PROCEDURE — G0151 HHCP-SERV OF PT,EA 15 MIN: HCPCS

## 2023-08-29 ENCOUNTER — HOME CARE VISIT (OUTPATIENT)
Dept: HOME HEALTH SERVICES | Facility: HOME HEALTHCARE | Age: 73
End: 2023-08-29
Payer: MEDICARE

## 2023-08-29 VITALS
TEMPERATURE: 97.7 F | SYSTOLIC BLOOD PRESSURE: 118 MMHG | RESPIRATION RATE: 17 BRPM | OXYGEN SATURATION: 95 % | HEART RATE: 83 BPM | DIASTOLIC BLOOD PRESSURE: 64 MMHG

## 2023-08-29 PROCEDURE — G0151 HHCP-SERV OF PT,EA 15 MIN: HCPCS

## 2023-08-29 ASSESSMENT — ENCOUNTER SYMPTOMS
SUBJECTIVE PAIN PROGRESSION: WAXING AND WANING
DEBILITATING PAIN: 1
LOWEST PAIN SEVERITY IN PAST 24 HOURS: 2/10
PAIN: 1
PAIN LOCATION - PAIN SEVERITY: 2/10
PAIN LOCATION: LEFT HIP
HIGHEST PAIN SEVERITY IN PAST 24 HOURS: 8/10
PERSON REPORTING PAIN: PATIENT

## 2023-08-31 ENCOUNTER — HOME CARE VISIT (OUTPATIENT)
Dept: HOME HEALTH SERVICES | Facility: HOME HEALTHCARE | Age: 73
End: 2023-08-31
Payer: MEDICARE

## 2023-08-31 PROCEDURE — G0151 HHCP-SERV OF PT,EA 15 MIN: HCPCS

## 2023-09-01 VITALS
OXYGEN SATURATION: 95 % | RESPIRATION RATE: 17 BRPM | HEART RATE: 85 BPM | DIASTOLIC BLOOD PRESSURE: 66 MMHG | SYSTOLIC BLOOD PRESSURE: 118 MMHG | TEMPERATURE: 97.9 F

## 2023-09-01 ASSESSMENT — ENCOUNTER SYMPTOMS
LOWEST PAIN SEVERITY IN PAST 24 HOURS: 2/10
PAIN LOCATION - PAIN QUALITY: ACHE
PAIN LOCATION: LEFT HIP
PERSON REPORTING PAIN: PATIENT
HIGHEST PAIN SEVERITY IN PAST 24 HOURS: 5/10
PAIN LOCATION - RELIEVING FACTORS: REST
SUBJECTIVE PAIN PROGRESSION: WAXING AND WANING
PAIN LOCATION - PAIN SEVERITY: 2/10
PAIN LOCATION - PAIN FREQUENCY: CONSTANT
PAIN LOCATION - PAIN DURATION: DAILY
PAIN: 1

## 2023-09-05 ENCOUNTER — HOME CARE VISIT (OUTPATIENT)
Dept: HOME HEALTH SERVICES | Facility: HOME HEALTHCARE | Age: 73
End: 2023-09-05
Payer: MEDICARE

## 2023-09-05 VITALS
TEMPERATURE: 97.7 F | DIASTOLIC BLOOD PRESSURE: 62 MMHG | RESPIRATION RATE: 18 BRPM | SYSTOLIC BLOOD PRESSURE: 118 MMHG | HEART RATE: 82 BPM | OXYGEN SATURATION: 97 %

## 2023-09-05 PROCEDURE — G0151 HHCP-SERV OF PT,EA 15 MIN: HCPCS

## 2023-09-05 ASSESSMENT — ENCOUNTER SYMPTOMS
PAIN LOCATION - PAIN SEVERITY: 1/10
PAIN: 1
HIGHEST PAIN SEVERITY IN PAST 24 HOURS: 4/10
PAIN LOCATION: LEFT HIP
SUBJECTIVE PAIN PROGRESSION: WAXING AND WANING
LOWEST PAIN SEVERITY IN PAST 24 HOURS: 1/10
PAIN LOCATION - PAIN QUALITY: ACHE
PERSON REPORTING PAIN: PATIENT

## 2023-09-07 ENCOUNTER — HOME CARE VISIT (OUTPATIENT)
Dept: HOME HEALTH SERVICES | Facility: HOME HEALTHCARE | Age: 73
End: 2023-09-07
Payer: MEDICARE

## 2023-09-07 PROCEDURE — G0151 HHCP-SERV OF PT,EA 15 MIN: HCPCS

## 2023-09-08 VITALS
OXYGEN SATURATION: 97 % | SYSTOLIC BLOOD PRESSURE: 122 MMHG | DIASTOLIC BLOOD PRESSURE: 60 MMHG | RESPIRATION RATE: 18 BRPM | TEMPERATURE: 97.4 F | HEART RATE: 102 BPM

## 2023-09-08 ASSESSMENT — ENCOUNTER SYMPTOMS
SUBJECTIVE PAIN PROGRESSION: WAXING AND WANING
PERSON REPORTING PAIN: PATIENT
PAIN LOCATION: LEFT HIP
PAIN LOCATION - PAIN SEVERITY: 2/10
LOWEST PAIN SEVERITY IN PAST 24 HOURS: 2/10
PAIN: 1
PAIN LOCATION - PAIN QUALITY: ACHE
HIGHEST PAIN SEVERITY IN PAST 24 HOURS: 3/10

## 2023-09-12 ENCOUNTER — HOME CARE VISIT (OUTPATIENT)
Dept: HOME HEALTH SERVICES | Facility: HOME HEALTHCARE | Age: 73
End: 2023-09-12
Payer: MEDICARE

## 2023-09-12 VITALS
DIASTOLIC BLOOD PRESSURE: 68 MMHG | SYSTOLIC BLOOD PRESSURE: 122 MMHG | HEART RATE: 75 BPM | RESPIRATION RATE: 17 BRPM | TEMPERATURE: 97.8 F | OXYGEN SATURATION: 94 %

## 2023-09-12 PROCEDURE — G0151 HHCP-SERV OF PT,EA 15 MIN: HCPCS

## 2023-09-12 ASSESSMENT — ENCOUNTER SYMPTOMS
PAIN LOCATION: LEFT HIP
PAIN LOCATION - PAIN DURATION: DAILY
PAIN: 1
LOWEST PAIN SEVERITY IN PAST 24 HOURS: 1/10
HIGHEST PAIN SEVERITY IN PAST 24 HOURS: 4/10
PERSON REPORTING PAIN: PATIENT
PAIN LOCATION - PAIN QUALITY: ACHE
PAIN LOCATION - PAIN SEVERITY: 1/10

## 2023-09-14 ENCOUNTER — HOME CARE VISIT (OUTPATIENT)
Dept: HOME HEALTH SERVICES | Facility: HOME HEALTHCARE | Age: 73
End: 2023-09-14
Payer: MEDICARE

## 2023-09-14 PROCEDURE — G0151 HHCP-SERV OF PT,EA 15 MIN: HCPCS

## 2023-09-15 VITALS
SYSTOLIC BLOOD PRESSURE: 124 MMHG | DIASTOLIC BLOOD PRESSURE: 64 MMHG | HEART RATE: 91 BPM | OXYGEN SATURATION: 98 % | TEMPERATURE: 97.6 F | RESPIRATION RATE: 17 BRPM

## 2023-09-15 ASSESSMENT — ENCOUNTER SYMPTOMS
PAIN LOCATION - PAIN QUALITY: ACHE/SHARP
SUBJECTIVE PAIN PROGRESSION: WAXING AND WANING
PAIN LOCATION: LEFT HIP
HIGHEST PAIN SEVERITY IN PAST 24 HOURS: 3/10
PERSON REPORTING PAIN: PATIENT
PAIN: 1
PAIN LOCATION - PAIN SEVERITY: 1/10
LOWEST PAIN SEVERITY IN PAST 24 HOURS: 1/10

## 2023-09-19 ENCOUNTER — HOME CARE VISIT (OUTPATIENT)
Dept: HOME HEALTH SERVICES | Facility: HOME HEALTHCARE | Age: 73
End: 2023-09-19
Payer: MEDICARE

## 2023-09-19 PROCEDURE — G0151 HHCP-SERV OF PT,EA 15 MIN: HCPCS

## 2023-09-20 VITALS
RESPIRATION RATE: 17 BRPM | OXYGEN SATURATION: 95 % | SYSTOLIC BLOOD PRESSURE: 120 MMHG | HEART RATE: 78 BPM | DIASTOLIC BLOOD PRESSURE: 68 MMHG | TEMPERATURE: 97.5 F

## 2023-09-20 ASSESSMENT — ENCOUNTER SYMPTOMS
PAIN LOCATION: LEFT HIP
PAIN LOCATION - PAIN QUALITY: DULL/ACHE
PAIN LOCATION - PAIN SEVERITY: 1/10
LOWEST PAIN SEVERITY IN PAST 24 HOURS: 1/10
PERSON REPORTING PAIN: PATIENT
HIGHEST PAIN SEVERITY IN PAST 24 HOURS: 3/10
PAIN: 1

## 2023-09-21 ENCOUNTER — HOME CARE VISIT (OUTPATIENT)
Dept: HOME HEALTH SERVICES | Facility: HOME HEALTHCARE | Age: 73
End: 2023-09-21
Payer: MEDICARE

## 2023-09-21 PROCEDURE — G0151 HHCP-SERV OF PT,EA 15 MIN: HCPCS

## 2023-09-23 VITALS
HEART RATE: 72 BPM | RESPIRATION RATE: 17 BRPM | DIASTOLIC BLOOD PRESSURE: 70 MMHG | SYSTOLIC BLOOD PRESSURE: 124 MMHG | OXYGEN SATURATION: 98 % | TEMPERATURE: 97.5 F

## 2023-09-23 ASSESSMENT — PATIENT HEALTH QUESTIONNAIRE - PHQ9: CLINICAL INTERPRETATION OF PHQ2 SCORE: 0

## 2023-09-23 ASSESSMENT — ENCOUNTER SYMPTOMS
HIGHEST PAIN SEVERITY IN PAST 24 HOURS: 3/10
SUBJECTIVE PAIN PROGRESSION: GRADUALLY IMPROVING
LOWEST PAIN SEVERITY IN PAST 24 HOURS: 1/10
PAIN LOCATION: LEFT HIP
PERSON REPORTING PAIN: PATIENT
PAIN LOCATION - PAIN QUALITY: ACHE
PAIN LOCATION - PAIN SEVERITY: 1/10
PAIN: 1

## 2023-09-23 ASSESSMENT — ACTIVITIES OF DAILY LIVING (ADL)
HOME_HEALTH_OASIS: 00
OASIS_M1830: 00

## 2023-09-25 ENCOUNTER — HOME CARE VISIT (OUTPATIENT)
Dept: HOME HEALTH SERVICES | Facility: HOME HEALTHCARE | Age: 73
End: 2023-09-25

## 2023-09-25 NOTE — CASE COMMUNICATION
Quality Review Completed for MT OASIS by FER Dominique RN on 9/25/2023:     Edits completed by FER Dominique RN:  1.  is yes to pressure ulcer per care plan interventions

## 2023-09-26 NOTE — CASE COMMUNICATION
I agree with this change.    ----- Message -----  From: Isabella Dominique R.N.  Sent: 9/25/2023   5:31 AM PDT  To: Alanis Cheek PT      Quality Review Completed for DC OASIS by FER Dominique, RN on 9/25/2023:     Edits completed by FER Dominique RN:  1.  is yes to pressure ulcer per care plan interventions

## 2024-01-25 ENCOUNTER — TELEPHONE (OUTPATIENT)
Dept: MEDICAL GROUP | Facility: MEDICAL CENTER | Age: 74
End: 2024-01-25
Payer: MEDICARE

## 2024-01-25 NOTE — TELEPHONE ENCOUNTER
Narendra Sun  [phone] (home    Pt called and he would like to consult with you RE: his L Hip surgery.     Pt did not get his global visits with his surgical assistant after his surgery in July. He believes something is wrong and BOTH of his global visits were cancelled by the providers. He would like to talk to you about it. I have him on my cancellation list but he doesn't want to wait too long. His AWV is 3/1.

## 2024-01-26 NOTE — TELEPHONE ENCOUNTER
It looks like we have 2 access appointments available next Tuesday, if they are still closed tomorrow afternoon, I believe we could add him in to 1 of those spots.

## 2024-01-30 ENCOUNTER — OFFICE VISIT (OUTPATIENT)
Dept: MEDICAL GROUP | Facility: MEDICAL CENTER | Age: 74
End: 2024-01-30
Payer: MEDICARE

## 2024-01-30 VITALS
HEIGHT: 66 IN | HEART RATE: 82 BPM | RESPIRATION RATE: 16 BRPM | OXYGEN SATURATION: 95 % | TEMPERATURE: 97.5 F | WEIGHT: 151 LBS | DIASTOLIC BLOOD PRESSURE: 60 MMHG | BODY MASS INDEX: 24.27 KG/M2 | SYSTOLIC BLOOD PRESSURE: 110 MMHG

## 2024-01-30 DIAGNOSIS — J41.0 SIMPLE CHRONIC BRONCHITIS (HCC): ICD-10-CM

## 2024-01-30 DIAGNOSIS — Z72.0 TOBACCO ABUSE: Chronic | ICD-10-CM

## 2024-01-30 DIAGNOSIS — I70.0 ATHEROSCLEROSIS OF AORTA (HCC): ICD-10-CM

## 2024-01-30 DIAGNOSIS — Z96.642 S/P TOTAL LEFT HIP ARTHROPLASTY: ICD-10-CM

## 2024-01-30 DIAGNOSIS — M25.552 PAIN OF LEFT HIP: ICD-10-CM

## 2024-01-30 PROCEDURE — 99214 OFFICE O/P EST MOD 30 MIN: CPT | Performed by: INTERNAL MEDICINE

## 2024-01-30 PROCEDURE — 3074F SYST BP LT 130 MM HG: CPT | Performed by: INTERNAL MEDICINE

## 2024-01-30 PROCEDURE — 3078F DIAST BP <80 MM HG: CPT | Performed by: INTERNAL MEDICINE

## 2024-01-30 ASSESSMENT — PATIENT HEALTH QUESTIONNAIRE - PHQ9: CLINICAL INTERPRETATION OF PHQ2 SCORE: 0

## 2024-01-30 ASSESSMENT — FIBROSIS 4 INDEX: FIB4 SCORE: 0.89

## 2024-01-30 NOTE — PROGRESS NOTES
Subjective     Narendra Sun is a 73 y.o. male who presents with Follow-Up (Post surgical concerns)            HPI    Here follow up left hip ORIF done in July last year due to left femoral fracture surgery by orthopedics , patient did in-home physical therapy right after the surgery and had been doing stretching exercises.  After home physical therapy was complete, he did not have follow-up outpatient physical therapy after the home physical therapy ended.  Since the surgery has had limited range of motion at the left hip, has difficulty leaning over putting on a sock, crosssing left foot onto right knee, limited left hip flexibility, has had more difficulty walking longer distances with left hip pain 2/10 pain, some left knee pain as well with walking.  Has not seen the orthopedic surgeon since the surgery.  No cane or walker for ambulation, as his balance has been fine without using any cane, no subsequent falls.  One-story residence no steps at home.  Is able to climb into his SUV but still finds that to be difficult at times as well.  No pain medications  Cigars 3 a day  Medications, allergies, medical history, surgical history, social history, family history  reviewed and updated            Current Outpatient Medications   Medication Sig Dispense Refill    ibuprofen (MOTRIN) 200 MG Tab Take 200 mg by mouth every 6 hours as needed for Mild Pain. Pt reports he has been taking 2-3 200 MG tabs PRN 2x/day   Indications: Pain      tolnaftate (ANTIFUNGAL, TOLNAFTATE,) 1 % Cream Apply 1 Application topically 1 time a day as needed (for fungal issue of feet). Indications: fungal infection      albuterol 108 (90 Base) MCG/ACT Aero Soln inhalation aerosol INHALE 2 PUFFS BY MOUTH EVERY 6 HOURS AS NEEDED FOR SHORTNESS OF BREATH 17 Each 3    atorvastatin (LIPITOR) 20 MG Tab TAKE 1 TABLET BY MOUTH EVERY  Tablet 2    acetaminophen (TYLENOL) 325 MG Tab Take 2 Tablets by mouth every 6 hours as needed for  Fever, Moderate Pain or Mild Pain. (Patient not taking: Reported on 7/25/2023) 30 Tablet 0    Multiple Vitamin (MULTIVITAMIN ADULT PO) Take 1 Tablet by mouth every day. Indications: supplement      TRELEGY ELLIPTA 100-62.5-25 MCG/ACT AEROSOL POWDER, BREATH ACTIVATED inhalation INHALE 1 INHALATION EVERY DAY BY MOUTH (Patient taking differently: Inhale 1 Inhalation every day.) 180 Each 3    Cholecalciferol (D3 PO) Take 1 Capsule by mouth every day. 2 gummies (2000 units)  Indications: supplement       No current facility-administered medications for this visit.                       Adenoma  12/05 colonoscopy [facility] tubular adenoma  6/29/12 colon per [facility], neg, repeat 5 years  7/28/17 colon per [facility] 3 mm polyp descending colon, 10 mm polyp descending colon, 5-8 mm polyp sigmoid colon, 10 mm polyp distal sigmoid colon, 6-8 mm polyp rectum, pathology adenoma and hyperplastic polyps repeat 3 years  11/16/20 colon per [facility] 4 polyps hyperplastic and benign polypoid, repeat in 3 years     atherosclerosis aorta  10/25/16 atherosclerotic plaque aorta on CT scan thorax     b12 deficiency  12/7/15 b12 201, folate 5.3, wbc 12.4(49%N,34%L),hgb 17,hct 53,plt [id number]; start b12 1000 mcg daily  5/18/16 b12 249,folate 7,MMA<0.1,Intrinsic factor Ab negative, not on b12  8/14/17 b12 273  10/16/18 b12 324, hgb 18,hct 55,mcv 101  10/10/19 b2 300, hgb 18,hct 55,mcv 103  3/30/21 b12 398, hgb 19,hct 60,mcv 103  6/8/22 b12 468,hgb 18,hct 54.8  8/8/23 b12 558,hgb 14,hct 44,plt 779     BPH  5/11 psa 0.7  4/12 psa 7.5 given course of cipro  5/12/12 psa 2.3 after cipro  12/31/13 psa 1.4  2/7/15 psa 1.1  5/18/16 psa 1.2  10/16/18 psa 1.0  10/10/19 psa 2.1  3/30/21 psa 1.34  6/8/22 psa 1.5  6/30/23 psa 1.3       COPD  5/08 chest x-ray negative  6/08 PFT FEV1.6 L (54% predicted), FEV/FVC ratio 47%, positive bronchodilator response  6/10 quit smoking tobacco  5/11 still off cigs, smoking 2 cigars per day  5/11 restart spiriva  5/11 cxr negative  12/12 off  cigs, still smokes cigars 3-4 per day  12/12 on spiriva, add symbicort 80/4.5  2/7/13 PFT severe stage III COPD, FEV1 1.4 L (46% predicted), FEV/FVC 47% improvement after bronchodilator 13%, normal DLCO; continue spiriva, symbicort 80/1.5, smoking cessation  6/24/16  CT thorax lung cancer screening to spiculated 9 mm nodules RUL underlying emphysematous changes and tiny 3 mm nodule RML, recommend 3 month follow-up CT vs CT/PET  2/7/13 cxr negative  12/26/13 still 3 cigars per day; on spiriva and symbicort  1/23/15 still 3 cigars per day, on spiriva and symbicort  1/26/16 change symbicort to advair 250/50 (insurance) and continue spiriva  6/2/16 CT lung cancer screening visit  7/8/16 [facility] note right upper lobe lung nodules, suspicious in nature, patient agrees to CT/PET  7/8/16 CT/PET spiculated right upper lobe nodule SUV 1.1, not FDG avid, scarring left upper lobe noted, nonspecific findings, low-grade neoplasm cannot be excluded; per [facility] repeat CT 3 months  10/24/16 CT thorax without; 2 spiculated right upper lobe pulmonary nodules 8 mm and 9 mm in size unchanged, 3 mm right middle lobe unchanged nodule  10/27/16 lung cancer screening program note, recommend referral to pulmonary nodule clinic  4/6/17 CT thorax without; 2 stable 8-9 mm spiculated right upper lobe nodules, stable probably postinflammatory tiny 3 mm RML and RLL nodules, no new suspicious nodules  12/1/17 declines PFT   4/15/18 CT lung lung cancer screening stable 9 mm right posterior/apical ill-defined nodule, stable right upper/lateral 9 mm solid pulmonary nodule, postoperative changes left upper lobe, emphysema, aortic and coronary atherosclerotic plaque, previous splenectomy   8/1/18 CT/PET no abnormal uptake within either right upper lobe or right apical nodule, nodules unchanged dating back to previous lung cancer screening CT 6/24/16, downgraded to category 3 RADS, no change 3 mm right middle lobe nodule, no increased uptake neck, abdomen,  pelvis  4/19/19 CT lung cancer screening spiculated nodular density right lung apex 9 x 9 mm with cavitary change and spiculated nodule right lung apex posteriorly 7 x 8 mm overall unchanged from previous exam, postoperative change left upper lobe, recommend repeat 6 months  8/1/19 on advair and spiriva declines PFT  10/8/19 CT thorax without; unchanged 9 mm and 8 mm noncalcified pulmonary nodules right lung apex, postoperative scarring left upper lobe again noted  2/13/20 change advair to breo once daily (had difficulty remembering to take Advair twice a day) continue spiriva  5/7/20 declines PFT   10/16/20 CT thorax without, pulmonary nodules 7.9 mm right apex, 9.1 mm RUL, 2.8 mm RUL unchanged compared to previous, new 5.0 mm nodule RUL, ill-defined opacities lingula unchanged consistent with atelectasis/fibrosis, no effusions, hyperexpanded and emphysematous lungs borderline enlarged right hilar lymph node unchanged 9.1 mm, atherosclerotic changes  10/17/20 change breo plus spiriva to trelegy inhaler due to cost  3/29/21 CT thorax; new left lower lobe 4 mm noncalcified nodule, otherwise stable 10 mm and 8 mm spiculated right upper lobe and small nodules, emphysematous changes, left upper lobe resection, recommend repeat CT 3 to 6 months  8/2/21 CT thorax without, spiculated nodule right upper lobe apex 8 mm unchanged, nodule right upper lobe central cavitation 10 mm in size unchanged, 4 mm nodule right lower lobe not significantly changed, stable 4 mm left upper lobe nodule, emphysematous and bullous change multiple areas of pulmonary scarring, surgical changes left upper lobe, repeat CT in 6 months  5/16/22 declines PFT  5/15/22 CT thorax without, stable 9 mm nodule right apex, stable 9 mm nodule RUL, 5 mm nodule RLL increased from 3 mm compared to 2018, 3 mm nodule LLL, no significant adenopathy, coronary calcifications, repeat 6 months  11/21/22 declines PFT, cigar 3 per day  8/22/23 CT thorax without, stable  bilateral pulmonary nodules most of these have been stable for 2 years however 5 mm right lung base slightly progressed from more remote exam, recommend follow-up CT chest 12 months     Dyslipidemia  5/08 chol  239, trig 91,hdl 71,ldl 150  8/09 chol 268,trig 107,hdl 56,ldl 191  8/09 start zocor 20  8/09 stress echo negative  5/11 chol 231,trig 114,hdl 53,ldl 155 off zocor  5/12 chol 215,trig 97,hdl 48,ldl 148 off zocor  12/31/13 chol 215,trig 101,hdl 55,ldl 140,crp 3.0 off statin; 10 year risk 12%, I recommend statin therapy he declines  1/13/14 echo technically difficult study, possible mild LVH  1/13/14 treadmill thallium exercise 6 minutes 30 seconds lashell protocol, limited by fatigue; no ischemia, EF 59%  2/7/15 chol 250,trig 86,hdl 51,ldl 182; urine mac <0.5; 10 year risk calculation 19%, start zocor 20 mg  5/16/16 did not take zocor  5/18/16 chol 239,trig 152,hdl 44,ldl 165  8/14/17 chol 205,trig 142,hdl 36,ldl 141 off zocor, 10 year cardiac risk, declines medication  8/1/18 CT cardiac scoring LMA 0.0, LCX 1.5, .5, RCA 0.0, PDA 0.0  10/16/8 chol 261,trig 155,hdl 42,ldl 188 declines statin therapy  10/18/18 start lipitor 20 mg  10/10/19 chol 159,trig 94,hdl 44,ldl 96 on lipitor 20 mg  3/30/21 chol 186,trig 138,hdl 42,ldl 116 on lipitor 20 mg  6/8/22 chol 165,trig 143,hdl 45,ldl 91 on lipitor 20 mg  1/16/23 chol 169,trig 122,hdl 48,ldl 97 on lipitor 20 mg  6/30/23 chol 164,trig 214,hdl 41,ldl 80 on lipitor 20 mg     elevated coronary calcium score  8/1/18 CT cardiac scoring LMA 0.0, LCX 1.5, .5, RCA 0.0, PDA 0.0= 282     history benign positional vertigo  3/8/23 ER note  3/8/23 CT CTA neck with and without postprocessing no evidence of flow-limiting stenosis  3/8/23 CT CTA head with and without postprocessing  3/8/23 MRI brain no acute infarct or hemorrhage, subacute chronic small white matter infarct right frontal lobe  3/8/23 CT head negative     history brao  Records from ophthalmology july 2009  from [facility] indicate right branch retinal artery occlusion  8/09 MRI/MRA head and neck negative  8/09 protein C, protein S, anticardiolipin antibody, beta 2 glycoprotein antibody, factor II, factor V leyden, homocysteine antithrombin III all negative  8/09 stress echo negative  8/09 holter monitor negative, need to modify risk factors we'll start on statin, he needs to quit smoking     History hypertension  1/13/14 echo technically difficult study, possible mild LVH  1/13/14 treadmill thallium exercise 6 minutes 30 seconds lashell protocol, limited by fatigue; no ischemia, EF 59%  4/27/15 start lisinopril 10 mg   5/16/16 off lisinopril   10/10/19 urine mac 6     gout  11/29/17 right first toe pain given indocin uric 5.6  10/16/18 uric 6.1     impaired glucose metabolism  6/30/23 A1c 5.8%     insomnia  10/4/18 ambien refill #15  3/2/23 ambien refill#15     Leukocytosis  8/09 wbc 8.9  5/11 wbc 11.4  4/12 wbc 12.4 (54%N,34%L)  12/31/13 wbc 12.9 (53%N,22%L),hgb 17,hct 52,mcv 102,platelet 364, CRP 3.0; ? Related to tobacco  2/7/15 wbc 12.4 (49%N,34%L),hgb 17,hct 53,plt [id number]; b12 201,folate 5.3  2/13/15 leukocyte alkaline phosphatase 108 normal  5/18/16 wbc 13.5 (51%N,30%L),hgb 17.8,hct 54,plt 427,b12 249,folate 7  5/18/16 b12 249,folate 7 not on b12  8/14/17 wbc 12.1 (50%N,32%L),hgb 17,hct 51,mcv 100.8,b12 273,jak2 negative   11/29/17 wbc 19.5 (51%N, 32%L)  10/16/18 wbc 14 (48%N,36%L), hgb 18,hct 55, plt 436, b12 324  10/8/19 CT thorax without lung; unchanged 19 mm and 8 mm noncalcified pulmonary nodules right lung apex, postoperative scarring left upper lobe again noted  10/10/19 wbc 14 (53%N,31%L),hgb 18,hct 55,mcv 103,iron 120,ferritin 116,%sat 39,b12 300,folate 20  7/16/20 wbc 14.4 (57%N,28%L),hgb 18.6,hct 56.8,plt 368, flow cytometry normal myeloid cells  3/30/21 wbc 12.9,hgb 19,hct 60,mcv 103   6/8/22 wbc 12.4,b12 468,flow cytometry negative  1/16/23 wbc 12.6 (51%N,33%L)  3/8/23 wbc 12  (59%N,26%L)  6/30/23 wbc 15 (52%N,32.6%L),hgb 17,hct 54,mcv 103,plt 369  7/19/23 hospital admit, 7/21/23 hospital discharge, patient fell getting out of a car in his garage tripping over a wood piece landing on his left hip, displaced left femoral neck fracture, orthopedics consulted, ORIF left femur fracture with proximal and neck prosthetic replacement performed, discharged home  7/19/23 wbc 19.5 (77%N,14%L)  7/20/23  orthopedic operative note ORIF left femoral fracture, proximal end, neck with prosthetic replacement  7/21/23 wbc 22.9 (82%N,8%L)  7/21/23 cxr negative  8/8/23 wbc 11.9 (67%N,20%L),b12 558  ??related to splenectomy     polycythemia  12/31/13 wbc 12.9 (53%N,22%L),hgb 17,hct 52,mcv 102,platelet 364, CRP 3.0; ? Related to tobacco  2/7/15 wbc 12.4 (49%N,34%L),hgb 17,hct 53,plt [id number]; b12 201,folate 5.3  2/13/15 leukocyte alkaline phosphatase 108 normal  5/18/16 wbc 13.5 (51%N,30%L),hgb 17.8,hct 54,plt 427,b12 249,folate 7  5/18/16 b12 249,folate 7 not on b12  8/14/17 wbc 12.1 (50%N,32%L),hgb 17,hct 51,mcv 100.8,b12 273,jak2 negative   11/29/17 wbc 19.5 (51%N, 32%L)  8/1/18 CT/PET stable pulmonary nodules, no uptake in the right upper lobe or right apical nodule, no abnormal FDG uptake neck, abdomen, pelvis  10/16/18 wbc 14 (48%N,36%L), hgb 18,hct 55, plt 436, b12 324, flow cytometry phenotypically normal slightly left shifted myeloid cells without evidence of monoclonality, leukemia, or lymphoproliferative disorder  10/8/19 CT thorax without lung; unchanged 19 mm and 8 mm noncalcified pulmonary nodules right lung apex, postoperative scarring left upper lobe again noted  10/10/19 wbc 14 (53%N,31%L),hgb 18,hct 55,mcv 103,iron 120,ferritin 116,%sat 39,b12 300,folate 20  7/16/20 wbc 14.4 (57%N,28%L),hgb 18.6,hct 56.8,plt 368, flow cytometry normal myeloid cells  3/30/21 wbc 12.9,hgb 19,hct 60,mcv 103   6/8/22 hgb 18,hct 54.8,EPO 7,LIUDMILA,MPL,CALR mutation negative,flow cytometry negative  1/16/23  wbc 12.6,hgb 18.6,hct 55.5,mcv 102  7/21/23 hgb 15.8,hct 50  8/8/23 hgb 14,hct 44,plt 779     Preventative health  10/12/19 hep c Ab reactive, follow up HCV RNA negative  5/7/20 menactra second  11/16/20 colon per [facility] 4 polyps hyperplastic and benign polypoid, repeat in 3 years  6/27/21 shingrix first   5/16/22 trumenba third   5/16/22 tdap  11/21/22 hep b third  6/16/23 prevnar 20  6/30/23 vit d 36  6/30/23 psa 1.3     Pulmonary nodule  6/24/16  CT thorax lung cancer screening to spiculated 9 mm nodules RUL underlying emphysematous changes and tiny 3 mm nodule RML, recommend 3 month follow-up CT vs CT/PET  7/8/16 [facility] note right upper lobe lung nodules, suspicious in nature, patient agrees to CT/PET  7/8/16 CT/PET spiculated right upper lobe nodule SUV 1.1, not FDG avid, scarring left upper lobe noted, nonspecific findings, low-grade neoplasm cannot be excluded; per [facility] repeat CT 3 months  10/24/16 CT thorax without; 2 spiculated right upper lobe pulmonary nodules 8 mm and 9 mm in size unchanged, 3 mm right middle lobe unchanged nodule  10/27/16 lung cancer screening program note, recommend referral to pulmonary nodule clinic  11/3/16 lung cancer screening note recent follow-up CT scan unchanged pulmonary nodules, recommend repeat CT scan 6 months with myself  4/6/17 CT thorax without; 2 stable 8-9 mm spiculated right upper lobe nodules, stable probably postinflammatory tiny 3 mm RML and RLL nodules, no new suspicious nodules  4/15/18 CT lung lung cancer screening stable 9 mm right posterior/apical ill-defined nodule, stable right upper/lateral 9 mm solid pulmonary nodule, postoperative changes left upper lobe, emphysema, aortic and coronary atherosclerotic plaque, previous splenectomy   8/1/18 CT/PET no abnormal uptake within either right upper lobe or right apical nodule, nodules unchanged dating back to previous lung cancer screening CT 6/24/16, downgraded to category 3 RADS, no change 3 mm right middle lobe  nodule, no increased uptake neck, abdomen, pelvis  4/19/19 CT lung cancer screening spiculated nodular density right lung apex 9 x 9 mm with cavitary change and spiculated nodule right lung apex posteriorly 7 x 8 mm overall unchanged from previous exam, postoperative change left upper lobe, recommend repeat 6 months  10/8/19 CT thorax without lung; unchanged 19 mm and 8 mm noncalcified pulmonary nodules right lung apex, postoperative scarring left upper lobe again noted  2/12/20 cxr negative   10/16/20 CT thorax without, pulmonary nodules 7.9 mm right apex, 9.1 mm RUL, 2.8 mm RUL unchanged compared to previous, new 5.0 mm nodule RUL, ill-defined opacities lingula unchanged consistent with atelectasis/fibrosis, no effusions, hyperexpanded and emphysematous lungs borderline enlarged right hilar lymph node unchanged 9.1 mm, atherosclerotic changes  3/29/21 CT thorax; new left lower lobe 4 mm noncalcified nodule, otherwise stable 10 mm and 8 mm spiculated right upper lobe and small nodules, emphysematous changes, left upper lobe resection, recommend repeat CT 3 to 6 months  5/15/22 CT thorax without, stable 9 mm nodule right apex, stable 9 mm nodule RUL, 5 mm nodule RLL increased from 3 mm compared to 2018, 3 mm nodule LLL, no significant adenopathy, coronary calcifications, repeat 6 months  8/22/23 CT thorax without, stable bilateral pulmonary nodules most of these have been stable for 2 years however 5 mm right lung base slightly progressed from more remote exam, recommend follow-up CT chest 12 months     shoulder pain  6/16/23 xray left shoulder, consider MRI, physical therapy  6/30/23 x-ray left shoulder moderate osteoarthritis, MRI left shoulder ordered  8/21/23 MRI left shoulder, partial-thickness tear supraspinatus 1.7 cm, cannot exclude full-thickness perforation, mild atrophy supraspinatus, low-grade undersurface tear infraspinatus, moderate osteoarthritis AC joint and glenohumeral joint, mild subacromial  subdeltoid fluid     s/p hip left orif  7/19/23 hospital admit, 7/21/23 hospital discharge, patient fell getting out of a car in his garage tripping over a wood piece landing on his left hip, displaced left femoral neck fracture, orthopedics consulted, ORIF left femur fracture with proximal and neck prosthetic replacement performed, discharged home  7/19/23 x-ray pelvis comminuted fracture left femoral neck  7/19/23 x-ray left femur mildly displaced left femoral neck fracture  7/20/23  orthopedic operative note ORIF left femoral fracture, proximal end, neck with prosthetic replacement  7/21/23 occupational therapy hospital note discharge home sock aide, reacher, tub and shower seat  7/19/23 wbc 19.5 (77%N,14%L)  7/21/23 wbc 22.9 (82%N,8%L)     Status post splenectomy  During childhood  8/7/17 pneumovax  5/7/20 prevnar   5/7/20 menactra second  5/16/22 trumenba third   6/16/23 prevnar 20     Tobacco  12/12 off cigs had been smoking 1 to 1.5 packs per day for 40+ years, still smokes cigars 3-4 per day  2/4/13 cxr negative  4/13 off cigar using electronic cig  12/26/13 still smokes 3 cigar per day  1/13/14 echo technically difficult study, possible mild LVH  1/13/14 treadmill thallium exercise 6 minutes 30 seconds lashell protocol, limited by fatigue; no ischemia, EF 59%  5/16/16 still smoking 3 cigars per day  6/24/16  CT thorax lung cancer screening to spiculated 9 mm nodules RUL underlying emphysematous changes and tiny 3 mm nodule RML, recommend 3 month follow-up CT vs CT/PET  7/8/16 [facility] note right upper lobe lung nodules, suspicious in nature, patient agrees to CT/PET  7/8/16 [facility] note right upper lobe lung nodules, suspicious in nature, patient agrees to CT/PET  7/8/16 CT/PET spiculated right upper lobe nodule SUV 1.1, not FDG avid, scarring left upper lobe noted, nonspecific findings, low-grade neoplasm cannot be excluded; per [facility] repeat CT 3 months  10/24/16 CT thorax without; 2 spiculated right upper  lobe pulmonary nodules 8 mm and 9 mm in size unchanged, 3 mm right middle lobe unchanged nodule  10/27/16 lung cancer screening program note, recommend referral to pulmonary nodule clinic  4/6/17 CT thorax without; 2 stable 8-9 mm spiculated right upper lobe nodules, stable probably postinflammatory tiny 3 mm RML and RLL nodules, no new suspicious nodules  8/7/17 no cigarettes, 4 cigars per day  12/1/17 4 cigars per day  4/15/18 CT lung lung cancer screening stable 9 mm right posterior/apical ill-defined nodule, stable right upper/lateral 9 mm solid pulmonary nodule, postoperative changes left upper lobe, emphysema, aortic and coronary atherosclerotic plaque, previous splenectomy   8/1/18 CT/PET no abnormal uptake within either right upper lobe or right apical nodule, nodules unchanged dating back to previous lung cancer screening CT 6/24/16, downgraded to category 3 RADS, no change 3 mm right middle lobe nodule, no increased uptake neck, abdomen, pelvis  10/4/18 declines PFT, smoking 3 cigars per day  4/19/19 CT lung cancer screening spiculated nodular density right lung apex 9 x 9 mm with cavitary change and spiculated nodule right lung apex posteriorly 7 x 8 mm overall unchanged from previous exam, postoperative change left upper lobe, recommend repeat 6 months  8/1/19 3 cigars/day declines stop smoking classes  10/8/19 CT thorax without lung; unchanged 19 mm and 8 mm noncalcified pulmonary nodules right lung apex, postoperative scarring left upper lobe again noted  2/12/20 cxr negative   2/13/20 2 cigars per day  5/7/20 2 cigars per day  10/16/20 CT thorax without, pulmonary nodules 7.9 mm right apex, 9.1 mm RUL, 2.8 mm RUL unchanged compared to previous, new 5.0 mm nodule RUL, ill-defined opacities lingula unchanged consistent with atelectasis/fibrosis, no effusions, hyperexpanded and emphysematous lungs borderline enlarged right hilar lymph node unchanged 9.1 mm, atherosclerotic changes  3/29/21 CT thorax; new  left lower lobe 4 mm noncalcified nodule, otherwise stable 10 mm and 8 mm spiculated right upper lobe and small nodules, emphysematous changes, left upper lobe resection, recommend repeat CT 3 to 6 months  5/16/22 declines PFT smoking 2 cigars per day  5/15/22 CT thorax without, stable 9 mm nodule right apex, stable 9 mm nodule RUL, 5 mm nodule RLL increased from 3 mm compared to 2018, 3 mm nodule LLL, no significant adenopathy, coronary calcifications, repeat 6 months  11/21/22 cigar 3 per day  11/21/22 declines PFT  8/22/23 CT thorax without, stable bilateral pulmonary nodules most of these have been stable for 2 years however 5 mm right lung base slightly progressed from more remote exam, recommend follow-up CT chest 12 months         Patient Active Problem List   Diagnosis    COPD (chronic obstructive pulmonary disease) (HCC)    Dyslipidemia    History of pneumothorax    S/P splenectomy    Preventative health care    Tobacco abuse    History of BRAO (branch retinal artery occlusion)    BPH (benign prostatic hypertrophy)    Adenomatous colon polyp    Leukocytosis    B12 deficiency    History of hypertension    Pulmonary nodule    Atherosclerosis of aorta (HCC)    Elevated coronary artery calcium score    Polycythemia    Insomnia    History of benign positional vertigo    Shoulder pain    Impaired glucose metabolism    S/p hip left ORIF         Depression Screening  Little interest or pleasure in doing things?  0 - not at all  Feeling down, depressed , or hopeless? 0 - not at all  Patient Health Questionnaire Score: 0        Fall Risk Assessment  Has the patient had two or more falls in the last year or any fall with injury in the last year?  Yes           Patient Care Team:  Naveen Duncan M.D. as PCP - General  Naveen Duncan M.D. as PCP - [facility]  CAROL Rodríguez as Consulting Physician (Medical Oncology)  Valencia Hull as [facility]   [facility]  "Health)    ROS           Objective     Pulse 82   Temp 36.4 °C (97.5 °F)   Resp 16   Ht 1.676 m (5' 6\")   Wt 68.5 kg (151 lb)   SpO2 95%   BMI 24.37 kg/m²      Physical Exam  Vitals and nursing note reviewed.   Constitutional:       Appearance: Normal appearance.   HENT:      Head: Normocephalic and atraumatic.      Right Ear: External ear normal.      Left Ear: External ear normal.   Eyes:      Conjunctiva/sclera: Conjunctivae normal.   Cardiovascular:      Rate and Rhythm: Normal rate and regular rhythm.      Heart sounds: Normal heart sounds.   Pulmonary:      Effort: Pulmonary effort is normal.      Breath sounds: Normal breath sounds.   Abdominal:      General: There is no distension.   Musculoskeletal:         General: No swelling.   Skin:     Findings: No bruising.   Neurological:      Mental Status: He is alert.   Psychiatric:         Mood and Affect: Mood normal.         Behavior: Behavior normal.       Left hip Limited flexion, extension, abduction, internal and external rotation, tender to palpation anterior aspect, normal left knee flexion extension, no left knee tenderness or pain, no left knee effusion no edema, no left lower extremity edema.  No spinal tenderness, no left IT band tenderness, no left lumbar lower back tenderness to palpation.  Diminished back flexion extension.           Assessment & Plan     Assessment  #1 status post left hip ORIF secondary left femoral fracture, surgery in July of last year, did have home health physical therapy for short period of time, has been trying to do stretching exercises at home but has found that his improvement has reached a plateau, still with decreased range of motion, some activities with daily living more difficult at times, even dressing and doing things such as putting on a sock and difficult, diminished range of motion and more pain with walking.  No subsequent falls or trauma.    #2 tobacco 3 cigars/day    #3 COPD continues on Trelegy daily, " has declined PFT    #4 atherosclerosis aorta by CT millimeters    Plan  #1 x-ray left hip, will notify with results and follow-up orthopedics    #2 referral back to orthopedics at [facility]    #3 physical therapy at [facility] in [place]    #4 patient does have a [employer] placard    #5 continue to work on stretching, range of motion exercises, physical therapy I believe would be beneficial    #6 falling precautions

## 2024-01-31 ENCOUNTER — TELEPHONE (OUTPATIENT)
Dept: MEDICAL GROUP | Facility: MEDICAL CENTER | Age: 74
End: 2024-01-31
Payer: MEDICARE

## 2024-01-31 ENCOUNTER — HOSPITAL ENCOUNTER (OUTPATIENT)
Dept: RADIOLOGY | Facility: MEDICAL CENTER | Age: 74
End: 2024-01-31
Attending: INTERNAL MEDICINE
Payer: MEDICARE

## 2024-01-31 DIAGNOSIS — M25.552 PAIN OF LEFT HIP: ICD-10-CM

## 2024-01-31 DIAGNOSIS — Z96.642 S/P TOTAL LEFT HIP ARTHROPLASTY: ICD-10-CM

## 2024-01-31 PROCEDURE — 73502 X-RAY EXAM HIP UNI 2-3 VIEWS: CPT | Mod: LT

## 2024-02-01 ENCOUNTER — TELEPHONE (OUTPATIENT)
Dept: MEDICAL GROUP | Facility: MEDICAL CENTER | Age: 74
End: 2024-02-01
Payer: MEDICARE

## 2024-02-01 DIAGNOSIS — M25.559 HIP PAIN, UNSPECIFIED LATERALITY: ICD-10-CM

## 2024-02-01 NOTE — TELEPHONE ENCOUNTER
Called the patient left a message, please notify him that the x-ray of his left hip shows that his hip replacement is in good alignment, and is not out of place.  There are no fractures evident.  Please have him follow-up with orthopedics as we discussed.

## 2024-02-02 NOTE — TELEPHONE ENCOUNTER
----- Message from Naveen Duncan M.D. sent at 1/31/2024  5:29 PM PST -----  Called the patient left a message, please notify him that the x-ray of his left hip shows that his hip replacement is in good alignment, and is not out of place.  There are no fractures evident.  Please have him follow-up with orthopedics as we discussed.

## 2024-02-05 ENCOUNTER — TELEPHONE (OUTPATIENT)
Dept: MEDICAL GROUP | Facility: MEDICAL CENTER | Age: 74
End: 2024-02-05
Payer: MEDICARE

## 2024-02-05 DIAGNOSIS — M25.559 HIP PAIN, UNSPECIFIED LATERALITY: ICD-10-CM

## 2024-02-06 NOTE — TELEPHONE ENCOUNTER
Noted, I will place another physical therapy referral for him, this one to [facility] in [place].

## 2024-02-16 NOTE — TELEPHONE ENCOUNTER
ANNUAL WELLNESS VISIT PRE-VISIT PLANNING     1.  Reviewed note from last office visit with PCP: YES 5/2016    2.  If any orders were placed at last visit, do we have Results/Consult Notes?        •  Labs - Labs were not ordered at last office visit.       •  Imaging - Imaging was not ordered at last office visit.       •  informed to arrive 15 min prior to their scheduled appointment and bring in their medication bottles.  
Left message for patient to call back @ [phone]  
Pt called back and left a message on VM.  
0

## 2024-03-08 ENCOUNTER — OFFICE VISIT (OUTPATIENT)
Dept: MEDICAL GROUP | Facility: MEDICAL CENTER | Age: 74
End: 2024-03-08
Payer: MEDICARE

## 2024-03-08 VITALS
HEART RATE: 92 BPM | DIASTOLIC BLOOD PRESSURE: 70 MMHG | TEMPERATURE: 97.3 F | RESPIRATION RATE: 16 BRPM | OXYGEN SATURATION: 98 % | WEIGHT: 152 LBS | BODY MASS INDEX: 24.43 KG/M2 | HEIGHT: 66 IN | SYSTOLIC BLOOD PRESSURE: 128 MMHG

## 2024-03-08 DIAGNOSIS — M89.8X9 METABOLIC BONE DISEASE: ICD-10-CM

## 2024-03-08 DIAGNOSIS — M85.852 OTHER SPECIFIED DISORDERS OF BONE DENSITY AND STRUCTURE, LEFT THIGH: ICD-10-CM

## 2024-03-08 DIAGNOSIS — S72.002S CLOSED FRACTURE OF LEFT HIP, SEQUELA: ICD-10-CM

## 2024-03-08 DIAGNOSIS — Z72.0 TOBACCO ABUSE: Chronic | ICD-10-CM

## 2024-03-08 DIAGNOSIS — Z12.11 COLON CANCER SCREENING: ICD-10-CM

## 2024-03-08 PROCEDURE — 3074F SYST BP LT 130 MM HG: CPT | Performed by: INTERNAL MEDICINE

## 2024-03-08 PROCEDURE — 3078F DIAST BP <80 MM HG: CPT | Performed by: INTERNAL MEDICINE

## 2024-03-08 PROCEDURE — 99214 OFFICE O/P EST MOD 30 MIN: CPT | Performed by: INTERNAL MEDICINE

## 2024-03-08 ASSESSMENT — FIBROSIS 4 INDEX: FIB4 SCORE: 0.89

## 2024-03-08 NOTE — PROGRESS NOTES
Subjective     Narendra Sun is a 73 y.o. male who presents with hip pain Follow-Up            HPI    Patient follow-up hip pain, status post left femur ORIF, prosthetic replacement by orthopedics July 20, 2023, has seen orthopedics in follow-up, started physical therapy with [facility] and has had improved movement, activity, any range of motion.  Going to physical therapy twice a week and doing some home exercises although recently went to the [employer] area where he was not able to do his exercises although on that trip he went walking quite a bit with some occasional hip pain at the end of the day but no significant limitations.  No cane or walker for ambulation.  No falls. COPD, no shortness of breath with activity, currently taking Trelegy Ellipta 1 inhalation/day, smokes 3 cigars/day.  No pain medication, no NSAIDs, no regular Tylenol.  Medications, allergies, medical history, surgical history, social history, family history  reviewed and updated    Current Outpatient Medications   Medication Sig Dispense Refill    ibuprofen (MOTRIN) 200 MG Tab Take 200 mg by mouth every 6 hours as needed for Mild Pain. Pt reports he has been taking 2-3 200 MG tabs PRN 2x/day   Indications: Pain      tolnaftate (ANTIFUNGAL, TOLNAFTATE,) 1 % Cream Apply 1 Application topically 1 time a day as needed (for fungal issue of feet). Indications: fungal infection      albuterol 108 (90 Base) MCG/ACT Aero Soln inhalation aerosol INHALE 2 PUFFS BY MOUTH EVERY 6 HOURS AS NEEDED FOR SHORTNESS OF BREATH 17 Each 3    atorvastatin (LIPITOR) 20 MG Tab TAKE 1 TABLET BY MOUTH EVERY  Tablet 2    Multiple Vitamin (MULTIVITAMIN ADULT PO) Take 1 Tablet by mouth every day. Indications: supplement      TRELEGY ELLIPTA 100-62.5-25 MCG/ACT AEROSOL POWDER, BREATH ACTIVATED inhalation INHALE 1 INHALATION EVERY DAY BY MOUTH (Patient taking differently: Inhale 1 Inhalation every day.) 180 Each 3    Cholecalciferol (D3 PO) Take 1 Capsule by mouth every  day. 2 gummies (2000 units)  Indications: supplement       No current facility-administered medications for this visit.                            Adenoma  12/05 colonoscopy [facility] tubular adenoma  6/29/12 colon per [facility], neg, repeat 5 years  7/28/17 colon per [facility] 3 mm polyp descending colon, 10 mm polyp descending colon, 5-8 mm polyp sigmoid colon, 10 mm polyp distal sigmoid colon, 6-8 mm polyp rectum, pathology adenoma and hyperplastic polyps repeat 3 years  11/16/20 colon per [facility] 4 polyps hyperplastic and benign polypoid, repeat in 3 years     atherosclerosis aorta  10/25/16 atherosclerotic plaque aorta on CT scan thorax     b12 deficiency  12/7/15 b12 201, folate 5.3, wbc 12.4(49%N,34%L),hgb 17,hct 53,plt [id number]; start b12 1000 mcg daily  5/18/16 b12 249,folate 7,MMA<0.1,Intrinsic factor Ab negative, not on b12  8/14/17 b12 273  10/16/18 b12 324, hgb 18,hct 55,mcv 101  10/10/19 b2 300, hgb 18,hct 55,mcv 103  3/30/21 b12 398, hgb 19,hct 60,mcv 103  6/8/22 b12 468,hgb 18,hct 54.8  8/8/23 b12 558,hgb 14,hct 44,plt 779     BPH  5/11 psa 0.7  4/12 psa 7.5 given course of cipro  5/12/12 psa 2.3 after cipro  12/31/13 psa 1.4  2/7/15 psa 1.1  5/18/16 psa 1.2  10/16/18 psa 1.0  10/10/19 psa 2.1  3/30/21 psa 1.34  6/8/22 psa 1.5  6/30/23 psa 1.3       COPD  5/08 chest x-ray negative  6/08 PFT FEV1.6 L (54% predicted), FEV/FVC ratio 47%, positive bronchodilator response  6/10 quit smoking tobacco  5/11 still off cigs, smoking 2 cigars per day  5/11 restart spiriva  5/11 cxr negative  12/12 off cigs, still smokes cigars 3-4 per day  12/12 on spiriva, add symbicort 80/4.5  2/7/13 PFT severe stage III COPD, FEV1 1.4 L (46% predicted), FEV/FVC 47% improvement after bronchodilator 13%, normal DLCO; continue spiriva, symbicort 80/1.5, smoking cessation  6/24/16  CT thorax lung cancer screening to spiculated 9 mm nodules RUL underlying emphysematous changes and tiny 3 mm nodule RML, recommend 3 month follow-up CT vs CT/PET  2/7/13  cxr negative  12/26/13 still 3 cigars per day; on spiriva and symbicort  1/23/15 still 3 cigars per day, on spiriva and symbicort  1/26/16 change symbicort to advair 250/50 (insurance) and continue spiriva  6/2/16 CT lung cancer screening visit  7/8/16 [facility] note right upper lobe lung nodules, suspicious in nature, patient agrees to CT/PET  7/8/16 CT/PET spiculated right upper lobe nodule SUV 1.1, not FDG avid, scarring left upper lobe noted, nonspecific findings, low-grade neoplasm cannot be excluded; per [facility] repeat CT 3 months  10/24/16 CT thorax without; 2 spiculated right upper lobe pulmonary nodules 8 mm and 9 mm in size unchanged, 3 mm right middle lobe unchanged nodule  10/27/16 lung cancer screening program note, recommend referral to pulmonary nodule clinic  4/6/17 CT thorax without; 2 stable 8-9 mm spiculated right upper lobe nodules, stable probably postinflammatory tiny 3 mm RML and RLL nodules, no new suspicious nodules  12/1/17 declines PFT   4/15/18 CT lung lung cancer screening stable 9 mm right posterior/apical ill-defined nodule, stable right upper/lateral 9 mm solid pulmonary nodule, postoperative changes left upper lobe, emphysema, aortic and coronary atherosclerotic plaque, previous splenectomy   8/1/18 CT/PET no abnormal uptake within either right upper lobe or right apical nodule, nodules unchanged dating back to previous lung cancer screening CT 6/24/16, downgraded to category 3 RADS, no change 3 mm right middle lobe nodule, no increased uptake neck, abdomen, pelvis  4/19/19 CT lung cancer screening spiculated nodular density right lung apex 9 x 9 mm with cavitary change and spiculated nodule right lung apex posteriorly 7 x 8 mm overall unchanged from previous exam, postoperative change left upper lobe, recommend repeat 6 months  8/1/19 on advair and spiriva declines PFT  10/8/19 CT thorax without; unchanged 9 mm and 8 mm noncalcified pulmonary nodules right lung apex, postoperative scarring  left upper lobe again noted  2/13/20 change advair to breo once daily (had difficulty remembering to take Advair twice a day) continue spiriva  5/7/20 declines PFT   10/16/20 CT thorax without, pulmonary nodules 7.9 mm right apex, 9.1 mm RUL, 2.8 mm RUL unchanged compared to previous, new 5.0 mm nodule RUL, ill-defined opacities lingula unchanged consistent with atelectasis/fibrosis, no effusions, hyperexpanded and emphysematous lungs borderline enlarged right hilar lymph node unchanged 9.1 mm, atherosclerotic changes  10/17/20 change breo plus spiriva to trelegy inhaler due to cost  3/29/21 CT thorax; new left lower lobe 4 mm noncalcified nodule, otherwise stable 10 mm and 8 mm spiculated right upper lobe and small nodules, emphysematous changes, left upper lobe resection, recommend repeat CT 3 to 6 months  8/2/21 CT thorax without, spiculated nodule right upper lobe apex 8 mm unchanged, nodule right upper lobe central cavitation 10 mm in size unchanged, 4 mm nodule right lower lobe not significantly changed, stable 4 mm left upper lobe nodule, emphysematous and bullous change multiple areas of pulmonary scarring, surgical changes left upper lobe, repeat CT in 6 months  5/16/22 declines PFT  5/15/22 CT thorax without, stable 9 mm nodule right apex, stable 9 mm nodule RUL, 5 mm nodule RLL increased from 3 mm compared to 2018, 3 mm nodule LLL, no significant adenopathy, coronary calcifications, repeat 6 months  11/21/22 declines PFT, cigar 3 per day  8/22/23 CT thorax without, stable bilateral pulmonary nodules most of these have been stable for 2 years however 5 mm right lung base slightly progressed from more remote exam, recommend follow-up CT chest 12 months     Dyslipidemia  5/08 chol  239, trig 91,hdl 71,ldl 150  8/09 chol 268,trig 107,hdl 56,ldl 191  8/09 start zocor 20  8/09 stress echo negative  5/11 chol 231,trig 114,hdl 53,ldl 155 off zocor  5/12 chol 215,trig 97,hdl 48,ldl 148 off zocor  12/31/13 chol  215,trig 101,hdl 55,ldl 140,crp 3.0 off statin; 10 year risk 12%, I recommend statin therapy he declines  1/13/14 echo technically difficult study, possible mild LVH  1/13/14 treadmill thallium exercise 6 minutes 30 seconds lashell protocol, limited by fatigue; no ischemia, EF 59%  2/7/15 chol 250,trig 86,hdl 51,ldl 182; urine mac <0.5; 10 year risk calculation 19%, start zocor 20 mg  5/16/16 did not take zocor  5/18/16 chol 239,trig 152,hdl 44,ldl 165  8/14/17 chol 205,trig 142,hdl 36,ldl 141 off zocor, 10 year cardiac risk, declines medication  8/1/18 CT cardiac scoring LMA 0.0, LCX 1.5, .5, RCA 0.0, PDA 0.0  10/16/8 chol 261,trig 155,hdl 42,ldl 188 declines statin therapy  10/18/18 start lipitor 20 mg  10/10/19 chol 159,trig 94,hdl 44,ldl 96 on lipitor 20 mg  3/30/21 chol 186,trig 138,hdl 42,ldl 116 on lipitor 20 mg  6/8/22 chol 165,trig 143,hdl 45,ldl 91 on lipitor 20 mg  1/16/23 chol 169,trig 122,hdl 48,ldl 97 on lipitor 20 mg  6/30/23 chol 164,trig 214,hdl 41,ldl 80 on lipitor 20 mg     elevated coronary calcium score  8/1/18 CT cardiac scoring LMA 0.0, LCX 1.5, .5, RCA 0.0, PDA 0.0= 282     history benign positional vertigo  3/8/23 ER note  3/8/23 CT CTA neck with and without postprocessing no evidence of flow-limiting stenosis  3/8/23 CT CTA head with and without postprocessing  3/8/23 MRI brain no acute infarct or hemorrhage, subacute chronic small white matter infarct right frontal lobe  3/8/23 CT head negative     history brao  Records from ophthalmology july 2009 from [facility] indicate right branch retinal artery occlusion  8/09 MRI/MRA head and neck negative  8/09 protein C, protein S, anticardiolipin antibody, beta 2 glycoprotein antibody, factor II, factor V leyden, homocysteine antithrombin III all negative  8/09 stress echo negative  8/09 holter monitor negative, need to modify risk factors we'll start on statin, he needs to quit smoking     History hypertension  1/13/14  echo technically difficult study, possible mild LVH  1/13/14 treadmill thallium exercise 6 minutes 30 seconds lashell protocol, limited by fatigue; no ischemia, EF 59%  4/27/15 start lisinopril 10 mg   5/16/16 off lisinopril   10/10/19 urine mac 6     gout  11/29/17 right first toe pain given indocin uric 5.6  10/16/18 uric 6.1     impaired glucose metabolism  6/30/23 A1c 5.8%     insomnia  10/4/18 ambien refill #15  3/2/23 ambien refill#15     Leukocytosis  8/09 wbc 8.9  5/11 wbc 11.4  4/12 wbc 12.4 (54%N,34%L)  12/31/13 wbc 12.9 (53%N,22%L),hgb 17,hct 52,mcv 102,platelet 364, CRP 3.0; ? Related to tobacco  2/7/15 wbc 12.4 (49%N,34%L),hgb 17,hct 53,plt [id number]; b12 201,folate 5.3  2/13/15 leukocyte alkaline phosphatase 108 normal  5/18/16 wbc 13.5 (51%N,30%L),hgb 17.8,hct 54,plt 427,b12 249,folate 7  5/18/16 b12 249,folate 7 not on b12  8/14/17 wbc 12.1 (50%N,32%L),hgb 17,hct 51,mcv 100.8,b12 273,jak2 negative   11/29/17 wbc 19.5 (51%N, 32%L)  10/16/18 wbc 14 (48%N,36%L), hgb 18,hct 55, plt 436, b12 324  10/8/19 CT thorax without lung; unchanged 19 mm and 8 mm noncalcified pulmonary nodules right lung apex, postoperative scarring left upper lobe again noted  10/10/19 wbc 14 (53%N,31%L),hgb 18,hct 55,mcv 103,iron 120,ferritin 116,%sat 39,b12 300,folate 20  7/16/20 wbc 14.4 (57%N,28%L),hgb 18.6,hct 56.8,plt 368, flow cytometry normal myeloid cells  3/30/21 wbc 12.9,hgb 19,hct 60,mcv 103   6/8/22 wbc 12.4,b12 468,flow cytometry negative  1/16/23 wbc 12.6 (51%N,33%L)  3/8/23 wbc 12 (59%N,26%L)  6/30/23 wbc 15 (52%N,32.6%L),hgb 17,hct 54,mcv 103,plt 369  7/19/23 hospital admit, 7/21/23 hospital discharge, patient fell getting out of a car in his garage tripping over a wood piece landing on his left hip, displaced left femoral neck fracture, orthopedics consulted, ORIF left femur fracture with proximal and neck prosthetic replacement performed, discharged home  7/19/23 wbc 19.5 (77%N,14%L)  7/20/23  orthopedic  operative note ORIF left femoral fracture, proximal end, neck with prosthetic replacement  7/21/23 wbc 22.9 (82%N,8%L)  7/21/23 cxr negative  8/8/23 wbc 11.9 (67%N,20%L),b12 558  ??related to splenectomy     polycythemia  12/31/13 wbc 12.9 (53%N,22%L),hgb 17,hct 52,mcv 102,platelet 364, CRP 3.0; ? Related to tobacco  2/7/15 wbc 12.4 (49%N,34%L),hgb 17,hct 53,plt [id number]; b12 201,folate 5.3  2/13/15 leukocyte alkaline phosphatase 108 normal  5/18/16 wbc 13.5 (51%N,30%L),hgb 17.8,hct 54,plt 427,b12 249,folate 7  5/18/16 b12 249,folate 7 not on b12  8/14/17 wbc 12.1 (50%N,32%L),hgb 17,hct 51,mcv 100.8,b12 273,jak2 negative   11/29/17 wbc 19.5 (51%N, 32%L)  8/1/18 CT/PET stable pulmonary nodules, no uptake in the right upper lobe or right apical nodule, no abnormal FDG uptake neck, abdomen, pelvis  10/16/18 wbc 14 (48%N,36%L), hgb 18,hct 55, plt 436, b12 324, flow cytometry phenotypically normal slightly left shifted myeloid cells without evidence of monoclonality, leukemia, or lymphoproliferative disorder  10/8/19 CT thorax without lung; unchanged 19 mm and 8 mm noncalcified pulmonary nodules right lung apex, postoperative scarring left upper lobe again noted  10/10/19 wbc 14 (53%N,31%L),hgb 18,hct 55,mcv 103,iron 120,ferritin 116,%sat 39,b12 300,folate 20  7/16/20 wbc 14.4 (57%N,28%L),hgb 18.6,hct 56.8,plt 368, flow cytometry normal myeloid cells  3/30/21 wbc 12.9,hgb 19,hct 60,mcv 103   6/8/22 hgb 18,hct 54.8,EPO 7,LIUDMILA,MPL,CALR mutation negative,flow cytometry negative  1/16/23 wbc 12.6,hgb 18.6,hct 55.5,mcv 102  7/21/23 hgb 15.8,hct 50  8/8/23 hgb 14,hct 44,plt 779     Preventative health  10/12/19 hep c Ab reactive, follow up HCV RNA negative  5/7/20 menactra second  11/16/20 colon per [facility] 4 polyps hyperplastic and benign polypoid, repeat in 3 years  6/27/21 shingrix first   5/16/22 trumenba third   5/16/22 tdap  11/21/22 hep b third  6/16/23 prevnar 20  6/30/23 vit d 36  6/30/23 psa 1.3     Pulmonary  nodule  6/24/16  CT thorax lung cancer screening to spiculated 9 mm nodules RUL underlying emphysematous changes and tiny 3 mm nodule RML, recommend 3 month follow-up CT vs CT/PET  7/8/16 [facility] note right upper lobe lung nodules, suspicious in nature, patient agrees to CT/PET  7/8/16 CT/PET spiculated right upper lobe nodule SUV 1.1, not FDG avid, scarring left upper lobe noted, nonspecific findings, low-grade neoplasm cannot be excluded; per [facility] repeat CT 3 months  10/24/16 CT thorax without; 2 spiculated right upper lobe pulmonary nodules 8 mm and 9 mm in size unchanged, 3 mm right middle lobe unchanged nodule  10/27/16 lung cancer screening program note, recommend referral to pulmonary nodule clinic  11/3/16 lung cancer screening note recent follow-up CT scan unchanged pulmonary nodules, recommend repeat CT scan 6 months with myself  4/6/17 CT thorax without; 2 stable 8-9 mm spiculated right upper lobe nodules, stable probably postinflammatory tiny 3 mm RML and RLL nodules, no new suspicious nodules  4/15/18 CT lung lung cancer screening stable 9 mm right posterior/apical ill-defined nodule, stable right upper/lateral 9 mm solid pulmonary nodule, postoperative changes left upper lobe, emphysema, aortic and coronary atherosclerotic plaque, previous splenectomy   8/1/18 CT/PET no abnormal uptake within either right upper lobe or right apical nodule, nodules unchanged dating back to previous lung cancer screening CT 6/24/16, downgraded to category 3 RADS, no change 3 mm right middle lobe nodule, no increased uptake neck, abdomen, pelvis  4/19/19 CT lung cancer screening spiculated nodular density right lung apex 9 x 9 mm with cavitary change and spiculated nodule right lung apex posteriorly 7 x 8 mm overall unchanged from previous exam, postoperative change left upper lobe, recommend repeat 6 months  10/8/19 CT thorax without lung; unchanged 19 mm and 8 mm noncalcified pulmonary nodules right lung apex, postoperative  scarring left upper lobe again noted  2/12/20 cxr negative   10/16/20 CT thorax without, pulmonary nodules 7.9 mm right apex, 9.1 mm RUL, 2.8 mm RUL unchanged compared to previous, new 5.0 mm nodule RUL, ill-defined opacities lingula unchanged consistent with atelectasis/fibrosis, no effusions, hyperexpanded and emphysematous lungs borderline enlarged right hilar lymph node unchanged 9.1 mm, atherosclerotic changes  3/29/21 CT thorax; new left lower lobe 4 mm noncalcified nodule, otherwise stable 10 mm and 8 mm spiculated right upper lobe and small nodules, emphysematous changes, left upper lobe resection, recommend repeat CT 3 to 6 months  5/15/22 CT thorax without, stable 9 mm nodule right apex, stable 9 mm nodule RUL, 5 mm nodule RLL increased from 3 mm compared to 2018, 3 mm nodule LLL, no significant adenopathy, coronary calcifications, repeat 6 months  8/22/23 CT thorax without, stable bilateral pulmonary nodules most of these have been stable for 2 years however 5 mm right lung base slightly progressed from more remote exam, recommend follow-up CT chest 12 months     shoulder pain  6/16/23 xray left shoulder, consider MRI, physical therapy  6/30/23 x-ray left shoulder moderate osteoarthritis, MRI left shoulder ordered  8/21/23 MRI left shoulder, partial-thickness tear supraspinatus 1.7 cm, cannot exclude full-thickness perforation, mild atrophy supraspinatus, low-grade undersurface tear infraspinatus, moderate osteoarthritis AC joint and glenohumeral joint, mild subacromial subdeltoid fluid     s/p hip left orif  7/19/23 hospital admit, 7/21/23 hospital discharge, patient fell getting out of a car in his garage tripping over a wood piece landing on his left hip, displaced left femoral neck fracture, orthopedics consulted, ORIF left femur fracture with proximal and neck prosthetic replacement performed, discharged home  7/19/23 x-ray pelvis comminuted fracture left femoral neck  7/19/23 x-ray left femur mildly  displaced left femoral neck fracture  7/20/23  orthopedic operative note ORIF left femoral fracture, proximal end, neck with prosthetic replacement  7/21/23 occupational therapy hospital note discharge home sock aide, reacher, tub and shower seat  1/31/24 referral back to orthopedics and physical therapy  1/31/24 x-ray left hip, left hip arthroplasty appears within normal limits, probable right hip osteoarthritis, no fractures or malalignment  2/13/24 [facility] note     Status post splenectomy  During childhood  8/7/17 pneumovax  5/7/20 prevnar   5/7/20 menactra second  5/16/22 trumenba third   6/16/23 prevnar 20     Tobacco  12/12 off cigs had been smoking 1 to 1.5 packs per day for 40+ years, still smokes cigars 3-4 per day  2/4/13 cxr negative  4/13 off cigar using electronic cig  12/26/13 still smokes 3 cigar per day  1/13/14 echo technically difficult study, possible mild LVH  1/13/14 treadmill thallium exercise 6 minutes 30 seconds lashell protocol, limited by fatigue; no ischemia, EF 59%  5/16/16 still smoking 3 cigars per day  6/24/16  CT thorax lung cancer screening to spiculated 9 mm nodules RUL underlying emphysematous changes and tiny 3 mm nodule RML, recommend 3 month follow-up CT vs CT/PET  7/8/16 [facility] note right upper lobe lung nodules, suspicious in nature, patient agrees to CT/PET  7/8/16 [facility] note right upper lobe lung nodules, suspicious in nature, patient agrees to CT/PET  7/8/16 CT/PET spiculated right upper lobe nodule SUV 1.1, not FDG avid, scarring left upper lobe noted, nonspecific findings, low-grade neoplasm cannot be excluded; per [facility] repeat CT 3 months  10/24/16 CT thorax without; 2 spiculated right upper lobe pulmonary nodules 8 mm and 9 mm in size unchanged, 3 mm right middle lobe unchanged nodule  10/27/16 lung cancer screening program note, recommend referral to pulmonary nodule clinic  4/6/17 CT thorax without; 2 stable 8-9 mm spiculated right upper lobe nodules, stable  probably postinflammatory tiny 3 mm RML and RLL nodules, no new suspicious nodules  8/7/17 no cigarettes, 4 cigars per day  12/1/17 4 cigars per day  4/15/18 CT lung lung cancer screening stable 9 mm right posterior/apical ill-defined nodule, stable right upper/lateral 9 mm solid pulmonary nodule, postoperative changes left upper lobe, emphysema, aortic and coronary atherosclerotic plaque, previous splenectomy   8/1/18 CT/PET no abnormal uptake within either right upper lobe or right apical nodule, nodules unchanged dating back to previous lung cancer screening CT 6/24/16, downgraded to category 3 RADS, no change 3 mm right middle lobe nodule, no increased uptake neck, abdomen, pelvis  10/4/18 declines PFT, smoking 3 cigars per day  4/19/19 CT lung cancer screening spiculated nodular density right lung apex 9 x 9 mm with cavitary change and spiculated nodule right lung apex posteriorly 7 x 8 mm overall unchanged from previous exam, postoperative change left upper lobe, recommend repeat 6 months  8/1/19 3 cigars/day declines stop smoking classes  10/8/19 CT thorax without lung; unchanged 19 mm and 8 mm noncalcified pulmonary nodules right lung apex, postoperative scarring left upper lobe again noted  2/12/20 cxr negative   2/13/20 2 cigars per day  5/7/20 2 cigars per day  10/16/20 CT thorax without, pulmonary nodules 7.9 mm right apex, 9.1 mm RUL, 2.8 mm RUL unchanged compared to previous, new 5.0 mm nodule RUL, ill-defined opacities lingula unchanged consistent with atelectasis/fibrosis, no effusions, hyperexpanded and emphysematous lungs borderline enlarged right hilar lymph node unchanged 9.1 mm, atherosclerotic changes  3/29/21 CT thorax; new left lower lobe 4 mm noncalcified nodule, otherwise stable 10 mm and 8 mm spiculated right upper lobe and small nodules, emphysematous changes, left upper lobe resection, recommend repeat CT 3 to 6 months  5/16/22 declines PFT smoking 2 cigars per day  5/15/22 CT thorax  "without, stable 9 mm nodule right apex, stable 9 mm nodule RUL, 5 mm nodule RLL increased from 3 mm compared to 2018, 3 mm nodule LLL, no significant adenopathy, coronary calcifications, repeat 6 months  11/21/22 cigar 3 per day  11/21/22 declines PFT  8/22/23 CT thorax without, stable bilateral pulmonary nodules most of these have been stable for 2 years however 5 mm right lung base slightly progressed from more remote exam, recommend follow-up CT chest 12 months  1/30/24 cigars 3 per day       Patient Active Problem List   Diagnosis    COPD (chronic obstructive pulmonary disease) (HCC)    Dyslipidemia    History of pneumothorax    S/P splenectomy    Preventative health care    Tobacco abuse    History of BRAO (branch retinal artery occlusion)    BPH (benign prostatic hypertrophy)    Adenomatous colon polyp    Leukocytosis    B12 deficiency    History of hypertension    Pulmonary nodule    Atherosclerosis of aorta (HCC)    Elevated coronary artery calcium score    Polycythemia    Insomnia    History of benign positional vertigo    Shoulder pain    Impaired glucose metabolism    S/p hip left ORIF              Patient Care Team:  Naveen Duncan M.D. as PCP - General  Naveen Duncan M.D. as PCP - HTH Paneled  CAROL Rodríguez as Consulting Physician (Medical Oncology)  Valencia Hull as [facility]   [facility] (Home Health)      ROS           Objective     Pulse 92   Temp 36.3 °C (97.3 °F)   Resp 16   Ht 1.676 m (5' 6\")   Wt 68.9 kg (152 lb)   SpO2 98%   BMI 24.53 kg/m²      Physical Exam  Vitals and nursing note reviewed.   Constitutional:       General: He is not in acute distress.     Appearance: Normal appearance. He is not ill-appearing.   HENT:      Head: Normocephalic and atraumatic.      Right Ear: External ear normal.      Left Ear: External ear normal.   Eyes:      Conjunctiva/sclera: Conjunctivae normal.   Cardiovascular:      Rate and Rhythm: Normal " rate and regular rhythm.      Heart sounds: Normal heart sounds.   Pulmonary:      Effort: Pulmonary effort is normal. No respiratory distress.      Breath sounds: Normal breath sounds. No stridor.   Abdominal:      General: There is no distension.   Musculoskeletal:         General: No swelling.   Skin:     Coloration: Skin is not jaundiced.   Neurological:      Mental Status: He is alert. Mental status is at baseline.   Psychiatric:         Mood and Affect: Mood normal.         Behavior: Behavior normal.     Left knee no crepitus ROM, left hip mild tenderness to palpation, somewhat decreased flexion, internal and external rotation, no lower extremity edema             Assessment & Plan     Assessment  #1 status post left femoral fracture and ORIF in July, slowly recovering going to physical therapy twice a week with improvement, x-ray in January showed left hip arthroplasty within normal limits, followed by physical therapy, orthopedics no cane or walker for ambulation, no falls    #2 COPD stable on Trelegy, no shortness of breath with activity    #3 cigars 3/day, not interested in smoking cessation     #4 pulmonary nodules by CT scan in August 8/22/23 CT thorax without, stable bilateral pulmonary nodules most of these have been stable for 2 years however 5 mm right lung base slightly progressed from more remote exam, recommend follow-up CT chest 12 months    #5 colon polyps by colonoscopy November 2020, follow-up was recommended 3 years by GI consultants    #6 metabolic bone disease, smoking, increasing risk for fragility fracture    Plan  #1 bone density ordered with smoking, previous fracture, increased risk for osteoporotic disease and metabolic bone disease    #2 continue physical therapy twice weekly and home exercises    #3 continue work on smoking cessation which long-term may worsen COPD    #4 continue Trelegy inhaler daily    #5 declines PFT as no shortness of breath with exertion    #6 referral back to  GI consultants for colonoscopy    #7 follow-up 3 months, at that time he will be due for labs at close to repeat CT scan follow-up

## 2024-03-11 ENCOUNTER — PATIENT MESSAGE (OUTPATIENT)
Dept: HEALTH INFORMATION MANAGEMENT | Facility: OTHER | Age: 74
End: 2024-03-11

## 2024-03-11 ENCOUNTER — PATIENT OUTREACH (OUTPATIENT)
Dept: HEALTH INFORMATION MANAGEMENT | Facility: OTHER | Age: 74
End: 2024-03-11
Payer: MEDICARE

## 2024-03-11 DIAGNOSIS — J41.0 SIMPLE CHRONIC BRONCHITIS (HCC): Chronic | ICD-10-CM

## 2024-03-11 DIAGNOSIS — Z86.79 HISTORY OF HYPERTENSION: ICD-10-CM

## 2024-03-18 ENCOUNTER — TELEPHONE (OUTPATIENT)
Dept: MEDICAL GROUP | Facility: MEDICAL CENTER | Age: 74
End: 2024-03-18
Payer: MEDICARE

## 2024-03-18 ENCOUNTER — PATIENT OUTREACH (OUTPATIENT)
Dept: HEALTH INFORMATION MANAGEMENT | Facility: OTHER | Age: 74
End: 2024-03-18
Payer: MEDICARE

## 2024-03-18 DIAGNOSIS — E78.5 DYSLIPIDEMIA: Chronic | ICD-10-CM

## 2024-03-18 DIAGNOSIS — J41.0 SIMPLE CHRONIC BRONCHITIS (HCC): Chronic | ICD-10-CM

## 2024-03-18 DIAGNOSIS — Z86.79 HISTORY OF HYPERTENSION: ICD-10-CM

## 2024-03-18 NOTE — TELEPHONE ENCOUNTER
Sanjay from [facility] called and LM. States that Narendra was in and mentioned that he had Hip surgery. She need a letter stating whether or not Narendra needs to be pre medicated for dental visits. Please advise.     PH: [phone]  FX: [phone]

## 2024-03-20 RX ORDER — ATORVASTATIN CALCIUM 20 MG/1
20 TABLET, FILM COATED ORAL
Qty: 100 TABLET | Refills: 1 | Status: SHIPPED | OUTPATIENT
Start: 2024-03-20

## 2024-03-25 ENCOUNTER — PATIENT OUTREACH (OUTPATIENT)
Dept: HEALTH INFORMATION MANAGEMENT | Facility: OTHER | Age: 74
End: 2024-03-25
Payer: MEDICARE

## 2024-03-25 DIAGNOSIS — Z86.79 HISTORY OF HYPERTENSION: ICD-10-CM

## 2024-03-25 DIAGNOSIS — J41.0 SIMPLE CHRONIC BRONCHITIS (HCC): Chronic | ICD-10-CM

## 2024-03-27 ENCOUNTER — TELEPHONE (OUTPATIENT)
Dept: HEALTH INFORMATION MANAGEMENT | Facility: OTHER | Age: 74
End: 2024-03-27

## 2024-05-06 ENCOUNTER — HOSPITAL ENCOUNTER (OUTPATIENT)
Dept: RADIOLOGY | Facility: MEDICAL CENTER | Age: 74
End: 2024-05-06
Attending: INTERNAL MEDICINE
Payer: MEDICARE

## 2024-05-06 DIAGNOSIS — M89.8X9 METABOLIC BONE DISEASE: ICD-10-CM

## 2024-05-06 DIAGNOSIS — S72.002S CLOSED FRACTURE OF LEFT HIP, SEQUELA: ICD-10-CM

## 2024-05-06 DIAGNOSIS — M85.852 OTHER SPECIFIED DISORDERS OF BONE DENSITY AND STRUCTURE, LEFT THIGH: ICD-10-CM

## 2024-05-06 PROBLEM — M85.80 OSTEOPENIA: Status: ACTIVE | Noted: 2024-05-06

## 2024-05-07 ENCOUNTER — TELEPHONE (OUTPATIENT)
Dept: MEDICAL GROUP | Facility: MEDICAL CENTER | Age: 74
End: 2024-05-07
Payer: MEDICARE

## 2024-05-07 NOTE — TELEPHONE ENCOUNTER
----- Message from Naveen Duncan M.D. sent at 5/7/2024 12:12 AM PDT -----  I called the patient left a message, please notify the patient that the bone density test of his back is normal however the bone density of his hip is decreased, and based upon his previous fracture I believe he would benefit from a once a week bone medication to strengthen his bones and decrease his risk of subsequent fractures.  The medication I would like to try is called Fosamax, it is taken once a week first thing in the morning on an empty stomach with water only, he would not be able to eat breakfast, drink coffee, or take any medications, vitamins, or supplements within 30 minutes after taking the medication.  The main side effects are heartburn, rare side effects would include inflammation of his jaw so he would need to let us dentist know he would be taking the medication.  If he would like to take the medication please let me know I can send a prescription to his pharmacy.

## 2024-05-07 NOTE — TELEPHONE ENCOUNTER
I called the patient left a message, please notify the patient that the bone density test of his back is normal however the bone density of his hip is decreased, and based upon his previous fracture I believe he would benefit from a once a week bone medication to strengthen his bones and decrease his risk of subsequent fractures.  The medication I would like to try is called Fosamax, it is taken once a week first thing in the morning on an empty stomach with water only, he would not be able to eat breakfast, drink coffee, or take any medications, vitamins, or supplements within 30 minutes after taking the medication.  The main side effects are heartburn, rare side effects would include inflammation of his jaw so he would need to let us dentist know he would be taking the medication.  If he would like to take the medication please let me know I can send a prescription to his pharmacy.

## 2024-05-14 ENCOUNTER — TELEPHONE (OUTPATIENT)
Dept: MEDICAL GROUP | Facility: MEDICAL CENTER | Age: 74
End: 2024-05-14
Payer: MEDICARE

## 2024-05-14 RX ORDER — ALENDRONATE SODIUM 70 MG/1
70 TABLET ORAL
Qty: 12 TABLET | Refills: 3 | Status: SHIPPED | OUTPATIENT
Start: 2024-05-14

## 2024-06-10 DIAGNOSIS — J44.9 CHRONIC OBSTRUCTIVE PULMONARY DISEASE, UNSPECIFIED COPD TYPE (HCC): Chronic | ICD-10-CM

## 2024-06-10 RX ORDER — FLUTICASONE FUROATE, UMECLIDINIUM BROMIDE AND VILANTEROL TRIFENATATE 100; 62.5; 25 UG/1; UG/1; UG/1
POWDER RESPIRATORY (INHALATION)
Qty: 180 EACH | Refills: 1 | Status: SHIPPED | OUTPATIENT
Start: 2024-06-10

## 2024-07-27 RX ORDER — ALBUTEROL SULFATE 90 UG/1
2 AEROSOL, METERED RESPIRATORY (INHALATION) EVERY 6 HOURS PRN
Qty: 17 EACH | Refills: 5 | Status: SHIPPED | OUTPATIENT
Start: 2024-07-27

## 2024-09-19 DIAGNOSIS — E78.5 DYSLIPIDEMIA: Chronic | ICD-10-CM

## 2024-09-20 NOTE — TELEPHONE ENCOUNTER
Received request via: Pharmacy    Was the patient seen in the last year in this department? Yes    Does the patient have an active prescription (recently filled or refills available) for medication(s) requested? No    Pharm[facility] Name: [facility]     Does the patient have [facility] and need 100-day supply? (This applies to ALL medications) Yes, quantity updated to 100 days

## 2024-09-21 ENCOUNTER — TELEPHONE (OUTPATIENT)
Dept: MEDICAL GROUP | Facility: MEDICAL CENTER | Age: 74
End: 2024-09-21
Payer: MEDICARE

## 2024-09-21 DIAGNOSIS — Z12.5 PROSTATE CANCER SCREENING: ICD-10-CM

## 2024-09-21 DIAGNOSIS — R35.1 NOCTURIA: ICD-10-CM

## 2024-09-21 DIAGNOSIS — R73.09 IMPAIRED GLUCOSE METABOLISM: ICD-10-CM

## 2024-09-21 DIAGNOSIS — E78.5 DYSLIPIDEMIA: Chronic | ICD-10-CM

## 2024-09-21 DIAGNOSIS — D72.829 LEUKOCYTOSIS, UNSPECIFIED TYPE: Chronic | ICD-10-CM

## 2024-09-21 DIAGNOSIS — E55.9 VITAMIN D DEFICIENCY: ICD-10-CM

## 2024-09-21 DIAGNOSIS — E53.8 B12 DEFICIENCY: ICD-10-CM

## 2024-09-21 RX ORDER — ATORVASTATIN CALCIUM 20 MG/1
20 TABLET, FILM COATED ORAL
Qty: 100 TABLET | Refills: 0 | Status: SHIPPED | OUTPATIENT
Start: 2024-09-21

## 2024-10-04 ENCOUNTER — TELEPHONE (OUTPATIENT)
Dept: MEDICAL GROUP | Facility: MEDICAL CENTER | Age: 74
End: 2024-10-04
Payer: MEDICARE

## 2024-10-28 ENCOUNTER — TELEPHONE (OUTPATIENT)
Dept: MEDICAL GROUP | Facility: MEDICAL CENTER | Age: 74
End: 2024-10-28
Payer: MEDICARE

## 2024-11-04 ENCOUNTER — HOSPITAL ENCOUNTER (OUTPATIENT)
Dept: LAB | Facility: MEDICAL CENTER | Age: 74
End: 2024-11-04
Attending: INTERNAL MEDICINE
Payer: MEDICARE

## 2024-11-04 DIAGNOSIS — E78.5 DYSLIPIDEMIA: Chronic | ICD-10-CM

## 2024-11-04 DIAGNOSIS — R35.1 NOCTURIA: ICD-10-CM

## 2024-11-04 DIAGNOSIS — E55.9 VITAMIN D DEFICIENCY: ICD-10-CM

## 2024-11-04 DIAGNOSIS — Z12.5 PROSTATE CANCER SCREENING: ICD-10-CM

## 2024-11-04 DIAGNOSIS — R73.09 IMPAIRED GLUCOSE METABOLISM: ICD-10-CM

## 2024-11-04 LAB
BASOPHILS # BLD AUTO: 0.7 % (ref 0–1.8)
BASOPHILS # BLD: 0.1 K/UL (ref 0–0.12)
EOSINOPHIL # BLD AUTO: 0.82 K/UL (ref 0–0.51)
EOSINOPHIL NFR BLD: 5.5 % (ref 0–6.9)
ERYTHROCYTE [DISTWIDTH] IN BLOOD BY AUTOMATED COUNT: 54.6 FL (ref 35.9–50)
EST. AVERAGE GLUCOSE BLD GHB EST-MCNC: 123 MG/DL
HBA1C MFR BLD: 5.9 % (ref 4–5.6)
HCT VFR BLD AUTO: 58.2 % (ref 42–52)
HGB BLD-MCNC: 18.5 G/DL (ref 14–18)
IMM GRANULOCYTES # BLD AUTO: 0.09 K/UL (ref 0–0.11)
IMM GRANULOCYTES NFR BLD AUTO: 0.6 % (ref 0–0.9)
LYMPHOCYTES # BLD AUTO: 5.04 K/UL (ref 1–4.8)
LYMPHOCYTES NFR BLD: 33.9 % (ref 22–41)
MCH RBC QN AUTO: 32.9 PG (ref 27–33)
MCHC RBC AUTO-ENTMCNC: 31.8 G/DL (ref 32.3–36.5)
MCV RBC AUTO: 103.4 FL (ref 81.4–97.8)
MONOCYTES # BLD AUTO: 1.37 K/UL (ref 0–0.85)
MONOCYTES NFR BLD AUTO: 9.2 % (ref 0–13.4)
NEUTROPHILS # BLD AUTO: 7.44 K/UL (ref 1.82–7.42)
NEUTROPHILS NFR BLD: 50.1 % (ref 44–72)
NRBC # BLD AUTO: 0 K/UL
NRBC BLD-RTO: 0 /100 WBC (ref 0–0.2)
PLATELET # BLD AUTO: 429 K/UL (ref 164–446)
PMV BLD AUTO: 10.1 FL (ref 9–12.9)
RBC # BLD AUTO: 5.63 M/UL (ref 4.7–6.1)
WBC # BLD AUTO: 14.9 K/UL (ref 4.8–10.8)

## 2024-11-04 PROCEDURE — 84153 ASSAY OF PSA TOTAL: CPT

## 2024-11-04 PROCEDURE — 84443 ASSAY THYROID STIM HORMONE: CPT

## 2024-11-04 PROCEDURE — 80053 COMPREHEN METABOLIC PANEL: CPT

## 2024-11-04 PROCEDURE — 82306 VITAMIN D 25 HYDROXY: CPT

## 2024-11-04 PROCEDURE — 80061 LIPID PANEL: CPT

## 2024-11-04 PROCEDURE — 85025 COMPLETE CBC W/AUTO DIFF WBC: CPT

## 2024-11-04 PROCEDURE — 83036 HEMOGLOBIN GLYCOSYLATED A1C: CPT

## 2024-11-04 PROCEDURE — 36415 COLL VENOUS BLD VENIPUNCTURE: CPT

## 2024-11-05 LAB
25(OH)D3 SERPL-MCNC: 48 NG/ML (ref 30–100)
ALBUMIN SERPL BCP-MCNC: 3.9 G/DL (ref 3.2–4.9)
ALBUMIN/GLOB SERPL: 1.3 G/DL
ALP SERPL-CCNC: 105 U/L (ref 30–99)
ALT SERPL-CCNC: 20 U/L (ref 2–50)
ANION GAP SERPL CALC-SCNC: 10 MMOL/L (ref 7–16)
AST SERPL-CCNC: 25 U/L (ref 12–45)
BILIRUB SERPL-MCNC: 0.6 MG/DL (ref 0.1–1.5)
BUN SERPL-MCNC: 11 MG/DL (ref 8–22)
CALCIUM ALBUM COR SERPL-MCNC: 9.8 MG/DL (ref 8.5–10.5)
CALCIUM SERPL-MCNC: 9.7 MG/DL (ref 8.5–10.5)
CHLORIDE SERPL-SCNC: 107 MMOL/L (ref 96–112)
CHOLEST SERPL-MCNC: 164 MG/DL (ref 100–199)
CO2 SERPL-SCNC: 22 MMOL/L (ref 20–33)
CREAT SERPL-MCNC: 1.03 MG/DL (ref 0.5–1.4)
FASTING STATUS PATIENT QL REPORTED: NORMAL
GFR SERPLBLD CREATININE-BSD FMLA CKD-EPI: 76 ML/MIN/1.73 M 2
GLOBULIN SER CALC-MCNC: 3.1 G/DL (ref 1.9–3.5)
GLUCOSE SERPL-MCNC: 90 MG/DL (ref 65–99)
HDLC SERPL-MCNC: 42 MG/DL
LDLC SERPL CALC-MCNC: 94 MG/DL
POTASSIUM SERPL-SCNC: 4.7 MMOL/L (ref 3.6–5.5)
PROT SERPL-MCNC: 7 G/DL (ref 6–8.2)
PSA SERPL-MCNC: 1.64 NG/ML (ref 0–4)
SODIUM SERPL-SCNC: 139 MMOL/L (ref 135–145)
TRIGL SERPL-MCNC: 141 MG/DL (ref 0–149)
TSH SERPL-ACNC: 2.34 UIU/ML (ref 0.35–5.5)

## 2024-11-11 ENCOUNTER — TELEPHONE (OUTPATIENT)
Dept: MEDICAL GROUP | Facility: MEDICAL CENTER | Age: 74
End: 2024-11-11
Payer: MEDICARE

## 2024-11-11 NOTE — TELEPHONE ENCOUNTER
Please notify the patient that his test shows:  (1) normal liver function, kidney function on his blood testing  (2 his blood sugar 90 average is slightly high at 5.9%, he has mild prediabetes no medications are necessary, have him continue to work on a good nutrition and exercise program  (3) his white blood cell count is elevated but stable compared to his previous numbers  (4) his cholesterol total is 164 same as last year, have him continue on the atorvastatin cholesterol medication  (5) his prostate cancer blood test is normal  (6) he is due for his follow-up appointment as we last saw him in the first week of March, please have him schedule a follow-up appointment

## 2024-12-23 ENCOUNTER — OFFICE VISIT (OUTPATIENT)
Dept: MEDICAL GROUP | Facility: MEDICAL CENTER | Age: 74
End: 2024-12-23
Payer: MEDICARE

## 2024-12-23 VITALS
SYSTOLIC BLOOD PRESSURE: 136 MMHG | DIASTOLIC BLOOD PRESSURE: 72 MMHG | HEIGHT: 66 IN | WEIGHT: 153 LBS | BODY MASS INDEX: 24.59 KG/M2 | HEART RATE: 76 BPM | TEMPERATURE: 97.7 F | OXYGEN SATURATION: 99 %

## 2024-12-23 DIAGNOSIS — E53.8 B12 DEFICIENCY: ICD-10-CM

## 2024-12-23 DIAGNOSIS — G25.0 ESSENTIAL TREMOR: ICD-10-CM

## 2024-12-23 DIAGNOSIS — E78.5 DYSLIPIDEMIA: ICD-10-CM

## 2024-12-23 DIAGNOSIS — D75.1 POLYCYTHEMIA: ICD-10-CM

## 2024-12-23 DIAGNOSIS — R91.1 PULMONARY NODULE: ICD-10-CM

## 2024-12-23 DIAGNOSIS — Z23 NEEDS FLU SHOT: ICD-10-CM

## 2024-12-23 DIAGNOSIS — J44.9 CHRONIC OBSTRUCTIVE PULMONARY DISEASE, UNSPECIFIED COPD TYPE (HCC): Chronic | ICD-10-CM

## 2024-12-23 DIAGNOSIS — F17.200 TOBACCO DEPENDENCE: ICD-10-CM

## 2024-12-23 DIAGNOSIS — I70.0 ATHEROSCLEROSIS OF AORTA (HCC): ICD-10-CM

## 2024-12-23 DIAGNOSIS — I51.5 CARDIAC CALCIFICATION (HCC): ICD-10-CM

## 2024-12-23 DIAGNOSIS — R93.1 ELEVATED CORONARY ARTERY CALCIUM SCORE: ICD-10-CM

## 2024-12-23 DIAGNOSIS — J41.0 SIMPLE CHRONIC BRONCHITIS (HCC): Chronic | ICD-10-CM

## 2024-12-23 DIAGNOSIS — J44.9 CHRONIC OBSTRUCTIVE PULMONARY DISEASE, UNSPECIFIED COPD TYPE (HCC): ICD-10-CM

## 2024-12-23 DIAGNOSIS — Z91.89 FRAMINGHAM CARDIAC RISK >20% IN NEXT 10 YEARS: ICD-10-CM

## 2024-12-23 DIAGNOSIS — M85.80 OSTEOPENIA, UNSPECIFIED LOCATION: ICD-10-CM

## 2024-12-23 PROCEDURE — 3075F SYST BP GE 130 - 139MM HG: CPT | Performed by: INTERNAL MEDICINE

## 2024-12-23 PROCEDURE — G0008 ADMIN INFLUENZA VIRUS VAC: HCPCS

## 2024-12-23 PROCEDURE — 90662 IIV NO PRSV INCREASED AG IM: CPT

## 2024-12-23 PROCEDURE — 99214 OFFICE O/P EST MOD 30 MIN: CPT | Mod: 25 | Performed by: INTERNAL MEDICINE

## 2024-12-23 PROCEDURE — 3078F DIAST BP <80 MM HG: CPT | Performed by: INTERNAL MEDICINE

## 2024-12-23 RX ORDER — ATORVASTATIN CALCIUM 20 MG/1
20 TABLET, FILM COATED ORAL
Qty: 100 TABLET | Refills: 0 | Status: SHIPPED | OUTPATIENT
Start: 2024-12-23

## 2024-12-23 RX ORDER — FLUTICASONE FUROATE, UMECLIDINIUM BROMIDE AND VILANTEROL TRIFENATATE 100; 62.5; 25 UG/1; UG/1; UG/1
POWDER RESPIRATORY (INHALATION)
Qty: 180 EACH | Refills: 1 | OUTPATIENT
Start: 2024-12-23

## 2024-12-23 RX ORDER — FLUTICASONE FUROATE, UMECLIDINIUM BROMIDE AND VILANTEROL TRIFENATATE 100; 62.5; 25 UG/1; UG/1; UG/1
1 POWDER RESPIRATORY (INHALATION) DAILY
Qty: 180 EACH | Refills: 1 | Status: SHIPPED | OUTPATIENT
Start: 2024-12-23

## 2024-12-23 ASSESSMENT — FIBROSIS 4 INDEX: FIB4 SCORE: 0.96

## 2024-12-23 NOTE — PROGRESS NOTES
Subjective     Narendra Sun is a 74 y.o. male who presents with follow-up COPD          HPI      Follow-up COPD, has declined PFTs last PFTs done 2013, continues on Trelegy inhaler, continues to smoke cigars 2 per day.  Has not been able to cut down his cigarette smoking to 2 a day.  No change with regards to shortness of breath at rest or with exertion.  No recurrent lung infections or upper respiratory tract infections.  Has not had his flu shot yet.  Dyslipidemia on atorvastatin.  Left hip still with limited range of motion, no cane for ambulation, no falls, status post left femoral fracture and ORIF July 2023, completed physical therapy but still has some decreased range of motion in that left hip.  He has altered his golf swing to compensate. Tries to limit sweets and candies in his diet.  Dyslipidemia on Lipitor 20 mg daily.  Previous CT calcium score 282 done 6 years ago.  No diabetes, no hypertension.  Chronic leukocytosis status post splenectomy, has not had his flu vaccine yet.  Polycythemia previous secondary workup negative, no snoring, does not stop breathing at night, no other symptoms of sleep apnea.  Pulmonary nodule by last CT scan 8/22/23 CT thorax without, stable bilateral pulmonary nodules most of these have been stable for 2 years however 5 mm right lung base slightly progressed from more remote exam, recommend follow-up CT chest 12 months  Patient states his wife will notice at night that he has a slight neck tremor and right hand tremor although he does not notice that and it does not bother him, does not bother him with eating or with his daily routine  Medications, allergies, medical history, surgical history, social history, family history  reviewed and updated                Current Outpatient Medications   Medication Sig Dispense Refill    atorvastatin (LIPITOR) 20 MG Tab TAKE 1 TABLET BY MOUTH EVERY DAY. INDICATIONS: HIGH AMOUNT OF FATS IN THE BLOOD 100 Tablet 0    albuterol 108 (90  Base) MCG/ACT Aero Soln inhalation aerosol INHALE 2 PUFFS BY MOUTH EVERY 6 HOURS AS NEEDED FOR SHORTNESS OF BREATH 17 Each 5    fluticasone-umeclidinium-vilanterol (TRELEGY ELLIPTA) 100-62.5-25 mcg/act inhaler INHALE 1 INHALATION EVERY DAY BY MOUTH 180 Each 1    alendronate (FOSAMAX) 70 MG Tab Take 1 Tablet by mouth every 7 days. 12 Tablet 3    ibuprofen (MOTRIN) 200 MG Tab Take 200 mg by mouth every 6 hours as needed for Mild Pain. Pt reports he has been taking 2-3 200 MG tabs PRN 2x/day   Indications: Pain      tolnaftate (ANTIFUNGAL, TOLNAFTATE,) 1 % Cream Apply 1 Application topically 1 time a day as needed (for fungal issue of feet). Indications: fungal infection      Multiple Vitamin (MULTIVITAMIN ADULT PO) Take 1 Tablet by mouth every day. Indications: supplement      Cholecalciferol (D3 PO) Take 1 Capsule by mouth every day. 2 gummies (2000 units)  Indications: supplement       No current facility-administered medications for this visit.         Adenoma  12/05 colonoscopy [facility] tubular adenoma  6/29/12 colon per [facility], neg, repeat 5 years  7/28/17 colon per [facility] 3 mm polyp descending colon, 10 mm polyp descending colon, 5-8 mm polyp sigmoid colon, 10 mm polyp distal sigmoid colon, 6-8 mm polyp rectum, pathology adenoma and hyperplastic polyps repeat 3 years  11/16/20 colon per [facility] 4 polyps hyperplastic and benign polypoid, repeat in 3 years  8/26/24 colon per [facility] 8 adenomas including 15 mm mid ascending adenoma, recommend repeat 1 year     atherosclerosis aorta  10/25/16 atherosclerotic plaque aorta on CT scan thorax  1/31/24 x-ray left hip, incidentally seen aortic atherosclerosis     b12 deficiency  12/7/15 b12 201, folate 5.3, wbc 12.4(49%N,34%L),hgb 17,hct 53,plt [id number]; start b12 1000 mcg daily  5/18/16 b12 249,folate 7,MMA<0.1,Intrinsic factor Ab negative, not on b12  8/14/17 b12 273  10/16/18 b12 324, hgb 18,hct 55,mcv 101  10/10/19 b2 300, hgb 18,hct 55,mcv 103  3/30/21 b12 398, hgb 19,hct 60,mcv  103  6/8/22 b12 468,hgb 18,hct 54.8  8/8/23 b12 558,hgb 14,hct 44,plt 779     BPH  5/11 psa 0.7  4/12 psa 7.5 given course of cipro  5/12/12 psa 2.3 after cipro  12/31/13 psa 1.4  2/7/15 psa 1.1  5/18/16 psa 1.2  10/16/18 psa 1.0  10/10/19 psa 2.1  3/30/21 psa 1.34  6/8/22 psa 1.5  6/30/23 psa 1.3  10/7/24 psa 1.6       COPD  5/08 chest x-ray negative  6/08 PFT FEV1.6 L (54% predicted), FEV/FVC ratio 47%, positive bronchodilator response  6/10 quit smoking tobacco  5/11 still off cigs, smoking 2 cigars per day  5/11 restart spiriva  5/11 cxr negative  12/12 off cigs, still smokes cigars 3-4 per day  12/12 on spiriva, add symbicort 80/4.5  2/7/13 PFT severe stage III COPD, FEV1 1.4 L (46% predicted), FEV/FVC 47% improvement after bronchodilator 13%, normal DLCO; continue spiriva, symbicort 80/1.5, smoking cessation  6/24/16  CT thorax lung cancer screening to spiculated 9 mm nodules RUL underlying emphysematous changes and tiny 3 mm nodule RML, recommend 3 month follow-up CT vs CT/PET  2/7/13 cxr negative  12/26/13 still 3 cigars per day; on spiriva and symbicort  1/23/15 still 3 cigars per day, on spiriva and symbicort  1/26/16 change symbicort to advair 250/50 (insurance) and continue spiriva  6/2/16 CT lung cancer screening visit  7/8/16 C note right upper lobe lung nodules, suspicious in nature, patient agrees to CT/PET  7/8/16 CT/PET spiculated right upper lobe nodule SUV 1.1, not FDG avid, scarring left upper lobe noted, nonspecific findings, low-grade neoplasm cannot be excluded; per [facility] repeat CT 3 months  10/24/16 CT thorax without; 2 spiculated right upper lobe pulmonary nodules 8 mm and 9 mm in size unchanged, 3 mm right middle lobe unchanged nodule  10/27/16 lung cancer screening program note, recommend referral to pulmonary nodule clinic  4/6/17 CT thorax without; 2 stable 8-9 mm spiculated right upper lobe nodules, stable probably postinflammatory tiny 3 mm RML and RLL nodules, no new suspicious  nodules  12/1/17 declines PFT   4/15/18 CT lung lung cancer screening stable 9 mm right posterior/apical ill-defined nodule, stable right upper/lateral 9 mm solid pulmonary nodule, postoperative changes left upper lobe, emphysema, aortic and coronary atherosclerotic plaque, previous splenectomy   8/1/18 CT/PET no abnormal uptake within either right upper lobe or right apical nodule, nodules unchanged dating back to previous lung cancer screening CT 6/24/16, downgraded to category 3 RADS, no change 3 mm right middle lobe nodule, no increased uptake neck, abdomen, pelvis  4/19/19 CT lung cancer screening spiculated nodular density right lung apex 9 x 9 mm with cavitary change and spiculated nodule right lung apex posteriorly 7 x 8 mm overall unchanged from previous exam, postoperative change left upper lobe, recommend repeat 6 months  8/1/19 on advair and spiriva declines PFT  10/8/19 CT thorax without; unchanged 9 mm and 8 mm noncalcified pulmonary nodules right lung apex, postoperative scarring left upper lobe again noted  2/13/20 change advair to breo once daily (had difficulty remembering to take Advair twice a day) continue spiriva  5/7/20 declines PFT   10/16/20 CT thorax without, pulmonary nodules 7.9 mm right apex, 9.1 mm RUL, 2.8 mm RUL unchanged compared to previous, new 5.0 mm nodule RUL, ill-defined opacities lingula unchanged consistent with atelectasis/fibrosis, no effusions, hyperexpanded and emphysematous lungs borderline enlarged right hilar lymph node unchanged 9.1 mm, atherosclerotic changes  10/17/20 change breo plus spiriva to trelegy inhaler due to cost  3/29/21 CT thorax; new left lower lobe 4 mm noncalcified nodule, otherwise stable 10 mm and 8 mm spiculated right upper lobe and small nodules, emphysematous changes, left upper lobe resection, recommend repeat CT 3 to 6 months  8/2/21 CT thorax without, spiculated nodule right upper lobe apex 8 mm unchanged, nodule right upper lobe central  cavitation 10 mm in size unchanged, 4 mm nodule right lower lobe not significantly changed, stable 4 mm left upper lobe nodule, emphysematous and bullous change multiple areas of pulmonary scarring, surgical changes left upper lobe, repeat CT in 6 months  5/16/22 declines PFT  5/15/22 CT thorax without, stable 9 mm nodule right apex, stable 9 mm nodule RUL, 5 mm nodule RLL increased from 3 mm compared to 2018, 3 mm nodule LLL, no significant adenopathy, coronary calcifications, repeat 6 months  11/21/22 declines PFT, cigar 3 per day  8/22/23 CT thorax without, stable bilateral pulmonary nodules most of these have been stable for 2 years however 5 mm right lung base slightly progressed from more remote exam, recommend follow-up CT chest 12 months     Dyslipidemia  5/08 chol  239, trig 91,hdl 71,ldl 150  8/09 chol 268,trig 107,hdl 56,ldl 191  8/09 start zocor 20  8/09 stress echo negative  5/11 chol 231,trig 114,hdl 53,ldl 155 off zocor  5/12 chol 215,trig 97,hdl 48,ldl 148 off zocor  12/31/13 chol 215,trig 101,hdl 55,ldl 140,crp 3.0 off statin; 10 year risk 12%, I recommend statin therapy he declines  1/13/14 echo technically difficult study, possible mild LVH  1/13/14 treadmill thallium exercise 6 minutes 30 seconds lashell protocol, limited by fatigue; no ischemia, EF 59%  2/7/15 chol 250,trig 86,hdl 51,ldl 182; urine mac <0.5; 10 year risk calculation 19%, start zocor 20 mg  5/16/16 did not take zocor  5/18/16 chol 239,trig 152,hdl 44,ldl 165  8/14/17 chol 205,trig 142,hdl 36,ldl 141 off zocor, 10 year cardiac risk, declines medication  8/1/18 CT cardiac scoring LMA 0.0, LCX 1.5, .5, RCA 0.0, PDA 0.0  10/16/8 chol 261,trig 155,hdl 42,ldl 188 declines statin therapy  10/18/18 start lipitor 20 mg  10/10/19 chol 159,trig 94,hdl 44,ldl 96 on lipitor 20 mg  3/30/21 chol 186,trig 138,hdl 42,ldl 116 on lipitor 20 mg  6/8/22 chol 165,trig 143,hdl 45,ldl 91 on lipitor 20 mg  1/16/23 chol 169,trig 122,hdl 48,ldl 97 on  lipitor 20 mg  6/30/23 chol 164,trig 214,hdl 41,ldl 80 on lipitor 20 mg  10/7/24 chol 164,trig 141,hdl 42,ldl 94 on lipitor 20 mg     elevated coronary calcium score  8/1/18 CT cardiac scoring LMA 0.0, LCX 1.5, .5, RCA 0.0, PDA 0.0= 282     gout  11/29/17 right first toe pain given indocin uric 5.6  10/16/18 uric 6.1     history benign positional vertigo  3/8/23 ER note  3/8/23 CT CTA neck with and without postprocessing no evidence of flow-limiting stenosis  3/8/23 CT CTA head with and without postprocessing  3/8/23 MRI brain no acute infarct or hemorrhage, subacute chronic small white matter infarct right frontal lobe  3/8/23 CT head negative     history brao  Records from ophthalmology july 2009 from [facility] indicate right branch retinal artery occlusion  8/09 MRI/MRA head and neck negative  8/09 protein C, protein S, anticardiolipin antibody, beta 2 glycoprotein antibody, factor II, factor V leyden, homocysteine antithrombin III all negative  8/09 stress echo negative  8/09 holter monitor negative, need to modify risk factors we'll start on statin, he needs to quit smoking     History hypertension  1/13/14 echo technically difficult study, possible mild LVH  1/13/14 treadmill thallium exercise 6 minutes 30 seconds lashell protocol, limited by fatigue; no ischemia, EF 59%  4/27/15 start lisinopril 10 mg   5/16/16 off lisinopril   10/10/19 urine mac 6    impaired glucose metabolism  6/30/23 A1c 5.8%  10/7/24 A1c 5.9%     insomnia  10/4/18 ambien refill #15  3/2/23 ambien refill#15     Leukocytosis  8/09 wbc 8.9  5/11 wbc 11.4  4/12 wbc 12.4 (54%N,34%L)  12/31/13 wbc 12.9 (53%N,22%L),hgb 17,hct 52,mcv 102,platelet 364, CRP 3.0; ? Related to tobacco  2/7/15 wbc 12.4 (49%N,34%L),hgb 17,hct 53,plt [id number]; b12 201,folate 5.3  2/13/15 leukocyte alkaline phosphatase 108 normal  5/18/16 wbc 13.5 (51%N,30%L),hgb 17.8,hct 54,plt 427,b12 249,folate 7  5/18/16 b12 249,folate 7 not on b12  8/14/17 wbc  12.1 (50%N,32%L),hgb 17,hct 51,mcv 100.8,b12 273,jak2 negative   11/29/17 wbc 19.5 (51%N, 32%L)  10/16/18 wbc 14 (48%N,36%L), hgb 18,hct 55, plt 436, b12 324  10/8/19 CT thorax without lung; unchanged 19 mm and 8 mm noncalcified pulmonary nodules right lung apex, postoperative scarring left upper lobe again noted  10/10/19 wbc 14 (53%N,31%L),hgb 18,hct 55,mcv 103,iron 120,ferritin 116,%sat 39,b12 300,folate 20  7/16/20 wbc 14.4 (57%N,28%L),hgb 18.6,hct 56.8,plt 368, flow cytometry normal myeloid cells  3/30/21 wbc 12.9,hgb 19,hct 60,mcv 103   6/8/22 wbc 12.4,b12 468,flow cytometry negative  1/16/23 wbc 12.6 (51%N,33%L)  3/8/23 wbc 12 (59%N,26%L)  6/30/23 wbc 15 (52%N,32.6%L),hgb 17,hct 54,mcv 103,plt 369  7/19/23 hospital admit, 7/21/23 hospital discharge, patient fell getting out of a car in his garage tripping over a wood piece landing on his left hip, displaced left femoral neck fracture, orthopedics consulted, ORIF left femur fracture with proximal and neck prosthetic replacement performed, discharged home  7/19/23 wbc 19.5 (77%N,14%L)  7/20/23  orthopedic operative note ORIF left femoral fracture, proximal end, neck with prosthetic replacement  7/21/23 wbc 22.9 (82%N,8%L)  7/21/23 cxr negative  8/8/23 wbc 11.9 (67%N,20%L),b12 558  10/7/24 wbc 14.9 (50%N,33.9%L)  ??related to splenectomy    osteopenia  7/19/23 x-ray pelvis comminuted fracture left femoral neck  5/6/24 dexa LS+1.0,hip-2.0;FRAX 20.3% major, 11.9% hip   5/4/24 start fosamax weekly     polycythemia  12/31/13 wbc 12.9 (53%N,22%L),hgb 17,hct 52,mcv 102,platelet 364, CRP 3.0; ? Related to tobacco  2/7/15 wbc 12.4 (49%N,34%L),hgb 17,hct 53,plt [id number]; b12 201,folate 5.3  2/13/15 leukocyte alkaline phosphatase 108 normal  5/18/16 wbc 13.5 (51%N,30%L),hgb 17.8,hct 54,plt 427,b12 249,folate 7  5/18/16 b12 249,folate 7 not on b12  8/14/17 wbc 12.1 (50%N,32%L),hgb 17,hct 51,mcv 100.8,b12 273,jak2 negative   11/29/17 wbc 19.5 (51%N, 32%L)  8/1/18  CT/PET stable pulmonary nodules, no uptake in the right upper lobe or right apical nodule, no abnormal FDG uptake neck, abdomen, pelvis  10/16/18 wbc 14 (48%N,36%L), hgb 18,hct 55, plt 436, b12 324, flow cytometry phenotypically normal slightly left shifted myeloid cells without evidence of monoclonality, leukemia, or lymphoproliferative disorder  10/8/19 CT thorax without lung; unchanged 19 mm and 8 mm noncalcified pulmonary nodules right lung apex, postoperative scarring left upper lobe again noted  10/10/19 wbc 14 (53%N,31%L),hgb 18,hct 55,mcv 103,iron 120,ferritin 116,%sat 39,b12 300,folate 20  7/16/20 wbc 14.4 (57%N,28%L),hgb 18.6,hct 56.8,plt 368, flow cytometry normal myeloid cells  3/30/21 wbc 12.9,hgb 19,hct 60,mcv 103   6/8/22 hgb 18,hct 54.8,EPO 7,LIUDMILA,MPL,CALR mutation negative,flow cytometry negative  1/16/23 wbc 12.6,hgb 18.6,hct 55.5,mcv 102  7/21/23 hgb 15.8,hct 50  8/8/23 hgb 14,hct 44,plt 779  10/7/24 hgb 18.5,hct 58,mcv 103,plt 429     Preventative health  10/12/19 hep c Ab reactive, follow up HCV RNA negative  5/7/20 menactra second  6/27/21 shingrix first   5/16/22 trumenba third   5/16/22 tdap  11/21/22 hep b third  6/16/23 prevnar 20  5/6/24 dexa LS+1.0,hip-2.0;FRAX 20.3% major, 11.9% hip  8/26/24 colon per [facility] 8 adenomas including 15 mm mid ascending adenoma, recommend repeat 1 year  10/7/24 psa 1.6  10/7/24 vit d 48     Pulmonary nodule  6/24/16  CT thorax lung cancer screening to spiculated 9 mm nodules RUL underlying emphysematous changes and tiny 3 mm nodule RML, recommend 3 month follow-up CT vs CT/PET  7/8/16 [facility] note right upper lobe lung nodules, suspicious in nature, patient agrees to CT/PET  7/8/16 CT/PET spiculated right upper lobe nodule SUV 1.1, not FDG avid, scarring left upper lobe noted, nonspecific findings, low-grade neoplasm cannot be excluded; per [facility] repeat CT 3 months  10/24/16 CT thorax without; 2 spiculated right upper lobe pulmonary nodules 8 mm and 9 mm in size  unchanged, 3 mm right middle lobe unchanged nodule  10/27/16 lung cancer screening program note, recommend referral to pulmonary nodule clinic  11/3/16 lung cancer screening note recent follow-up CT scan unchanged pulmonary nodules, recommend repeat CT scan 6 months with myself  4/6/17 CT thorax without; 2 stable 8-9 mm spiculated right upper lobe nodules, stable probably postinflammatory tiny 3 mm RML and RLL nodules, no new suspicious nodules  4/15/18 CT lung lung cancer screening stable 9 mm right posterior/apical ill-defined nodule, stable right upper/lateral 9 mm solid pulmonary nodule, postoperative changes left upper lobe, emphysema, aortic and coronary atherosclerotic plaque, previous splenectomy   8/1/18 CT/PET no abnormal uptake within either right upper lobe or right apical nodule, nodules unchanged dating back to previous lung cancer screening CT 6/24/16, downgraded to category 3 RADS, no change 3 mm right middle lobe nodule, no increased uptake neck, abdomen, pelvis  4/19/19 CT lung cancer screening spiculated nodular density right lung apex 9 x 9 mm with cavitary change and spiculated nodule right lung apex posteriorly 7 x 8 mm overall unchanged from previous exam, postoperative change left upper lobe, recommend repeat 6 months  10/8/19 CT thorax without lung; unchanged 19 mm and 8 mm noncalcified pulmonary nodules right lung apex, postoperative scarring left upper lobe again noted  2/12/20 cxr negative   10/16/20 CT thorax without, pulmonary nodules 7.9 mm right apex, 9.1 mm RUL, 2.8 mm RUL unchanged compared to previous, new 5.0 mm nodule RUL, ill-defined opacities lingula unchanged consistent with atelectasis/fibrosis, no effusions, hyperexpanded and emphysematous lungs borderline enlarged right hilar lymph node unchanged 9.1 mm, atherosclerotic changes  3/29/21 CT thorax; new left lower lobe 4 mm noncalcified nodule, otherwise stable 10 mm and 8 mm spiculated right upper lobe and small nodules,  emphysematous changes, left upper lobe resection, recommend repeat CT 3 to 6 months  5/15/22 CT thorax without, stable 9 mm nodule right apex, stable 9 mm nodule RUL, 5 mm nodule RLL increased from 3 mm compared to 2018, 3 mm nodule LLL, no significant adenopathy, coronary calcifications, repeat 6 months  8/22/23 CT thorax without, stable bilateral pulmonary nodules most of these have been stable for 2 years however 5 mm right lung base slightly progressed from more remote exam, recommend follow-up CT chest 12 months     shoulder pain  6/16/23 xray left shoulder, consider MRI, physical therapy  6/30/23 x-ray left shoulder moderate osteoarthritis, MRI left shoulder ordered  8/21/23 MRI left shoulder, partial-thickness tear supraspinatus 1.7 cm, cannot exclude full-thickness perforation, mild atrophy supraspinatus, low-grade undersurface tear infraspinatus, moderate osteoarthritis AC joint and glenohumeral joint, mild subacromial subdeltoid fluid     s/p hip left orif  7/19/23 hospital admit, 7/21/23 hospital discharge, patient fell getting out of a car in his garage tripping over a wood piece landing on his left hip, displaced left femoral neck fracture, orthopedics consulted, ORIF left femur fracture with proximal and neck prosthetic replacement performed, discharged home  7/19/23 x-ray pelvis comminuted fracture left femoral neck  7/19/23 x-ray left femur mildly displaced left femoral neck fracture  7/20/23  orthopedic operative note ORIF left femoral fracture, proximal end, neck with prosthetic replacement  7/21/23 occupational therapy hospital note discharge home sock aide, reacher, tub and shower seat  1/31/24 referral back to orthopedics and physical therapy  1/31/24 x-ray left hip, left hip arthroplasty appears within normal limits, probable right hip osteoarthritis, no fractures or malalignment  2/13/24 custom PT note  3/22/24 custom PT note     Status post splenectomy  During childhood  8/7/17  pneumovax  5/7/20 prevnar   5/7/20 menactra second  5/16/22 trumenba third   6/16/23 prevnar 20     Tobacco  12/12 off cigs had been smoking 1 to 1.5 packs per day for 40+ years, still smokes cigars 3-4 per day  2/4/13 cxr negative  4/13 off cigar using electronic cig  12/26/13 still smokes 3 cigar per day  1/13/14 echo technically difficult study, possible mild LVH  1/13/14 treadmill thallium exercise 6 minutes 30 seconds lashell protocol, limited by fatigue; no ischemia, EF 59%  5/16/16 still smoking 3 cigars per day  6/24/16  CT thorax lung cancer screening to spiculated 9 mm nodules RUL underlying emphysematous changes and tiny 3 mm nodule RML, recommend 3 month follow-up CT vs CT/PET  7/8/16 [facility] note right upper lobe lung nodules, suspicious in nature, patient agrees to CT/PET  7/8/16 [facility] note right upper lobe lung nodules, suspicious in nature, patient agrees to CT/PET  7/8/16 CT/PET spiculated right upper lobe nodule SUV 1.1, not FDG avid, scarring left upper lobe noted, nonspecific findings, low-grade neoplasm cannot be excluded; per [facility] repeat CT 3 months  10/24/16 CT thorax without; 2 spiculated right upper lobe pulmonary nodules 8 mm and 9 mm in size unchanged, 3 mm right middle lobe unchanged nodule  10/27/16 lung cancer screening program note, recommend referral to pulmonary nodule clinic  4/6/17 CT thorax without; 2 stable 8-9 mm spiculated right upper lobe nodules, stable probably postinflammatory tiny 3 mm RML and RLL nodules, no new suspicious nodules  8/7/17 no cigarettes, 4 cigars per day  12/1/17 4 cigars per day  4/15/18 CT lung lung cancer screening stable 9 mm right posterior/apical ill-defined nodule, stable right upper/lateral 9 mm solid pulmonary nodule, postoperative changes left upper lobe, emphysema, aortic and coronary atherosclerotic plaque, previous splenectomy   8/1/18 CT/PET no abnormal uptake within either right upper lobe or right apical nodule, nodules unchanged dating back to  previous lung cancer screening CT 6/24/16, downgraded to category 3 RADS, no change 3 mm right middle lobe nodule, no increased uptake neck, abdomen, pelvis  10/4/18 declines PFT, smoking 3 cigars per day  4/19/19 CT lung cancer screening spiculated nodular density right lung apex 9 x 9 mm with cavitary change and spiculated nodule right lung apex posteriorly 7 x 8 mm overall unchanged from previous exam, postoperative change left upper lobe, recommend repeat 6 months  8/1/19 3 cigars/day declines stop smoking classes  10/8/19 CT thorax without lung; unchanged 19 mm and 8 mm noncalcified pulmonary nodules right lung apex, postoperative scarring left upper lobe again noted  2/12/20 cxr negative   2/13/20 2 cigars per day  5/7/20 2 cigars per day  10/16/20 CT thorax without, pulmonary nodules 7.9 mm right apex, 9.1 mm RUL, 2.8 mm RUL unchanged compared to previous, new 5.0 mm nodule RUL, ill-defined opacities lingula unchanged consistent with atelectasis/fibrosis, no effusions, hyperexpanded and emphysematous lungs borderline enlarged right hilar lymph node unchanged 9.1 mm, atherosclerotic changes  3/29/21 CT thorax; new left lower lobe 4 mm noncalcified nodule, otherwise stable 10 mm and 8 mm spiculated right upper lobe and small nodules, emphysematous changes, left upper lobe resection, recommend repeat CT 3 to 6 months  5/16/22 declines PFT smoking 2 cigars per day  5/15/22 CT thorax without, stable 9 mm nodule right apex, stable 9 mm nodule RUL, 5 mm nodule RLL increased from 3 mm compared to 2018, 3 mm nodule LLL, no significant adenopathy, coronary calcifications, repeat 6 months  11/21/22 cigar 3 per day  11/21/22 declines PFT  8/22/23 CT thorax without, stable bilateral pulmonary nodules most of these have been stable for 2 years however 5 mm right lung base slightly progressed from more remote exam, recommend follow-up CT chest 12 months  1/30/24 cigars 3 per day  3/8/24 declines PFT              Patient  "Active Problem List   Diagnosis    COPD (chronic obstructive pulmonary disease) (HCC)    Dyslipidemia    History of pneumothorax    S/P splenectomy    Preventative health care    Tobacco dependence    History of BRAO (branch retinal artery occlusion)    BPH (benign prostatic hypertrophy)    Adenomatous colon polyp    Leukocytosis    B12 deficiency    History of hypertension    Pulmonary nodule    Atherosclerosis of aorta (HCC)    Elevated coronary artery calcium score    Polycythemia    Insomnia    History of benign positional vertigo    Shoulder pain    Impaired glucose metabolism    S/p hip left ORIF    Osteopenia           ROS           Objective     Ht 1.676 m (5' 6\")   Wt 69.4 kg (153 lb)   BMI 24.69 kg/m²      Physical Exam  Vitals and nursing note reviewed.   Constitutional:       Appearance: Normal appearance.   HENT:      Head: Normocephalic and atraumatic.      Right Ear: External ear normal.      Left Ear: External ear normal.   Eyes:      Conjunctiva/sclera: Conjunctivae normal.   Cardiovascular:      Rate and Rhythm: Normal rate and regular rhythm.      Heart sounds: Normal heart sounds.   Pulmonary:      Effort: Pulmonary effort is normal.      Breath sounds: Normal breath sounds.   Abdominal:      General: There is no distension.   Musculoskeletal:         General: No swelling.   Skin:     Coloration: Skin is not jaundiced.      Findings: No bruising.   Neurological:      General: No focal deficit present.      Mental Status: He is alert.   Psychiatric:         Mood and Affect: Mood normal.         Behavior: Behavior normal.          No resting tremor                   Assessment & Plan     Assessment    #1 COPD has declined PFTs, last PFT done 2013 at that time stage III COPD, continues to smoke, last CT scan August of last year stable pulmonary nodules    #2 Tobacco dependence smokes 2 cigars per day    #3 pulmonary nodule 8/22/23 CT thorax without, stable bilateral pulmonary nodules most of these " have been stable for 2 years however 5 mm right lung base slightly progressed from more remote exam, recommend follow-up CT chest 12 months    #4 polycythemia 10/7/24 hgb 18.5,hct 58,mcv 103,plt 429 previous secondary workup in the past EPO, JAK2, MPL, CALR mutation all negative    #5 leukocytosis chronic in nature 10/7/24 wbc 14.9 (50%N,33.9%L) question related to splenectomy, previous workup including B12, flow cytometry, iron studies all negative    #6 dyslipidemia 10/7/24 chol 164,trig 141,hdl 42,ldl 94 on lipitor 20 mg    # CT calcium score elevated 282 in 2018    #8 B12 deficiency    #9 atherosclerosis aorta no aneurysm    #10 status post splenectomy up-to-date on vaccines    #11 history of left femoral neck fracture, osteopenia on bone density test in May hip -2.0, recommend start Fosamax at that time, he is not taking that medication    #12 question essential tremor      Plan  #1 continue work on smoking cessation as long-term smoking will increase risk for worsening lung disease, lung cancer, osteoporosis, cardiac disease    #2 declines PFT    #3 CT thorax without follow-up nodules    #4 repeat labs ordered before next appointment    #5 recommend bisphosphonate therapy to decrease risk of fracture, he declines    #6 he will get the second shingles vaccine at the pharmacy, updated flu vaccine, COVID-vaccine also at the [facility]    #7 continue regular exercise, work on nutrition limiting sweets, candies, processed foods    #8 declines sleep apnea testing evaluate sleep apnea with polycythemia    #9 CT calcium score, risk stratification    #10 follow-up 6 months          - - -

## 2024-12-23 NOTE — TELEPHONE ENCOUNTER
Received request via: Pharmacy    Was the patient seen in the last year in this department? Yes    Does the patient have an active prescription (recently filled or refills available) for medication(s) requested? No    Pharmacy Name: [facility]/pharmacy #[id number] - [place], [place] - [address] [facility] [place]     Does the patient have snf Plus and need 100-day supply? (This applies to ALL medications) Yes, quantity updated to 100 days

## 2025-01-13 ENCOUNTER — APPOINTMENT (OUTPATIENT)
Dept: RADIOLOGY | Facility: MEDICAL CENTER | Age: 75
End: 2025-01-13
Attending: INTERNAL MEDICINE
Payer: COMMERCIAL

## 2025-01-13 ENCOUNTER — APPOINTMENT (OUTPATIENT)
Dept: RADIOLOGY | Facility: MEDICAL CENTER | Age: 75
End: 2025-01-13
Attending: INTERNAL MEDICINE
Payer: MEDICARE

## 2025-01-20 ENCOUNTER — HOSPITAL ENCOUNTER (OUTPATIENT)
Dept: RADIOLOGY | Facility: MEDICAL CENTER | Age: 75
End: 2025-01-20
Attending: INTERNAL MEDICINE
Payer: COMMERCIAL

## 2025-01-20 ENCOUNTER — HOSPITAL ENCOUNTER (OUTPATIENT)
Dept: RADIOLOGY | Facility: MEDICAL CENTER | Age: 75
End: 2025-01-20
Attending: INTERNAL MEDICINE
Payer: MEDICARE

## 2025-01-20 DIAGNOSIS — R91.1 PULMONARY NODULE: ICD-10-CM

## 2025-01-20 DIAGNOSIS — Z91.89 FRAMINGHAM CARDIAC RISK >20% IN NEXT 10 YEARS: ICD-10-CM

## 2025-01-20 DIAGNOSIS — I51.5 CARDIAC CALCIFICATION (HCC): ICD-10-CM

## 2025-01-20 PROCEDURE — 71250 CT THORAX DX C-: CPT

## 2025-01-20 PROCEDURE — 4410556 CT-CARDIAC SCORING (SELF PAY ONLY)

## 2025-01-21 ENCOUNTER — TELEPHONE (OUTPATIENT)
Dept: MEDICAL GROUP | Facility: MEDICAL CENTER | Age: 75
End: 2025-01-21
Payer: MEDICARE

## 2025-01-21 DIAGNOSIS — R91.1 PULMONARY NODULE: ICD-10-CM

## 2025-01-21 DIAGNOSIS — E78.5 DYSLIPIDEMIA: Chronic | ICD-10-CM

## 2025-01-21 DIAGNOSIS — E53.8 B12 DEFICIENCY: ICD-10-CM

## 2025-01-21 DIAGNOSIS — R93.1 ELEVATED CORONARY ARTERY CALCIUM SCORE: ICD-10-CM

## 2025-01-21 RX ORDER — ROSUVASTATIN CALCIUM 40 MG/1
40 TABLET, COATED ORAL DAILY
Qty: 45 TABLET | Refills: 1 | Status: SHIPPED | OUTPATIENT
Start: 2025-01-21 | End: 2026-02-25

## 2025-01-22 NOTE — TELEPHONE ENCOUNTER
Notified CT thorax result, repeat 6 months.  Order submitted, patient agrees.  Also reviewed and notified with CT calcium, score increased, recommend changing from Lipitor 20 mg to Crestor 40 mg, monitor for muscle aches, muscle pains at higher dose, will need to repeat labs 6 weeks, quantity 45 symptoms pharmacy, at the end of the quantity 45 to get his labs done fasting please.  Orders placed in the computer system, prescription Crestor 40 mg sent to pharmacy.  Monitor for muscle aches or pains on new statin therapy.

## 2025-03-20 DIAGNOSIS — J44.9 CHRONIC OBSTRUCTIVE PULMONARY DISEASE, UNSPECIFIED COPD TYPE (HCC): ICD-10-CM

## 2025-03-20 RX ORDER — FLUTICASONE FUROATE, UMECLIDINIUM BROMIDE AND VILANTEROL TRIFENATATE 100; 62.5; 25 UG/1; UG/1; UG/1
1 POWDER RESPIRATORY (INHALATION) DAILY
Qty: 200 EACH | Refills: 2 | Status: SHIPPED | OUTPATIENT
Start: 2025-03-20

## 2025-04-28 ENCOUNTER — TELEPHONE (OUTPATIENT)
Dept: MEDICAL GROUP | Facility: MEDICAL CENTER | Age: 75
End: 2025-04-28

## 2025-05-15 ENCOUNTER — OFFICE VISIT (OUTPATIENT)
Dept: MEDICAL GROUP | Facility: MEDICAL CENTER | Age: 75
End: 2025-05-15
Payer: MEDICARE

## 2025-05-15 VITALS
TEMPERATURE: 98.8 F | HEART RATE: 79 BPM | WEIGHT: 156.9 LBS | SYSTOLIC BLOOD PRESSURE: 140 MMHG | BODY MASS INDEX: 26.14 KG/M2 | DIASTOLIC BLOOD PRESSURE: 78 MMHG | OXYGEN SATURATION: 96 % | HEIGHT: 65 IN

## 2025-05-15 DIAGNOSIS — R25.1 TREMOR: ICD-10-CM

## 2025-05-15 DIAGNOSIS — E78.5 DYSLIPIDEMIA: Primary | Chronic | ICD-10-CM

## 2025-05-15 DIAGNOSIS — I70.0 ATHEROSCLEROSIS OF AORTA (HCC): ICD-10-CM

## 2025-05-15 DIAGNOSIS — D75.1 POLYCYTHEMIA: ICD-10-CM

## 2025-05-15 DIAGNOSIS — J44.9 CHRONIC OBSTRUCTIVE PULMONARY DISEASE, UNSPECIFIED COPD TYPE (HCC): ICD-10-CM

## 2025-05-15 DIAGNOSIS — F17.200 TOBACCO DEPENDENCE: ICD-10-CM

## 2025-05-15 PROCEDURE — 99214 OFFICE O/P EST MOD 30 MIN: CPT | Performed by: INTERNAL MEDICINE

## 2025-05-15 PROCEDURE — 3078F DIAST BP <80 MM HG: CPT | Performed by: INTERNAL MEDICINE

## 2025-05-15 PROCEDURE — 3077F SYST BP >= 140 MM HG: CPT | Performed by: INTERNAL MEDICINE

## 2025-05-15 ASSESSMENT — PATIENT HEALTH QUESTIONNAIRE - PHQ9: CLINICAL INTERPRETATION OF PHQ2 SCORE: 0

## 2025-05-15 ASSESSMENT — FIBROSIS 4 INDEX: FIB4 SCORE: 0.96

## 2025-05-15 NOTE — PROGRESS NOTES
Subjective     Narendra Sun is a 74 y.o. male who presents with Tremors (In right hand /Getting worse in the last six months //Feels like sence of balance is off /Light headed )            HPI      New complaint feels tremor right hand, off balance when walking, no falls, no dizziness, no lightheadedness, does not notice lightheadedness or dizziness when going from sitting to standing, no weakness legs.  States he has had episode of feeling off balance or unsteady on his feet starting a few years ago but more noticeable now especially after an episode of vertigo years ago.  No cane or walker for ambulation.  Status post left hip ORIF July 2023, since then has had chronic hip pain, has been back to orthopedics, has had follow-up x-rays, has been doing physical therapy and completed physical therapy.  No falls, no current cane or walker for ambulation.  No incontinence of bowel or bladder.  Also has had noticed more right hand shaking and shaking of his head, his wife notices head bobbing when he sits down with him, right hand tremor is worse with intention using a spoon, drinking coffee, writing, tying fishSOLARBRUSHs, does not notice a tremor at rest.  No postural instability, no slowness of movement.  No history of movement disorder.  Does not notice this with his left hand, left arm, or feet.  Alcohol does not affect a tremor 1-2 beers every few weeks, 2 coffee in the morning does not affect tremor.  Working on smoking cessation noticed 1-2 cigars per day, on Trelegy daily, albuterol as needed 2-3 times a day with activity, does notice some shortness of breath with activity.     Medications, allergies, medical history, surgical history, social history, family history  reviewed and updated      Current Medications[1]      Adenoma  12/05 colonoscopy [facility] tubular adenoma  6/29/12 colon per [facility], neg, repeat 5 years  7/28/17 colon per [facility] 3 mm polyp descending colon, 10 mm polyp descending colon, 5-8 mm polyp sigmoid  colon, 10 mm polyp distal sigmoid colon, 6-8 mm polyp rectum, pathology adenoma and hyperplastic polyps repeat 3 years  11/16/20 colon per [facility] 4 polyps hyperplastic and benign polypoid, repeat in 3 years  8/26/24 colon per [facility] 8 adenomas including 15 mm mid ascending adenoma, recommend repeat 1 year     atherosclerosis aorta  10/25/16 atherosclerotic plaque aorta on CT scan thorax  1/31/24 x-ray left hip, incidentally seen aortic atherosclerosis     b12 deficiency  12/7/15 b12 201, folate 5.3, wbc 12.4(49%N,34%L),hgb 17,hct 53,plt [id number]; start b12 1000 mcg daily  5/18/16 b12 249,folate 7,MMA<0.1,Intrinsic factor Ab negative, not on b12  8/14/17 b12 273  10/16/18 b12 324, hgb 18,hct 55,mcv 101  10/10/19 b2 300, hgb 18,hct 55,mcv 103  3/30/21 b12 398, hgb 19,hct 60,mcv 103  6/8/22 b12 468,hgb 18,hct 54.8  8/8/23 b12 558,hgb 14,hct 44,plt 779     BPH  5/11 psa 0.7  4/12 psa 7.5 given course of cipro  5/12/12 psa 2.3 after cipro  12/31/13 psa 1.4  2/7/15 psa 1.1  5/18/16 psa 1.2  10/16/18 psa 1.0  10/10/19 psa 2.1  3/30/21 psa 1.34  6/8/22 psa 1.5  6/30/23 psa 1.3  10/7/24 psa 1.6       COPD  5/08 chest x-ray negative  6/08 PFT FEV1.6 L (54% predicted), FEV/FVC ratio 47%, positive bronchodilator response  6/10 quit smoking tobacco  5/11 still off cigs, smoking 2 cigars per day  5/11 restart spiriva  5/11 cxr negative  12/12 off cigs, still smokes cigars 3-4 per day  12/12 on spiriva, add symbicort 80/4.5  2/7/13 PFT severe stage III COPD, FEV1 1.4 L (46% predicted), FEV/FVC 47% improvement after bronchodilator 13%, normal DLCO; continue spiriva, symbicort 80/1.5, smoking cessation  6/24/16  CT thorax lung cancer screening to spiculated 9 mm nodules RUL underlying emphysematous changes and tiny 3 mm nodule RML, recommend 3 month follow-up CT vs CT/PET  2/7/13 cxr negative  12/26/13 still 3 cigars per day; on spiriva and symbicort  1/23/15 still 3 cigars per day, on spiriva and symbicort  1/26/16 change symbicort to  advair 250/50 (insurance) and continue spiriva  6/2/16 CT lung cancer screening visit  7/8/16 [facility] note right upper lobe lung nodules, suspicious in nature, patient agrees to CT/PET  7/8/16 CT/PET spiculated right upper lobe nodule SUV 1.1, not FDG avid, scarring left upper lobe noted, nonspecific findings, low-grade neoplasm cannot be excluded; per [facility] repeat CT 3 months  10/24/16 CT thorax without; 2 spiculated right upper lobe pulmonary nodules 8 mm and 9 mm in size unchanged, 3 mm right middle lobe unchanged nodule  10/27/16 lung cancer screening program note, recommend referral to pulmonary nodule clinic  4/6/17 CT thorax without; 2 stable 8-9 mm spiculated right upper lobe nodules, stable probably postinflammatory tiny 3 mm RML and RLL nodules, no new suspicious nodules  12/1/17 declines PFT   4/15/18 CT lung lung cancer screening stable 9 mm right posterior/apical ill-defined nodule, stable right upper/lateral 9 mm solid pulmonary nodule, postoperative changes left upper lobe, emphysema, aortic and coronary atherosclerotic plaque, previous splenectomy   8/1/18 CT/PET no abnormal uptake within either right upper lobe or right apical nodule, nodules unchanged dating back to previous lung cancer screening CT 6/24/16, downgraded to category 3 RADS, no change 3 mm right middle lobe nodule, no increased uptake neck, abdomen, pelvis  4/19/19 CT lung cancer screening spiculated nodular density right lung apex 9 x 9 mm with cavitary change and spiculated nodule right lung apex posteriorly 7 x 8 mm overall unchanged from previous exam, postoperative change left upper lobe, recommend repeat 6 months  8/1/19 on advair and spiriva declines PFT  10/8/19 CT thorax without; unchanged 9 mm and 8 mm noncalcified pulmonary nodules right lung apex, postoperative scarring left upper lobe again noted  2/13/20 change advair to breo once daily (had difficulty remembering to take Advair twice a day) continue spiriva  5/7/20 declines  PFT   10/16/20 CT thorax without, pulmonary nodules 7.9 mm right apex, 9.1 mm RUL, 2.8 mm RUL unchanged compared to previous, new 5.0 mm nodule RUL, ill-defined opacities lingula unchanged consistent with atelectasis/fibrosis, no effusions, hyperexpanded and emphysematous lungs borderline enlarged right hilar lymph node unchanged 9.1 mm, atherosclerotic changes  10/17/20 change breo plus spiriva to trelegy inhaler due to cost  3/29/21 CT thorax; new left lower lobe 4 mm noncalcified nodule, otherwise stable 10 mm and 8 mm spiculated right upper lobe and small nodules, emphysematous changes, left upper lobe resection, recommend repeat CT 3 to 6 months  8/2/21 CT thorax without, spiculated nodule right upper lobe apex 8 mm unchanged, nodule right upper lobe central cavitation 10 mm in size unchanged, 4 mm nodule right lower lobe not significantly changed, stable 4 mm left upper lobe nodule, emphysematous and bullous change multiple areas of pulmonary scarring, surgical changes left upper lobe, repeat CT in 6 months  5/16/22 declines PFT  5/15/22 CT thorax without, stable 9 mm nodule right apex, stable 9 mm nodule RUL, 5 mm nodule RLL increased from 3 mm compared to 2018, 3 mm nodule LLL, no significant adenopathy, coronary calcifications, repeat 6 months  11/21/22 declines PFT, cigar 3 per day  8/22/23 CT thorax without, stable bilateral pulmonary nodules most of these have been stable for 2 years however 5 mm right lung base slightly progressed from more remote exam, recommend follow-up CT chest 12 months  12/23/24 on trelegy   12/23/24 declines PFT, smoking 3 cigars per day  1/20/25 CT thorax without, multiple lung nodules lateral right lung apex unchanged and no longer cavitated which raises possibility of some growth, 5 mm medial right lung nodule unchanged from recent scans and slightly larger than 2021, new 7 mm nodule right midlung, emphysema, left mid lung pulmonary nodules noted, indeterminate 5.5 medial  right lung base nodule, indeterminate 10 mm lateral right lung apex nodule, stable 8 mm right lower lobe groundglass nodule, stable 7.5 mm right lung apex nodule, stable 4 mm left lung apex nodule, stable 3 mm subpleural nodule left midlung, no effusion, no adenopathy, recommend repeat 6 months    Dyslipidemia  5/08 chol  239, trig 91,hdl 71,ldl 150  8/09 chol 268,trig 107,hdl 56,ldl 191  8/09 start zocor 20  8/09 stress echo negative  5/11 chol 231,trig 114,hdl 53,ldl 155 off zocor  5/12 chol 215,trig 97,hdl 48,ldl 148 off zocor  12/31/13 chol 215,trig 101,hdl 55,ldl 140,crp 3.0 off statin; 10 year risk 12%, I recommend statin therapy he declines  1/13/14 echo technically difficult study, possible mild LVH  1/13/14 treadmill thallium exercise 6 minutes 30 seconds lashell protocol, limited by fatigue; no ischemia, EF 59%  2/7/15 chol 250,trig 86,hdl 51,ldl 182; urine mac <0.5; 10 year risk calculation 19%, start zocor 20 mg  5/16/16 did not take zocor  5/18/16 chol 239,trig 152,hdl 44,ldl 165  8/14/17 chol 205,trig 142,hdl 36,ldl 141 off zocor, 10 year cardiac risk, declines medication  8/1/18 CT cardiac scoring LMA 0.0, LCX 1.5, .5, RCA 0.0, PDA 0.0  10/16/8 chol 261,trig 155,hdl 42,ldl 188 declines statin therapy  10/18/18 start lipitor 20 mg  10/10/19 chol 159,trig 94,hdl 44,ldl 96 on lipitor 20 mg  3/30/21 chol 186,trig 138,hdl 42,ldl 116 on lipitor 20 mg  6/8/22 chol 165,trig 143,hdl 45,ldl 91 on lipitor 20 mg  1/16/23 chol 169,trig 122,hdl 48,ldl 97 on lipitor 20 mg  6/30/23 chol 164,trig 214,hdl 41,ldl 80 on lipitor 20 mg  10/7/24 chol 164,trig 141,hdl 42,ldl 94 on lipitor 20 mg  1/20/25 CT cardiac calcium score; LMA 0.0, LCx to 43.7, .0, RCA 0 = 845.7  1/21/25 change lipitor 20 mg to crestor 40 mg repeat labs six weeks     elevated coronary calcium score  8/1/18 CT cardiac scoring LMA 0.0, LCX 1.5, .5, RCA 0.0, PDA 0.0  = 282.0  10/16/18 chol 261,trig 155,hdl 42,ldl 188 declines statin  therapy  10/18/18 start lipitor 20 mg  10/10/19 chol 159,trig 94,hdl 44,ldl 96 on lipitor 20 mg  3/30/21 chol 186,trig 138,hdl 42,ldl 116 on lipitor 20 mg  6/8/22 chol 165,trig 143,hdl 45,ldl 91 on lipitor 20 mg  1/16/23 chol 169,trig 122,hdl 48,ldl 97 on lipitor 20 mg  6/30/23 chol 164,trig 214,hdl 41,ldl 80 on lipitor 20 mg  10/7/24 chol 164,trig 141,hdl 42,ldl 94 on lipitor 20 mg  1/20/25 CT cardiac calcium score; LMA 0.0, LCx to 43.7, .0, RCA 0 = 845.7  1/21/25 change lipitor 20 mg to crestor 40 mg repeat labs six weeks     gout  11/29/17 right first toe pain given indocin uric 5.6  10/16/18 uric 6.1     history benign positional vertigo  3/8/23 ER note  3/8/23 CT CTA neck with and without postprocessing no evidence of flow-limiting stenosis  3/8/23 CT CTA head with and without postprocessing  3/8/23 MRI brain no acute infarct or hemorrhage, subacute chronic small white matter infarct right frontal lobe  3/8/23 CT head negative     history brao  Records from ophthalmology july 2009 from [facility] indicate right branch retinal artery occlusion  8/09 MRI/MRA head and neck negative  8/09 protein C, protein S, anticardiolipin antibody, beta 2 glycoprotein antibody, factor II, factor V leyden, homocysteine antithrombin III all negative  8/09 stress echo negative  8/09 holter monitor negative, need to modify risk factors we'll start on statin, he needs to quit smoking     History hypertension  1/13/14 echo technically difficult study, possible mild LVH  1/13/14 treadmill thallium exercise 6 minutes 30 seconds lashell protocol, limited by fatigue; no ischemia, EF 59%  4/27/15 start lisinopril 10 mg   5/16/16 off lisinopril   10/10/19 urine mac 6     impaired glucose metabolism  6/30/23 A1c 5.8%  10/7/24 A1c 5.9%     insomnia  10/4/18 ambien refill #15  3/2/23 ambien refill#15     Leukocytosis  8/09 wbc 8.9  5/11 wbc 11.4  4/12 wbc 12.4 (54%N,34%L)  12/31/13 wbc 12.9 (53%N,22%L),hgb 17,hct 52,mcv  102,platelet 364, CRP 3.0; ? Related to tobacco  2/7/15 wbc 12.4 (49%N,34%L),hgb 17,hct 53,plt [id number]; b12 201,folate 5.3  2/13/15 leukocyte alkaline phosphatase 108 normal  5/18/16 wbc 13.5 (51%N,30%L),hgb 17.8,hct 54,plt 427,b12 249,folate 7  5/18/16 b12 249,folate 7 not on b12  8/14/17 wbc 12.1 (50%N,32%L),hgb 17,hct 51,mcv 100.8,b12 273,jak2 negative   11/29/17 wbc 19.5 (51%N, 32%L)  10/16/18 wbc 14 (48%N,36%L), hgb 18,hct 55, plt 436, b12 324  10/8/19 CT thorax without lung; unchanged 19 mm and 8 mm noncalcified pulmonary nodules right lung apex, postoperative scarring left upper lobe again noted  10/10/19 wbc 14 (53%N,31%L),hgb 18,hct 55,mcv 103,iron 120,ferritin 116,%sat 39,b12 300,folate 20  7/16/20 wbc 14.4 (57%N,28%L),hgb 18.6,hct 56.8,plt 368, flow cytometry normal myeloid cells  3/30/21 wbc 12.9,hgb 19,hct 60,mcv 103   6/8/22 wbc 12.4,b12 468,flow cytometry negative  1/16/23 wbc 12.6 (51%N,33%L)  3/8/23 wbc 12 (59%N,26%L)  6/30/23 wbc 15 (52%N,32.6%L),hgb 17,hct 54,mcv 103,plt 369  7/19/23 hospital admit, 7/21/23 hospital discharge, patient fell getting out of a car in his garage tripping over a wood piece landing on his left hip, displaced left femoral neck fracture, orthopedics consulted, ORIF left femur fracture with proximal and neck prosthetic replacement performed, discharged home  7/19/23 wbc 19.5 (77%N,14%L)  7/20/23  orthopedic operative note ORIF left femoral fracture, proximal end, neck with prosthetic replacement  7/21/23 wbc 22.9 (82%N,8%L)  7/21/23 cxr negative  8/8/23 wbc 11.9 (67%N,20%L),b12 558  10/7/24 wbc 14.9 (50%N,33.9%L)  ??related to splenectomy     osteopenia  7/19/23 x-ray pelvis comminuted fracture left femoral neck  5/6/24 dexa LS+1.0,hip-2.0;FRAX 20.3% major, 11.9% hip   5/4/24 start fosamax weekly  12/23/24 off fosamax, declines medication     polycythemia  12/31/13 wbc 12.9 (53%N,22%L),hgb 17,hct 52,mcv 102,platelet 364, CRP 3.0; ? Related to tobacco  2/7/15 wbc  12.4 (49%N,34%L),hgb 17,hct 53,plt [id number]; b12 201,folate 5.3  2/13/15 leukocyte alkaline phosphatase 108 normal  5/18/16 wbc 13.5 (51%N,30%L),hgb 17.8,hct 54,plt 427,b12 249,folate 7  5/18/16 b12 249,folate 7 not on b12  8/14/17 wbc 12.1 (50%N,32%L),hgb 17,hct 51,mcv 100.8,b12 273,jak2 negative   11/29/17 wbc 19.5 (51%N, 32%L)  8/1/18 CT/PET stable pulmonary nodules, no uptake in the right upper lobe or right apical nodule, no abnormal FDG uptake neck, abdomen, pelvis  10/16/18 wbc 14 (48%N,36%L), hgb 18,hct 55, plt 436, b12 324, flow cytometry phenotypically normal slightly left shifted myeloid cells without evidence of monoclonality, leukemia, or lymphoproliferative disorder  10/8/19 CT thorax without lung; unchanged 19 mm and 8 mm noncalcified pulmonary nodules right lung apex, postoperative scarring left upper lobe again noted  10/10/19 wbc 14 (53%N,31%L),hgb 18,hct 55,mcv 103,iron 120,ferritin 116,%sat 39,b12 300,folate 20  7/16/20 wbc 14.4 (57%N,28%L),hgb 18.6,hct 56.8,plt 368, flow cytometry normal myeloid cells  3/30/21 wbc 12.9,hgb 19,hct 60,mcv 103   6/8/22 hgb 18,hct 54.8,EPO 7,LIUDMILA,MPL,CALR mutation negative,flow cytometry negative  1/16/23 wbc 12.6,hgb 18.6,hct 55.5,mcv 102  7/21/23 hgb 15.8,hct 50  8/8/23 hgb 14,hct 44,plt 779  10/7/24 hgb 18.5,hct 58,mcv 103,plt 429  12/23/24 declines sleep apnea testing     Preventative health  10/12/19 hep c Ab reactive, follow up HCV RNA negative  5/7/20 menactra second  6/27/21 shingrix first   5/16/22 trumenba third   5/16/22 tdap  11/21/22 hep b third  6/16/23 prevnar 20  5/6/24 dexa LS+1.0,hip-2.0;FRAX 20.3% major, 11.9% hip  8/26/24 colon per [facility] 8 adenomas including 15 mm mid ascending adenoma, recommend repeat 1 year  10/7/24 psa 1.6  10/7/24 vit d 48     Pulmonary nodule  6/24/16  CT thorax lung cancer screening to spiculated 9 mm nodules RUL underlying emphysematous changes and tiny 3 mm nodule RML, recommend 3 month follow-up CT vs CT/PET  7/8/16 [facility] note  right upper lobe lung nodules, suspicious in nature, patient agrees to CT/PET  7/8/16 CT/PET spiculated right upper lobe nodule SUV 1.1, not FDG avid, scarring left upper lobe noted, nonspecific findings, low-grade neoplasm cannot be excluded; per [facility] repeat CT 3 months  10/24/16 CT thorax without; 2 spiculated right upper lobe pulmonary nodules 8 mm and 9 mm in size unchanged, 3 mm right middle lobe unchanged nodule  10/27/16 lung cancer screening program note, recommend referral to pulmonary nodule clinic  11/3/16 lung cancer screening note recent follow-up CT scan unchanged pulmonary nodules, recommend repeat CT scan 6 months with myself  4/6/17 CT thorax without; 2 stable 8-9 mm spiculated right upper lobe nodules, stable probably postinflammatory tiny 3 mm RML and RLL nodules, no new suspicious nodules  4/15/18 CT lung lung cancer screening stable 9 mm right posterior/apical ill-defined nodule, stable right upper/lateral 9 mm solid pulmonary nodule, postoperative changes left upper lobe, emphysema, aortic and coronary atherosclerotic plaque, previous splenectomy   8/1/18 CT/PET no abnormal uptake within either right upper lobe or right apical nodule, nodules unchanged dating back to previous lung cancer screening CT 6/24/16, downgraded to category 3 RADS, no change 3 mm right middle lobe nodule, no increased uptake neck, abdomen, pelvis  4/19/19 CT lung cancer screening spiculated nodular density right lung apex 9 x 9 mm with cavitary change and spiculated nodule right lung apex posteriorly 7 x 8 mm overall unchanged from previous exam, postoperative change left upper lobe, recommend repeat 6 months  10/8/19 CT thorax without lung; unchanged 19 mm and 8 mm noncalcified pulmonary nodules right lung apex, postoperative scarring left upper lobe again noted  2/12/20 cxr negative   10/16/20 CT thorax without, pulmonary nodules 7.9 mm right apex, 9.1 mm RUL, 2.8 mm RUL unchanged compared to previous, new 5.0 mm  nodule RUL, ill-defined opacities lingula unchanged consistent with atelectasis/fibrosis, no effusions, hyperexpanded and emphysematous lungs borderline enlarged right hilar lymph node unchanged 9.1 mm, atherosclerotic changes  3/29/21 CT thorax; new left lower lobe 4 mm noncalcified nodule, otherwise stable 10 mm and 8 mm spiculated right upper lobe and small nodules, emphysematous changes, left upper lobe resection, recommend repeat CT 3 to 6 months  5/15/22 CT thorax without, stable 9 mm nodule right apex, stable 9 mm nodule RUL, 5 mm nodule RLL increased from 3 mm compared to 2018, 3 mm nodule LLL, no significant adenopathy, coronary calcifications, repeat 6 months  8/22/23 CT thorax without, stable bilateral pulmonary nodules most of these have been stable for 2 years however 5 mm right lung base slightly progressed from more remote exam, recommend follow-up CT chest 12 months     shoulder pain  6/16/23 xray left shoulder, consider MRI, physical therapy  6/30/23 x-ray left shoulder moderate osteoarthritis, MRI left shoulder ordered  8/21/23 MRI left shoulder, partial-thickness tear supraspinatus 1.7 cm, cannot exclude full-thickness perforation, mild atrophy supraspinatus, low-grade undersurface tear infraspinatus, moderate osteoarthritis AC joint and glenohumeral joint, mild subacromial subdeltoid fluid     s/p hip left orif  7/19/23 hospital admit, 7/21/23 hospital discharge, patient fell getting out of a car in his garage tripping over a wood piece landing on his left hip, displaced left femoral neck fracture, orthopedics consulted, ORIF left femur fracture with proximal and neck prosthetic replacement performed, discharged home  7/19/23 x-ray pelvis comminuted fracture left femoral neck  7/19/23 x-ray left femur mildly displaced left femoral neck fracture  7/20/23  orthopedic operative note ORIF left femoral fracture, proximal end, neck with prosthetic replacement  7/21/23 occupational therapy  hospital note discharge home sock aide, reacher, tub and shower seat  1/31/24 referral back to orthopedics and physical therapy  1/31/24 x-ray left hip, left hip arthroplasty appears within normal limits, probable right hip osteoarthritis, no fractures or malalignment  2/13/24 custom PT note  3/22/24 custom PT note     Status post splenectomy  During childhood  8/7/17 pneumovax  5/7/20 prevnar   5/7/20 menactra second  5/16/22 trumenba third   6/16/23 prevnar 20     Tobacco  12/12 off cigs had been smoking 1 to 1.5 packs per day for 40+ years, still smokes cigars 3-4 per day  2/4/13 cxr negative  4/13 off cigar using electronic cig  12/26/13 still smokes 3 cigar per day  1/13/14 echo technically difficult study, possible mild LVH  1/13/14 treadmill thallium exercise 6 minutes 30 seconds lashell protocol, limited by fatigue; no ischemia, EF 59%  5/16/16 still smoking 3 cigars per day  6/24/16  CT thorax lung cancer screening to spiculated 9 mm nodules RUL underlying emphysematous changes and tiny 3 mm nodule RML, recommend 3 month follow-up CT vs CT/PET  7/8/16 [facility] note right upper lobe lung nodules, suspicious in nature, patient agrees to CT/PET  7/8/16 [facility] note right upper lobe lung nodules, suspicious in nature, patient agrees to CT/PET  7/8/16 CT/PET spiculated right upper lobe nodule SUV 1.1, not FDG avid, scarring left upper lobe noted, nonspecific findings, low-grade neoplasm cannot be excluded; per [facility] repeat CT 3 months  10/24/16 CT thorax without; 2 spiculated right upper lobe pulmonary nodules 8 mm and 9 mm in size unchanged, 3 mm right middle lobe unchanged nodule  10/27/16 lung cancer screening program note, recommend referral to pulmonary nodule clinic  4/6/17 CT thorax without; 2 stable 8-9 mm spiculated right upper lobe nodules, stable probably postinflammatory tiny 3 mm RML and RLL nodules, no new suspicious nodules  8/7/17 no cigarettes, 4 cigars per day  12/1/17 4 cigars per day  4/15/18 CT lung lung  cancer screening stable 9 mm right posterior/apical ill-defined nodule, stable right upper/lateral 9 mm solid pulmonary nodule, postoperative changes left upper lobe, emphysema, aortic and coronary atherosclerotic plaque, previous splenectomy   8/1/18 CT/PET no abnormal uptake within either right upper lobe or right apical nodule, nodules unchanged dating back to previous lung cancer screening CT 6/24/16, downgraded to category 3 RADS, no change 3 mm right middle lobe nodule, no increased uptake neck, abdomen, pelvis  10/4/18 declines PFT, smoking 3 cigars per day  4/19/19 CT lung cancer screening spiculated nodular density right lung apex 9 x 9 mm with cavitary change and spiculated nodule right lung apex posteriorly 7 x 8 mm overall unchanged from previous exam, postoperative change left upper lobe, recommend repeat 6 months  8/1/19 3 cigars/day declines stop smoking classes  10/8/19 CT thorax without lung; unchanged 19 mm and 8 mm noncalcified pulmonary nodules right lung apex, postoperative scarring left upper lobe again noted  2/12/20 cxr negative   2/13/20 2 cigars per day  5/7/20 2 cigars per day  10/16/20 CT thorax without, pulmonary nodules 7.9 mm right apex, 9.1 mm RUL, 2.8 mm RUL unchanged compared to previous, new 5.0 mm nodule RUL, ill-defined opacities lingula unchanged consistent with atelectasis/fibrosis, no effusions, hyperexpanded and emphysematous lungs borderline enlarged right hilar lymph node unchanged 9.1 mm, atherosclerotic changes  3/29/21 CT thorax; new left lower lobe 4 mm noncalcified nodule, otherwise stable 10 mm and 8 mm spiculated right upper lobe and small nodules, emphysematous changes, left upper lobe resection, recommend repeat CT 3 to 6 months  5/16/22 declines PFT smoking 2 cigars per day  5/15/22 CT thorax without, stable 9 mm nodule right apex, stable 9 mm nodule RUL, 5 mm nodule RLL increased from 3 mm compared to 2018, 3 mm nodule LLL, no significant adenopathy, coronary  "calcifications, repeat 6 months  11/21/22 cigar 3 per day  11/21/22 declines PFT  8/22/23 CT thorax without, stable bilateral pulmonary nodules most of these have been stable for 2 years however 5 mm right lung base slightly progressed from more remote exam, recommend follow-up CT chest 12 months  1/30/24 cigars 3 per day  3/8/24 declines PFT  12/23/24 smokes 2 cigars per day  12/23/24 declines PFT    Tremor  12/23/24 wife has noticed that at night patient will have a neck tremor and slight tremor of his right hand at times  5/15/25 right hand intention tremor, neck tremor, occasional balance problem, consider parkinson's, cervical focal dystonia, referral neurology, declines physical therapy          Problem List[2]    Depression Screening  Little interest or pleasure in doing things?  0 - not at all  Feeling down, depressed , or hopeless? 0 - not at all  Patient Health Questionnaire Score: 0        Fall Risk Assessment  Has the patient had two or more falls in the last year or any fall with injury in the last year?  No                 Patient Care Team:  Naveen Duncan M.D. as PCP - General  aNveen Duncan M.D. as PCP - [facility] Paneled  CAROL Rodríguez as Consulting Physician (Medical Oncology)  Valencia Hull as [facility]   [facility] (Home Health)      ROS           Objective     /64   Pulse 79   Temp 37.1 °C (98.8 °F) (Temporal)   Ht 1.655 m (5' 5.16\")   Wt 71.2 kg (156 lb 14.4 oz)   SpO2 96%   BMI 25.98 kg/m²      Physical Exam  Vitals and nursing note reviewed.   Constitutional:       Appearance: Normal appearance.   HENT:      Head: Normocephalic and atraumatic.      Right Ear: External ear normal.      Left Ear: External ear normal.      Nose: Nose normal.   Eyes:      Conjunctiva/sclera: Conjunctivae normal.   Cardiovascular:      Rate and Rhythm: Normal rate and regular rhythm.      Heart sounds: Normal heart sounds.   Pulmonary:      Effort: " Pulmonary effort is normal.      Breath sounds: Normal breath sounds.   Abdominal:      General: There is no distension.   Musculoskeletal:         General: No swelling.      Cervical back: Neck supple.   Skin:     Coloration: Skin is not jaundiced.      Findings: No bruising.   Neurological:      General: No focal deficit present.      Mental Status: He is alert.   Psychiatric:         Mood and Affect: Mood normal.         Behavior: Behavior normal.          No resting tremor, no cogwheeling, normal finger-to-nose, right hand slight intention tremor, mild neck tremor at times, no postural instability, no ataxia, normal strength  bilateral                Assessment & Plan    Assessment  #1 right hand intention tremor, also neck tremor, question cervical dystonia, focal dystonia, also consider parkinsonian symptoms, since he has had some difficulty with balance    #2 tobacco dependence 1 to 2 cigars/day working on smoking cessation    #3 COPD, last PFTs over 10 years ago, patient declines PFTs, some shortness of breath with activity    #4 dyslipidemia 10/7/24 chol 164,trig 141,hdl 42,ldl 94 on lipitor 20 mg 1/20/25 CT cardiac calcium score; LMA 0.0, LCx to 43.7, .0, RCA 0 = 845.7 after this test, changed to Crestor 40 mg, due for follow-up lipid panel    #5 leukocytosis likely related to splenectomy 10/7/24 wbc 14.9 (50%N,33.9%L)    #6 aortic atherosclerosis      Plan  #1 neurology referral    #2 recommend physical therapy evaluation, he declines    #3 Declines PFTs    #4 referral stop smoking class, recommend continue to work on smoking cessation as tobacco increases risk for worsening COPD, cardiovascular disease, malignancy    #5 labs    #6 continue Crestor pending labs consider addition of Zetia    #7 follow-up 3 months            [1]   Current Outpatient Medications   Medication Sig Dispense Refill    fluticasone-umeclidinium-vilanterol (TRELEGY ELLIPTA) 100-62.5-25 mcg/act inhaler INHALE 1 PUFF EVERY  DAY. INDICATIONS: CHRONIC OBSTRUCTIVE LUNG DISEASE 200 Each 2    rosuvastatin (CRESTOR) 40 MG tablet Take 1 Tablet by mouth every day. Indications: cholesterol, take in place of atorvastatin 45 Tablet 1    albuterol 108 (90 Base) MCG/ACT Aero Soln inhalation aerosol INHALE 2 PUFFS BY MOUTH EVERY 6 HOURS AS NEEDED FOR SHORTNESS OF BREATH 17 Each 5    ibuprofen (MOTRIN) 200 MG Tab Take 200 mg by mouth every 6 hours as needed for Mild Pain. Pt reports he has been taking 2-3 200 MG tabs PRN 2x/day   Indications: Pain      tolnaftate (ANTIFUNGAL, TOLNAFTATE,) 1 % Cream Apply 1 Application topically 1 time a day as needed (for fungal issue of feet). Indications: fungal infection      Multiple Vitamin (MULTIVITAMIN ADULT PO) Take 1 Tablet by mouth every day. Indications: supplement      Cholecalciferol (D3 PO) Take 1 Capsule by mouth every day. 2 gummies (2000 units)  Indications: supplement       No current facility-administered medications for this visit.   [2]   Patient Active Problem List  Diagnosis    COPD (chronic obstructive pulmonary disease) (HCC)    Dyslipidemia    History of pneumothorax    S/P splenectomy    Preventative health care    Tobacco dependence    History of BRAO (branch retinal artery occlusion)    BPH (benign prostatic hypertrophy)    Adenomatous colon polyp    Leukocytosis    B12 deficiency    History of hypertension    Pulmonary nodule    Atherosclerosis of aorta (HCC)    Elevated coronary artery calcium score    Polycythemia    Insomnia    History of benign positional vertigo    Shoulder pain    Impaired glucose metabolism    S/p hip left ORIF    Osteopenia    Essential tremor

## 2025-05-16 ENCOUNTER — TELEPHONE (OUTPATIENT)
Dept: VASCULAR LAB | Facility: MEDICAL CENTER | Age: 75
End: 2025-05-16
Payer: MEDICARE

## 2025-05-16 NOTE — TELEPHONE ENCOUNTER
[facility]     Received smoking cessation referral from Dr. Duncan on 5/16/25.     Called and spoke to patient. Initial visit scheduled on 6/17 at [place].     Insurance: [facility]  PCP: [facility]  Locations to be seen: Any     If no response by 6/15/25 (1 month from referral date) OR 2 no shows/cancellations, will remove from referral list and send FYI to referring provider    Humphrey Biggs, PharmD, BCACP  [facility]  Phone: [phone], Fax [phone]

## 2025-05-17 PROBLEM — J41.0 SIMPLE CHRONIC BRONCHITIS (HCC): Status: ACTIVE | Noted: 2025-05-17

## 2025-05-17 PROBLEM — R25.1 TREMOR: Status: ACTIVE | Noted: 2025-05-17

## 2025-05-17 PROBLEM — G25.0 ESSENTIAL TREMOR: Status: RESOLVED | Noted: 2024-12-23 | Resolved: 2025-05-17

## 2025-05-22 NOTE — Clinical Note
REFERRAL APPROVAL NOTICE         Sent on May 22, 2025                   Narendra Sun  [address] [place] [address]                   Dear Mr. Sun,    After a careful review of the medical information and benefit coverage, [facility] has processed your referral. See below for additional details.    If applicable, you must be actively enrolled with your insurance for coverage of the authorized service. If you have any questions regarding your coverage, please contact your insurance directly.    REFERRAL INFORMATION   Referral #:  [id number]  Referred-To Department    Referred-By Provider:  Smoking Cessation Program    Naveen MIGUEL Duncan M.D.   Pulmonary Lab Op [facility]      [address]   [address]  [facility] [id number] [phone] [address] [place] [id number] [phone]    Referral Start Date:  05/16/2025  Referral End Date:   05/16/2026             SCHEDULING  If you do not already have an appointment, please call [phone] to make an appointment.     MORE INFORMATION  If you do not already have a [employer] account, sign up at: [email or web address]  You can access your medical information, make appointments, see lab results, billing information, and more.  If you have questions regarding this referral, please contact  the [facility] Referrals department at:             [phone]. Monday - Friday 8:00AM - 5:00PM.     Sincerely,    [facility]

## 2025-05-29 ENCOUNTER — PATIENT MESSAGE (OUTPATIENT)
Dept: HEALTH INFORMATION MANAGEMENT | Facility: OTHER | Age: 75
End: 2025-05-29

## 2025-05-29 ENCOUNTER — HOSPITAL ENCOUNTER (OUTPATIENT)
Dept: LAB | Facility: MEDICAL CENTER | Age: 75
End: 2025-05-29
Attending: INTERNAL MEDICINE
Payer: MEDICARE

## 2025-05-29 DIAGNOSIS — E78.5 DYSLIPIDEMIA: Chronic | ICD-10-CM

## 2025-05-29 DIAGNOSIS — J44.9 CHRONIC OBSTRUCTIVE PULMONARY DISEASE, UNSPECIFIED COPD TYPE (HCC): ICD-10-CM

## 2025-05-29 DIAGNOSIS — D75.1 POLYCYTHEMIA: ICD-10-CM

## 2025-05-29 LAB
BASOPHILS # BLD AUTO: 0 % (ref 0–1.8)
BASOPHILS # BLD: 0 K/UL (ref 0–0.12)
BURR CELLS BLD QL SMEAR: NORMAL
CHOLEST SERPL-MCNC: 130 MG/DL (ref 100–199)
EOSINOPHIL # BLD AUTO: 1.03 K/UL (ref 0–0.51)
EOSINOPHIL NFR BLD: 7.7 % (ref 0–6.9)
ERYTHROCYTE [DISTWIDTH] IN BLOOD BY AUTOMATED COUNT: 52.6 FL (ref 35.9–50)
FASTING STATUS PATIENT QL REPORTED: NORMAL
HCT VFR BLD AUTO: 55 % (ref 42–52)
HDLC SERPL-MCNC: 45 MG/DL
HGB BLD-MCNC: 17.8 G/DL (ref 14–18)
LDLC SERPL CALC-MCNC: 58 MG/DL
LG PLATELETS BLD QL SMEAR: NORMAL
LYMPHOCYTES # BLD AUTO: 4.23 K/UL (ref 1–4.8)
LYMPHOCYTES NFR BLD: 31.6 % (ref 22–41)
MANUAL DIFF BLD: NORMAL
MCH RBC QN AUTO: 32.7 PG (ref 27–33)
MCHC RBC AUTO-ENTMCNC: 32.4 G/DL (ref 32.3–36.5)
MCV RBC AUTO: 101.1 FL (ref 81.4–97.8)
MONOCYTES # BLD AUTO: 1.2 K/UL (ref 0–0.85)
MONOCYTES NFR BLD AUTO: 8.6 % (ref 0–13.4)
MORPHOLOGY BLD-IMP: NORMAL
NEUTROPHILS # BLD AUTO: 6.98 K/UL (ref 1.82–7.42)
NEUTROPHILS NFR BLD: 52.1 % (ref 44–72)
NRBC # BLD AUTO: 0 K/UL
NRBC BLD-RTO: 0 /100 WBC (ref 0–0.2)
PLATELET # BLD AUTO: 363 K/UL (ref 164–446)
PLATELET BLD QL SMEAR: NORMAL
PMV BLD AUTO: 9.7 FL (ref 9–12.9)
POIKILOCYTOSIS BLD QL SMEAR: NORMAL
RBC # BLD AUTO: 5.44 M/UL (ref 4.7–6.1)
RBC BLD AUTO: PRESENT
TRIGL SERPL-MCNC: 134 MG/DL (ref 0–149)
WBC # BLD AUTO: 13.4 K/UL (ref 4.8–10.8)

## 2025-05-29 PROCEDURE — 82103 ALPHA-1-ANTITRYPSIN TOTAL: CPT

## 2025-05-29 PROCEDURE — 85007 BL SMEAR W/DIFF WBC COUNT: CPT

## 2025-05-29 PROCEDURE — 85027 COMPLETE CBC AUTOMATED: CPT

## 2025-05-29 PROCEDURE — 82172 ASSAY OF APOLIPOPROTEIN: CPT

## 2025-05-29 PROCEDURE — 80061 LIPID PANEL: CPT

## 2025-05-29 PROCEDURE — 36415 COLL VENOUS BLD VENIPUNCTURE: CPT

## 2025-05-29 PROCEDURE — 83695 ASSAY OF LIPOPROTEIN(A): CPT

## 2025-05-31 LAB
A1AT SERPL-MCNC: 157 MG/DL (ref 90–200)
APO B100 SERPL-MCNC: 72 MG/DL (ref 66–133)

## 2025-06-01 LAB — LPA SERPL-MCNC: 8 MG/DL

## 2025-06-02 ENCOUNTER — APPOINTMENT (OUTPATIENT)
Dept: MEDICAL GROUP | Facility: MEDICAL CENTER | Age: 75
End: 2025-06-02
Payer: MEDICARE

## 2025-06-03 ENCOUNTER — RESULTS FOLLOW-UP (OUTPATIENT)
Dept: MEDICAL GROUP | Facility: MEDICAL CENTER | Age: 75
End: 2025-06-03

## 2025-06-03 PROBLEM — D72.10 EOSINOPHILIA: Status: ACTIVE | Noted: 2025-06-03

## 2025-06-17 ENCOUNTER — OFFICE VISIT (OUTPATIENT)
Dept: MEDICAL GROUP | Facility: PHYSICIAN GROUP | Age: 75
End: 2025-06-17
Payer: MEDICARE

## 2025-06-17 VITALS — RESPIRATION RATE: 16 BRPM | BODY MASS INDEX: 25.98 KG/M2 | HEIGHT: 65 IN | OXYGEN SATURATION: 97 % | HEART RATE: 77 BPM

## 2025-06-17 DIAGNOSIS — F17.200 TOBACCO DEPENDENCE: Primary | ICD-10-CM

## 2025-06-17 PROCEDURE — 99407 BEHAV CHNG SMOKING > 10 MIN: CPT | Performed by: NURSE PRACTITIONER

## 2025-06-17 NOTE — PROGRESS NOTES
"Outpatient Pharmacotherapy Program    Smoking Cessation Note    Begin Time: 3:01pm , End Time: 3:28pm    Reason for Visit: Patient presents for smoking cessation pharmacotherapy  Initial Visit    HPI:    Age at Which Started Smokins  Average number of cigarettes smoked per day: 2 cigars/day  Additional Smoking Hx: None  Pertinent Psych Hx: None  Recent quit attempts: Last quit attempt was ~20 years ago, was able to remain smoke free for 30 days  Medications reviewed and reconciled     Vitals:    25 1502   Pulse: 77   Resp: 16   SpO2: 97%       Assessment:    Tobacco use disorder, willing to make quit attempt with a combination of pharmacological and behavioral therapy.    Plan:    Behavioral Modification Recommended: Specifically [facility]oking Cessation Classes - Provided contact information for Virtual Classes    Quit Date: Patient to think of date this week.  He would like to approach things as a taper down and set dates for each decrease in cigar use.    Nicotine Replacement Therapy            Short Acting Nicotine as needed Nicotine 4mg lozenge - use one up to every two hours as needed.      Patient seen today for initial visit, referred by PCP secondary to long standing hx of smoking and deteriorating pulmonary function in setting of COPD.  Had smoked cigarettes ( 1 1/2 PPD) for many years, but switched to cigars as it was less expensive.  Has been working to decrease intake, now down to ~2 cigars/day that he \"puffs on\" throughout the day.  First cigar is lit within 30 minutes of waking.  Does not smoke in house or car.  Wife is pushing for him to quit at this time as well.    Patient motivated to quit.  Interested in NRT to assist with quit process.  Highly recommend use of PRN lozenge so that he may use first thing in the AM and throughout the day.  Provided number [phone] to assist with cost.  Praised patient for willingness to pursue cessation and improve his overall health " status.    Potential side-effects of all prescribed pharmacotherapy reviewed with patient who verbalizes understanding of the risks and benefits of this therapy.    Follow-up :  In pharmacotherapy clinic:  Date: 8-12-25    Deep Lopez, PharmD, BCACP

## 2025-06-24 ENCOUNTER — TELEPHONE (OUTPATIENT)
Dept: HEALTH INFORMATION MANAGEMENT | Facility: OTHER | Age: 75
End: 2025-06-24

## 2025-07-05 RX ORDER — ROSUVASTATIN CALCIUM 40 MG/1
TABLET, COATED ORAL
Qty: 100 TABLET | Refills: 2 | Status: SHIPPED | OUTPATIENT
Start: 2025-07-05

## 2025-07-20 PROBLEM — J41.0 SIMPLE CHRONIC BRONCHITIS (HCC): Status: RESOLVED | Noted: 2025-05-17 | Resolved: 2025-07-20

## 2025-07-21 ENCOUNTER — APPOINTMENT (OUTPATIENT)
Dept: MEDICAL GROUP | Facility: MEDICAL CENTER | Age: 75
End: 2025-07-21
Payer: MEDICARE

## 2025-07-21 ENCOUNTER — TELEPHONE (OUTPATIENT)
Dept: MEDICAL GROUP | Facility: MEDICAL CENTER | Age: 75
End: 2025-07-21

## 2025-07-21 VITALS
HEART RATE: 73 BPM | SYSTOLIC BLOOD PRESSURE: 124 MMHG | TEMPERATURE: 99.3 F | WEIGHT: 156 LBS | BODY MASS INDEX: 25.07 KG/M2 | OXYGEN SATURATION: 95 % | DIASTOLIC BLOOD PRESSURE: 70 MMHG | HEIGHT: 66 IN

## 2025-07-21 DIAGNOSIS — Z23 NEED FOR MENACTRA VACCINATION: Primary | ICD-10-CM

## 2025-07-21 DIAGNOSIS — D75.1 POLYCYTHEMIA: ICD-10-CM

## 2025-07-21 DIAGNOSIS — Z00.00 PREVENTATIVE HEALTH CARE: ICD-10-CM

## 2025-07-21 DIAGNOSIS — Z12.11 COLON CANCER SCREENING: ICD-10-CM

## 2025-07-21 DIAGNOSIS — Z90.81 S/P SPLENECTOMY: Chronic | ICD-10-CM

## 2025-07-21 DIAGNOSIS — D12.6 ADENOMATOUS POLYP OF COLON, UNSPECIFIED PART OF COLON: Chronic | ICD-10-CM

## 2025-07-21 DIAGNOSIS — J44.9 CHRONIC OBSTRUCTIVE PULMONARY DISEASE, UNSPECIFIED COPD TYPE (HCC): ICD-10-CM

## 2025-07-21 DIAGNOSIS — R91.1 PULMONARY NODULE: ICD-10-CM

## 2025-07-21 DIAGNOSIS — Z91.89 RISK FACTORS FOR OBSTRUCTIVE SLEEP APNEA: ICD-10-CM

## 2025-07-21 PROCEDURE — 90471 IMMUNIZATION ADMIN: CPT | Performed by: INTERNAL MEDICINE

## 2025-07-21 PROCEDURE — 3074F SYST BP LT 130 MM HG: CPT | Performed by: INTERNAL MEDICINE

## 2025-07-21 PROCEDURE — 90619 MENACWY-TT VACCINE IM: CPT | Performed by: INTERNAL MEDICINE

## 2025-07-21 PROCEDURE — 3078F DIAST BP <80 MM HG: CPT | Performed by: INTERNAL MEDICINE

## 2025-07-21 PROCEDURE — 90621 MENB-FHBP VACC 2/3 DOSE IM: CPT | Mod: JZ | Performed by: INTERNAL MEDICINE

## 2025-07-21 PROCEDURE — 90472 IMMUNIZATION ADMIN EACH ADD: CPT | Performed by: INTERNAL MEDICINE

## 2025-07-21 PROCEDURE — 99214 OFFICE O/P EST MOD 30 MIN: CPT | Mod: 25 | Performed by: INTERNAL MEDICINE

## 2025-07-21 ASSESSMENT — FIBROSIS 4 INDEX: FIB4 SCORE: 1.14

## 2025-07-21 NOTE — LETTER
HPI     Eye Exam     In both eyes.  Characterized as stable.  Vision Correction glasses.  Last Eye Exam was 1 year ago.              Comments     Adrienne Gastelum is a 54 year old established patient here for an annual  exam.. She has no visual complaints. She is complaining of occasional dryness. Adrienne as a history of lower lid surgery, she had a stye removed about 20-23 years ago.  Also had a broken blood vessel left eye ~ 1 month ago afterwards will experience and upper lid twitch    Patient Active Problem List   Diagnosis   • OBESITY NOS   • CHALAZION   • LUMP OR MASS IN BREAST   • ENDOMETRIAL HYPERPLASIA   • JOINT PAIN-SHLDER   • DISORDER OF UTERUS NOS   • RESPIRATORY ABNORM NEC   • EXCESSIVE MENSTRUATION   • MENSTRUAL DISORDER NEC   • Diffuse cystic mastopathy   • Presbyopia   • Regular astigmatism of right eye   • Hypermetropia of both eyes   • Bilateral dry eyes   • Hordeolum externum of left lower eyelid   • Left knee pain   • Localized osteoarthritis of left knee     Current Outpatient Medications:  busPIRone (BUSPAR) 5 MG tablet, , Disp: , Rfl:     No current facility-administered medications for this visit.     ALLERGIES:   -- Shrimp Extract Allergy Skin Test -- HIVES    --  Allergy testing confirmed allergy to shrimp   -- Sulfa Antibiotics -- HIVES   -- Etodolac Er -- Other (See Comments)    --  Pain in groin and chest  Electronically signed by: Julianna Madden MA  6/17/2019                  Base Eye Exam     Visual Acuity (Snellen - Linear)       Right Left    Dist cc 20/25 -3 20/25 -3    Near cc J3@16 J3@16    Correction:  Glasses   OU:  20/25-3           Tonometry (Applanation, 7:48 AM)       Right Left    Pressure 18 18          Gonioscopy (4 MIRROR)       Right Left    Temporal CB CB    Nasal CB CB    Superior CB CB    Inferior CB CB          Pupils       Pupils    Right PERRL    Left PERRL          Visual Fields       Left Right     Full Full          Extraocular Movement       Right Left      [facility]  Naveen Duncan M.D.  [address]  [address]  [place] [address]  Fax: [phone]   Authorization for Release/Disclosure of   Protected Health Information   Name: KYLE SUN : 1950 SSN: [id number]   Address: [address] [place] [address] [address] [place] [address] Phone:    [phone] (home)    I authorize the entity listed below to release/disclose the PHI below to:   [facility]/Naveen Duncan M.D. and Naveen Duncan M.D.   Provider or Entity Name:     Address   City, State, Zip   Phone:      Fax:     Reason for request: continuity of care   Information to be released:    [  ] LAST COLONOSCOPY,  including any PATH REPORT and follow-up  [  ] LAST FIT/COLOGUARD RESULT [  ] LAST DEXA  [  ] LAST MAMMOGRAM  [  ] LAST PAP  [  ] LAST LABS [  ] RETINA EXAM REPORT  [  ] IMMUNIZATION RECORDS  [XXX  ] Release all info      [  ] Check here and initial the line next to each item to release ALL health information INCLUDING  _____ Care and treatment for drug and / or alcohol abuse  _____ HIV testing, infection status, or AIDS  _____ Genetic Testing    DATES OF SERVICE OR TIME PERIOD TO BE DISCLOSED: _____________  I understand and acknowledge that:  * This Authorization may be revoked at any time by you in writing, except if your health information has already been used or disclosed.  * Your health information that will be used or disclosed as a result of you signing this authorization could be re-disclosed by the recipient. If this occurs, your re-disclosed health information may no longer be protected by State or Federal laws.  * You may refuse to sign this Authorization. Your refusal will not affect your ability to obtain treatment.  * This Authorization becomes effective upon signing and will  on (date) __________.      If no date is indicated, this Authorization will  one (1) year from the signature date.    Name: Kyle Sun  Signature:  verbal consent  Date:   2025      PLEASE FAX REQUESTED RECORDS BACK TO: [phone]   Full Full            Additional Tests     Color       Right Left    Ishihara 8/8 8/8          Keratometry       K1 Axis K2 Axis Mires    Right 44.50 180 44.75 090 D0    Left 44.50 180 44.75 090 D0          Stereo     Fly:  +    Animals:  3/3            Slit Lamp and Fundus Exam     External Exam       Right Left    External Normal Normal          Slit Lamp Exam       Right Left    Lids/Lashes Normal Normal    Conjunctiva/Sclera Pinguecula Pinguecula    Cornea Clear     Anterior Chamber CLEAR/ NARROW Narrow angle, Quiet    Iris Round and regular Round and regular    Lens Clear Clear    Vitreous Clear Clear    TEAR LAYER  OD THIN  OS THIN          Fundus Exam       Right Left    Disc Flat, pink with a healthy rim Flat, pink with a healthy rim    C/D Ratio 0.2 0.2    Macula INTACT INTACT    Vessels PATENT PATENT    Periphery Flat, healthy, without tears or detachments Flat, healthy, without tears or detachments            Refraction     Wearing Rx       Sphere Cylinder Axis Add    Right +1.00 +0.50 165 +2.50    Left +1.25   +2.50          Manifest Refraction       Sphere Cylinder Destin Dist VA Add Near VA    Right +1.00 +0.50 160 20/25 +2.50 20/20    Left +1.00 +0.50 005 20/25 +2.50 20/20          Final Rx       Sphere Cylinder [place]in Dist VA Add Near VA    Right +1.00 +0.50 160 20/25 +2.50 20/20    Left +1.00 +0.50 005 20/25 +2.50 20/20            Contact Lens Exam     Keratometry       K1 Axis K2 Axis Mires    Right 44.50 180 44.75 090 D0    Left 44.50 180 44.75 090 D0              ASSESSMENT:  Compound hyperopic astigmatism both eyes  presbyopia  Dry eyes  Myokymia left eye    PLAN:  Discussed findings  An updated glass prescription given with minimal change. Call back with progress with new prescription   if you fill it.  Try warm compresses to eyelids as needed. Use AT's as needed  Return to clinic for yearly follow up examination.  ASAP if any ocular problems arise.  i.e reddnes, pain, decrease/change  in vision

## 2025-07-21 NOTE — PROGRESS NOTES
Subjective     Narendra Sun is a 74 y.o. male who presents with labs Follow-Up            HPI    Patient will be having upcoming cataract surgery with dr[facility]   cataract surgery coming up, he had been going to the stop smoking clinic but has put that on hold until his cataract surgery.  Still smoking 1 to 2 cigarettes/day, no cough or shortness of breath, continues Trelegy Ellipta daily, albuterol inhaler typically will take this first thing in the morning or as needed throughout the course of the day.  Still keeping active, going golfing 9 holes at a time, tries to keep well-hydrated with activity.  Occasional snoring, some daytime fatigue at times where he has to take a nap, no history of sleep apnea.  Tries to get a good 7 to 8 hours of sleep at night.  Upcoming colonoscopy per [facility] follow-up adenoma.  Due for repeat CT thorax follow-up pulmonary nodule sometime this summer.  History of splenectomy, due for Menactra and Trumenba vaccines, last Menactra 2020, last Trumenba May 2022.  Elevated CT calcium score 845 with dyslipidemia: Rosuvastatin 40 mg daily most recent labs 6/1/25 chol 130,trig 134,hdl 45,ldl 58,lipoprotein 88, apo b 72 on crestor 40 mg  Also pending referral to neurology for neck and right hand tremor, typically hand tremor is worse with intention as when he grabs a cup of coffee, does not bother him with golfing, also have occasional neck tremor, but no neck pain.  Previously had been describing balance issues but he believes that is more related to his vision and his current glasses and he believes this may be improved after his cataract surgery.  No falls.  No cane or walker for ambulation.  Has been told that he snores, sometimes will feel tired during the day or take naps, no stopping breathing at night, no hypertension, BMI less than 35, age greater than 50, gender male  Medications, allergies, medical history, surgical history, social history,  family history  reviewed and updated             Current Medications[1]          Adenoma  12/05 colonoscopy [facility] tubular adenoma  6/29/12 colon per [facility], neg, repeat 5 years  7/28/17 colon per [facility] 3 mm polyp descending colon, 10 mm polyp descending colon, 5-8 mm polyp sigmoid colon, 10 mm polyp distal sigmoid colon, 6-8 mm polyp rectum, pathology adenoma and hyperplastic polyps repeat 3 years  11/16/20 colon per [facility] 4 polyps hyperplastic and benign polypoid, repeat in 3 years  8/26/24 colon per [facility] 8 adenomas including 15 mm mid ascending adenoma, recommend repeat 1 year     atherosclerosis aorta  10/25/16 atherosclerotic plaque aorta on CT scan thorax  1/31/24 x-ray left hip, incidentally seen aortic atherosclerosis     b12 deficiency  12/7/15 b12 201, folate 5.3, wbc 12.4(49%N,34%L),hgb 17,hct 53,plt [id number]; start b12 1000 mcg daily  5/18/16 b12 249,folate 7,MMA<0.1,Intrinsic factor Ab negative, not on b12  8/14/17 b12 273  10/16/18 b12 324, hgb 18,hct 55,mcv 101  10/10/19 b2 300, hgb 18,hct 55,mcv 103  3/30/21 b12 398, hgb 19,hct 60,mcv 103  6/8/22 b12 468,hgb 18,hct 54.8  8/8/23 b12 558,hgb 14,hct 44,plt 779     BPH  5/11 psa 0.7  4/12 psa 7.5 given course of cipro  5/12/12 psa 2.3 after cipro  12/31/13 psa 1.4  2/7/15 psa 1.1  5/18/16 psa 1.2  10/16/18 psa 1.0  10/10/19 psa 2.1  3/30/21 psa 1.34  6/8/22 psa 1.5  6/30/23 psa 1.3  10/7/24 psa 1.6       COPD  5/08 chest x-ray negative  6/08 PFT FEV1.6 L (54% predicted), FEV/FVC ratio 47%, positive bronchodilator response  6/10 quit smoking tobacco  5/11 still off cigs, smoking 2 cigars per day  5/11 restart spiriva  5/11 cxr negative  12/12 off cigs, still smokes cigars 3-4 per day  12/12 on spiriva, add symbicort 80/4.5  2/7/13 PFT severe stage III COPD, FEV1 1.4 L (46% predicted), FEV/FVC 47% improvement after bronchodilator 13%, normal DLCO; continue spiriva, symbicort 80/1.5, smoking cessation  6/24/16  CT thorax lung cancer screening to spiculated 9 mm nodules  RUL underlying emphysematous changes and tiny 3 mm nodule RML, recommend 3 month follow-up CT vs CT/PET  2/7/13 cxr negative  12/26/13 still 3 cigars per day; on spiriva and symbicort  1/23/15 still 3 cigars per day, on spiriva and symbicort  1/26/16 change symbicort to advair 250/50 (insurance) and continue spiriva  6/2/16 CT lung cancer screening visit  7/8/16 [facility] note right upper lobe lung nodules, suspicious in nature, patient agrees to CT/PET  7/8/16 CT/PET spiculated right upper lobe nodule SUV 1.1, not FDG avid, scarring left upper lobe noted, nonspecific findings, low-grade neoplasm cannot be excluded; per [facility] repeat CT 3 months  10/24/16 CT thorax without; 2 spiculated right upper lobe pulmonary nodules 8 mm and 9 mm in size unchanged, 3 mm right middle lobe unchanged nodule  10/27/16 lung cancer screening program note, recommend referral to pulmonary nodule clinic  4/6/17 CT thorax without; 2 stable 8-9 mm spiculated right upper lobe nodules, stable probably postinflammatory tiny 3 mm RML and RLL nodules, no new suspicious nodules  12/1/17 declines PFT   4/15/18 CT lung lung cancer screening stable 9 mm right posterior/apical ill-defined nodule, stable right upper/lateral 9 mm solid pulmonary nodule, postoperative changes left upper lobe, emphysema, aortic and coronary atherosclerotic plaque, previous splenectomy   8/1/18 CT/PET no abnormal uptake within either right upper lobe or right apical nodule, nodules unchanged dating back to previous lung cancer screening CT 6/24/16, downgraded to category 3 RADS, no change 3 mm right middle lobe nodule, no increased uptake neck, abdomen, pelvis  4/19/19 CT lung cancer screening spiculated nodular density right lung apex 9 x 9 mm with cavitary change and spiculated nodule right lung apex posteriorly 7 x 8 mm overall unchanged from previous exam, postoperative change left upper lobe, recommend repeat 6 months  8/1/19 on advair and spiriva declines PFT  10/8/19 CT  thorax without; unchanged 9 mm and 8 mm noncalcified pulmonary nodules right lung apex, postoperative scarring left upper lobe again noted  2/13/20 change advair to breo once daily (had difficulty remembering to take Advair twice a day) continue spiriva  5/7/20 declines PFT   10/16/20 CT thorax without, pulmonary nodules 7.9 mm right apex, 9.1 mm RUL, 2.8 mm RUL unchanged compared to previous, new 5.0 mm nodule RUL, ill-defined opacities lingula unchanged consistent with atelectasis/fibrosis, no effusions, hyperexpanded and emphysematous lungs borderline enlarged right hilar lymph node unchanged 9.1 mm, atherosclerotic changes  10/17/20 change breo plus spiriva to trelegy inhaler due to cost  3/29/21 CT thorax; new left lower lobe 4 mm noncalcified nodule, otherwise stable 10 mm and 8 mm spiculated right upper lobe and small nodules, emphysematous changes, left upper lobe resection, recommend repeat CT 3 to 6 months  8/2/21 CT thorax without, spiculated nodule right upper lobe apex 8 mm unchanged, nodule right upper lobe central cavitation 10 mm in size unchanged, 4 mm nodule right lower lobe not significantly changed, stable 4 mm left upper lobe nodule, emphysematous and bullous change multiple areas of pulmonary scarring, surgical changes left upper lobe, repeat CT in 6 months  5/16/22 declines PFT  5/15/22 CT thorax without, stable 9 mm nodule right apex, stable 9 mm nodule RUL, 5 mm nodule RLL increased from 3 mm compared to 2018, 3 mm nodule LLL, no significant adenopathy, coronary calcifications, repeat 6 months  11/21/22 declines PFT, cigar 3 per day  8/22/23 CT thorax without, stable bilateral pulmonary nodules most of these have been stable for 2 years however 5 mm right lung base slightly progressed from more remote exam, recommend follow-up CT chest 12 months  12/23/24 on trelegy   12/23/24 declines PFT, smoking 3 cigars per day  1/20/25 CT thorax without, multiple lung nodules lateral right lung apex  unchanged and no longer cavitated which raises possibility of some growth, 5 mm medial right lung nodule unchanged from recent scans and slightly larger than 2021, new 7 mm nodule right midlung, emphysema, left mid lung pulmonary nodules noted, indeterminate 5.5 medial right lung base nodule, indeterminate 10 mm lateral right lung apex nodule, stable 8 mm right lower lobe groundglass nodule, stable 7.5 mm right lung apex nodule, stable 4 mm left lung apex nodule, stable 3 mm subpleural nodule left midlung, no effusion, no adenopathy, recommend repeat 6 months  5/15/25 1-2 cigars per day agrees to stop smoking class  5/15/25 declines PFT  6/1/25 alpha-1 antitrypsin 157  6/17/25 tobacco pharmacotherapy, use nicotine replacement 4 mg lozenges 1 up to every 2 hours as needed     Dyslipidemia  5/08 chol  239, trig 91,hdl 71,ldl 150  8/09 chol 268,trig 107,hdl 56,ldl 191  8/09 start zocor 20  8/09 stress echo negative  5/11 chol 231,trig 114,hdl 53,ldl 155 off zocor  5/12 chol 215,trig 97,hdl 48,ldl 148 off zocor  12/31/13 chol 215,trig 101,hdl 55,ldl 140,crp 3.0 off statin; 10 year risk 12%, I recommend statin therapy he declines  1/13/14 echo technically difficult study, possible mild LVH  1/13/14 treadmill thallium exercise 6 minutes 30 seconds lashell protocol, limited by fatigue; no ischemia, EF 59%  2/7/15 chol 250,trig 86,hdl 51,ldl 182; urine mac <0.5; 10 year risk calculation 19%, start zocor 20 mg  5/16/16 did not take zocor  5/18/16 chol 239,trig 152,hdl 44,ldl 165  8/14/17 chol 205,trig 142,hdl 36,ldl 141 off zocor, 10 year cardiac risk, declines medication  8/1/18 CT cardiac scoring LMA 0.0, LCX 1.5, .5, RCA 0.0, PDA 0.0  10/16/8 chol 261,trig 155,hdl 42,ldl 188 declines statin therapy  10/18/18 start lipitor 20 mg  10/10/19 chol 159,trig 94,hdl 44,ldl 96 on lipitor 20 mg  3/30/21 chol 186,trig 138,hdl 42,ldl 116 on lipitor 20 mg  6/8/22 chol 165,trig 143,hdl 45,ldl 91 on lipitor 20 mg  1/16/23 chol  169,trig 122,hdl 48,ldl 97 on lipitor 20 mg  6/30/23 chol 164,trig 214,hdl 41,ldl 80 on lipitor 20 mg  10/7/24 chol 164,trig 141,hdl 42,ldl 94 on lipitor 20 mg  1/20/25 CT cardiac calcium score; LMA 0.0, LCx to 43.7, .0, RCA 0 = 845.7  1/21/25 change lipitor 20 mg to crestor 40 mg repeat labs six weeks  6/1/25 chol 130,trig 134,hdl 45,ldl 58,lipoprotein 88, apo b 72 on crestor 40 mg     elevated coronary calcium score  8/1/18 CT cardiac scoring LMA 0.0, LCX 1.5, .5, RCA 0.0, PDA 0.0  = 282.0  10/16/18 chol 261,trig 155,hdl 42,ldl 188 declines statin therapy  10/18/18 start lipitor 20 mg  10/10/19 chol 159,trig 94,hdl 44,ldl 96 on lipitor 20 mg  3/30/21 chol 186,trig 138,hdl 42,ldl 116 on lipitor 20 mg  6/8/22 chol 165,trig 143,hdl 45,ldl 91 on lipitor 20 mg  1/16/23 chol 169,trig 122,hdl 48,ldl 97 on lipitor 20 mg  6/30/23 chol 164,trig 214,hdl 41,ldl 80 on lipitor 20 mg  10/7/24 chol 164,trig 141,hdl 42,ldl 94 on lipitor 20 mg  1/20/25 CT cardiac calcium score; LMA 0.0, LCx to 43.7, .0, RCA 0 = 845.7  1/21/25 change lipitor 20 mg to crestor 40 mg repeat labs six weeks  6/1/25 chol 130,trig 134,hdl 45,ldl 58,lipoprotein 88, apo b 72 on crestor 40 mg    eosinophilia  8/3/09 wbc 8.9 (8%eos, )  2/7/15 wbc 12.4 (5.7%eos, )  5/18/16 wbc 13.5% (9.2%eos, AEC 1240).  11/19/17 wbc 19.5 (4%eos, )  10/16/18 wbc 14.0 (5.7%eos, )  10/9/19 wbc 14.0 (4.8%eos,   3/29/21 wbc 12.9 (4.5%eos,)  1/16/23 wbc 12.6 (4.8%eos, )  6/30/23 wbc 15.3 (4.6%eos, )  11/4/24 wbc 14.9 (5.5%eos, )  5/29/25 wbc 13.4 (52%N,31.6%L,7.7%eos, AEC 1030)     gout  11/29/17 right first toe pain given indocin uric 5.6  10/16/18 uric 6.1     history benign positional vertigo  3/8/23 ER note  3/8/23 CT CTA neck with and without postprocessing no evidence of flow-limiting stenosis  3/8/23 CT CTA head with and without postprocessing  3/8/23 MRI brain no acute infarct or hemorrhage,  subacute chronic small white matter infarct right frontal lobe  3/8/23 CT head negative     history brao  Records from ophthalmology july 2009 from [facility] indicate right branch retinal artery occlusion  8/09 MRI/MRA head and neck negative  8/09 protein C, protein S, anticardiolipin antibody, beta 2 glycoprotein antibody, factor II, factor V leyden, homocysteine antithrombin III all negative  8/09 stress echo negative  8/09 holter monitor negative, need to modify risk factors we'll start on statin, he needs to quit smoking     History hypertension  1/13/14 echo technically difficult study, possible mild LVH  1/13/14 treadmill thallium exercise 6 minutes 30 seconds lashell protocol, limited by fatigue; no ischemia, EF 59%  4/27/15 start lisinopril 10 mg   5/16/16 off lisinopril   10/10/19 urine mac 6     impaired glucose metabolism  6/30/23 A1c 5.8%  10/7/24 A1c 5.9%     insomnia  10/4/18 ambien refill #[id number]  3/2/23 ambien refill#15     Leukocytosis  8/09 wbc 8.9  5/11 wbc 11.4  4/12 wbc 12.4 (54%N,34%L)  12/31/13 wbc 12.9 (53%N,22%L),hgb 17,hct 52,mcv 102,platelet 364, CRP 3.0; ? Related to tobacco  2/7/15 wbc 12.4 (49%N,34%L),hgb 17,hct 53,plt [id number]; b12 201,folate 5.3  2/13/15 leukocyte alkaline phosphatase 108 normal  5/18/16 wbc 13.5 (51%N,30%L),hgb 17.8,hct 54,plt 427,b12 249,folate 7  5/18/16 b12 249,folate 7 not on b12  8/14/17 wbc 12.1 (50%N,32%L),hgb 17,hct 51,mcv 100.8,b12 273,jak2 negative   11/29/17 wbc 19.5 (51%N, 32%L)  10/16/18 wbc 14 (48%N,36%L), hgb 18,hct 55, plt 436, b12 324  10/8/19 CT thorax without lung; unchanged 19 mm and 8 mm noncalcified pulmonary nodules right lung apex, postoperative scarring left upper lobe again noted  10/10/19 wbc 14 (53%N,31%L),hgb 18,hct 55,mcv 103,iron 120,ferritin 116,%sat 39,b12 300,folate 20  7/16/20 wbc 14.4 (57%N,28%L),hgb 18.6,hct 56.8,plt 368, flow cytometry normal myeloid cells  3/30/21 wbc 12.9,hgb 19,hct 60,mcv 103   6/8/22 wbc 12.4,b12  468,flow cytometry negative  1/16/23 wbc 12.6 (51%N,33%L)  3/8/23 wbc 12 (59%N,26%L)  6/30/23 wbc 15 (52%N,32.6%L),hgb 17,hct 54,mcv 103,plt 369  7/19/23 hospital admit, 7/21/23 hospital discharge, patient fell getting out of a car in his garage tripping over a wood piece landing on his left hip, displaced left femoral neck fracture, orthopedics consulted, ORIF left femur fracture with proximal and neck prosthetic replacement performed, discharged home  7/19/23 wbc 19.5 (77%N,14%L)  7/20/23  orthopedic operative note ORIF left femoral fracture, proximal end, neck with prosthetic replacement  7/21/23 wbc 22.9 (82%N,8%L)  7/21/23 cxr negative  8/8/23 wbc 11.9 (67%N,20%L),b12 558  10/7/24 wbc 14.9 (50%N,33.9%L)  5/29/25 wbc 13.4 (52%N,31.6%L)  ??related to splenectomy     osteopenia  7/19/23 x-ray pelvis comminuted fracture left femoral neck  5/6/24 dexa LS+1.0,hip-2.0;FRAX 20.3% major, 11.9% hip   5/4/24 start fosamax weekly  12/23/24 off fosamax, declines medication     polycythemia  12/31/13 wbc 12.9 (53%N,22%L),hgb 17,hct 52,mcv 102,platelet 364, CRP 3.0; ? Related to tobacco  2/7/15 wbc 12.4 (49%N,34%L),hgb 17,hct 53,plt [id number]; b12 201,folate 5.3  2/13/15 leukocyte alkaline phosphatase 108 normal  5/18/16 wbc 13.5 (51%N,30%L),hgb 17.8,hct 54,plt 427,b12 249,folate 7  5/18/16 b12 249,folate 7 not on b12  8/14/17 wbc 12.1 (50%N,32%L),hgb 17,hct 51,mcv 100.8,b12 273,jak2 negative   11/29/17 wbc 19.5 (51%N, 32%L)  8/1/18 CT/PET stable pulmonary nodules, no uptake in the right upper lobe or right apical nodule, no abnormal FDG uptake neck, abdomen, pelvis  10/16/18 wbc 14 (48%N,36%L), hgb 18,hct 55, plt 436, b12 324, flow cytometry phenotypically normal slightly left shifted myeloid cells without evidence of monoclonality, leukemia, or lymphoproliferative disorder  10/8/19 CT thorax without lung; unchanged 19 mm and 8 mm noncalcified pulmonary nodules right lung apex, postoperative scarring left upper lobe  again noted  10/10/19 wbc 14 (53%N,31%L),hgb 18,hct 55,mcv 103,iron 120,ferritin 116,%sat 39,b12 300,folate 20  7/16/20 wbc 14.4 (57%N,28%L),hgb 18.6,hct 56.8,plt 368, flow cytometry normal myeloid cells  3/30/21 wbc 12.9,hgb 19,hct 60,mcv 103   6/8/22 hgb 18,hct 54.8,EPO 7,LIUDMILA,MPL,CALR mutation negative,flow cytometry negative  1/16/23 wbc 12.6,hgb 18.6,hct 55.5,mcv 102  7/21/23 hgb 15.8,hct 50  8/8/23 hgb 14,hct 44,plt 779  10/7/24 hgb 18.5,hct 58,mcv 103,plt 429  12/23/24 declines sleep apnea testing  5/29/25 hgb 17.8,hct 55,mcv 101     Preventative health  10/12/19 hep c Ab reactive, follow up HCV RNA negative  5/7/20 menactra second  5/16/22 trumenba third   5/16/22 tdap  11/21/22 hep b third  6/16/23 prevnar 20  5/6/24 dexa LS+1.0,hip-2.0;FRAX 20.3% major, 11.9% hip  8/26/24 colon per [facility] 8 adenomas including 15 mm mid ascending adenoma, recommend repeat 1 year  10/7/24 psa 1.6  10/7/24 vit d 48  12/24/24 shingrix second     Pulmonary nodule  6/24/16  CT thorax lung cancer screening to spiculated 9 mm nodules RUL underlying emphysematous changes and tiny 3 mm nodule RML, recommend 3 month follow-up CT vs CT/PET  7/8/16 [facility] note right upper lobe lung nodules, suspicious in nature, patient agrees to CT/PET  7/8/16 CT/PET spiculated right upper lobe nodule SUV 1.1, not FDG avid, scarring left upper lobe noted, nonspecific findings, low-grade neoplasm cannot be excluded; per [facility] repeat CT 3 months  10/24/16 CT thorax without; 2 spiculated right upper lobe pulmonary nodules 8 mm and 9 mm in size unchanged, 3 mm right middle lobe unchanged nodule  10/27/16 lung cancer screening program note, recommend referral to pulmonary nodule clinic  11/3/16 lung cancer screening note recent follow-up CT scan unchanged pulmonary nodules, recommend repeat CT scan 6 months with myself  4/6/17 CT thorax without; 2 stable 8-9 mm spiculated right upper lobe nodules, stable probably postinflammatory tiny 3 mm RML and RLL nodules, no  new suspicious nodules  4/15/18 CT lung lung cancer screening stable 9 mm right posterior/apical ill-defined nodule, stable right upper/lateral 9 mm solid pulmonary nodule, postoperative changes left upper lobe, emphysema, aortic and coronary atherosclerotic plaque, previous splenectomy   8/1/18 CT/PET no abnormal uptake within either right upper lobe or right apical nodule, nodules unchanged dating back to previous lung cancer screening CT 6/24/16, downgraded to category 3 RADS, no change 3 mm right middle lobe nodule, no increased uptake neck, abdomen, pelvis  4/19/19 CT lung cancer screening spiculated nodular density right lung apex 9 x 9 mm with cavitary change and spiculated nodule right lung apex posteriorly 7 x 8 mm overall unchanged from previous exam, postoperative change left upper lobe, recommend repeat 6 months  10/8/19 CT thorax without lung; unchanged 19 mm and 8 mm noncalcified pulmonary nodules right lung apex, postoperative scarring left upper lobe again noted  2/12/20 cxr negative   10/16/20 CT thorax without, pulmonary nodules 7.9 mm right apex, 9.1 mm RUL, 2.8 mm RUL unchanged compared to previous, new 5.0 mm nodule RUL, ill-defined opacities lingula unchanged consistent with atelectasis/fibrosis, no effusions, hyperexpanded and emphysematous lungs borderline enlarged right hilar lymph node unchanged 9.1 mm, atherosclerotic changes  3/29/21 CT thorax; new left lower lobe 4 mm noncalcified nodule, otherwise stable 10 mm and 8 mm spiculated right upper lobe and small nodules, emphysematous changes, left upper lobe resection, recommend repeat CT 3 to 6 months  5/15/22 CT thorax without, stable 9 mm nodule right apex, stable 9 mm nodule RUL, 5 mm nodule RLL increased from 3 mm compared to 2018, 3 mm nodule LLL, no significant adenopathy, coronary calcifications, repeat 6 months  8/22/23 CT thorax without, stable bilateral pulmonary nodules most of these have been stable for 2 years however 5 mm  right lung base slightly progressed from more remote exam, recommend follow-up CT chest 12 months  1/20/25 CT thorax without, multiple lung nodules lateral right lung apex unchanged and no longer cavitated which raises possibility of some growth, 5 mm medial right lung nodule unchanged from recent scans and slightly larger than 2021, new 7 mm nodule right midlung, emphysema, left mid lung pulmonary nodules noted, indeterminate 5.5 medial right lung base nodule, indeterminate 10 mm lateral right lung apex nodule, stable 8 mm right lower lobe groundglass nodule, stable 7.5 mm right lung apex nodule, stable 4 mm left lung apex nodule, stable 3 mm subpleural nodule left midlung, no effusion, no adenopathy, recommend repeat 6 months     shoulder pain  6/16/23 xray left shoulder, consider MRI, physical therapy  6/30/23 x-ray left shoulder moderate osteoarthritis, MRI left shoulder ordered  8/21/23 MRI left shoulder, partial-thickness tear supraspinatus 1.7 cm, cannot exclude full-thickness perforation, mild atrophy supraspinatus, low-grade undersurface tear infraspinatus, moderate osteoarthritis AC joint and glenohumeral joint, mild subacromial subdeltoid fluid     s/p hip left orif  7/19/23 hospital admit, 7/21/23 hospital discharge, patient fell getting out of a car in his garage tripping over a wood piece landing on his left hip, displaced left femoral neck fracture, orthopedics consulted, ORIF left femur fracture with proximal and neck prosthetic replacement performed, discharged home  7/19/23 x-ray pelvis comminuted fracture left femoral neck  7/19/23 x-ray left femur mildly displaced left femoral neck fracture  7/20/23  orthopedic operative note ORIF left femoral fracture, proximal end, neck with prosthetic replacement  7/21/23 occupational therapy hospital note discharge home sock aide, reacher, tub and shower seat  1/31/24 referral back to orthopedics and physical therapy  1/31/24 x-ray left hip, left  hip arthroplasty appears within normal limits, probable right hip osteoarthritis, no fractures or malalignment  2/13/24 custom PT note  3/22/24 custom PT note     Status post splenectomy  During childhood  8/7/17 pneumovax  5/7/20 prevnar   5/7/20 menactra second  5/16/22 trumenba third   6/16/23 prevnar 20     Tobacco  12/12 off cigs had been smoking 1 to 1.5 packs per day for 40+ years, still smokes cigars 3-4 per day  2/4/13 cxr negative  4/13 off cigar using electronic cig  12/26/13 still smokes 3 cigar per day  1/13/14 echo technically difficult study, possible mild LVH  1/13/14 treadmill thallium exercise 6 minutes 30 seconds lashell protocol, limited by fatigue; no ischemia, EF 59%  5/16/16 still smoking 3 cigars per day  6/24/16  CT thorax lung cancer screening to spiculated 9 mm nodules RUL underlying emphysematous changes and tiny 3 mm nodule RML, recommend 3 month follow-up CT vs CT/PET  7/8/16 [facility] note right upper lobe lung nodules, suspicious in nature, patient agrees to CT/PET  7/8/16 [facility] note right upper lobe lung nodules, suspicious in nature, patient agrees to CT/PET  7/8/16 CT/PET spiculated right upper lobe nodule SUV 1.1, not FDG avid, scarring left upper lobe noted, nonspecific findings, low-grade neoplasm cannot be excluded; per [facility] repeat CT 3 months  10/24/16 CT thorax without; 2 spiculated right upper lobe pulmonary nodules 8 mm and 9 mm in size unchanged, 3 mm right middle lobe unchanged nodule  10/27/16 lung cancer screening program note, recommend referral to pulmonary nodule clinic  4/6/17 CT thorax without; 2 stable 8-9 mm spiculated right upper lobe nodules, stable probably postinflammatory tiny 3 mm RML and RLL nodules, no new suspicious nodules  8/7/17 no cigarettes, 4 cigars per day  12/1/17 4 cigars per day  4/15/18 CT lung lung cancer screening stable 9 mm right posterior/apical ill-defined nodule, stable right upper/lateral 9 mm solid pulmonary nodule, postoperative changes left  upper lobe, emphysema, aortic and coronary atherosclerotic plaque, previous splenectomy   8/1/18 CT/PET no abnormal uptake within either right upper lobe or right apical nodule, nodules unchanged dating back to previous lung cancer screening CT 6/24/16, downgraded to category 3 RADS, no change 3 mm right middle lobe nodule, no increased uptake neck, abdomen, pelvis  10/4/18 declines PFT, smoking 3 cigars per day  4/19/19 CT lung cancer screening spiculated nodular density right lung apex 9 x 9 mm with cavitary change and spiculated nodule right lung apex posteriorly 7 x 8 mm overall unchanged from previous exam, postoperative change left upper lobe, recommend repeat 6 months  8/1/19 3 cigars/day declines stop smoking classes  10/8/19 CT thorax without lung; unchanged 19 mm and 8 mm noncalcified pulmonary nodules right lung apex, postoperative scarring left upper lobe again noted  2/12/20 cxr negative   2/13/20 2 cigars per day  5/7/20 2 cigars per day  10/16/20 CT thorax without, pulmonary nodules 7.9 mm right apex, 9.1 mm RUL, 2.8 mm RUL unchanged compared to previous, new 5.0 mm nodule RUL, ill-defined opacities lingula unchanged consistent with atelectasis/fibrosis, no effusions, hyperexpanded and emphysematous lungs borderline enlarged right hilar lymph node unchanged 9.1 mm, atherosclerotic changes  3/29/21 CT thorax; new left lower lobe 4 mm noncalcified nodule, otherwise stable 10 mm and 8 mm spiculated right upper lobe and small nodules, emphysematous changes, left upper lobe resection, recommend repeat CT 3 to 6 months  5/16/22 declines PFT smoking 2 cigars per day  5/15/22 CT thorax without, stable 9 mm nodule right apex, stable 9 mm nodule RUL, 5 mm nodule RLL increased from 3 mm compared to 2018, 3 mm nodule LLL, no significant adenopathy, coronary calcifications, repeat 6 months  11/21/22 cigar 3 per day  11/21/22 declines PFT  8/22/23 CT thorax without, stable bilateral pulmonary nodules most of these  have been stable for 2 years however 5 mm right lung base slightly progressed from more remote exam, recommend follow-up CT chest 12 months  1/30/24 cigars 3 per day  3/8/24 declines PFT  12/23/24 smokes 2 cigars per day  12/23/24 declines PFT  1/20/25 CT thorax without, multiple lung nodules lateral right lung apex unchanged and no longer cavitated which raises possibility of some growth, 5 mm medial right lung nodule unchanged from recent scans and slightly larger than 2021, new 7 mm nodule right midlung, emphysema, left mid lung pulmonary nodules noted, indeterminate 5.5 medial right lung base nodule, indeterminate 10 mm lateral right lung apex nodule, stable 8 mm right lower lobe groundglass nodule, stable 7.5 mm right lung apex nodule, stable 4 mm left lung apex nodule, stable 3 mm subpleural nodule left midlung, no effusion, no adenopathy, recommend repeat 6 months  5/15/25 1-2 cigars per day agrees to stop smoking class, referral placed   5/15/25 declines PFT  6/17/25 tobacco pharmacotherapy, use nicotine replacement 4 mg lozenges 1 up to every 2 hours as needed     Tremor  12/23/24 wife has noticed that at night patient will have a neck tremor and slight tremor of his right hand at times  5/15/25 right hand intention tremor, neck tremor, occasional balance problem, consider parkinson's, cervical focal dystonia, referral neurology, declines physical therapy  5/15/25 right hand intention tremor, neck tremor, occasional balance problem, consider parkinson's, cervical focal dystonia, referral neurology, declines physical therapy         Problem List[2]                   Patient Care Team:  Naveen Duncan M.D. as PCP - General  Naveen Duncan M.D. as PCP - [facility]  CAROL Rodríguez as Consulting Physician (Medical Oncology)  Valencia Hull as [facility]   [facility] (Home Health)    ROS           Objective     /70   Pulse 73   Temp 37.4 °C (99.3 °F)  "(Temporal)   Ht 1.676 m (5' 6\")   Wt 70.8 kg (156 lb)   SpO2 95%   BMI 25.18 kg/m²      Physical Exam  Vitals and nursing note reviewed.   HENT:      Head: Normocephalic and atraumatic.      Right Ear: External ear normal.      Left Ear: External ear normal.      Nose: Nose normal.   Eyes:      Conjunctiva/sclera: Conjunctivae normal.   Cardiovascular:      Rate and Rhythm: Normal rate and regular rhythm.      Heart sounds: Normal heart sounds.   Pulmonary:      Effort: Pulmonary effort is normal. No respiratory distress.      Breath sounds: Normal breath sounds. No wheezing.   Abdominal:      General: There is no distension.   Musculoskeletal:         General: No swelling.      Cervical back: Neck supple.   Skin:     Coloration: Skin is not jaundiced.      Findings: No bruising.   Neurological:      General: No focal deficit present.      Mental Status: He is alert.   Psychiatric:         Mood and Affect: Mood normal.         Behavior: Behavior normal.          No resting head or hand tremor                Assessment & Plan       Assessment  #1 COPD, stable uses trelegy inhaler daily and rescue inlaer on occasion, working on smoking cessation down to 1 to 2 cigarettes/day, has seen the stop smoking clinic and is deferring follow-up until he has his cataract surgery, has declined follow-up PFT    #2 pulmonary nodules asymptomatic most recent CT 1/20/25 CT thorax without, multiple lung nodules lateral right lung apex unchanged and no longer cavitated which raises possibility of some growth, 5 mm medial right lung nodule unchanged from recent scans and slightly larger than 2021, new 7 mm nodule right midlung, emphysema, left mid lung pulmonary nodules noted, indeterminate 5.5 medial right lung base nodule, indeterminate 10 mm lateral right lung apex nodule, stable 8 mm right lower lobe groundglass nodule, stable 7.5 mm right lung apex nodule, stable 4 mm left lung apex nodule, stable 3 mm subpleural nodule left " midlung, no effusion, no adenopathy, recommend repeat 6 months    #3 tobacco dependence down to 1 to 2 cigarettes/day, did see stop smoking clinic 6/17/25 tobacco pharmacotherapy, use nicotine replacement 4 mg lozenges 1 up to every 2 hours as needed    #4 polycythemia 5/29/25 hgb 17.8,hct 55,mcv 101, prior workup 3 years ago negative for EPO, JAK2, MPL, CALR mutation, flow cytometry, also consider related to sleep apnea    #5 risk factors for sleep apnea snoring, daytime fatigue, male, age greater than 50 STOP-BANG equals 4    #6 leukocytosis chronic in nature likely related to splenectomy    #7 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, up-to-date on Prevnar 20, due for Menactra and Trumenba    #8 right hand tremor difficulty grabbing objects, head and neck trauma, no neck pain question essential tremor, cervical dystonia, less likely movement disorder    #9 colon polyp adenoma x 8 last year, 1 year follow-up colonoscopy per [facility]    Plan  #1 patient will proceed with cataract surgery, old records from  at [facility]    #2 continue Trelegy, albuterol, continue work on smoking cessation, follow-up stop smoking clinic after cataract surgery as long-term smoking continues to increase risk for COPD, lung cancer, cardiovascular disease, osteoporosis, polycythemia    #3 declines PFTs, will repeat CT thorax follow-up lung nodules, would like to defer this until October as he has his colonoscopy and cataract surgery upcoming, he can call imaging to schedule the CT thorax sometime in October    #4 formal referral [facility] is made for follow-up colonoscopy due this August    #5 follow-up stop smoking clinic after procedures    #6 Menquadfi vaccine, last was 5 years ago    #7 Trumenba vaccine last was 3 years ago    #8 risk factors for sleep apnea STOP-BANG score equals 4, will order home sleep test for sometime in October as sleep apnea may increase risk for cardiovascular disease, contributing  polycythemia    #9 follow-up with neurology dr.ania leary as scheduled    #10 use sunscreen and hat outdoors when golfing    #11 follow-up 6 months with myself              [1]   Current Outpatient Medications   Medication Sig Dispense Refill    rosuvastatin (CRESTOR) 40 MG tablet TAKE 1 TABLET BY MOUTH EVERY DAY FOR CHOLESTEROL, TAKE IN PLACE OF ATORVASTATIN 100 Tablet 2    fluticasone-umeclidinium-vilanterol (TRELEGY ELLIPTA) 100-62.5-25 mcg/act inhaler INHALE 1 PUFF EVERY DAY. INDICATIONS: CHRONIC OBSTRUCTIVE LUNG DISEASE 200 Each 2    albuterol 108 (90 Base) MCG/ACT Aero Soln inhalation aerosol INHALE 2 PUFFS BY MOUTH EVERY 6 HOURS AS NEEDED FOR SHORTNESS OF BREATH 17 Each 5    ibuprofen (MOTRIN) 200 MG Tab Take 200 mg by mouth every 6 hours as needed for Mild Pain. Pt reports he has been taking 2-3 200 MG tabs PRN 2x/day   Indications: Pain      tolnaftate (ANTIFUNGAL, TOLNAFTATE,) 1 % Cream Apply 1 Application topically 1 time a day as needed (for fungal issue of feet). Indications: fungal infection      Multiple Vitamin (MULTIVITAMIN ADULT PO) Take 1 Tablet by mouth every day. Indications: supplement      Cholecalciferol (D3 PO) Take 1 Capsule by mouth every day. 2 gummies (2000 units)  Indications: supplement       No current facility-administered medications for this visit.   [2]   Patient Active Problem List  Diagnosis    COPD (chronic obstructive pulmonary disease) (HCC)    Dyslipidemia    History of pneumothorax    S/P splenectomy    Preventative health care    Tobacco dependence    History of BRAO (branch retinal artery occlusion)    BPH (benign prostatic hypertrophy)    Adenomatous colon polyp    Leukocytosis    B12 deficiency    History of hypertension    Pulmonary nodule    Atherosclerosis of aorta (HCC)    Elevated coronary artery calcium score    Polycythemia    Insomnia    History of benign positional vertigo    Shoulder pain    Impaired glucose metabolism    S/p hip left ORIF    Osteopenia     Tremor    Eosinophilia

## 2025-07-22 NOTE — Clinical Note
REFERRAL APPROVAL NOTICE         Sent on July 22, 2025                   Narendra Sun  [address] [place] [address]                   Dear Mr. Sun,    After a careful review of the medical information and benefit coverage, [facility] has processed your referral. See below for additional details.    If applicable, you must be actively enrolled with your insurance for coverage of the authorized service. If you have any questions regarding your coverage, please contact your insurance directly.    REFERRAL INFORMATION   Referral #:  [id number]  Referred-To Department    Referred-By Provider:  Pulmonary and Sleep Medicine    Naveen MIGUEL Duncan M.D.   [facility]      [address]   [address]  [place] [id number] [phone] [address]  [address]  [place] [id number] [phone]    Referral Start Date:  07/21/2025  Referral End Date:   07/21/2026             SCHEDULING  If you do not already have an appointment, please call [phone] to make an appointment.     MORE INFORMATION  If you do not already have a [employer] account, sign up at: [email or web address]  You can access your medical information, make appointments, see lab results, billing information, and more.  If you have questions regarding this referral, please contact  the [facility] Referrals department at:             [phone]. Monday - Friday 8:00AM - 5:00PM.     Sincerely,    [facility]

## 2025-07-22 NOTE — Clinical Note
REFERRAL APPROVAL NOTICE         Sent on July 21, 2025                   Narendra Sun  [address] [address]                   Dear Mr. Sun,    After a careful review of the medical information and benefit coverage, [facility] has processed your referral. See below for additional details.    If applicable, you must be actively enrolled with your insurance for coverage of the authorized service. If you have any questions regarding your coverage, please contact your insurance directly.    REFERRAL INFORMATION   Referral #:  [id number]  Referred-To Provider    Referred-By Provider:  Gastroenterology    Naveen MIGUEL Duncan M.D.   [facility]      [address]   [address]  [place] [address]  [phone] [id number]  [place] [address]  [phone]    Referral Start Date:  07/21/2025  Referral End Date:   07/21/2026             SCHEDULING  If you do not already have an appointment, please call [phone] to make an appointment.     MORE INFORMATION  If you do not already have a [employer] account, sign up at: [email or web address]  You can access your medical information, make appointments, see lab results, billing information, and more.  If you have questions regarding this referral, please contact  the [facility] Referrals department at:             [phone]. Monday - Friday 8:00AM - 5:00PM.     Sincerely,    [facility]

## 2025-07-23 PROBLEM — H35.3190 MACULAR DEGENERATION, DRY: Status: ACTIVE | Noted: 2025-07-23

## 2025-07-23 PROBLEM — H26.9 CATARACT: Status: ACTIVE | Noted: 2025-07-23

## 2025-08-12 ENCOUNTER — OFFICE VISIT (OUTPATIENT)
Dept: MEDICAL GROUP | Facility: PHYSICIAN GROUP | Age: 75
End: 2025-08-12
Payer: MEDICARE

## 2025-08-12 VITALS — RESPIRATION RATE: 15 BRPM | WEIGHT: 156 LBS | HEIGHT: 66 IN | BODY MASS INDEX: 25.07 KG/M2

## 2025-08-12 DIAGNOSIS — F17.200 TOBACCO DEPENDENCE: Primary | ICD-10-CM

## 2025-08-12 PROCEDURE — 99407 BEHAV CHNG SMOKING > 10 MIN: CPT | Performed by: NURSE PRACTITIONER

## 2025-08-12 ASSESSMENT — FIBROSIS 4 INDEX: FIB4 SCORE: 1.15

## 2025-08-18 ENCOUNTER — APPOINTMENT (OUTPATIENT)
Dept: MEDICAL GROUP | Facility: MEDICAL CENTER | Age: 75
End: 2025-08-18
Payer: MEDICARE

## 2025-08-19 ENCOUNTER — APPOINTMENT (OUTPATIENT)
Dept: MEDICAL GROUP | Facility: MEDICAL CENTER | Age: 75
End: 2025-08-19
Payer: MEDICARE

## 2025-08-26 VITALS
HEIGHT: 66 IN | HEART RATE: 90 BPM | DIASTOLIC BLOOD PRESSURE: 72 MMHG | WEIGHT: 156 LBS | SYSTOLIC BLOOD PRESSURE: 120 MMHG | BODY MASS INDEX: 25.07 KG/M2 | OXYGEN SATURATION: 96 % | TEMPERATURE: 97.7 F

## 2025-08-26 DIAGNOSIS — F17.200 TOBACCO DEPENDENCE: ICD-10-CM

## 2025-08-26 DIAGNOSIS — E78.5 DYSLIPIDEMIA: Primary | Chronic | ICD-10-CM

## 2025-08-26 DIAGNOSIS — M85.80 OSTEOPENIA, UNSPECIFIED LOCATION: ICD-10-CM

## 2025-08-26 DIAGNOSIS — J41.0 SIMPLE CHRONIC BRONCHITIS (HCC): Chronic | ICD-10-CM

## 2025-08-26 SDOH — ECONOMIC STABILITY: FOOD INSECURITY: HOW HARD IS IT FOR YOU TO PAY FOR THE VERY BASICS LIKE FOOD, HOUSING, MEDICAL CARE, AND HEATING?: NOT HARD AT ALL

## 2025-08-26 SDOH — ECONOMIC STABILITY: HOUSING INSECURITY: IN THE LAST 12 MONTHS, WAS THERE A TIME WHEN YOU WERE NOT ABLE TO PAY THE MORTGAGE OR RENT ON TIME?: NO

## 2025-08-26 SDOH — ECONOMIC STABILITY: HOUSING INSECURITY: AT ANY TIME IN THE PAST 12 MONTHS, WERE YOU HOMELESS OR LIVING IN A SHELTER (INCLUDING NOW)?: NO

## 2025-08-26 SDOH — ECONOMIC STABILITY: FOOD INSECURITY: WITHIN THE PAST 12 MONTHS, YOU WORRIED THAT YOUR FOOD WOULD RUN OUT BEFORE YOU GOT THE MONEY TO BUY MORE.: NEVER TRUE

## 2025-08-26 SDOH — ECONOMIC STABILITY: FOOD INSECURITY: WITHIN THE PAST 12 MONTHS, THE FOOD YOU BOUGHT JUST DIDN'T LAST AND YOU DIDN'T HAVE MONEY TO GET MORE.: NEVER TRUE

## 2025-08-26 SDOH — ECONOMIC STABILITY: HOUSING INSECURITY: IN THE PAST 12 MONTHS, HOW MANY TIMES HAVE YOU MOVED WHERE YOU WERE LIVING?: 0

## 2025-08-26 SDOH — ECONOMIC STABILITY: TRANSPORTATION INSECURITY: IN THE PAST 12 MONTHS, HAS LACK OF TRANSPORTATION KEPT YOU FROM MEDICAL APPOINTMENTS OR FROM GETTING MEDICATIONS?: NO

## 2025-08-26 ASSESSMENT — LIFESTYLE VARIABLES
SKIP TO QUESTIONS 9-10: 1
HOW OFTEN DO YOU HAVE SIX OR MORE DRINKS ON ONE OCCASION: NEVER
HOW OFTEN DO YOU HAVE A DRINK CONTAINING ALCOHOL: 2-4 TIMES A MONTH
HOW MANY STANDARD DRINKS CONTAINING ALCOHOL DO YOU HAVE ON A TYPICAL DAY: 1 OR 2
AUDIT-C TOTAL SCORE: 2

## 2025-08-26 ASSESSMENT — ACTIVITIES OF DAILY LIVING (ADL)
BATHING_REQUIRES_ASSISTANCE: 0
LACK_OF_TRANSPORTATION: NO

## 2025-08-26 ASSESSMENT — FIBROSIS 4 INDEX: FIB4 SCORE: 1.15

## 2025-08-26 ASSESSMENT — PAIN SCALES - GENERAL: PAINLEVEL_OUTOF10: NO PAIN

## 2025-08-26 ASSESSMENT — ENCOUNTER SYMPTOMS: GENERAL WELL-BEING: GOOD

## 2025-08-26 ASSESSMENT — PATIENT HEALTH QUESTIONNAIRE - PHQ9: CLINICAL INTERPRETATION OF PHQ2 SCORE: 0

## 2025-08-27 RX ORDER — ALBUTEROL SULFATE 90 UG/1
2 INHALANT RESPIRATORY (INHALATION) EVERY 6 HOURS PRN
Qty: 17 EACH | Refills: 5 | Status: SHIPPED | OUTPATIENT
Start: 2025-08-27

## 2025-09-29 ENCOUNTER — APPOINTMENT (OUTPATIENT)
Dept: MEDICAL GROUP | Facility: MEDICAL CENTER | Age: 75
End: 2025-09-29
Payer: MEDICARE

## (undated) DEVICE — PACK TOTAL HIP - (1/CA)

## (undated) DEVICE — COVER LIGHT HANDLE ALC PLUS DISP (18EA/BX)

## (undated) DEVICE — GOWN WARMING STANDARD FLEX - (30/CA)

## (undated) DEVICE — SENSOR OXIMETER ADULT SPO2 RD SET (20EA/BX)

## (undated) DEVICE — GLOVE BIOGEL INDICATOR SZ 8 SURGICAL PF LTX - (50/BX 4BX/CA)

## (undated) DEVICE — STAPLER SKIN DISP - (6/BX 10BX/CA) VISISTAT

## (undated) DEVICE — SLEEVE, VASO, THIGH, MED

## (undated) DEVICE — DRESSING POST OP BORDER 4 X 10 (5EA/BX)

## (undated) DEVICE — BLADE SAGITTAL SAW DUAL CUT 75.0 X 25.0MM (1/EA)

## (undated) DEVICE — LACTATED RINGERS INJ 1000 ML - (14EA/CA 60CA/PF)

## (undated) DEVICE — SET LEADWIRE 5 LEAD BEDSIDE DISPOSABLE ECG (1SET OF 5/EA)

## (undated) DEVICE — CHLORAPREP 26 ML APPLICATOR - ORANGE TINT(25/CA)

## (undated) DEVICE — DRAPE STRLE REG TOWEL 18X24 - (10/BX 4BX/CA)"

## (undated) DEVICE — SUTURE 5 TI-CRON HOS-14 - (36/BX)

## (undated) DEVICE — SODIUM CHL IRRIGATION 0.9% 1000ML (12EA/CA)

## (undated) DEVICE — CONNECTOR 5-IN-1 STERILE - (25EA/BX)

## (undated) DEVICE — GLOVE BIOGEL SZ 7.5 SURGICAL PF LTX - (50PR/BX 4BX/CA)

## (undated) DEVICE — SUTURE GENERAL

## (undated) DEVICE — SET EXTENSION WITH 2 PORTS (48EA/CA) ***PART #2C8610 IS A SUBSTITUTE*****

## (undated) DEVICE — SUCTION INSTRUMENT YANKAUER BULBOUS TIP W/O VENT (50EA/CA)

## (undated) DEVICE — SUTURE 2-0 VICRYL PLUS CT-1 - 8 X 18 INCH(12/BX)

## (undated) DEVICE — ELECTRODE DUAL RETURN W/ CORD - (50/PK)

## (undated) DEVICE — CANISTER SUCTION 3000ML MECHANICAL FILTER AUTO SHUTOFF MEDI-VAC NONSTERILE LF DISP  (40EA/CA)

## (undated) DEVICE — SUTURE 1 VICRYL PLUS CTX - 8 X 18 INCH (12/BX)

## (undated) DEVICE — TUBING CLEARLINK DUO-VENT - C-FLO (48EA/CA)